# Patient Record
Sex: MALE | Race: WHITE | NOT HISPANIC OR LATINO | Employment: OTHER | URBAN - METROPOLITAN AREA
[De-identification: names, ages, dates, MRNs, and addresses within clinical notes are randomized per-mention and may not be internally consistent; named-entity substitution may affect disease eponyms.]

---

## 2017-01-04 ENCOUNTER — OFFICE VISIT (OUTPATIENT)
Dept: MEDICAL GROUP | Facility: MEDICAL CENTER | Age: 67
End: 2017-01-04
Payer: MEDICARE

## 2017-01-04 VITALS
HEIGHT: 71 IN | OXYGEN SATURATION: 94 % | TEMPERATURE: 97.2 F | DIASTOLIC BLOOD PRESSURE: 74 MMHG | SYSTOLIC BLOOD PRESSURE: 110 MMHG | RESPIRATION RATE: 16 BRPM | HEART RATE: 66 BPM | BODY MASS INDEX: 29.12 KG/M2 | WEIGHT: 208 LBS

## 2017-01-04 DIAGNOSIS — Z79.4 CONTROLLED TYPE 2 DIABETES MELLITUS WITH COMPLICATION, WITH LONG-TERM CURRENT USE OF INSULIN (HCC): ICD-10-CM

## 2017-01-04 DIAGNOSIS — I10 ESSENTIAL HYPERTENSION: ICD-10-CM

## 2017-01-04 DIAGNOSIS — E78.5 DYSLIPIDEMIA: ICD-10-CM

## 2017-01-04 DIAGNOSIS — F41.1 GAD (GENERALIZED ANXIETY DISORDER): ICD-10-CM

## 2017-01-04 DIAGNOSIS — Z12.5 SCREENING FOR MALIGNANT NEOPLASM OF PROSTATE: ICD-10-CM

## 2017-01-04 DIAGNOSIS — Z00.00 HEALTH CARE MAINTENANCE: ICD-10-CM

## 2017-01-04 DIAGNOSIS — E11.8 CONTROLLED TYPE 2 DIABETES MELLITUS WITH COMPLICATION, WITH LONG-TERM CURRENT USE OF INSULIN (HCC): ICD-10-CM

## 2017-01-04 DIAGNOSIS — I25.10 CORONARY ARTERY DISEASE INVOLVING NATIVE CORONARY ARTERY OF NATIVE HEART WITHOUT ANGINA PECTORIS: ICD-10-CM

## 2017-01-04 DIAGNOSIS — K21.9 GASTROESOPHAGEAL REFLUX DISEASE WITHOUT ESOPHAGITIS: ICD-10-CM

## 2017-01-04 LAB
HBA1C MFR BLD: 7.6 % (ref ?–5.8)
INT CON NEG: NEGATIVE
INT CON POS: POSITIVE

## 2017-01-04 PROCEDURE — 99215 OFFICE O/P EST HI 40 MIN: CPT | Performed by: INTERNAL MEDICINE

## 2017-01-04 PROCEDURE — 83036 HEMOGLOBIN GLYCOSYLATED A1C: CPT | Performed by: INTERNAL MEDICINE

## 2017-01-04 ASSESSMENT — PATIENT HEALTH QUESTIONNAIRE - PHQ9
6. FEELING BAD ABOUT YOURSELF - OR THAT YOU ARE A FAILURE OR HAVE LET YOURSELF OR YOUR FAMILY DOWN: 0
5. POOR APPETITE OR OVEREATING: 0
8. MOVING OR SPEAKING SO SLOWLY THAT OTHER PEOPLE COULD HAVE NOTICED. OR THE OPPOSITE, BEING SO FIGETY OR RESTLESS THAT YOU HAVE BEEN MOVING AROUND A LOT MORE THAN USUAL: 0
4. FEELING TIRED OR HAVING LITTLE ENERGY: 0
SUM OF ALL RESPONSES TO PHQ9 QUESTIONS 1 AND 2: 0
3. TROUBLE FALLING OR STAYING ASLEEP OR SLEEPING TOO MUCH: 2
7. TROUBLE CONCENTRATING ON THINGS, SUCH AS READING THE NEWSPAPER OR WATCHING TELEVISION: 0
2. FEELING DOWN, DEPRESSED, IRRITABLE, OR HOPELESS: 0
9. THOUGHTS THAT YOU WOULD BE BETTER OFF DEAD, OR OF HURTING YOURSELF: 0
SUM OF ALL RESPONSES TO PHQ QUESTIONS 1-9: 2
1. LITTLE INTEREST OR PLEASURE IN DOING THINGS: 0

## 2017-01-04 NOTE — Clinical Note
[facility]  Oxana Parkinson M.D.  [address]  [place] [address]  Fax: [phone] Authorization for Release/Disclosure of Protected Health Information   Name: NIRMALA RENNER : 1950 SSN: [id number]   Address: [address] [place] [address] [address] [place] [address] Phone:    [phone] (home)    I authorize the entity listed below to release/disclose the PHI below to [facility]/Oxana Parkinson M.D.   Provider or Entity Name:                                                 Randy Long M.D.   Address   [facility], [facility], [facility]   Phone:      Fax:  [phone]   Reason for request: continuity of care   Information to be released:    [  ] LAST COLONOSCOPY, including any PATH REPORT [  ] LAST DEXA  [  ] LAST MAMMOGRAM  [  ] LAST PAP [  ] RETINA EXAM REPORT  [  ] IMMUNIZATION RECORDS  [ XXXX ] Release all info      [  ] Check here and initial the line next to each item to release ALL health information INCLUDING  _____ Care and treatment for drug and / or alcohol abuse  _____ HIV testing, infection status, or AIDS  _____ Genetic Testing    DATES OF SERVICE OR TIME PERIOD TO BE DISCLOSED: _____________  I understand and acknowledge that:  * This Authorization may be revoked at any time by you in writing, except if your health information has already been used or disclosed.  * Your health information that will be used or disclosed as a result of you signing this authorization could be re-disclosed by the recipient. If this occurs, your re-disclosed health information may no longer be protected by State or Federal laws.  * You may refuse to sign this Authorization. Your refusal will not affect your ability to obtain treatment.  * This Authorization becomes effective upon signing and will  on (date) __________. If no date is indicated, this Authorization will  one (1) year from the signature date.    Name: Nirmala Renner    Signature:     Date: 2017

## 2017-01-04 NOTE — MR AVS SNAPSHOT
"        Luis Renner   2017 11:00 AM   Office Visit   MRN: [id number]    Department:  [place] [address]   Dept Phone:  [phone]    Description:  Male : 1950   Provider:  Oxana Parkinson M.D.           Reason for Visit     Follow-Up           Allergies as of 2017     Allergen Noted Reactions    Byetta 2013       urticaria    Other Environmental 2016       Grass and cats      You were diagnosed with     Controlled type 2 diabetes mellitus with complication, with long-term current use of insulin ([facility])   [2749467]       Essential hypertension   [3230265]       Coronary artery disease involving native coronary artery of native heart without angina pectoris   [2016279]       Dyslipidemia   [784974]       Screening for malignant neoplasm of prostate   [6590777]       Health care maintenance   [624893]       Gastroesophageal reflux disease without esophagitis   [238106]         Vital Signs     Blood Pressure Pulse Temperature Respirations Height Weight    110/74 mmHg 66 36.2 °C (97.2 °F) 16 1.803 m (5' 10.98\") 94.348 kg (208 lb)    Body Mass Index Oxygen Saturation Smoking Status             29.02 kg/m2 94% Never Smoker          Basic Information     Date Of Birth Sex Race Ethnicity Preferred Language    1950 Male White Non- English      Your appointments     2017  1:00 PM   ACU GROUP with [facility] (--)    [address] [place] [address]   [phone]              Problem List              ICD-10-CM Priority Class Noted - Resolved    Hyperlipidemia (Chronic) E78.5 Medium  3/7/2013 - Present    Coronary atherosclerosis of native coronary artery (Chronic) I25.10 High  3/8/2013 - Present    GOUT (Chronic)  Low  2013 - Present    Ischemic cardiomyopathy I25.5 High  2013 - Present    Bilateral renal cysts N28.1 Low  2015 - Present    Panic attacks F41.0   2016 - Present   " Gastroesophageal reflux disease without esophagitis K21.9   1/7/2016 - Present    Coronary artery disease involving native coronary artery of native heart without angina pectoris I25.10   9/23/2016 - Present    Diabetes mellitus type 2, controlled, with complications (HCC) E11.8   9/23/2016 - Present    Gastroesophageal reflux disease without esophagitis K21.9   1/4/2017 - Present      Health Maintenance        Date Due Completion Dates    IMM ZOSTER VACCINE 5/13/2011 ---    URINE ACR / MICROALBUMIN 5/5/2016 5/5/2015, 3/24/2014    IMM PNEUMOCOCCAL 65+ (ADULT) LOW/MEDIUM RISK SERIES (1 of 2 - PCV13) 5/13/2016 3/11/2013    IMM INFLUENZA (1) 9/1/2016 1/7/2016, 11/6/2013, 9/7/2012, 11/12/2010    A1C SCREENING 1/21/2017 7/21/2016, 4/4/2016, 1/7/2016, 10/2/2015, 5/7/2015, 1/13/2015, 7/29/2014, 3/24/2014, 12/24/2013, 9/16/2013, 5/15/2013, 4/11/2013, 3/13/2013, 3/8/2013    RETINAL SCREENING 3/15/2017 3/15/2016, 10/15/2014 (Done), 11/6/2012 (N/S)    Override on 10/15/2014: Done    Override on 11/6/2012: (N/S)    FASTING LIPID PROFILE 11/19/2017 11/19/2016, 8/17/2016, 8/17/2016, 10/2/2015, 7/29/2014, 12/24/2013, 9/16/2013, 4/11/2013, 3/9/2013    SERUM CREATININE 11/19/2017 11/19/2016, 11/19/2016, 8/17/2016, 8/17/2016, 7/27/2016, 7/26/2016, 7/25/2016, 7/24/2016, 7/23/2016, 7/22/2016, 7/20/2016, 5/5/2015, 1/13/2015, 7/29/2014, 3/24/2014, 12/24/2013, 9/16/2013, 5/15/2013, 4/11/2013, 3/13/2013, 3/12/2013, 3/11/2013, 3/10/2013, 3/9/2013, 3/8/2013, 3/7/2013    DIABETES MONOFILAMTENT / LE EXAM 1/4/2018 1/4/2017 (Done), 9/23/2016 (Done), 1/7/2016, 5/7/2015 (Done), 11/6/2013 (N/S)    Override on 1/4/2017: Done    Override on 9/23/2016: Done    Override on 5/7/2015: Done    Override on 11/6/2013: (N/S)    COLONOSCOPY 11/6/2023 11/6/2013 (N/S)    Override on 11/6/2013: (N/S) (due 2018)    IMM DTaP/Tdap/Td Vaccine (2 - Td) 5/7/2025 5/7/2015            Results     POCT  A1C      Component    Glycohemoglobin    7.6    Internal Control  Negative    Negative    Internal Control Positive    Positive                        Current Immunizations     Influenza TIV (IM) 11/6/2013, 9/7/2012, 11/12/2010    Influenza Vaccine Quad Inj (Pf) 1/7/2016    Pneumococcal polysaccharide vaccine (PPSV-23) 3/11/2013  9:15 PM    Tdap Vaccine 5/7/2015      Below and/or attached are the medications your provider expects you to take. Review all of your home medications and newly ordered medications with your provider and/or pharmacist. Follow medication instructions as directed by your provider and/or pharmacist. Please keep your medication list with you and share with your provider. Update the information when medications are discontinued, doses are changed, or new medications (including over-the-counter products) are added; and carry medication information at all times in the event of emergency situations     Allergies:  BYETTA - (reactions not documented)     OTHER ENVIRONMENTAL - (reactions not documented)               Medications  Valid as of: January 04, 2017 - 11:35 AM    Generic Name Brand Name Tablet Size Instructions for use    Allopurinol (Tab) ZYLOPRIM 300 MG Take 1 Tab by mouth every day. continues to take        Aspirin (Tab) aspirin 81 MG Take 81 mg by mouth every day.        Blood Glucose Monitoring Suppl (Misc) Blood Glucose Monitoring Suppl SUPPLIES Test strips order: Test strips for Freestyle Lite  meter. Sig: use four times a day and prn ssx high or low sugar #100 RF x 3        Carvedilol (Tab) COREG 6.25 MG Take 1 Tab by mouth 2 times a day, with meals.        Citalopram Hydrobromide (Tab) CELEXA 20 MG TAKE ONE TABLET BY MOUTH DAILY -GENERICFOR CELEXA        Clopidogrel Bisulfate (Tab) PLAVIX 75 MG Take 1 Tab by mouth every day.        Colesevelam HCl (Tab) WELCHOL 625 MG Take 1,250 mg by mouth every day at 6 PM.        Colesevelam HCl   Take  by mouth.        Docusate Sodium (Cap) COLACE 100 MG Take 100 mg by mouth 3 times a day as needed for  Constipation. Indications: NOT TAKIN NOT  TAKING        Empagliflozin (Tab) JARDIANCE 10 MG Take 10 mg by mouth every day.        Ezetimibe (Tab) ZETIA 10 MG Take 1 Tab by mouth every day. TAKE ONE TABLET BY MOUTH DAILY        Lancets (Misc) Lancets  1 Each by Does not apply route 3 times a day. Lancets order: Lancets for Freestyle Lite meter. Sig: use three times dailyand prn ssx high or low sugar. #100 RF x 3        Linagliptin-Metformin HCl (Tab) Linagliptin-Metformin HCl 2.5-850 MG Take 1 Tab by mouth 2 Times a Day.        Lisinopril (Tab) PRINIVIL 5 MG Take 1 Tab by mouth every day.        Niacin (Antihyperlipidemic) (Tab CR) NIASPAN 1000 MG Take 2 tablets after dinner meal.        Omeprazole (CAPSULE DELAYED RELEASE) PRILOSEC 20 MG TAKE ONE CAPSULE BY MOUTH DAILY -GENERICFOR PRILOSEC        Polyethylene Glycol 3350 (Powder) MIRALAX  Take 17 g by mouth 1 time daily as needed. Indications: not taking NOT taking        Rosuvastatin Calcium (Tab) CRESTOR 20 MG Take 1 Tab by mouth every evening.        Sennosides-Docusate Sodium (Tab) PERICOLACE or SENOKOT S 8.6-50 MG Take 2 Tabs by mouth 4 times a day as needed for Constipation.        Spironolactone (Tab) ALDACTONE 25 MG Take 1 Tab by mouth every day.        .                 Medicines prescribed today were sent to:     [facility] [address] [place], [place] - [address] [facility]    [address] [facility] [address]    Phone: [phone] Fax: [phone]    Open 24 Hours?: No      Medication refill instructions:       If your prescription bottle indicates you have medication refills left, it is not necessary to call your provider’s office. Please contact your pharmacy and they will refill your medication.    If your prescription bottle indicates you do not have any refills left, you may request refills at any time through one of the following ways: The online [employer] system (except Urgent Care), by calling your provider’s office, or by asking your pharmacy to contact your  provider’s office with a refill request. Medication refills are processed only during regular business hours and may not be available until the next business day. Your provider may request additional information or to have a follow-up visit with you prior to refilling your medication.   *Please Note: Medication refills are assigned a new Rx number when refilled electronically. Your pharmacy may indicate that no refills were authorized even though a new prescription for the same medication is available at the pharmacy. Please request the medicine by name with the pharmacy before contacting your provider for a refill.        Your To Do List     Future Labs/Procedures Complete By Expires    COMP METABOLIC PANEL  As directed 1/5/2018    HEMOGLOBIN A1C  As directed 1/5/2018    LIPID PROFILE  As directed 1/5/2018    MICROALBUMIN CREAT RATIO URINE  As directed 1/5/2018    MICROALBUMIN CREAT RATIO URINE  As directed 1/5/2018    PROSTATE SPECIFIC AG SCREENING  As directed 1/5/2018    VITAMIN D,25 HYDROXY  As directed 1/5/2018         [employer] Access Code: Activation code not generated  Current [employer] Status: Active

## 2017-01-09 DIAGNOSIS — M54.2 NECK PAIN: ICD-10-CM

## 2017-01-09 RX ORDER — METHOCARBAMOL 750 MG/1
750 TABLET, FILM COATED ORAL 3 TIMES DAILY PRN
Qty: 60 TAB | Refills: 2 | Status: SHIPPED | OUTPATIENT
Start: 2017-01-09 | End: 2017-04-04

## 2017-01-27 RX ORDER — OMEPRAZOLE 20 MG/1
20 CAPSULE, DELAYED RELEASE ORAL DAILY
Qty: 30 CAP | Refills: 2 | Status: SHIPPED | OUTPATIENT
Start: 2017-01-27 | End: 2017-06-20 | Stop reason: SDUPTHER

## 2017-02-06 RX ORDER — LINAGLIPTIN AND METFORMIN HYDROCHLORIDE 2.5; 85 MG/1; MG/1
TABLET, FILM COATED ORAL
Qty: 180 TAB | Refills: 0 | Status: SHIPPED | OUTPATIENT
Start: 2017-02-06 | End: 2017-05-12 | Stop reason: SDUPTHER

## 2017-02-06 NOTE — TELEPHONE ENCOUNTER
Was the patient seen in the last year in this department? Yes     Does patient have an active prescription for medications requested? No     Received Request Via: Pharmacy     Last Office Visit: 1/4/17    Last Labs: 11/19/16

## 2017-02-28 ENCOUNTER — APPOINTMENT (OUTPATIENT)
Dept: LAB | Facility: MEDICAL CENTER | Age: 67
End: 2017-02-28
Payer: MEDICARE

## 2017-02-28 ENCOUNTER — HOSPITAL ENCOUNTER (OUTPATIENT)
Dept: LAB | Facility: MEDICAL CENTER | Age: 67
End: 2017-02-28
Attending: INTERNAL MEDICINE
Payer: MEDICARE

## 2017-02-28 DIAGNOSIS — E78.5 HYPERLIPIDEMIA: Chronic | ICD-10-CM

## 2017-02-28 DIAGNOSIS — E78.2 MIXED HYPERLIPIDEMIA: Chronic | ICD-10-CM

## 2017-02-28 LAB
ALBUMIN SERPL BCP-MCNC: 4.4 G/DL (ref 3.2–4.9)
ALBUMIN SERPL BCP-MCNC: 4.5 G/DL (ref 3.2–4.9)
ALBUMIN/GLOB SERPL: 1.3 G/DL
ALBUMIN/GLOB SERPL: 1.3 G/DL
ALP SERPL-CCNC: 62 U/L (ref 30–99)
ALP SERPL-CCNC: 64 U/L (ref 30–99)
ALT SERPL-CCNC: 15 U/L (ref 2–50)
ALT SERPL-CCNC: 16 U/L (ref 2–50)
ANION GAP SERPL CALC-SCNC: 11 MMOL/L (ref 0–11.9)
ANION GAP SERPL CALC-SCNC: 8 MMOL/L (ref 0–11.9)
AST SERPL-CCNC: 17 U/L (ref 12–45)
AST SERPL-CCNC: 18 U/L (ref 12–45)
BILIRUB SERPL-MCNC: 0.8 MG/DL (ref 0.1–1.5)
BILIRUB SERPL-MCNC: 0.8 MG/DL (ref 0.1–1.5)
BUN SERPL-MCNC: 27 MG/DL (ref 8–22)
BUN SERPL-MCNC: 27 MG/DL (ref 8–22)
CALCIUM SERPL-MCNC: 9.8 MG/DL (ref 8.5–10.5)
CALCIUM SERPL-MCNC: 9.9 MG/DL (ref 8.5–10.5)
CHLORIDE SERPL-SCNC: 101 MMOL/L (ref 96–112)
CHLORIDE SERPL-SCNC: 101 MMOL/L (ref 96–112)
CHOLEST SERPL-MCNC: 102 MG/DL (ref 100–199)
CHOLEST SERPL-MCNC: 102 MG/DL (ref 100–199)
CO2 SERPL-SCNC: 25 MMOL/L (ref 20–33)
CO2 SERPL-SCNC: 25 MMOL/L (ref 20–33)
CORTIS SERPL-MCNC: 16 UG/DL (ref 0–23)
CREAT SERPL-MCNC: 1.53 MG/DL (ref 0.5–1.4)
CREAT SERPL-MCNC: 1.53 MG/DL (ref 0.5–1.4)
CREAT UR-MCNC: 124.9 MG/DL
EST. AVERAGE GLUCOSE BLD GHB EST-MCNC: 157 MG/DL
GLOBULIN SER CALC-MCNC: 3.5 G/DL (ref 1.9–3.5)
GLOBULIN SER CALC-MCNC: 3.5 G/DL (ref 1.9–3.5)
GLUCOSE SERPL-MCNC: 147 MG/DL (ref 65–99)
GLUCOSE SERPL-MCNC: 149 MG/DL (ref 65–99)
HBA1C MFR BLD: 7.1 % (ref 0–5.6)
HDLC SERPL-MCNC: 39 MG/DL
HDLC SERPL-MCNC: 39 MG/DL
LDLC SERPL CALC-MCNC: 34 MG/DL
LDLC SERPL CALC-MCNC: 34 MG/DL
POTASSIUM SERPL-SCNC: 4.7 MMOL/L (ref 3.6–5.5)
POTASSIUM SERPL-SCNC: 4.7 MMOL/L (ref 3.6–5.5)
PROT 24H UR-MRATE: 36.7 MG/DL (ref 0–15)
PROT SERPL-MCNC: 7.9 G/DL (ref 6–8.2)
PROT SERPL-MCNC: 8 G/DL (ref 6–8.2)
PROT/CREAT UR: 294 MG/G (ref 15–68)
SODIUM SERPL-SCNC: 134 MMOL/L (ref 135–145)
SODIUM SERPL-SCNC: 137 MMOL/L (ref 135–145)
TRIGL SERPL-MCNC: 144 MG/DL (ref 0–149)
TRIGL SERPL-MCNC: 146 MG/DL (ref 0–149)

## 2017-02-28 PROCEDURE — 80053 COMPREHEN METABOLIC PANEL: CPT | Mod: 91

## 2017-02-28 PROCEDURE — 84156 ASSAY OF PROTEIN URINE: CPT

## 2017-02-28 PROCEDURE — 82340 ASSAY OF CALCIUM IN URINE: CPT

## 2017-02-28 PROCEDURE — 80061 LIPID PANEL: CPT

## 2017-02-28 PROCEDURE — 82088 ASSAY OF ALDOSTERONE: CPT

## 2017-02-28 PROCEDURE — 84105 ASSAY OF URINE PHOSPHORUS: CPT

## 2017-02-28 PROCEDURE — 82533 TOTAL CORTISOL: CPT

## 2017-02-28 PROCEDURE — 83036 HEMOGLOBIN GLYCOSYLATED A1C: CPT | Mod: GA

## 2017-02-28 PROCEDURE — 36415 COLL VENOUS BLD VENIPUNCTURE: CPT

## 2017-02-28 PROCEDURE — 82570 ASSAY OF URINE CREATININE: CPT

## 2017-02-28 PROCEDURE — 80061 LIPID PANEL: CPT | Mod: GA,91

## 2017-02-28 PROCEDURE — 80053 COMPREHEN METABOLIC PANEL: CPT

## 2017-03-02 ENCOUNTER — TELEPHONE (OUTPATIENT)
Dept: CARDIOLOGY | Facility: MEDICAL CENTER | Age: 67
End: 2017-03-02

## 2017-03-02 DIAGNOSIS — I25.5 ISCHEMIC CARDIOMYOPATHY: ICD-10-CM

## 2017-03-02 LAB
ALDOST SERPL-MCNC: 21.2 NG/DL
PHOSPHATE 24H UR-MCNC: 55 MG/DL
PHOSPHATE 24H UR-MRATE: NORMAL MG/D (ref 400–1300)
PHOSPHATE/CREAT 24H UR: 478 MG/G
TOTAL VOLUME 1105: NORMAL ML

## 2017-03-02 NOTE — TELEPHONE ENCOUNTER
----- Message from Monica Morrison M.D. sent at 3/1/2017  3:59 PM PST -----    Worsening renal function  D/c aldactone recheck BMP in 2-3 weeks   F/u with SED[facility] Cuenca

## 2017-03-06 LAB
CALCIUM 24H UR-MCNC: 5.3 MG/DL
CALCIUM 24H UR-MRATE: NORMAL MG/D
CALCIUM/CREAT 24H UR: 46 MG/G (ref 20–240)
CREAT 24H UR-MCNC: 115 MG/DL
CREAT 24H UR-MRATE: NORMAL MG/D (ref 800–2100)
TOTAL VOLUME 1105: NORMAL ML

## 2017-03-20 ENCOUNTER — HOSPITAL ENCOUNTER (OUTPATIENT)
Dept: LAB | Facility: MEDICAL CENTER | Age: 67
End: 2017-03-20
Attending: INTERNAL MEDICINE
Payer: MEDICARE

## 2017-03-20 DIAGNOSIS — I25.5 ISCHEMIC CARDIOMYOPATHY: ICD-10-CM

## 2017-03-20 LAB
ANION GAP SERPL CALC-SCNC: 8 MMOL/L (ref 0–11.9)
BUN SERPL-MCNC: 20 MG/DL (ref 8–22)
CALCIUM SERPL-MCNC: 9.5 MG/DL (ref 8.4–10.2)
CHLORIDE SERPL-SCNC: 103 MMOL/L (ref 96–112)
CO2 SERPL-SCNC: 27 MMOL/L (ref 20–33)
CREAT SERPL-MCNC: 1.23 MG/DL (ref 0.5–1.4)
GFR SERPL CREATININE-BSD FRML MDRD: 59 ML/MIN/1.73 M 2
GLUCOSE SERPL-MCNC: 144 MG/DL (ref 65–99)
POTASSIUM SERPL-SCNC: 4 MMOL/L (ref 3.6–5.5)
SODIUM SERPL-SCNC: 138 MMOL/L (ref 135–145)

## 2017-03-20 PROCEDURE — 36415 COLL VENOUS BLD VENIPUNCTURE: CPT

## 2017-03-20 PROCEDURE — 80048 BASIC METABOLIC PNL TOTAL CA: CPT

## 2017-03-31 ENCOUNTER — HOSPITAL ENCOUNTER (OUTPATIENT)
Dept: RADIOLOGY | Facility: MEDICAL CENTER | Age: 67
End: 2017-03-31
Attending: PHYSICAL MEDICINE & REHABILITATION
Payer: MEDICARE

## 2017-03-31 DIAGNOSIS — M47.812 CERVICAL SPONDYLOSIS WITHOUT MYELOPATHY: ICD-10-CM

## 2017-03-31 DIAGNOSIS — S13.9XXA SPRAIN OF NECK, INITIAL ENCOUNTER: ICD-10-CM

## 2017-03-31 PROCEDURE — 72141 MRI NECK SPINE W/O DYE: CPT

## 2017-04-04 ENCOUNTER — OFFICE VISIT (OUTPATIENT)
Dept: MEDICAL GROUP | Facility: MEDICAL CENTER | Age: 67
End: 2017-04-04
Payer: MEDICARE

## 2017-04-04 VITALS
SYSTOLIC BLOOD PRESSURE: 126 MMHG | WEIGHT: 209.66 LBS | BODY MASS INDEX: 29.35 KG/M2 | DIASTOLIC BLOOD PRESSURE: 78 MMHG | HEART RATE: 73 BPM | TEMPERATURE: 97.3 F | RESPIRATION RATE: 14 BRPM | OXYGEN SATURATION: 94 % | HEIGHT: 71 IN

## 2017-04-04 DIAGNOSIS — Z00.00 HEALTH CARE MAINTENANCE: ICD-10-CM

## 2017-04-04 DIAGNOSIS — Z95.1 S/P CABG X 3: ICD-10-CM

## 2017-04-04 DIAGNOSIS — F41.1 GAD (GENERALIZED ANXIETY DISORDER): ICD-10-CM

## 2017-04-04 DIAGNOSIS — E78.5 HYPERLIPIDEMIA, UNSPECIFIED HYPERLIPIDEMIA TYPE: Chronic | ICD-10-CM

## 2017-04-04 DIAGNOSIS — D64.9 ANEMIA, UNSPECIFIED TYPE: ICD-10-CM

## 2017-04-04 DIAGNOSIS — Z12.5 SCREENING FOR MALIGNANT NEOPLASM OF PROSTATE: ICD-10-CM

## 2017-04-04 DIAGNOSIS — I25.10 ATHEROSCLEROSIS OF NATIVE CORONARY ARTERY OF NATIVE HEART WITHOUT ANGINA PECTORIS: Chronic | ICD-10-CM

## 2017-04-04 DIAGNOSIS — I25.5 ISCHEMIC CARDIOMYOPATHY: ICD-10-CM

## 2017-04-04 DIAGNOSIS — E11.8 CONTROLLED TYPE 2 DIABETES MELLITUS WITH COMPLICATION, WITHOUT LONG-TERM CURRENT USE OF INSULIN (HCC): ICD-10-CM

## 2017-04-04 DIAGNOSIS — I25.10 CORONARY ARTERY DISEASE INVOLVING NATIVE CORONARY ARTERY OF NATIVE HEART WITHOUT ANGINA PECTORIS: ICD-10-CM

## 2017-04-04 PROCEDURE — 99214 OFFICE O/P EST MOD 30 MIN: CPT | Performed by: INTERNAL MEDICINE

## 2017-04-04 ASSESSMENT — PATIENT HEALTH QUESTIONNAIRE - PHQ9
2. FEELING DOWN, DEPRESSED, IRRITABLE, OR HOPELESS: 0
SUM OF ALL RESPONSES TO PHQ QUESTIONS 1-9: 0
SUM OF ALL RESPONSES TO PHQ9 QUESTIONS 1 AND 2: 0
6. FEELING BAD ABOUT YOURSELF - OR THAT YOU ARE A FAILURE OR HAVE LET YOURSELF OR YOUR FAMILY DOWN: 0
1. LITTLE INTEREST OR PLEASURE IN DOING THINGS: 0
5. POOR APPETITE OR OVEREATING: 0
4. FEELING TIRED OR HAVING LITTLE ENERGY: 0
9. THOUGHTS THAT YOU WOULD BE BETTER OFF DEAD, OR OF HURTING YOURSELF: 0
7. TROUBLE CONCENTRATING ON THINGS, SUCH AS READING THE NEWSPAPER OR WATCHING TELEVISION: 0
8. MOVING OR SPEAKING SO SLOWLY THAT OTHER PEOPLE COULD HAVE NOTICED. OR THE OPPOSITE, BEING SO FIGETY OR RESTLESS THAT YOU HAVE BEEN MOVING AROUND A LOT MORE THAN USUAL: 0
3. TROUBLE FALLING OR STAYING ASLEEP OR SLEEPING TOO MUCH: 0

## 2017-04-04 NOTE — MR AVS SNAPSHOT
"        Luis Renner   2017 1:00 PM   Office Visit   MRN: [id number]    Department:  [place] [address]   Dept Phone:  [phone]    Description:  Male : 1950   Provider:  Oxana Parkinson M.D.           Reason for Visit     Follow-Up           Allergies as of 2017     Allergen Noted Reactions    Byetta 2013       urticaria    Other Environmental 2016       Grass and cats      You were diagnosed with     Controlled type 2 diabetes mellitus with complication, without long-term current use of insulin (CMS-[facility])   [1818931]       Coronary artery disease involving native coronary artery of native heart without angina pectoris   [9965214]       Atherosclerosis of native coronary artery of native heart without angina pectoris   [9820152]       Ischemic cardiomyopathy   [045197]       S/P CABG x 3   [651167]       Hyperlipidemia, unspecified hyperlipidemia type   [9180774]       Screening for malignant neoplasm of prostate   [5680140]       Health care maintenance   [035783]       Anemia, unspecified type   [1506353]       TEMO (generalized anxiety disorder)   [574904]         Vital Signs     Blood Pressure Pulse Temperature Respirations Height Weight    126/78 mmHg 73 36.3 °C (97.3 °F) 14 1.803 m (5' 11\") 95.1 kg (209 lb 10.5 oz)    Body Mass Index Oxygen Saturation Smoking Status             29.25 kg/m2 94% Never Smoker          Basic Information     Date Of Birth Sex Race Ethnicity Preferred Language    1950 Male White Non- English      Your appointments     2017  2:00 PM   ANNUAL EXAM PREVENTATIVE with Oxana Parkinson M.D.   [facility]    [address]  [address]  [place] [id number] [phone]              Problem List              ICD-10-CM Priority Class Noted - Resolved    Hyperlipidemia (Chronic) E78.5 Medium  3/7/2013 - Present    Coronary atherosclerosis of native coronary artery (Chronic) I25.10 " High  3/8/2013 - Present    GOUT (Chronic)  Low  5/20/2013 - Present    Ischemic cardiomyopathy I25.5 High  7/23/2013 - Present    Bilateral renal cysts N28.1 Low  1/14/2015 - Present    Panic attacks F41.0   1/7/2016 - Present    Gastroesophageal reflux disease without esophagitis K21.9   1/7/2016 - Present    Coronary artery disease involving native coronary artery of native heart without angina pectoris I25.10   9/23/2016 - Present    Diabetes mellitus type 2, controlled, with complications (CMS-HCC) E11.8   9/23/2016 - Present    Gastroesophageal reflux disease without esophagitis K21.9   1/4/2017 - Present    S/P CABG x 3 Z95.1   4/4/2017 - Present    TEMO (generalized anxiety disorder) F41.1   4/4/2017 - Present      Health Maintenance        Date Due Completion Dates    IMM ZOSTER VACCINE 5/13/2011 ---    IMM PNEUMOCOCCAL 65+ (ADULT) LOW/MEDIUM RISK SERIES (1 of 2 - PCV13) 5/13/2016 3/11/2013    RETINAL SCREENING 3/15/2017 3/15/2016, 10/15/2014 (Done), 11/6/2012 (N/S)    Override on 10/15/2014: Done    Override on 11/6/2012: (N/S)    A1C SCREENING 8/28/2017 2/28/2017, 1/4/2017, 7/21/2016, 4/4/2016, 1/7/2016, 10/2/2015, 5/7/2015, 1/13/2015, 7/29/2014, 3/24/2014, 12/24/2013, 9/16/2013, 5/15/2013, 4/11/2013, 3/13/2013, 3/8/2013    FASTING LIPID PROFILE 2/28/2018 2/28/2017, 2/28/2017, 11/19/2016, 8/17/2016, 8/17/2016, 10/2/2015, 7/29/2014, 12/24/2013, 9/16/2013, 4/11/2013, 3/9/2013    URINE ACR / MICROALBUMIN 2/28/2018 2/28/2017 (Done), 5/5/2015, 3/24/2014    Override on 2/28/2017: Done    SERUM CREATININE 3/20/2018 3/20/2017, 2/28/2017, 2/28/2017, 11/19/2016, 11/19/2016, 8/17/2016, 8/17/2016, 7/27/2016, 7/26/2016, 7/25/2016, 7/24/2016, 7/23/2016, 7/22/2016, 7/20/2016, 5/5/2015, 1/13/2015, 7/29/2014, 3/24/2014, 12/24/2013, 9/16/2013, 5/15/2013, 4/11/2013, 3/13/2013, 3/12/2013, 3/11/2013, 3/10/2013, 3/9/2013, 3/8/2013, 3/7/2013    DIABETES MONOFILAMENT / LE EXAM 4/4/2018 4/4/2017 (Done), 1/4/2017 (Done), 9/23/2016  (Done), 1/7/2016, 5/7/2015 (Done), 11/6/2013 (N/S)    Override on 4/4/2017: Done    Override on 1/4/2017: Done    Override on 9/23/2016: Done    Override on 5/7/2015: Done    Override on 11/6/2013: (N/S)    COLONOSCOPY 11/6/2023 11/6/2013 (N/S)    Override on 11/6/2013: (N/S) (due 2018)    IMM DTaP/Tdap/Td Vaccine (2 - Td) 5/7/2025 5/7/2015            Current Immunizations     Influenza TIV (IM) 11/6/2013, 9/7/2012, 11/12/2010    Influenza Vaccine Quad Inj (Pf) 1/7/2016    Pneumococcal polysaccharide vaccine (PPSV-23) 3/11/2013  9:15 PM    Tdap Vaccine 5/7/2015      Below and/or attached are the medications your provider expects you to take. Review all of your home medications and newly ordered medications with your provider and/or pharmacist. Follow medication instructions as directed by your provider and/or pharmacist. Please keep your medication list with you and share with your provider. Update the information when medications are discontinued, doses are changed, or new medications (including over-the-counter products) are added; and carry medication information at all times in the event of emergency situations     Allergies:  BYETTA - (reactions not documented)     OTHER ENVIRONMENTAL - (reactions not documented)               Medications  Valid as of: April 04, 2017 -  1:29 PM    Generic Name Brand Name Tablet Size Instructions for use    Allopurinol (Tab) ZYLOPRIM 300 MG Take 1 Tab by mouth every day. continues to take        Aspirin (Tab) aspirin 81 MG Take 81 mg by mouth every day.        Blood Glucose Monitoring Suppl (Misc) Blood Glucose Monitoring Suppl SUPPLIES Test strips order: Test strips for Freestyle Lite  meter. Sig: use four times a day and prn ssx high or low sugar #100 RF x 3        Carvedilol (Tab) COREG 6.25 MG Take 1 Tab by mouth 2 times a day, with meals.        Citalopram Hydrobromide (Tab) CELEXA 20 MG TAKE ONE TABLET BY MOUTH DAILY -GENERICFOR CELEXA        Clopidogrel Bisulfate (Tab)  PLAVIX 75 MG Take 1 Tab by mouth every day.        Colesevelam HCl   Take  by mouth.        Docusate Sodium (Cap) COLACE 100 MG Take 100 mg by mouth 3 times a day as needed for Constipation. Indications: NOT TAKIN NOT  TAKING        Empagliflozin (Tab) JARDIANCE 10 MG Take 10 mg by mouth every day.        Ezetimibe (Tab) ZETIA 10 MG Take 1 Tab by mouth every day. TAKE ONE TABLET BY MOUTH DAILY        Lancets (Misc) Lancets  1 Each by Does not apply route 3 times a day. Lancets order: Lancets for Freestyle Lite meter. Sig: use three times dailyand prn ssx high or low sugar. #100 RF x 3        Linagliptin-Metformin HCl (Tab) JENTADUETO 2.5-850 MG TAKE ONE TABLET BY MOUTH TWICE A DAY        Lisinopril (Tab) PRINIVIL 5 MG Take 1 Tab by mouth every day.        Omeprazole (CAPSULE DELAYED RELEASE) PRILOSEC 20 MG Take 1 Cap by mouth every day.        Polyethylene Glycol 3350 (Powder) MIRALAX  Take 17 g by mouth 1 time daily as needed. Indications: not taking NOT taking        Rosuvastatin Calcium (Tab) CRESTOR 20 MG Take 1 Tab by mouth every evening.        Sennosides-Docusate Sodium (Tab) PERICOLACE or SENOKOT S 8.6-50 MG Take 2 Tabs by mouth 4 times a day as needed for Constipation.        .                 Medicines prescribed today were sent to:     [facility] [address] [place], [place] - [address] [facility]    [address] [facility] [address]    Phone: [phone] Fax: [phone]    Open 24 Hours?: No      Medication refill instructions:       If your prescription bottle indicates you have medication refills left, it is not necessary to call your provider’s office. Please contact your pharmacy and they will refill your medication.    If your prescription bottle indicates you do not have any refills left, you may request refills at any time through one of the following ways: The online [employer] system (except Urgent Care), by calling your provider’s office, or by asking your pharmacy to contact your provider’s office with a  refill request. Medication refills are processed only during regular business hours and may not be available until the next business day. Your provider may request additional information or to have a follow-up visit with you prior to refilling your medication.   *Please Note: Medication refills are assigned a new Rx number when refilled electronically. Your pharmacy may indicate that no refills were authorized even though a new prescription for the same medication is available at the pharmacy. Please request the medicine by name with the pharmacy before contacting your provider for a refill.        Your To Do List     Future Labs/Procedures Complete By Expires    BASIC METABOLIC PANEL  As directed 4/5/2018    CBC WITH DIFFERENTIAL  As directed 4/5/2018    HEMOGLOBIN A1C  As directed 4/5/2018    PROSTATE SPECIFIC AG SCREENING  As directed 4/5/2018    VITAMIN D,25 HYDROXY  As directed 4/5/2018      Referral     A referral request has been sent to our patient care coordination department. Please allow 3-5 business days for us to process this request and contact you either by phone or mail. If you do not hear from us by the 5th business day, please call us at [phone].           [employer] Access Code: Activation code not generated  Current [employer] Status: Active

## 2017-04-04 NOTE — PROGRESS NOTES
CHIEF COMPLAINT  Chief Complaint   Patient presents with   • Follow-Up   DM    HPI  Patient is a 66 y.o. male patient who presents today for the following     DIABETES MELLITUS TYPE 2, with CKD stage III  Onset:  DM2 for 10 yrs.    Insulin/meds: Jardiance, 10 mg QD; jentadueto 2.5/850 mg BIDF  Used meds as prescribed.    Compliance to meds: yes  Checking feet daily/wear soft socks/shoes: yes    The last A1c: 7.1 today    Checking blood sugar: yes, once daily  Mean BS: postmeal in 180'  Hypoglycemia:  one remote episode   Symptoms of hypoglycemia: sweating, fatigue, pale skin, hunger.    ASA: yes  ACE: yes  Statin: yes, welcol    Diet: regular  Exercise: daily  Body mass index is 29    DIABETES COMPLICATIONS:   Peripheral neuropathy:  No numbness or tingling sensation in the feet.  Retinopathy: No evidence of retinopathy. Last eye exam: 3/2016  Nephropathy: Last microalbumin in 2015              - decreased GFR, stage III  CVS: Denies symptoms of CAD (CP/pressure, exertional dyspnea, edema).    GI: No symptoms of gastropathy (nausea/vomiting).    FH of DM:  none    HYPERTENSION / CAD (st post CABG in 7/16), cardiomyopathy  The patient is on Lisinopril, 5 mg QD, spironolactone, 25 mg QD, carvedilol, 6.25 mg BID; taking as prescribed.    He is not measuring BP at home.  Denies: - headaches, vision problems, tinnitus.  - chest pain/pressure, palpitations, irregular heart beats, exertional, dyspnea, peripheral edema.  Low salt diet: yes  Diet / exercise BMI: as above    FH: multiple    HYPERLIPIDEMIA / LIPID PROFILE  Meds: Crestor, 20 mg QD, Welchol, 625 mg QD. No muscle weakness, cramps, nausea,abdominal discomfort.    Diet / exercise BMI: as above    FH: 2 brothers, father  He has been f/u by cardiology.    Anemia  He developed anemia after CABG .  Denies fatigue, dizziness, lightheadedness, anorexia.     Anxiety  He she has history of anxiety / panic attacks, has been on Celexa for > 10 yrs, currently on 10 mg every  other.        TEMO-7 score:  Points   1. Feeling nervous, anxious, or on edge  0   2. Not being able to stop or control worrying 0   3. Worrying too much about different things 0   4. Trouble relaxing 0   5. Being so restless that it is hard to sit still 0   6. Becoming easily annoyed or irritable 0   7. Feeling afraid as if something awful might happen 0   Total score 0     Depression screen:   1. Little interest or pleasure in doing things:  0  2. Feeling down, depressed, or hopeless:    0     Reviewed PMH, PSH, FH, SH, ALL, HCM/IMM, no changes  Reviewed MEDS, no changes    Patient Active Problem List    Diagnosis Date Noted   • Ischemic cardiomyopathy 07/23/2013     Priority: High   • Coronary atherosclerosis of native coronary artery 03/08/2013     Priority: High   • Hyperlipidemia 03/07/2013     Priority: Medium   • Bilateral renal cysts 01/14/2015     Priority: Low   • GOUT 05/20/2013     Priority: Low   • Gastroesophageal reflux disease without esophagitis 01/04/2017   • Coronary artery disease involving native coronary artery of native heart without angina pectoris 09/23/2016   • Diabetes mellitus type 2, controlled, with complications (CMS-[facility]) 09/23/2016   • Panic attacks 01/07/2016   • Gastroesophageal reflux disease without esophagitis 01/07/2016     CURRENT MEDICATIONS  Current Outpatient Prescriptions   Medication Sig Dispense Refill   • JENTADUETO 2.5-850 MG Tab TAKE ONE TABLET BY MOUTH TWICE A  Tab 0   • omeprazole (PRILOSEC) 20 MG delayed-release capsule Take 1 Cap by mouth every day. 30 Cap 2   • methocarbamol (ROBAXIN) 750 MG Tab Take 1 Tab by mouth 3 times a day as needed. 60 Tab 2   • Colesevelam HCl (WELCHOL PO) Take  by mouth.     • clopidogrel (PLAVIX) 75 MG Tab Take 1 Tab by mouth every day. 30 Tab 11   • JARDIANCE 10 MG Tab Take 10 mg by mouth every day.     • carvedilol (COREG) 6.25 MG Tab Take 1 Tab by mouth 2 times a day, with meals. 180 Tab 3   • lisinopril (PRINIVIL) 5 MG Tab  Take 1 Tab by mouth every day. 90 Tab 3   • spironolactone (ALDACTONE) 25 MG Tab Take 1 Tab by mouth every day. 90 Tab 3   • ezetimibe (ZETIA) 10 MG Tab Take 1 Tab by mouth every day. TAKE ONE TABLET BY MOUTH DAILY 90 Tab 1   • allopurinol (ZYLOPRIM) 300 MG Tab Take 1 Tab by mouth every day. continues to take 90 Tab 0   • citalopram (CELEXA) 20 MG Tab TAKE ONE TABLET BY MOUTH DAILY -GENERICFOR CELEXA 30 Tab 2   • niacin (NIASPAN) 1000 MG CR tablet Take 2 tablets after dinner meal. (Patient not taking: Reported on 1/4/2017) 60 Tab 6   • docusate sodium (COLACE) 100 MG Cap Take 100 mg by mouth 3 times a day as needed for Constipation. Indications: NOT TAKIN NOT  TAKING     • senna-docusate (PERICOLACE OR SENOKOT S) 8.6-50 MG Tab Take 2 Tabs by mouth 4 times a day as needed for Constipation.     • polyethylene glycol 3350 (MIRALAX) Powder Take 17 g by mouth 1 time daily as needed. Indications: not taking NOT taking     • colesevelam (WELCHOL) 625 MG Tab Take 1,250 mg by mouth every day at 6 PM.     • rosuvastatin (CRESTOR) 20 MG Tab Take 1 Tab by mouth every evening. 30 Tab 11   • Blood Glucose Monitoring Suppl SUPPLIES Misc Test strips order: Test strips for Freestyle Lite  meter. Sig: use four times a day and prn ssx high or low sugar #100 RF x 3 300 Each 1   • Lancets Misc 1 Each by Does not apply route 3 times a day. Lancets order: Lancets for Freestyle Lite meter. Sig: use three times dailyand prn ssx high or low sugar. #100 RF x 3 100 Each 3   • aspirin 81 MG tablet Take 81 mg by mouth every day.       No current facility-administered medications for this visit.     ALLERGIES  Allergies: Byetta and Other environmental  PAST MEDICAL HISTORY  Past Medical History   Diagnosis Date   • Chronic anticoagulation    • Indigestion    • Left ventricular apical thrombus (CMS-HCC) March 2013     Inferoapical clot measuring 1.1cm x 0.8cm, started on anticoagulation.   • Hyperlipidemia     • CAD (coronary artery disease) March       ACS. PCI/ANA PAULA x 2 of the LAD (2.5 x 12mm - mid; 2.75 x 16mm - proximal). Distal RCA is occluded; distal circumflex is occluded with good collateralization.   • Bilateral renal cysts    • Bronchitis    • DIABETES MELLITUS       oral diet and insulin   • Myocardial infarct (CMS-[facility]) 3/7/2013   • Psychiatric problem      anxiety   • C. difficile diarrhea      pt with hx of c diff after hospitalization [id number]   • Hypertension      well controlled on meds   • Backpain      -7/10   • Heart burn      SURGICAL HISTORY  He  has past surgical history that includes other neurological surg (); other cardiac surgery (); cholecystectomy (); recovery (2016); multiple coronary artery bypass endo vein harvest (2016); and miller (2016).  SOCIAL HISTORY  Social History   Substance Use Topics   • Smoking status: Never Smoker    • Smokeless tobacco: Never Used   • Alcohol Use: No     Social History     Social History Narrative     FAMILY HISTORY  Family History   Problem Relation Age of Onset   • Cancer Mother    • Diabetes Neg Hx    • Hyperlipidemia Brother      x 2   • Hypertension Father    • Hypertension Brother      x2   • Heart Disease Brother      x2, afib; cad x 1     Family Status   Relation Status Death Age   • Mother     • Father       ROS   Constitutional: Negative for fever, chills.  HENT: Negative for congestion, sore throat.  Eyes: Negative for blurred vision.   Respiratory: Negative for cough, shortness of breath.  Cardiovascular: Negative for chest pain, palpitations. And per HPI.  Gastrointestinal: Negative for heartburn, nausea, abdominal pain.   Genitourinary: Negative for dysuria.  Musculoskeletal: Negative for significant myalgias, back pain and joint pain.   Skin: Negative for rash and itching.   Neuro: Negative for dizziness, weakness and headaches.   Endo/Heme/Allergies: Does not bruise/bleed easily. And per HPI.  Psychiatric/Behavioral: As above    PHYSICAL EXAM  "  Blood pressure 126/78, pulse 73, temperature 36.3 °C (97.4 °F), height 1.803 m (5' 11\"), weight 95.1 kg (209 lb 10.5 oz), SpO2 94 %. Body mass index is 29.25 kg/(m^2).  General:  NAD, well appearing  HEENT:   NC/AT, PERRLA, EOMI, TMs are clear. Oropharyngeal mucosa is pink,  without lesions;  no cervical / supraclavicular  lymphadenopathy, no thyromegaly.    Cardiovascular: RRR.   No m/r/g. No carotid bruits .      Lungs:   CTAB, no w/r/r, no respiratory distress.  Abdomen: Soft, NT/ND + BS; no suprapubic tenderness; no masses or hepatosplenomegaly.  Extremities:  2+ DP and radial pulses bilaterally.  No c/c/e.   Skin:  Warm, dry.  No erythema. No rash.   Neurologic: Alert & oriented x 3. No focal deficits.  Psychiatric:  Affect normal, mood normal, judgment normal.    LABS     Labs are reviewed and discussed with a patient    Lab Results   Component Value Date/Time    CHOLESTEROL, 02/28/2017 08:56 AM    CHOLESTEROL, 02/28/2017 08:56 AM    LDL 34 02/28/2017 08:56 AM    LDL 34 02/28/2017 08:56 AM    HDL 39* 02/28/2017 08:56 AM    HDL 39* 02/28/2017 08:56 AM    TRIGLYCERIDES 146 02/28/2017 08:56 AM    TRIGLYCERIDES 144 02/28/2017 08:56 AM       Lab Results   Component Value Date/Time    SODIUM 138 03/20/2017 07:59 AM    POTASSIUM 4.0 03/20/2017 07:59 AM    CHLORIDE 103 03/20/2017 07:59 AM    CO2 27 03/20/2017 07:59 AM    GLUCOSE 144* 03/20/2017 07:59 AM    BUN 20 03/20/2017 07:59 AM    CREATININE 1.23 03/20/2017 07:59 AM     Lab Results   Component Value Date/Time    ALKALINE PHOSPHATASE 64 02/28/2017 08:56 AM    ALKALINE PHOSPHATASE 62 02/28/2017 08:56 AM    AST(SGOT) 17 02/28/2017 08:56 AM    AST(SGOT) 18 02/28/2017 08:56 AM    ALT(SGPT) 15 02/28/2017 08:56 AM    ALT(SGPT) 16 02/28/2017 08:56 AM    TOTAL BILIRUBIN 0.8 02/28/2017 08:56 AM    TOTAL BILIRUBIN 0.8 02/28/2017 08:56 AM      Lab Results   Component Value Date/Time    GLYCOHEMOGLOBIN 7.1* 02/28/2017 08:56 AM    GLYCOHEMOGLOBIN 7.6 01/04/2017 " 11:23 AM    GLYCOHEMOGLOBIN 7.1* 07/21/2016 06:51 PM     No results found for: TSH  Lab Results   Component Value Date/Time    FREE T-4 0.82 03/08/2013 01:00 AM       Lab Results   Component Value Date/Time    WBC 10.2 07/27/2016 02:15 AM    RBC 3.56* 07/27/2016 02:15 AM    HEMOGLOBIN 10.1* 07/27/2016 02:15 AM    HEMATOCRIT 29.8* 07/27/2016 02:15 AM    MCV 83.7 07/27/2016 02:15 AM    MCH 28.4 07/27/2016 02:15 AM    MCHC 33.9 07/27/2016 02:15 AM    MPV 11.1 07/27/2016 02:15 AM    NEUTROPHILS-POLYS 65.1 09/16/2013 01:13 PM    LYMPHOCYTES 23.8 09/16/2013 01:13 PM    MONOCYTES 5.7 09/16/2013 01:13 PM    EOSINOPHILS 4.2 09/16/2013 01:13 PM    BASOPHILS 1.2 09/16/2013 01:13 PM    HYPOCHROMIA 1+ 09/16/2013 01:13 PM      IMAGING     None    ASSESMENT AND PLAN        1. Controlled type 2 diabetes mellitus with complication, without long-term current use of insulin (CMS-HCC)  Controlled, continue current treatments  - REFERRAL FOR RETINAL SCREENING EXAM  - BASIC METABOLIC PANEL; Future  - HEMOGLOBIN A1C; Future    2. Coronary artery disease involving native coronary artery of native heart without angina pectoris  3. Atherosclerosis of native coronary artery of native heart without angina pectoris  4. Ischemic cardiomyopathy  5. S/P CABG x 3  Controlled, asymptomatic, continue current treatment and cardiology follow-up    6. Hyperlipidemia, unspecified hyperlipidemia type  Controlled, continue current treatment    7. Anemia, unspecified type  Follow-up CBC  - CBC WITH DIFFERENTIAL; Future    8. TEMO (generalized anxiety disorder)  Controlled, continue current treatment regimen    9. Screening for malignant neoplasm of prostate  - PROSTATE SPECIFIC AG SCREENING; Future    10. Health care maintenance  - VITAMIN D,25 HYDROXY; Future    Counseling:   - Smoking:  Nonsmoker    Followup: Return in about 3 months (around 7/4/2017) for Short.    All questions are answered.    Please note that this dictation was created using voice  recognition software, and that there might be errors of mary and possibly content.

## 2017-04-12 DIAGNOSIS — Z76.0 MEDICATION REFILL: ICD-10-CM

## 2017-04-12 RX ORDER — CITALOPRAM 20 MG/1
TABLET ORAL
Qty: 30 TAB | Refills: 2 | Status: SHIPPED | OUTPATIENT
Start: 2017-04-12 | End: 2017-08-25 | Stop reason: SDUPTHER

## 2017-05-01 RX ORDER — ALLOPURINOL 300 MG/1
TABLET ORAL
Qty: 90 TAB | Refills: 1 | Status: SHIPPED | OUTPATIENT
Start: 2017-05-01 | End: 2017-10-23 | Stop reason: SDUPTHER

## 2017-05-08 ENCOUNTER — PATIENT OUTREACH (OUTPATIENT)
Dept: HEALTH INFORMATION MANAGEMENT | Facility: OTHER | Age: 67
End: 2017-05-08

## 2017-05-12 RX ORDER — LINAGLIPTIN AND METFORMIN HYDROCHLORIDE 2.5; 85 MG/1; MG/1
TABLET, FILM COATED ORAL
Qty: 180 TAB | Refills: 3 | Status: SHIPPED | OUTPATIENT
Start: 2017-05-12 | End: 2018-05-22

## 2017-05-18 NOTE — PROGRESS NOTES
Outcome: Left Message    WebIZ Checked & Epic Updated:  yes    [facility] Verified: no    Attempt # 2

## 2017-05-22 NOTE — PROGRESS NOTES
Attempt #:3    WebIZ Checked & Epic Updated: yes  [facility] Verified: NO  Verify PCP: yes    Communication Preference Obtained: yes     Review Care Team: yes    Annual Wellness Visit Scheduling  1. Scheduling Status:Scheduled     Care Gap Scheduling (Attempt to Schedule EACH Overdue Care Gap!)     Health Maintenance Due   Topic Date Due   • Annual Wellness Visit  SCHEDULED   • IMM ZOSTER VACCINE  SCHEDULED   • IMM PNEUMOCOCCAL 65+ (ADULT) LOW/MEDIUM RISK SERIES (1 of 2 - PCV13) SCHEDULED   • RETINAL SCREENING  PT HAS APPT SCHEDULED-PER PT         MyChart Activation: already active  MyChart Jonathan: no  Virtual Visits: no  Opt In to Text Messages: no

## 2017-06-06 ENCOUNTER — OFFICE VISIT (OUTPATIENT)
Dept: MEDICAL GROUP | Facility: MEDICAL CENTER | Age: 67
End: 2017-06-06
Payer: MEDICARE

## 2017-06-06 VITALS
SYSTOLIC BLOOD PRESSURE: 116 MMHG | OXYGEN SATURATION: 94 % | DIASTOLIC BLOOD PRESSURE: 78 MMHG | HEIGHT: 71 IN | HEART RATE: 85 BPM | WEIGHT: 214 LBS | TEMPERATURE: 98.1 F | BODY MASS INDEX: 29.96 KG/M2

## 2017-06-06 DIAGNOSIS — N28.1 BILATERAL RENAL CYSTS: ICD-10-CM

## 2017-06-06 DIAGNOSIS — K21.9 GASTROESOPHAGEAL REFLUX DISEASE WITHOUT ESOPHAGITIS: ICD-10-CM

## 2017-06-06 DIAGNOSIS — E11.8 CONTROLLED TYPE 2 DIABETES MELLITUS WITH COMPLICATION, WITHOUT LONG-TERM CURRENT USE OF INSULIN (HCC): ICD-10-CM

## 2017-06-06 DIAGNOSIS — E78.5 HYPERLIPIDEMIA, UNSPECIFIED HYPERLIPIDEMIA TYPE: Chronic | ICD-10-CM

## 2017-06-06 DIAGNOSIS — Z00.00 MEDICARE ANNUAL WELLNESS VISIT, INITIAL: Primary | ICD-10-CM

## 2017-06-06 DIAGNOSIS — F41.0 PANIC ATTACKS: ICD-10-CM

## 2017-06-06 DIAGNOSIS — Z95.1 S/P CABG X 3: ICD-10-CM

## 2017-06-06 DIAGNOSIS — E13.9 OTHER SPECIFIED DIABETES MELLITUS: ICD-10-CM

## 2017-06-06 DIAGNOSIS — F41.1 GAD (GENERALIZED ANXIETY DISORDER): ICD-10-CM

## 2017-06-06 DIAGNOSIS — I25.10 CORONARY ARTERY DISEASE INVOLVING NATIVE CORONARY ARTERY OF NATIVE HEART WITHOUT ANGINA PECTORIS: ICD-10-CM

## 2017-06-06 DIAGNOSIS — I25.5 ISCHEMIC CARDIOMYOPATHY: ICD-10-CM

## 2017-06-06 PROCEDURE — G0438 PPPS, INITIAL VISIT: HCPCS | Performed by: NURSE PRACTITIONER

## 2017-06-06 PROCEDURE — 3045F PR MOST RECENT HEMOGLOBIN A1C LEVEL 7.0-9.0%: CPT | Performed by: NURSE PRACTITIONER

## 2017-06-06 PROCEDURE — 1036F TOBACCO NON-USER: CPT | Performed by: NURSE PRACTITIONER

## 2017-06-06 PROCEDURE — G8432 DEP SCR NOT DOC, RNG: HCPCS | Performed by: NURSE PRACTITIONER

## 2017-06-06 ASSESSMENT — PATIENT HEALTH QUESTIONNAIRE - PHQ9: CLINICAL INTERPRETATION OF PHQ2 SCORE: 0

## 2017-06-06 NOTE — MR AVS SNAPSHOT
"        Luis Renner   2017 1:40 PM   Office Visit   MRN: [id number]    Department:  [facility]   Dept Phone:  [phone]    Description:  Male : 1950   Provider:  LORENA Spring           Reason for Visit     Annual Exam initial      Allergies as of 2017     Allergen Noted Reactions    Byetta 2013       urticaria    Other Environmental 2016       Grass and cats      You were diagnosed with     Medicare annual wellness visit, initial   [014919]  -  Primary     Bilateral renal cysts   [968118]       Coronary artery disease involving native coronary artery of native heart without angina pectoris   [6045042]       Other specified diabetes mellitus   [5722412]       TEMO (generalized anxiety disorder)   [819032]       Gastroesophageal reflux disease without esophagitis   [564755]       Hyperlipidemia, unspecified hyperlipidemia type   [5887018]       Ischemic cardiomyopathy   [931083]       Panic attacks   [132567]       S/P CABG x 3   [379391]       Controlled type 2 diabetes mellitus with complication, without long-term current use of insulin (CMS-[facility])   [2330615]         Vital Signs     Blood Pressure Pulse Temperature Height Weight Body Mass Index    116/78 mmHg 85 36.7 °C (98.1 °F) 1.803 m (5' 11\") 97.07 kg (214 lb) 29.86 kg/m2    Oxygen Saturation Smoking Status                94% Never Smoker           Basic Information     Date Of Birth Sex Race Ethnicity Preferred Language    1950 Male White Non- English      Your appointments     2017  2:00 PM   ANNUAL EXAM PREVENTATIVE with Oxana Parkinson M.D.   [facility]    [address]  [address]  [place] [id number] [phone]              Problem List              ICD-10-CM Priority Class Noted - Resolved    Hyperlipidemia (Chronic) E78.5 Medium  3/7/2013 - Present    Coronary atherosclerosis of native coronary artery (Chronic) I25.10 High "  3/8/2013 - Present    GOUT (Chronic)  Low  5/20/2013 - Present    Ischemic cardiomyopathy I25.5 High  7/23/2013 - Present    Bilateral renal cysts N28.1 Low  1/14/2015 - Present    Panic attacks F41.0   1/7/2016 - Present    Coronary artery disease involving native coronary artery of native heart without angina pectoris I25.10   9/23/2016 - Present    Diabetes mellitus type 2, controlled, with complications (CMS-HCC) E11.8   9/23/2016 - Present    Gastroesophageal reflux disease without esophagitis K21.9   1/4/2017 - Present    S/P CABG x 3 Z95.1   4/4/2017 - Present    TEMO (generalized anxiety disorder) F41.1   4/4/2017 - Present      Health Maintenance        Date Due Completion Dates    IMM ZOSTER VACCINE 5/13/2011 ---    IMM PNEUMOCOCCAL 65+ (ADULT) LOW/MEDIUM RISK SERIES (1 of 2 - PCV13) 5/13/2016 3/11/2013    RETINAL SCREENING 3/15/2017 3/15/2016, 10/15/2014 (Done), 11/6/2012 (N/S)    Override on 10/15/2014: Done    Override on 11/6/2012: (N/S)    A1C SCREENING 8/28/2017 2/28/2017, 1/4/2017, 7/21/2016, 4/4/2016, 1/7/2016, 10/2/2015, 5/7/2015, 1/13/2015, 7/29/2014, 3/24/2014, 12/24/2013, 9/16/2013, 5/15/2013, 4/11/2013, 3/13/2013, 3/8/2013    FASTING LIPID PROFILE 2/28/2018 2/28/2017, 2/28/2017, 11/19/2016, 8/17/2016, 8/17/2016, 10/2/2015, 7/29/2014, 12/24/2013, 9/16/2013, 4/11/2013, 3/9/2013    URINE ACR / MICROALBUMIN 2/28/2018 2/28/2017 (Done), 5/5/2015, 3/24/2014    Override on 2/28/2017: Done    SERUM CREATININE 3/20/2018 3/20/2017, 2/28/2017, 2/28/2017, 11/19/2016, 11/19/2016, 8/17/2016, 8/17/2016, 7/27/2016, 7/26/2016, 7/25/2016, 7/24/2016, 7/23/2016, 7/22/2016, 7/20/2016, 5/5/2015, 1/13/2015, 7/29/2014, 3/24/2014, 12/24/2013, 9/16/2013, 5/15/2013, 4/11/2013, 3/13/2013, 3/12/2013, 3/11/2013, 3/10/2013, 3/9/2013, 3/8/2013, 3/7/2013    DIABETES MONOFILAMENT / LE EXAM 4/4/2018 4/4/2017 (Done), 1/4/2017 (Done), 9/23/2016 (Done), 1/7/2016, 5/7/2015 (Done), 11/6/2013 (N/S)    Override on 4/4/2017: Done     Override on 1/4/2017: Done    Override on 9/23/2016: Done    Override on 5/7/2015: Done    Override on 11/6/2013: (N/S)    COLONOSCOPY 11/6/2023 11/6/2013 (N/S)    Override on 11/6/2013: (N/S) (due 2018)    IMM DTaP/Tdap/Td Vaccine (2 - Td) 5/7/2025 5/7/2015            Current Immunizations     Influenza TIV (IM) 11/6/2013, 9/7/2012, 11/12/2010    Influenza Vaccine Quad Inj (Pf) 1/7/2016    Pneumococcal polysaccharide vaccine (PPSV-23) 3/11/2013  9:15 PM    Tdap Vaccine 5/7/2015      Below and/or attached are the medications your provider expects you to take. Review all of your home medications and newly ordered medications with your provider and/or pharmacist. Follow medication instructions as directed by your provider and/or pharmacist. Please keep your medication list with you and share with your provider. Update the information when medications are discontinued, doses are changed, or new medications (including over-the-counter products) are added; and carry medication information at all times in the event of emergency situations     Allergies:  BYETTA - (reactions not documented)     OTHER ENVIRONMENTAL - (reactions not documented)               Medications  Valid as of: June 06, 2017 -  2:59 PM    Generic Name Brand Name Tablet Size Instructions for use    Allopurinol (Tab) ZYLOPRIM 300 MG TAKE ONE TABLET BY MOUTH EVERY DAY        Aspirin (Tab) aspirin 81 MG Take 81 mg by mouth every day.        Blood Glucose Monitoring Suppl (Misc) Blood Glucose Monitoring Suppl SUPPLIES Test strips order: Test strips for Freestyle Lite  meter. Sig: use four times a day and prn ssx high or low sugar #100 RF x 3        Carvedilol (Tab) COREG 6.25 MG Take 1 Tab by mouth 2 times a day, with meals.        Citalopram Hydrobromide (Tab) CELEXA 20 MG TAKE ONE TABLET BY MOUTH DAILY -GENERICFOR CELEXA        Clopidogrel Bisulfate (Tab) PLAVIX 75 MG Take 1 Tab by mouth every day.        Colesevelam HCl   Take  by mouth.        Docusate  Sodium (Cap) COLACE 100 MG Take 100 mg by mouth 3 times a day as needed for Constipation. Indications: NOT TAKIN NOT  TAKING        Empagliflozin (Tab) JARDIANCE 10 MG Take 10 mg by mouth every day.        Ezetimibe (Tab) ZETIA 10 MG Take 1 Tab by mouth every day. TAKE ONE TABLET BY MOUTH DAILY        Lancets (Misc) Lancets  1 Each by Does not apply route 3 times a day. Lancets order: Lancets for Freestyle Lite meter. Sig: use three times dailyand prn ssx high or low sugar. #100 RF x 3        Linagliptin-Metformin HCl (Tab) JENTADUETO 2.5-850 MG TAKE ONE TABLET BY MOUTH TWICE A DAY        Lisinopril (Tab) PRINIVIL 5 MG Take 1 Tab by mouth every day.        Omeprazole (CAPSULE DELAYED RELEASE) PRILOSEC 20 MG Take 1 Cap by mouth every day.        Polyethylene Glycol 3350 (Powder) MIRALAX  Take 17 g by mouth 1 time daily as needed. Indications: not taking NOT taking        Rosuvastatin Calcium (Tab) CRESTOR 20 MG Take 1 Tab by mouth every evening.        .                 Medicines prescribed today were sent to:     [id number] - [place], [place] - [address]    [address] [place] [address]    Phone: [phone] Fax: [phone]    Open 24 Hours?: No      Medication refill instructions:       If your prescription bottle indicates you have medication refills left, it is not necessary to call your provider’s office. Please contact your pharmacy and they will refill your medication.    If your prescription bottle indicates you do not have any refills left, you may request refills at any time through one of the following ways: The online [employer] system (except Urgent Care), by calling your provider’s office, or by asking your pharmacy to contact your provider’s office with a refill request. Medication refills are processed only during regular business hours and may not be available until the next business day. Your provider may request additional information or to have a follow-up visit with you prior to refilling  your medication.   *Please Note: Medication refills are assigned a new Rx number when refilled electronically. Your pharmacy may indicate that no refills were authorized even though a new prescription for the same medication is available at the pharmacy. Please request the medicine by name with the pharmacy before contacting your provider for a refill.        Instructions    Continue with care through Oxana Parkinson M.D..  Next Medicare Annual Wellness Visit is due in one year.    Continue care with specialists you are seeing for your chronic problems.    Attached is some preventative information for you to read.          Fall Prevention and Home Safety  Falls cause injuries and can affect all age groups. It is possible to prevent falls.   HOW TO PREVENT FALLS  · Wear shoes with rubber soles that do not have an opening for your toes.   · Keep the inside and outside of your house well lit.   · Use night lights throughout your home.   · Remove clutter from floors.   · Clean up floor spills.   · Remove throw rugs or fasten them to the floor with carpet tape.   · Do not place electrical cords across pathways.   · Put grab bars by your tub, shower, and toilet. Do not use towel bars as grab bars.   · Put handrails on both sides of the stairway. Fix loose handrails.   · Do not climb on stools or stepladders, if possible.   · Do not wax your floors.   · Repair uneven or unsafe sidewalks, walkways, or stairs.   · Keep items you use a lot within reach.   · Be aware of pets.   · Keep emergency numbers next to the telephone.   · Put smoke detectors in your home and near bedrooms.   Ask your doctor what other things you can do to prevent falls.  Document Released: 10/14/2010 Document Revised: 06/18/2013 Document Reviewed: 03/19/2013  ExitCare® Patient Information ©2013 [employer] [place].    Exercise to Stay Healthy      Exercise helps you become and stay healthy.  EXERCISE IDEAS AND TIPS  Choose exercises that:  · You enjoy.      · Fit into your day.   You do not need to exercise really hard to be healthy. You can do exercises at a slow or medium level and stay healthy. You can:  · Stretch before and after working out.   · Try yoga, Pilates, or sobia chi.   · Lift weights.   · Walk fast, swim, jog, run, climb stairs, bicycle, dance, or rollerskate.   · Take aerobic classes.   Exercises that burn about 150 calories:  · Running 1 ½ miles in 15 minutes.   · Playing volleyball for 45 to 60 minutes.   · Washing and waxing a car for 45 to 60 minutes.   · Playing touch football for 45 minutes.   · Walking 1 ¾ miles in 35 minutes.   · Pushing a stroller 1 ½ miles in 30 minutes.   · Playing basketball for 30 minutes.   · Raking leaves for 30 minutes.   · Bicycling 5 miles in 30 minutes.   · Walking 2 miles in 30 minutes.   · Dancing for 30 minutes.   · Shoveling snow for 15 minutes.   · Swimming laps for 20 minutes.   · Walking up stairs for 15 minutes.   · Bicycling 4 miles in 15 minutes.   · Gardening for 30 to 45 minutes.   · Jumping rope for 15 minutes.   · Washing windows or floors for 45 to 60 minutes.     One suggestion is to start walking for 10 minutes after each meal.  This will help with digestion and help you to metabolize your meal.       For Weight Loss -   Recommend portion sizes with more fruits/vegetables/high fiber foods.  Stay away from white bread/pastas/white rice/white potatoes.  Increase Fluid intake to 6-8 glasses of water daily.   Eliminate soda's (diet and regular) from your fluid intake.      Document Released: 01/20/2012 Document Revised: 03/11/2013 Document Reviewed: 01/20/2012  ExitCare® Patient Information ©2013 [employer].    Fat and Cholesterol Control Diet  Cholesterol is a wax-like substance. It comes from your liver and is found in certain foods. There is good (HDL) and bad (LDL) cholesterol. Too much cholesterol in your blood can affect your heart. Certain foods can lower or raise your cholesterol. Eat foods  that are low in cholesterol.  Saturated and trans fats are bad fats found in foods that will raise your cholesterol. Do not eat foods that are high in saturated and trans fats.  FOODS HIGHER IN SATURATED AND TRANS FATS  · Dairy products, such as whole milk, eggs, cheese, cream, and butter.   · Fatty meats, such as hot dogs, sausage, and salami.   · Fried foods.   · Trans fats which are found in margarine and pre-made cookies, crackers, and baked goods.   · Tropical oils, such as coconut and palm oils.   Read package labels at the store. Do not buy products that use saturated or trans fats or hydrogenated oils. Find foods labeled:  · Low-fat.   · Low-saturated fat.   · Trans-fat-free.   · Low-cholesterol.   FOODS LOWER IN CHOLESTEROL  ·  Fruit.   · Vegetables.   · Beans, peas, and lentils.   · Fish.   · Lean meat, such as chicken (without skin) or ground turkey.   · Grains, such as barley, rice, couscous, bulgur wheat, and pasta.   · Heart-healthy tub margarine.   PREPARING YOUR FOOD  · Broil, bake, steam, or roast foods. Do not franklin food.   · Use non-stick cooking sprays.   · Use lemon or herbs to flavor food instead of using butter or stick margarine.   · Use nonfat yogurt, salsa, or low-fat dressings for salads.   Document Released: 06/18/2013 Document Reviewed: 06/18/2013  ExitCare® Patient Information ©2013 [employer].    Recommend annual flu vaccine.  Next due in Fall, 2015.  If you decide not to have the flu vaccine, recommend good handwashing and staying out of crowds during flu season.        Basic Carbohydrate Counting for Diabetes Mellitus  Carbohydrate counting is a method for keeping track of the amount of carbohydrates you eat. Eating carbohydrates naturally increases the level of sugar (glucose) in your blood, so it is important for you to know the amount that is okay for you to have in every meal. Carbohydrate counting helps keep the level of glucose in your blood within normal limits. The amount of  "carbohydrates allowed is different for every person. A dietitian can help you calculate the amount that is right for you. Once you know the amount of carbohydrates you can have, you can count the carbohydrates in the foods you want to eat.  Carbohydrates are found in the following foods:  · Grains, such as breads and cereals.  · Dried beans and soy products.  · Starchy vegetables, such as potatoes, peas, and corn.  · Fruit and fruit juices.  · Milk and yogurt.  · Sweets and snack foods, such as cake, cookies, candy, chips, soft drinks, and fruit drinks.  CARBOHYDRATE COUNTING  There are two ways to count the carbohydrates in your food. You can use either of the methods or a combination of both.  Reading the \"Nutrition Facts\" on Packaged Food  The \"Nutrition Facts\" is an area that is included on the labels of almost all packaged food and beverages in the [place]. It includes the serving size of that food or beverage and information about the nutrients in each serving of the food, including the grams (g) of carbohydrate per serving.   Decide the number of servings of this food or beverage that you will be able to eat or drink. Multiply that number of servings by the number of grams of carbohydrate that is listed on the label for that serving. The total will be the amount of carbohydrates you will be having when you eat or drink this food or beverage.  Learning Standard Serving Sizes of Food  When you eat food that is not packaged or does not include \"Nutrition Facts\" on the label, you need to measure the servings in order to count the amount of carbohydrates. A serving of most carbohydrate-rich foods contains about 15 g of carbohydrates. The following list includes serving sizes of carbohydrate-rich foods that provide 15 g of carbohydrate per serving:    · 1 slice of bread (1 oz) or 1 six-inch tortilla.    · ½ of a hamburger bun or English muffin.  · 4-6 crackers.  · ¾ cup unsweetened dry cereal.    · ½ cup hot " cereal.  ·  cup rice or pasta.    · ½ cup mashed potatoes or ¼ of a large baked potato.  · 1 cup fresh fruit or one small piece of fruit.    · ½ cup canned or frozen fruit or fruit juice.  · 1 cup milk.  ·  cup plain fat-free yogurt or yogurt sweetened with artificial sweeteners.  · ½ cup cooked dried beans or starchy vegetable, such as peas, corn, or potatoes.    Decide the number of standard-size servings that you will eat. Multiply that number of servings by 15 (the grams of carbohydrates in that serving). For example, if you eat 2 cups of strawberries, you will have eaten 2 servings and 30 g of carbohydrates (2 servings x 15 g = 30 g). For foods such as soups and casseroles, in which more than one food is mixed in, you will need to count the carbohydrates in each food that is included.  EXAMPLE OF CARBOHYDRATE COUNTING  Sample Dinner   · 3 oz chicken breast.  ·  cup of brown rice.  · ½ cup of corn.  · 1 cup milk.    · 1 cup strawberries with sugar-free whipped topping.    Carbohydrate Calculation   Step 1: Identify the foods that contain carbohydrates:   · Rice.    · Corn.    · Milk.    · Strawberries.  Step 2: Calculate the number of servings eaten of each:   · 2 servings of rice.    · 1 serving of corn.    · 1 serving of milk.    · 1 serving of strawberries.  Step 3:  Multiply each of those number of servings by 15 g:   · 2 servings of rice x 15 g = 30 g.    · 1 serving of corn x 15 g = 15 g.    · 1 serving of milk x 15 g = 15 g.    · 1 serving of strawberries x 15 g = 15 g.  Step 4: Add together all of the amounts to find the total grams of carbohydrates eaten: 30 g + 15 g + 15 g + 15 g = 75 g.     This information is not intended to replace advice given to you by your health care provider. Make sure you discuss any questions you have with your health care provider.     Document Released: 12/18/2006 Document Revised: 01/08/2016 Document Reviewed: 11/14/2014  Elsevier Interactive Patient Education ©2016  Elsevier Inc.            [employer] Access Code: Activation code not generated  Current [employer] Status: Active

## 2017-06-06 NOTE — ASSESSMENT & PLAN NOTE
Chronic condition managed with current medical regimen  Stable per review   Continue with current meds  Followed by Oxana Parkinson M.D..

## 2017-06-06 NOTE — ASSESSMENT & PLAN NOTE
Chronic condition managed with current medical regimen  3/20/2017   Glucose 144 (H) 65 - 99 mg/dL Final   Home testing fasting average 120   Continue with diet management and current meds  Followed by Oxana Parkinson M.D..

## 2017-06-06 NOTE — ASSESSMENT & PLAN NOTE
"Most recent renal u/s done 4/4/2016, \" 1.  There are bilateral simple renal cortical cyst.\"  Followed by Oxana Parkinson M.D..   "

## 2017-06-06 NOTE — PATIENT INSTRUCTIONS
Continue with care through Oxana Parkinson M.D..  Next Medicare Annual Wellness Visit is due in one year.    Continue care with specialists you are seeing for your chronic problems.    Attached is some preventative information for you to read.          Fall Prevention and Home Safety  Falls cause injuries and can affect all age groups. It is possible to prevent falls.   HOW TO PREVENT FALLS  · Wear shoes with rubber soles that do not have an opening for your toes.   · Keep the inside and outside of your house well lit.   · Use night lights throughout your home.   · Remove clutter from floors.   · Clean up floor spills.   · Remove throw rugs or fasten them to the floor with carpet tape.   · Do not place electrical cords across pathways.   · Put grab bars by your tub, shower, and toilet. Do not use towel bars as grab bars.   · Put handrails on both sides of the stairway. Fix loose handrails.   · Do not climb on stools or stepladders, if possible.   · Do not wax your floors.   · Repair uneven or unsafe sidewalks, walkways, or stairs.   · Keep items you use a lot within reach.   · Be aware of pets.   · Keep emergency numbers next to the telephone.   · Put smoke detectors in your home and near bedrooms.   Ask your doctor what other things you can do to prevent falls.  Document Released: 10/14/2010 Document Revised: 06/18/2013 Document Reviewed: 03/19/2013  ExitCare® Patient Information ©2013 [employer].    Exercise to Stay Healthy      Exercise helps you become and stay healthy.  EXERCISE IDEAS AND TIPS  Choose exercises that:  · You enjoy.   · Fit into your day.   You do not need to exercise really hard to be healthy. You can do exercises at a slow or medium level and stay healthy. You can:  · Stretch before and after working out.   · Try yoga, Pilates, or sobia chi.   · Lift weights.   · Walk fast, swim, jog, run, climb stairs, bicycle, dance, or rollerskate.   · Take aerobic classes.   Exercises that burn about  150 calories:  · Running 1 ½ miles in 15 minutes.   · Playing volleyball for 45 to 60 minutes.   · Washing and waxing a car for 45 to 60 minutes.   · Playing touch football for 45 minutes.   · Walking 1 ¾ miles in 35 minutes.   · Pushing a stroller 1 ½ miles in 30 minutes.   · Playing basketball for 30 minutes.   · Raking leaves for 30 minutes.   · Bicycling 5 miles in 30 minutes.   · Walking 2 miles in 30 minutes.   · Dancing for 30 minutes.   · Shoveling snow for 15 minutes.   · Swimming laps for 20 minutes.   · Walking up stairs for 15 minutes.   · Bicycling 4 miles in 15 minutes.   · Gardening for 30 to 45 minutes.   · Jumping rope for 15 minutes.   · Washing windows or floors for 45 to 60 minutes.     One suggestion is to start walking for 10 minutes after each meal.  This will help with digestion and help you to metabolize your meal.       For Weight Loss -   Recommend portion sizes with more fruits/vegetables/high fiber foods.  Stay away from white bread/pastas/white rice/white potatoes.  Increase Fluid intake to 6-8 glasses of water daily.   Eliminate soda's (diet and regular) from your fluid intake.      Document Released: 01/20/2012 Document Revised: 03/11/2013 Document Reviewed: 01/20/2012  ExitCare® Patient Information ©2013 [employer].    Fat and Cholesterol Control Diet  Cholesterol is a wax-like substance. It comes from your liver and is found in certain foods. There is good (HDL) and bad (LDL) cholesterol. Too much cholesterol in your blood can affect your heart. Certain foods can lower or raise your cholesterol. Eat foods that are low in cholesterol.  Saturated and trans fats are bad fats found in foods that will raise your cholesterol. Do not eat foods that are high in saturated and trans fats.  FOODS HIGHER IN SATURATED AND TRANS FATS  · Dairy products, such as whole milk, eggs, cheese, cream, and butter.   · Fatty meats, such as hot dogs, sausage, and salami.   · Fried foods.   · Trans fats  which are found in margarine and pre-made cookies, crackers, and baked goods.   · Tropical oils, such as coconut and palm oils.   Read package labels at the store. Do not buy products that use saturated or trans fats or hydrogenated oils. Find foods labeled:  · Low-fat.   · Low-saturated fat.   · Trans-fat-free.   · Low-cholesterol.   FOODS LOWER IN CHOLESTEROL  ·  Fruit.   · Vegetables.   · Beans, peas, and lentils.   · Fish.   · Lean meat, such as chicken (without skin) or ground turkey.   · Grains, such as barley, rice, couscous, bulgur wheat, and pasta.   · Heart-healthy tub margarine.   PREPARING YOUR FOOD  · Broil, bake, steam, or roast foods. Do not franklin food.   · Use non-stick cooking sprays.   · Use lemon or herbs to flavor food instead of using butter or stick margarine.   · Use nonfat yogurt, salsa, or low-fat dressings for salads.   Document Released: 06/18/2013 Document Reviewed: 06/18/2013  ExitCare® Patient Information ©2013 [employer].    Recommend annual flu vaccine.  Next due in Fall, 2015.  If you decide not to have the flu vaccine, recommend good handwashing and staying out of crowds during flu season.        Basic Carbohydrate Counting for Diabetes Mellitus  Carbohydrate counting is a method for keeping track of the amount of carbohydrates you eat. Eating carbohydrates naturally increases the level of sugar (glucose) in your blood, so it is important for you to know the amount that is okay for you to have in every meal. Carbohydrate counting helps keep the level of glucose in your blood within normal limits. The amount of carbohydrates allowed is different for every person. A dietitian can help you calculate the amount that is right for you. Once you know the amount of carbohydrates you can have, you can count the carbohydrates in the foods you want to eat.  Carbohydrates are found in the following foods:  · Grains, such as breads and cereals.  · Dried beans and soy products.  · Starchy  "vegetables, such as potatoes, peas, and corn.  · Fruit and fruit juices.  · Milk and yogurt.  · Sweets and snack foods, such as cake, cookies, candy, chips, soft drinks, and fruit drinks.  CARBOHYDRATE COUNTING  There are two ways to count the carbohydrates in your food. You can use either of the methods or a combination of both.  Reading the \"Nutrition Facts\" on Packaged Food  The \"Nutrition Facts\" is an area that is included on the labels of almost all packaged food and beverages in the [place]. It includes the serving size of that food or beverage and information about the nutrients in each serving of the food, including the grams (g) of carbohydrate per serving.   Decide the number of servings of this food or beverage that you will be able to eat or drink. Multiply that number of servings by the number of grams of carbohydrate that is listed on the label for that serving. The total will be the amount of carbohydrates you will be having when you eat or drink this food or beverage.  Learning Standard Serving Sizes of Food  When you eat food that is not packaged or does not include \"Nutrition Facts\" on the label, you need to measure the servings in order to count the amount of carbohydrates. A serving of most carbohydrate-rich foods contains about 15 g of carbohydrates. The following list includes serving sizes of carbohydrate-rich foods that provide 15 g of carbohydrate per serving:    · 1 slice of bread (1 oz) or 1 six-inch tortilla.    · ½ of a hamburger bun or English muffin.  · 4-6 crackers.  · ¾ cup unsweetened dry cereal.    · ½ cup hot cereal.  ·  cup rice or pasta.    · ½ cup mashed potatoes or ¼ of a large baked potato.  · 1 cup fresh fruit or one small piece of fruit.    · ½ cup canned or frozen fruit or fruit juice.  · 1 cup milk.  ·  cup plain fat-free yogurt or yogurt sweetened with artificial sweeteners.  · ½ cup cooked dried beans or starchy vegetable, such as peas, corn, or potatoes. "    Decide the number of standard-size servings that you will eat. Multiply that number of servings by 15 (the grams of carbohydrates in that serving). For example, if you eat 2 cups of strawberries, you will have eaten 2 servings and 30 g of carbohydrates (2 servings x 15 g = 30 g). For foods such as soups and casseroles, in which more than one food is mixed in, you will need to count the carbohydrates in each food that is included.  EXAMPLE OF CARBOHYDRATE COUNTING  Sample Dinner   · 3 oz chicken breast.  ·  cup of brown rice.  · ½ cup of corn.  · 1 cup milk.    · 1 cup strawberries with sugar-free whipped topping.    Carbohydrate Calculation   Step 1: Identify the foods that contain carbohydrates:   · Rice.    · Corn.    · Milk.    · Strawberries.  Step 2: Calculate the number of servings eaten of each:   · 2 servings of rice.    · 1 serving of corn.    · 1 serving of milk.    · 1 serving of strawberries.  Step 3:  Multiply each of those number of servings by 15 g:   · 2 servings of rice x 15 g = 30 g.    · 1 serving of corn x 15 g = 15 g.    · 1 serving of milk x 15 g = 15 g.    · 1 serving of strawberries x 15 g = 15 g.  Step 4: Add together all of the amounts to find the total grams of carbohydrates eaten: 30 g + 15 g + 15 g + 15 g = 75 g.     This information is not intended to replace advice given to you by your health care provider. Make sure you discuss any questions you have with your health care provider.     Document Released: 12/18/2006 Document Revised: 01/08/2016 Document Reviewed: 11/14/2014  [employer] Interactive Patient Education ©2016 [employer]

## 2017-06-06 NOTE — ASSESSMENT & PLAN NOTE
"Chronic condition managed with current medical regimen  States has \" a very active adrenal gland\"   Continue with current meds effective  Followed by Oxana Parkinson M.D..      "

## 2017-06-06 NOTE — ASSESSMENT & PLAN NOTE
Chronic condition managed with current medical regimen  Labs reviewed    Continue with current meds  Followed by Oxana Parkinson M.D..

## 2017-06-06 NOTE — PROGRESS NOTES
CC:   Medicare Annual Wellness Visit    HPI:  Luis is a 67 y.o. here for Medicare Annual Wellness Visit    Patient Active Problem List    Diagnosis Date Noted   • Ischemic cardiomyopathy 07/23/2013     Priority: High   • Coronary atherosclerosis of native coronary artery 03/08/2013     Priority: High   • Hyperlipidemia 03/07/2013     Priority: Medium   • Bilateral renal cysts 01/14/2015     Priority: Low   • GOUT 05/20/2013     Priority: Low   • S/P CABG x 3 04/04/2017   • TEMO (generalized anxiety disorder) 04/04/2017   • Gastroesophageal reflux disease without esophagitis 01/04/2017   • Coronary artery disease involving native coronary artery of native heart without angina pectoris 09/23/2016   • Diabetes mellitus type 2, controlled, with complications (CMS-HCC) 09/23/2016   • Panic attacks 01/07/2016     Current Outpatient Prescriptions   Medication Sig Dispense Refill   • JENTADUETO 2.5-850 MG Tab TAKE ONE TABLET BY MOUTH TWICE A  Tab 3   • allopurinol (ZYLOPRIM) 300 MG Tab TAKE ONE TABLET BY MOUTH EVERY DAY 90 Tab 1   • citalopram (CELEXA) 20 MG Tab TAKE ONE TABLET BY MOUTH DAILY -GENERICFOR CELEXA 30 Tab 2   • omeprazole (PRILOSEC) 20 MG delayed-release capsule Take 1 Cap by mouth every day. 30 Cap 2   • Colesevelam HCl (WELCHOL PO) Take  by mouth.     • clopidogrel (PLAVIX) 75 MG Tab Take 1 Tab by mouth every day. 30 Tab 11   • JARDIANCE 10 MG Tab Take 10 mg by mouth every day.     • carvedilol (COREG) 6.25 MG Tab Take 1 Tab by mouth 2 times a day, with meals. 180 Tab 3   • lisinopril (PRINIVIL) 5 MG Tab Take 1 Tab by mouth every day. 90 Tab 3   • ezetimibe (ZETIA) 10 MG Tab Take 1 Tab by mouth every day. TAKE ONE TABLET BY MOUTH DAILY 90 Tab 1   • docusate sodium (COLACE) 100 MG Cap Take 100 mg by mouth 3 times a day as needed for Constipation. Indications: NOT TAKIN NOT  TAKING     • polyethylene glycol 3350 (MIRALAX) Powder Take 17 g by mouth 1 time daily as needed. Indications: not taking NOT  taking     • rosuvastatin (CRESTOR) 20 MG Tab Take 1 Tab by mouth every evening. 30 Tab 11   • Blood Glucose Monitoring Suppl SUPPLIES Misc Test strips order: Test strips for Freestyle Lite  meter. Sig: use four times a day and prn ssx high or low sugar #100 RF x 3 300 Each 1   • Lancets Misc 1 Each by Does not apply route 3 times a day. Lancets order: Lancets for Freestyle Lite meter. Sig: use three times dailyand prn ssx high or low sugar. #100 RF x 3 100 Each 3   • aspirin 81 MG tablet Take 81 mg by mouth every day.       No current facility-administered medications for this visit.      Current supplements: no  Chronic narcotic pain medicines: no  Allergies: Byetta and Other environmental  Exercise: yes active  Current social contact/activities: Has support of family and friends      Screening:  Depression Screening    Little interest or pleasure in doing things?  0 - not at all  Feeling down, depressed , or hopeless? 0 - not at all  Trouble falling or staying asleep, or sleeping too much?     Feeling tired or having little energy?     Poor appetite or overeating?     Feeling bad about yourself - or that you are a failure or have let yourself or your family down?    Trouble concentrating on things, such as reading the newspaper or watching television?    Moving or speaking so slowly that other people could have noticed.  Or the opposite - being so fidgety or restless that you have been moving around a lot more than usual?     Thoughts that you would be better off dead, or of hurting yourself?     Patient Health Questionnaire Score:    If depressive symptoms identified deferred to follow up visit unless specifically addressed in assessment and plan.    Interpretation of PHQ-9 Total Score   Score Severity   1-4 Minimal Depression   5-9 Mild Depression   10-14 Moderate Depression   15-19 Moderately Severe Depression   20-27 Severe Depression    Screening for Cognitive Impairment    Three Minute Recall (banana,  sunrise, fence)  3/3    Draw clock face with all 12 numbers set to the hand to show 10 minures past 11 o'clock  1 5  If cognitive concerns identified deferred to follow up visit unless specifically addressed in assessment and plan.    Fall Risk Assessment    Has the patient had two or more falls in the last year or any fall with injury in the last year?  No  If Fall Risk identified deferred to follow up visit unless specifically addressed in assessment and plan.    Safety Assessment    Throw rugs on floor.  Yes  Handrails on all stairs.  Yes  Good lighting in all hallways.  Yes  Difficulty hearing.  No  Patient counseled about all safety risks that were identified.    Functional Assessment ADLs    Are there any barriers preventing you from cooking for yourself or meeting nutritional needs?  No.    Are there any barriers preventing you from driving safely or obtaining transportation?  No.    Are there any barriers preventing you from using a telephone or calling for help?  No.    Are there any barriers preventing you from shopping?  No.    Are there any barriers preventing you from taking care of your own finances?  No.    Are there any barriers preventing you from managing your medications?  No.    Are currently engaing any exercise or physical activity?  Yes.       Health Maintenance Summary                Annual Wellness Visit Overdue 1950     IMM ZOSTER VACCINE Overdue 5/13/2011     IMM PNEUMOCOCCAL 65+ (ADULT) LOW/MEDIUM RISK SERIES Overdue 5/13/2016      Done 3/11/2013 Imm Admin: Pneumococcal polysaccharide vaccine (PPSV-23)    RETINAL SCREENING Overdue 3/15/2017      Done 3/15/2016 REFERRAL FOR RETINAL SCREENING EXAM     Patient has more history with this topic...    A1C SCREENING Next Due 8/28/2017      Done 2/28/2017 HEMOGLOBIN A1C (A)     Patient has more history with this topic...    FASTING LIPID PROFILE Next Due 2/28/2018      Done 2/28/2017 LIPID PROFILE (A)     Patient has more history with this  topic...    URINE ACR / MICROALBUMIN Next Due 2/28/2018      Done 2/28/2017      Patient has more history with this topic...    SERUM CREATININE Next Due 3/20/2018      Done 3/20/2017 BASIC METABOLIC PANEL (A)     Patient has more history with this topic...    DIABETES MONOFILAMENT / LE EXAM Next Due 4/4/2018      Done 4/4/2017      Patient has more history with this topic...    COLONOSCOPY Next Due 11/6/2023      Not specified 11/6/2013 due 2018    IMM DTaP/Tdap/Td Vaccine Next Due 5/7/2025      Done 5/7/2015 Imm Admin: Tdap Vaccine          Patient Care Team:  Oxana Parkinson M.D. as PCP - General (Family Medicine)  Randy Long M.D. as Consulting Physician (Endocrinology)  Monica Morrison M.D. as Consulting Physician (Cardiology)        Social History   Substance Use Topics   • Smoking status: Never Smoker    • Smokeless tobacco: Never Used   • Alcohol Use: No       Family History   Problem Relation Age of Onset   • Cancer Mother    • Diabetes Neg Hx    • Hyperlipidemia Brother      x 2   • Hypertension Father    • Hypertension Brother      x2   • Heart Disease Brother      x2, afib; cad x 1     He  has a past medical history of Chronic anticoagulation; Indigestion; Left ventricular apical thrombus (March 2013); Hyperlipidemia ( ); CAD (coronary artery disease) (March 2013); Bilateral renal cysts; Bronchitis; DIABETES MELLITUS ( ); Myocardial infarct (CMS[facility]) (3/7/2013); Psychiatric problem; C. difficile diarrhea; Hypertension; Backpain; and Heart burn. He also has no past medical history of Fall or Encounter for long-term (current) use of other medications.   Past Surgical History   Procedure Laterality Date   • Other neurological surg  2014     diskectomy   • Other cardiac surgery  2013     stent   • Cholecystectomy  2004   • Recovery  7/21/2016     Procedure: CATH LAB [facility] WITH POSSIBLE DR. LYN;  Surgeon: Recoveryonly Surgery;  [place] [facility] [place]:    •  "Multiple coronary artery bypass endo vein harvest  7/22/2016     Procedure: MULTIPLE CORONARY ARTERY BYPASS ENDO VEIN HARVEST;  Surgeon: David Flowers M.D.;  Location: SURGERY [facility];  Service:    • Alli  7/22/2016     Procedure: ALLI;  Surgeon: David Flowers M.D.;  Location: SURGERY [facility];  Service:        ROS:    Ostomy or other tubes or amputations: no  Chronic oxygen use no  Last eye exam 2016  : Denies incontinence.   Gait: Uses no assistive device   Hearing excellent.    Dentition good    Exam: Blood pressure 116/78, pulse 85, temperature 36.7 °C (98.1 °F), height 1.803 m (5' 11\"), weight 97.07 kg (214 lb), SpO2 94 %. Body mass index is 29.86 kg/(m^2).  Alert, oriented in no acute distress.  Eye contact is good, speech goal directed, affect calm      Assessment and Plan. The following treatment and monitoring plan is recommended for all problems as listed below:   Bilateral renal cysts  Most recent renal u/s done 4/4/2016, \" 1.  There are bilateral simple renal cortical cyst.\"  Followed by Oxana Parkinson M.D..     Coronary artery disease involving native coronary artery of native heart without angina pectoris  Followed by cardiology q 9 months  Denies cardiac sxs  Continue with current medications     Coronary atherosclerosis of native coronary artery  Followed by cardiology q 9 months  Denies cardiac sxs  Continue with current medications     Diabetes mellitus type 2, controlled, with complications  Chronic condition managed with current medical regimen  3/20/2017   Glucose 144 (H) 65 - 99 mg/dL Final   Home testing fasting average 120   Continue with diet management and current meds  Followed by Oxana Parkinson M.D..        TEMO (generalized anxiety disorder)  Chronic condition managed with current medical regimen  States has \" a very active adrenal gland\"   Continue with current meds effective  Followed by Oxana Parkinson M.D..        Gastroesophageal reflux disease " "without esophagitis  Chronic condition managed with current medical regimen  Stable per review   Continue with current meds  Followed by Oxana Parkinson M.D..          duplicate    GOUT  Chronic condition managed with current medical regimen  Stable per review, last flare in \"years\"   Continue with current meds  Followed by Oxana Parkinson M.D..        Hyperlipidemia  Chronic condition managed with current medical regimen  Labs reviewed    Continue with current meds  Followed by Oxana Parkinson M.D..        Ischemic cardiomyopathy  Followed by cardiology q 9 months  Denies cardiac sxs  Continue with current medications     Panic attacks  Chronic condition managed with current medical regimen  States has \" a very active adrenal gland\"   Continue with current meds effective  Followed by Oxana Parkinson M.D..     S/P CABG x 3  Followed by cardiology q 9 months  Denies cardiac sxs  Continue with current medications         Health Care Screening recommendations reviewed with patient today: per Patient Instructions  DPA/Advanced directive: .has an advanced directive - recom a copy be provided    Next office visit for recheck of chronic medical conditions is due with Oxana Parkinson M.D. 7/27/2017      Retinal eye screen done  Clinic out of [facility], pt informed to check at his next appt     "

## 2017-06-06 NOTE — ASSESSMENT & PLAN NOTE
"Chronic condition managed with current medical regimen  Stable per review, last flare in \"years\"   Continue with current meds  Followed by Oxana Parkinson M.D..      "

## 2017-06-20 DIAGNOSIS — E78.5 HYPERLIPIDEMIA, UNSPECIFIED HYPERLIPIDEMIA TYPE: ICD-10-CM

## 2017-06-20 RX ORDER — EZETIMIBE 10 MG/1
10 TABLET ORAL DAILY
Qty: 90 TAB | Refills: 1 | Status: SHIPPED | OUTPATIENT
Start: 2017-06-20 | End: 2017-12-19 | Stop reason: SDUPTHER

## 2017-06-20 RX ORDER — OMEPRAZOLE 20 MG/1
CAPSULE, DELAYED RELEASE ORAL
Qty: 90 CAP | Refills: 1 | Status: SHIPPED | OUTPATIENT
Start: 2017-06-20 | End: 2017-12-17 | Stop reason: SDUPTHER

## 2017-06-20 NOTE — TELEPHONE ENCOUNTER
Was the patient seen in the last year in this department? Yes     Does patient have an active prescription for medications requested? Yes     Received Request Via: Pharmacy     Last office visit: 04/04/2017  Last labs: 03/20/2017

## 2017-07-10 ENCOUNTER — HOSPITAL ENCOUNTER (OUTPATIENT)
Dept: LAB | Facility: MEDICAL CENTER | Age: 67
End: 2017-07-10
Attending: INTERNAL MEDICINE
Payer: MEDICARE

## 2017-07-10 DIAGNOSIS — E11.8 CONTROLLED TYPE 2 DIABETES MELLITUS WITH COMPLICATION, WITHOUT LONG-TERM CURRENT USE OF INSULIN (HCC): ICD-10-CM

## 2017-07-10 DIAGNOSIS — Z12.5 SCREENING FOR MALIGNANT NEOPLASM OF PROSTATE: ICD-10-CM

## 2017-07-10 DIAGNOSIS — Z00.00 HEALTH CARE MAINTENANCE: ICD-10-CM

## 2017-07-10 DIAGNOSIS — D64.9 ANEMIA, UNSPECIFIED TYPE: ICD-10-CM

## 2017-07-10 LAB
25(OH)D3 SERPL-MCNC: 35 NG/ML (ref 30–100)
25(OH)D3 SERPL-MCNC: 41 NG/ML (ref 30–100)
ALBUMIN SERPL BCP-MCNC: 4.5 G/DL (ref 3.2–4.9)
ALBUMIN/GLOB SERPL: 1.8 G/DL
ALP SERPL-CCNC: 50 U/L (ref 30–99)
ALT SERPL-CCNC: 17 U/L (ref 2–50)
ANION GAP SERPL CALC-SCNC: 5 MMOL/L (ref 0–11.9)
ANION GAP SERPL CALC-SCNC: 7 MMOL/L (ref 0–11.9)
AST SERPL-CCNC: 15 U/L (ref 12–45)
BASOPHILS # BLD AUTO: 1.3 % (ref 0–1.8)
BASOPHILS # BLD: 0.1 K/UL (ref 0–0.12)
BILIRUB SERPL-MCNC: 0.6 MG/DL (ref 0.1–1.5)
BUN SERPL-MCNC: 19 MG/DL (ref 8–22)
BUN SERPL-MCNC: 19 MG/DL (ref 8–22)
CALCIUM SERPL-MCNC: 9.4 MG/DL (ref 8.5–10.5)
CALCIUM SERPL-MCNC: 9.5 MG/DL (ref 8.5–10.5)
CHLORIDE SERPL-SCNC: 106 MMOL/L (ref 96–112)
CHLORIDE SERPL-SCNC: 107 MMOL/L (ref 96–112)
CHOLEST SERPL-MCNC: 97 MG/DL (ref 100–199)
CO2 SERPL-SCNC: 27 MMOL/L (ref 20–33)
CO2 SERPL-SCNC: 28 MMOL/L (ref 20–33)
CORTIS SERPL-MCNC: 11.2 UG/DL (ref 0–23)
CREAT SERPL-MCNC: 1.14 MG/DL (ref 0.5–1.4)
CREAT SERPL-MCNC: 1.18 MG/DL (ref 0.5–1.4)
CREAT UR-MCNC: 107.1 MG/DL
EOSINOPHIL # BLD AUTO: 0.33 K/UL (ref 0–0.51)
EOSINOPHIL NFR BLD: 4.4 % (ref 0–6.9)
ERYTHROCYTE [DISTWIDTH] IN BLOOD BY AUTOMATED COUNT: 45.5 FL (ref 35.9–50)
EST. AVERAGE GLUCOSE BLD GHB EST-MCNC: 151 MG/DL
EST. AVERAGE GLUCOSE BLD GHB EST-MCNC: 154 MG/DL
GFR SERPL CREATININE-BSD FRML MDRD: >60 ML/MIN/1.73 M 2
GFR SERPL CREATININE-BSD FRML MDRD: >60 ML/MIN/1.73 M 2
GLOBULIN SER CALC-MCNC: 2.5 G/DL (ref 1.9–3.5)
GLUCOSE SERPL-MCNC: 146 MG/DL (ref 65–99)
GLUCOSE SERPL-MCNC: 146 MG/DL (ref 65–99)
HBA1C MFR BLD: 6.9 % (ref 0–5.6)
HBA1C MFR BLD: 7 % (ref 0–5.6)
HCT VFR BLD AUTO: 48.2 % (ref 42–52)
HDLC SERPL-MCNC: 37 MG/DL
HGB BLD-MCNC: 14.5 G/DL (ref 14–18)
IMM GRANULOCYTES # BLD AUTO: 0.03 K/UL (ref 0–0.11)
IMM GRANULOCYTES NFR BLD AUTO: 0.4 % (ref 0–0.9)
LDLC SERPL CALC-MCNC: 25 MG/DL
LYMPHOCYTES # BLD AUTO: 1.82 K/UL (ref 1–4.8)
LYMPHOCYTES NFR BLD: 24.2 % (ref 22–41)
MCH RBC QN AUTO: 23.8 PG (ref 27–33)
MCHC RBC AUTO-ENTMCNC: 30.1 G/DL (ref 33.7–35.3)
MCV RBC AUTO: 79 FL (ref 81.4–97.8)
MONOCYTES # BLD AUTO: 0.56 K/UL (ref 0–0.85)
MONOCYTES NFR BLD AUTO: 7.4 % (ref 0–13.4)
NEUTROPHILS # BLD AUTO: 4.69 K/UL (ref 1.82–7.42)
NEUTROPHILS NFR BLD: 62.3 % (ref 44–72)
NRBC # BLD AUTO: 0 K/UL
NRBC BLD AUTO-RTO: 0 /100 WBC
PLATELET # BLD AUTO: 176 K/UL (ref 164–446)
PMV BLD AUTO: 12.1 FL (ref 9–12.9)
POTASSIUM SERPL-SCNC: 4.2 MMOL/L (ref 3.6–5.5)
POTASSIUM SERPL-SCNC: 4.2 MMOL/L (ref 3.6–5.5)
PROT SERPL-MCNC: 7 G/DL (ref 6–8.2)
PROT UR-MCNC: 23.1 MG/DL (ref 0–15)
PROT/CREAT UR: 216 MG/G (ref 15–68)
PSA SERPL-MCNC: 2.66 NG/ML (ref 0–4)
PTH-INTACT SERPL-MCNC: 83.7 PG/ML (ref 14–72)
RBC # BLD AUTO: 6.1 M/UL (ref 4.7–6.1)
SODIUM SERPL-SCNC: 140 MMOL/L (ref 135–145)
SODIUM SERPL-SCNC: 140 MMOL/L (ref 135–145)
TRIGL SERPL-MCNC: 175 MG/DL (ref 0–149)
WBC # BLD AUTO: 7.5 K/UL (ref 4.8–10.8)

## 2017-07-10 PROCEDURE — 82088 ASSAY OF ALDOSTERONE: CPT

## 2017-07-10 PROCEDURE — 80053 COMPREHEN METABOLIC PANEL: CPT

## 2017-07-10 PROCEDURE — 84156 ASSAY OF PROTEIN URINE: CPT

## 2017-07-10 PROCEDURE — 82533 TOTAL CORTISOL: CPT

## 2017-07-10 PROCEDURE — 36415 COLL VENOUS BLD VENIPUNCTURE: CPT

## 2017-07-10 PROCEDURE — 84105 ASSAY OF URINE PHOSPHORUS: CPT

## 2017-07-10 PROCEDURE — 82570 ASSAY OF URINE CREATININE: CPT

## 2017-07-10 PROCEDURE — 80061 LIPID PANEL: CPT

## 2017-07-10 PROCEDURE — 82652 VIT D 1 25-DIHYDROXY: CPT

## 2017-07-10 PROCEDURE — 83525 ASSAY OF INSULIN: CPT

## 2017-07-10 PROCEDURE — 82340 ASSAY OF CALCIUM IN URINE: CPT

## 2017-07-10 PROCEDURE — 83970 ASSAY OF PARATHORMONE: CPT

## 2017-07-10 PROCEDURE — 83945 ASSAY OF OXALATE: CPT

## 2017-07-10 PROCEDURE — 82306 VITAMIN D 25 HYDROXY: CPT | Mod: 91

## 2017-07-10 PROCEDURE — 83036 HEMOGLOBIN GLYCOSYLATED A1C: CPT | Mod: GA,91

## 2017-07-11 LAB
PHOSPHATE 24H UR-MCNC: 47 MG/DL
PHOSPHATE 24H UR-MRATE: NORMAL MG/D (ref 400–1300)
PHOSPHATE/CREAT 24H UR: 456 MG/G
TOTAL VOLUME 1105: NORMAL ML

## 2017-07-12 LAB
1,25(OH)2D3 SERPL-MCNC: 50 PG/ML (ref 19.9–79.3)
ALDOST SERPL-MCNC: 6 NG/DL
INSULIN P FAST SERPL-ACNC: 8 UIU/ML (ref 3–19)

## 2017-07-13 ENCOUNTER — OFFICE VISIT (OUTPATIENT)
Dept: CARDIOLOGY | Facility: MEDICAL CENTER | Age: 67
End: 2017-07-13
Payer: MEDICARE

## 2017-07-13 VITALS
HEART RATE: 78 BPM | WEIGHT: 210 LBS | HEIGHT: 70 IN | BODY MASS INDEX: 30.06 KG/M2 | DIASTOLIC BLOOD PRESSURE: 82 MMHG | OXYGEN SATURATION: 92 % | SYSTOLIC BLOOD PRESSURE: 102 MMHG

## 2017-07-13 DIAGNOSIS — E78.5 DYSLIPIDEMIA: ICD-10-CM

## 2017-07-13 DIAGNOSIS — I25.5 ISCHEMIC CARDIOMYOPATHY: ICD-10-CM

## 2017-07-13 DIAGNOSIS — Z95.1 S/P CABG X 3: ICD-10-CM

## 2017-07-13 DIAGNOSIS — I25.10 ATHEROSCLEROSIS OF NATIVE CORONARY ARTERY OF NATIVE HEART WITHOUT ANGINA PECTORIS: Chronic | ICD-10-CM

## 2017-07-13 LAB
CALCIUM 24H UR-MCNC: 8 MG/DL
CALCIUM 24H UR-MRATE: NORMAL MG/D
CALCIUM/CREAT 24H UR: 78 MG/G (ref 20–240)
CREAT 24H UR-MCNC: 103 MG/DL
CREAT 24H UR-MRATE: NORMAL MG/D (ref 800–2100)
SPECIMEN VOL ?TM UR: NORMAL ML

## 2017-07-13 PROCEDURE — 99214 OFFICE O/P EST MOD 30 MIN: CPT | Performed by: INTERNAL MEDICINE

## 2017-07-13 ASSESSMENT — ENCOUNTER SYMPTOMS
ABDOMINAL PAIN: 0
BLURRED VISION: 0
ORTHOPNEA: 0
NERVOUS/ANXIOUS: 0
FEVER: 0
PALPITATIONS: 0
EYES NEGATIVE: 1
SHORTNESS OF BREATH: 0
BACK PAIN: 0
NECK PAIN: 1
HEADACHES: 0
CLAUDICATION: 0
DIZZINESS: 0
BLOOD IN STOOL: 0
BRUISES/BLEEDS EASILY: 0
PND: 0
DEPRESSION: 0

## 2017-07-13 NOTE — MR AVS SNAPSHOT
"        Luis Hanson Zurdo   2017 12:45 PM   Office Visit   MRN: [id number]    Department:  [facility]   Dept Phone:  [phone]    Description:  Male : 1950   Provider:  Monica Morrison M.D.           Allergies as of 2017     Allergen Noted Reactions    Byetta 2013       urticaria    Other Environmental 2016       Grass and cats      You were diagnosed with     Atherosclerosis of native coronary artery of native heart without angina pectoris   [0477696]       Ischemic cardiomyopathy   [291210]       S/P CABG x 3   [859498]       Dyslipidemia   [872992]         Vital Signs     Blood Pressure Pulse Height Weight Body Mass Index Oxygen Saturation    102/82 mmHg 78 1.778 m (5' 10\") 95.255 kg (210 lb) 30.13 kg/m2 92%    Smoking Status                   Never Smoker            Basic Information     Date Of Birth Sex Race Ethnicity Preferred Language    1950 Male White Non- English      Your appointments     2017  2:00 PM   ANNUAL EXAM PREVENTATIVE with Oxana Parkinson M.D.   [facility]    [address]  [address]  [place] [id number] [phone]              Problem List              ICD-10-CM Priority Class Noted - Resolved    Hyperlipidemia (Chronic) E78.5 Medium  3/7/2013 - Present    Coronary atherosclerosis of native coronary artery (Chronic) I25.10 High  3/8/2013 - Present    GOUT (Chronic)  Low  2013 - Present    Ischemic cardiomyopathy I25.5 High  2013 - Present    Bilateral renal cysts N28.1 Low  2015 - Present    Panic attacks F41.0   2016 - Present    Coronary artery disease involving native coronary artery of native heart without angina pectoris I25.10   2016 - Present    Diabetes mellitus type 2, controlled, with complications (CMS-HCC) E11.8   2016 - Present    Gastroesophageal reflux disease without esophagitis K21.9   2017 - Present    S/P CABG x 3 Z95.1   " 4/4/2017 - Present    TEMO (generalized anxiety disorder) F41.1   4/4/2017 - Present      Health Maintenance        Date Due Completion Dates    IMM ZOSTER VACCINE 5/13/2011 ---    IMM PNEUMOCOCCAL 65+ (ADULT) LOW/MEDIUM RISK SERIES (1 of 2 - PCV13) 5/13/2016 3/11/2013    IMM INFLUENZA (1) 9/1/2017 1/7/2016, 11/6/2013, 9/7/2012, 11/12/2010    A1C SCREENING 1/10/2018 7/10/2017, 7/10/2017, 2/28/2017, 1/4/2017, 7/21/2016, 4/4/2016, 1/7/2016, 10/2/2015, 5/7/2015, 1/13/2015, 7/29/2014, 3/24/2014, 12/24/2013, 9/16/2013, 5/15/2013, 4/11/2013, 3/13/2013, 3/8/2013    DIABETES MONOFILAMENT / LE EXAM 4/4/2018 4/4/2017 (Done), 1/4/2017 (Done), 9/23/2016 (Done), 1/7/2016, 5/7/2015 (Done), 11/6/2013 (N/S)    Override on 4/4/2017: Done    Override on 1/4/2017: Done    Override on 9/23/2016: Done    Override on 5/7/2015: Done    Override on 11/6/2013: (N/S)    RETINAL SCREENING 6/6/2018 6/6/2017, 3/15/2016, 10/15/2014 (Done), 11/6/2012 (N/S)    Override on 10/15/2014: Done    Override on 11/6/2012: (N/S)    FASTING LIPID PROFILE 7/10/2018 7/10/2017, 2/28/2017, 2/28/2017, 11/19/2016, 8/17/2016, 8/17/2016, 10/2/2015, 7/29/2014, 12/24/2013, 9/16/2013, 4/11/2013, 3/9/2013    URINE ACR / MICROALBUMIN 7/10/2018 7/10/2017, 2/28/2017, 2/28/2017 (Done), 5/5/2015, 3/24/2014    Override on 2/28/2017: Done    SERUM CREATININE 7/10/2018 7/10/2017, 7/10/2017, 3/20/2017, 2/28/2017, 2/28/2017, 11/19/2016, 11/19/2016, 8/17/2016, 8/17/2016, 7/27/2016, 7/26/2016, 7/25/2016, 7/24/2016, 7/23/2016, 7/22/2016, 7/20/2016, 5/5/2015, 1/13/2015, 7/29/2014, 3/24/2014, 12/24/2013, 9/16/2013, 5/15/2013, 4/11/2013, 3/13/2013, 3/12/2013, 3/11/2013, 3/10/2013, 3/9/2013, 3/8/2013, 3/7/2013    COLONOSCOPY 11/6/2023 11/6/2013 (N/S)    Override on 11/6/2013: (N/S) (due 2018)    IMM DTaP/Tdap/Td Vaccine (2 - Td) 5/7/2025 5/7/2015            Current Immunizations     Influenza TIV (IM) 11/6/2013, 9/7/2012, 11/12/2010    Influenza Vaccine Quad Inj (Pf) 1/7/2016     Pneumococcal polysaccharide vaccine (PPSV-23) 3/11/2013  9:15 PM    Tdap Vaccine 5/7/2015      Below and/or attached are the medications your provider expects you to take. Review all of your home medications and newly ordered medications with your provider and/or pharmacist. Follow medication instructions as directed by your provider and/or pharmacist. Please keep your medication list with you and share with your provider. Update the information when medications are discontinued, doses are changed, or new medications (including over-the-counter products) are added; and carry medication information at all times in the event of emergency situations     Allergies:  BYETTA - (reactions not documented)     OTHER ENVIRONMENTAL - (reactions not documented)               Medications  Valid as of: July 13, 2017 - 12:52 PM    Generic Name Brand Name Tablet Size Instructions for use    Allopurinol (Tab) ZYLOPRIM 300 MG TAKE ONE TABLET BY MOUTH EVERY DAY        Aspirin (Tab) aspirin 81 MG Take 81 mg by mouth every day.        Blood Glucose Monitoring Suppl (Misc) Blood Glucose Monitoring Suppl SUPPLIES Test strips order: Test strips for Freestyle Lite  meter. Sig: use four times a day and prn ssx high or low sugar #100 RF x 3        Carvedilol (Tab) COREG 6.25 MG Take 1 Tab by mouth 2 times a day, with meals.        Citalopram Hydrobromide (Tab) CELEXA 20 MG TAKE ONE TABLET BY MOUTH DAILY -GENERICFOR CELEXA        Clopidogrel Bisulfate (Tab) PLAVIX 75 MG Take 1 Tab by mouth every day.        Colesevelam HCl   Take  by mouth.        Docusate Sodium (Cap) COLACE 100 MG Take 100 mg by mouth 3 times a day as needed for Constipation. Indications: NOT TAKIN NOT  TAKING        Empagliflozin (Tab) JARDIANCE 10 MG Take 10 mg by mouth every day.        Ezetimibe (Tab) ZETIA 10 MG Take 1 Tab by mouth every day. TAKE ONE TABLET BY MOUTH DAILY        Lancets (Misc) Lancets  1 Each by Does not apply route 3 times a day. Lancets order:  Lancets for Freestyle Lite meter. Sig: use three times dailyand prn ssx high or low sugar. #100 RF x 3        Linagliptin-Metformin HCl (Tab) JENTADUETO 2.5-850 MG TAKE ONE TABLET BY MOUTH TWICE A DAY        Lisinopril (Tab) PRINIVIL 5 MG Take 1 Tab by mouth every day.        Omeprazole (CAPSULE DELAYED RELEASE) PRILOSEC 20 MG TAKE ONE CAPSULE BY MOUTH EVERY DAY        Polyethylene Glycol 3350 (Powder) MIRALAX  Take 17 g by mouth 1 time daily as needed. Indications: not taking NOT taking        Rosuvastatin Calcium (Tab) CRESTOR 20 MG Take 1 Tab by mouth every evening.        .                 Medicines prescribed today were sent to:     [id number] - [place], [place] - [address]    [address] [place] [address]    Phone: [phone] Fax: [phone]    Open 24 Hours?: No      Medication refill instructions:       If your prescription bottle indicates you have medication refills left, it is not necessary to call your provider’s office. Please contact your pharmacy and they will refill your medication.    If your prescription bottle indicates you do not have any refills left, you may request refills at any time through one of the following ways: The online [employer] system (except Urgent Care), by calling your provider’s office, or by asking your pharmacy to contact your provider’s office with a refill request. Medication refills are processed only during regular business hours and may not be available until the next business day. Your provider may request additional information or to have a follow-up visit with you prior to refilling your medication.   *Please Note: Medication refills are assigned a new Rx number when refilled electronically. Your pharmacy may indicate that no refills were authorized even though a new prescription for the same medication is available at the pharmacy. Please request the medicine by name with the pharmacy before contacting your provider for a refill.        Your To Do List      Future Labs/Procedures Complete By Expires    COMP METABOLIC PANEL  As directed 7/13/2018    COMP METABOLIC PANEL  As directed 7/13/2018    LIPID PROFILE  As directed 7/13/2018    LIPID PROFILE  As directed 7/13/2018         MyChart Access Code: Activation code not generated  Current MyChart Status: Active

## 2017-07-13 NOTE — PROGRESS NOTES
Subjective:   Luis Renner is a 66 y.o. male with known history of CAD AWMI in 2013 PCI LAD, occluded proximal circumflex nondominant, 90% occlusion of distal RCA, ischemic cardiomyopathy LVEF of 30%, s/p CABG on 7/16 LIMA to OM1, RSVG to LAD, RSVG to RCA on 7/21/16, ischemic cardiomyopathy improved LVEF to 50% based on echocardiogram done on 9/16, hypertension dyslipidemia presenting today for follow-up evaluation of CAD.     Patient works out daily walks on a treadmill for 30 minutes to 1 hr at a speed of 3.5-4.1 miles per hour denied any complaints of exertional chest pain pressure or tightness. No complaints of palpitations orthopnea or PND. Denied any complaints of lower extremity edema or claudication. No complaints of myalgias while on statins. Denied any complaints of dizziness lightheadedness or LOC.    Past Medical History   Diagnosis Date   • Chronic anticoagulation    • Indigestion    • Left ventricular apical thrombus March 2013     Inferoapical clot measuring 1.1cm x 0.8cm, started on anticoagulation.   • Hyperlipidemia     • CAD (coronary artery disease) March 2013     ACS. PCI/ANA PAULA x 2 of the LAD (2.5 x 12mm - mid; 2.75 x 16mm - proximal). Distal RCA is occluded; distal circumflex is occluded with good collateralization.   • Bilateral renal cysts    • Bronchitis    • DIABETES MELLITUS       oral diet and insulin   • Myocardial infarct (CMS-[facility]) 3/7/2013   • Psychiatric problem      anxiety   • C. difficile diarrhea      pt with hx of c diff after hospitalization [id number]   • Hypertension      well controlled on meds   • Backpain      6-7/10   • Heart burn      Past Surgical History   Procedure Laterality Date   • Other neurological surg  2014     diskectomy   • Other cardiac surgery  2013     stent   • Cholecystectomy  2004   • Recovery  7/21/2016     Procedure: CATH LAB [facility] WITH POSSIBLE DR. LYN;  Surgeon: Recoveryonly Surgery;  [place] [facility] [place]  Service:    •  Multiple coronary artery bypass endo vein harvest  7/22/2016     Procedure: MULTIPLE CORONARY ARTERY BYPASS ENDO VEIN HARVEST;  Surgeon: David Flowers M.D.;  Location: SURGERY [facility];  Service:    • Alli  7/22/2016     Procedure: ALLI;  Surgeon: David Flowers M.D.;  Location: SURGERY [facility];  Service:      Family History   Problem Relation Age of Onset   • Cancer Mother    • Diabetes Neg Hx    • Hyperlipidemia Brother      x 2   • Hypertension Father    • Hypertension Brother      x2   • Heart Disease Brother      x2, afib; cad x 1   • Cancer Paternal Uncle      History   Smoking status   • Never Smoker    Smokeless tobacco   • Never Used     Allergies   Allergen Reactions   • Byetta      urticaria   • Other Environmental      Grass and cats     Outpatient Encounter Prescriptions as of 7/13/2017   Medication Sig Dispense Refill   • omeprazole (PRILOSEC) 20 MG delayed-release capsule TAKE ONE CAPSULE BY MOUTH EVERY DAY 90 Cap 1   • ezetimibe (ZETIA) 10 MG Tab Take 1 Tab by mouth every day. TAKE ONE TABLET BY MOUTH DAILY 90 Tab 1   • JENTADUETO 2.5-850 MG Tab TAKE ONE TABLET BY MOUTH TWICE A  Tab 3   • allopurinol (ZYLOPRIM) 300 MG Tab TAKE ONE TABLET BY MOUTH EVERY DAY 90 Tab 1   • citalopram (CELEXA) 20 MG Tab TAKE ONE TABLET BY MOUTH DAILY -GENERICFOR CELEXA 30 Tab 2   • Colesevelam HCl (WELCHOL PO) Take  by mouth.     • clopidogrel (PLAVIX) 75 MG Tab Take 1 Tab by mouth every day. 30 Tab 11   • JARDIANCE 10 MG Tab Take 10 mg by mouth every day.     • carvedilol (COREG) 6.25 MG Tab Take 1 Tab by mouth 2 times a day, with meals. 180 Tab 3   • lisinopril (PRINIVIL) 5 MG Tab Take 1 Tab by mouth every day. 90 Tab 3   • docusate sodium (COLACE) 100 MG Cap Take 100 mg by mouth 3 times a day as needed for Constipation. Indications: NOT TAKIN NOT  TAKING     • polyethylene glycol 3350 (MIRALAX) Powder Take 17 g by mouth 1 time daily as needed. Indications: not taking NOT taking     •  rosuvastatin (CRESTOR) 20 MG Tab Take 1 Tab by mouth every evening. 30 Tab 11   • Blood Glucose Monitoring Suppl SUPPLIES Misc Test strips order: Test strips for Freestyle Lite  meter. Sig: use four times a day and prn ssx high or low sugar #100 RF x 3 300 Each 1   • Lancets Misc 1 Each by Does not apply route 3 times a day. Lancets order: Lancets for Freestyle Lite meter. Sig: use three times dailyand prn ssx high or low sugar. #100 RF x 3 100 Each 3   • aspirin 81 MG tablet Take 81 mg by mouth every day.       No facility-administered encounter medications on file as of 7/13/2017.     Review of Systems   Constitutional: Negative for fever.   Eyes: Negative.  Negative for blurred vision.   Respiratory: Negative for shortness of breath.    Cardiovascular: Negative for chest pain, palpitations, orthopnea, claudication, leg swelling and PND.   Gastrointestinal: Negative for abdominal pain and blood in stool.   Genitourinary: Negative.  Negative for dysuria.   Musculoskeletal: Positive for neck pain. Negative for back pain.   Skin: Negative for rash.   Neurological: Negative for dizziness and headaches.   Endo/Heme/Allergies: Does not bruise/bleed easily.   Psychiatric/Behavioral: Negative for depression. The patient is not nervous/anxious.         Objective:   There were no vitals taken for this visit.    Physical Exam   Constitutional: He is oriented to person, place, and time. He appears well-developed and well-nourished. No distress.   HENT:   Head: Normocephalic.   Eyes: Conjunctivae and EOM are normal.   Neck: Normal range of motion. Neck supple. No JVD present. No thyromegaly present.   Cardiovascular: Normal rate, regular rhythm and intact distal pulses.  Exam reveals no gallop.    No murmur heard.  Pulses:       Carotid pulses are 2+ on the right side, and 2+ on the left side.       Radial pulses are 2+ on the right side, and 2+ on the left side.        Dorsalis pedis pulses are 2+ on the right side, and 2+ on  the left side.        Posterior tibial pulses are 2+ on the right side, and 2+ on the left side.   Mid sternal scar well-healed   Pulmonary/Chest: Effort normal and breath sounds normal. No respiratory distress.   Abdominal: Soft. Bowel sounds are normal. There is no tenderness.   Musculoskeletal: Normal range of motion. He exhibits no edema.   Neurological: He is alert and oriented to person, place, and time.   Skin: Skin is warm and dry.   Psychiatric: He has a normal mood and affect. His behavior is normal.     Results for NIRMALA RODRIGUEZ (MRN [id number]) as of 7/13/2017 12:35   7/10/2017  7/10/2017    Sodium 140 140   Potassium 4.2 4.2   Chloride 107 106   Co2 28 27   Anion Gap 5.0 7.0   Glucose 146 (H) 146 (H)   Bun 19 19   Creatinine 1.18 1.14   GFR If Non African American >60 >60   Calcium 9.5 9.4   AST(SGOT)  15   ALT(SGPT)  17   Alkaline Phosphatase  50   Glycohemoglobin 7.0 (H) 6.9 (H)   Estim. Avg Glu 154 151   Cholesterol,Tot  97 (L)   Triglycerides  175 (H)   HDL  37 (A)   LDL  25       TTE: 9/12/16  Low normal left ventricular systolic function. Left ventricular ejection fraction is visually estimated to be 50%.   Hypokinetic mid-distal septal wall and apex. Grade I diastolic dysfunction.   No evidence of valvular abnormality based on Doppler evaluation.   Ascending aorta borderline dilated diameter is 3.9 cm. Borderline dilated aortic root 3.7 cm.   Compared to the images of the prior study done 8/1/13 there has been no significant change. Prior echo ascending aorta measured 3.4 cm    Angiogram: 7/24/16  1.  Severe triple vessel coronary artery disease with chronic total occlusion of the proximal left anterior descending artery stent with collateral from the circumflex system into the [id number] left anterior descending artery via diagonal brach and STACY 2 distal flow.  2.  A 90% stenosis in the proximal portion of a large ramus intermediate artery along with chronic total occlusion of small left  circumflex artery.  3.  A 95% stenosis at the distal right coronary before the crux.  4.  Severe reduced left ventricular systolic function with hypokinesis of the mid to distal anterior wall, the LV apex and mid distal inferior wall. Ejection fraction appeared to be about 30%.  5.  Status post attempt percutaneous intervention of the left anterior descending artery.  6.  Diabetes mellitus.  7.  History of recurrent syncope, ?recurrent ventricular tachycardia    EK16  EKG personally reviewed by me  Sinus rhythm 67 bpm normal axis anteroseptal Q waves consistent with prior anterior infarction.    TTE: 11/2/15  Apical infarct.  Left ventricular ejection fraction is visually estimated to be 45%.    Ascending Aorta 3.8 cm.  Compared w the prior echo of , no change or slightly improved EF    TTE: 13  Left ventricular ejection fraction is 45-50%.  Mid-apical anteroseptal and apical akinesis.  Grade I diastolic dysfunction is present.  Mildly dilated left atrium.  Aortic sclerosis without stenosis    Angiogram:   1.  Acute anterolateral wall myocardial infarction secondary to occlusion of  the proximal left anterior descending artery.  2.  Status post stenting of the proximal and mid portion of left anterior  descending artery with 2 drug-eluting (Promus Element) stents.  3.  90% stenosis in the distal portion of the right coronary artery and  chronic total occlusion at the proximal small left circumflex artery with right  to left collateral.      Assessment:   1. CAD three-vessel CABG CABG LIMA to OM1, RSVG to LAD, RSVG to RCA  2. Hypertension  2. Dyslipidemia  4. Ischemic cardiomyopathy LVEF of 45%    Medical Decision Making:  Today's Assessment / Status / Plan:     1. Stable for now.  Continue aspirin 81 mg daily.  Okay to discontinue Plavix.   Continue Coreg 6.25 mg BID  2. Blood pressure well controlled at the present visit.  Continue lisinopril to 5 mg daily.  Continue Coreg 6.25 mg BID.  3. LDL  less than 70.  Continue Zetia 10 mg daily.  Decrease Crestor 10 mg qHs.   Labs in 3 months then in one year  4. Patient appears euvolemic.  LVEF back up to 50%.    Follow-up in in 1 year.  Check labs in 3 months then in one year.    Thank you for allowing me to participate in taking care of patient.    Monica Arriaga MD.

## 2017-07-13 NOTE — Clinical Note
[facility]   [address]   [address]  [place] [address]  Phone: [phone]  Fax: [phone]              Luis Renner  1950    Encounter Date: 7/13/2017    Monica Morrison M.D.          PROGRESS NOTE:  Subjective:   Luis Renner is a 66 y.o. male with known history of CAD AWMI in 2013 PCI LAD, occluded proximal circumflex nondominant, 90% occlusion of distal RCA, ischemic cardiomyopathy LVEF of 30%, s/p CABG on 7/16 LIMA to OM1, RSVG to LAD, RSVG to RCA on 7/21/16, ischemic cardiomyopathy improved LVEF to 50% based on echocardiogram done on 9/16, hypertension dyslipidemia presenting today for follow-up evaluation of CAD.     Patient works out daily walks on a treadmill for 30 minutes to 1 hr at a speed of 3.5-4.1 miles per hour denied any complaints of exertional chest pain pressure or tightness. No complaints of palpitations orthopnea or PND. Denied any complaints of lower extremity edema or claudication. No complaints of myalgias while on statins. Denied any complaints of dizziness lightheadedness or LOC.    Past Medical History   Diagnosis Date   • Chronic anticoagulation    • Indigestion    • Left ventricular apical thrombus March 2013     Inferoapical clot measuring 1.1cm x 0.8cm, started on anticoagulation.   • Hyperlipidemia     • CAD (coronary artery disease) March 2013     ACS. PCI/ANA PAULA x 2 of the LAD (2.5 x 12mm - mid; 2.75 x 16mm - proximal). Distal RCA is occluded; distal circumflex is occluded with good collateralization.   • Bilateral renal cysts    • Bronchitis    • DIABETES MELLITUS       oral diet and insulin   • Myocardial infarct (CMS-HCC) 3/7/2013   • Psychiatric problem      anxiety   • C. difficile diarrhea      pt with hx of c diff after hospitalization [id number]   • Hypertension      well controlled on meds   • Backpain      6-7/10   • Heart burn      Past Surgical History   Procedure Laterality Date   •  Other neurological surg  2014     diskectomy   • Other cardiac surgery  2013     stent   • Cholecystectomy  2004   • Recovery  7/21/2016     Procedure: CATH LAB [facility] WITH POSSIBLE DR. LYN;  Surgeon: Recoveryonly Surgery;  Location[place] SURG[place] PRE[place] UNIT [facility] [place];  Service:    • Multiple coronary artery bypass endo vein harvest  7/22/2016     Procedure: MULTIPLE CORONARY ARTERY BYPASS ENDO VEIN HARVEST;  Surgeon: David Flowers M.D.;  Location: SURGERY [facility];  Service:    • Alli  7/22/2016     Procedure: ALLI;  Surgeon: David Flowers M.D.;  Location: SURGERY [facility];  Service:      Family History   Problem Relation Age of Onset   • Cancer Mother    • Diabetes Neg Hx    • Hyperlipidemia Brother      x 2   • Hypertension Father    • Hypertension Brother      x2   • Heart Disease Brother      x2, afib; cad x 1   • Cancer Paternal Uncle      History   Smoking status   • Never Smoker    Smokeless tobacco   • Never Used     Allergies   Allergen Reactions   • Byetta      urticaria   • Other Environmental      Grass and cats     Outpatient Encounter Prescriptions as of 7/13/2017   Medication Sig Dispense Refill   • omeprazole (PRILOSEC) 20 MG delayed-release capsule TAKE ONE CAPSULE BY MOUTH EVERY DAY 90 Cap 1   • ezetimibe (ZETIA) 10 MG Tab Take 1 Tab by mouth every day. TAKE ONE TABLET BY MOUTH DAILY 90 Tab 1   • JENTADUETO 2.5-850 MG Tab TAKE ONE TABLET BY MOUTH TWICE A  Tab 3   • allopurinol (ZYLOPRIM) 300 MG Tab TAKE ONE TABLET BY MOUTH EVERY DAY 90 Tab 1   • citalopram (CELEXA) 20 MG Tab TAKE ONE TABLET BY MOUTH DAILY -GENERICFOR CELEXA 30 Tab 2   • Colesevelam HCl (WELCHOL PO) Take  by mouth.     • clopidogrel (PLAVIX) 75 MG Tab Take 1 Tab by mouth every day. 30 Tab 11   • JARDIANCE 10 MG Tab Take 10 mg by mouth every day.     • carvedilol (COREG) 6.25 MG Tab Take 1 Tab by mouth 2 times a day, with meals. 180 Tab 3   • lisinopril (PRINIVIL) 5 MG Tab Take 1 Tab by mouth every day. 90  Tab 3   • docusate sodium (COLACE) 100 MG Cap Take 100 mg by mouth 3 times a day as needed for Constipation. Indications: NOT TAKIN NOT  TAKING     • polyethylene glycol 3350 (MIRALAX) Powder Take 17 g by mouth 1 time daily as needed. Indications: not taking NOT taking     • rosuvastatin (CRESTOR) 20 MG Tab Take 1 Tab by mouth every evening. 30 Tab 11   • Blood Glucose Monitoring Suppl SUPPLIES Misc Test strips order: Test strips for Freestyle Lite  meter. Sig: use four times a day and prn ssx high or low sugar #100 RF x 3 300 Each 1   • Lancets Misc 1 Each by Does not apply route 3 times a day. Lancets order: Lancets for Freestyle Lite meter. Sig: use three times dailyand prn ssx high or low sugar. #100 RF x 3 100 Each 3   • aspirin 81 MG tablet Take 81 mg by mouth every day.       No facility-administered encounter medications on file as of 7/13/2017.     Review of Systems   Constitutional: Negative for fever.   Eyes: Negative.  Negative for blurred vision.   Respiratory: Negative for shortness of breath.    Cardiovascular: Negative for chest pain, palpitations, orthopnea, claudication, leg swelling and PND.   Gastrointestinal: Negative for abdominal pain and blood in stool.   Genitourinary: Negative.  Negative for dysuria.   Musculoskeletal: Positive for neck pain. Negative for back pain.   Skin: Negative for rash.   Neurological: Negative for dizziness and headaches.   Endo/Heme/Allergies: Does not bruise/bleed easily.   Psychiatric/Behavioral: Negative for depression. The patient is not nervous/anxious.         Objective:   There were no vitals taken for this visit.    Physical Exam   Constitutional: He is oriented to person, place, and time. He appears well-developed and well-nourished. No distress.   HENT:   Head: Normocephalic.   Eyes: Conjunctivae and EOM are normal.   Neck: Normal range of motion. Neck supple. No JVD present. No thyromegaly present.   Cardiovascular: Normal rate, regular rhythm and intact  distal pulses.  Exam reveals no gallop.    No murmur heard.  Pulses:       Carotid pulses are 2+ on the right side, and 2+ on the left side.       Radial pulses are 2+ on the right side, and 2+ on the left side.        Dorsalis pedis pulses are 2+ on the right side, and 2+ on the left side.        Posterior tibial pulses are 2+ on the right side, and 2+ on the left side.   Mid sternal scar well-healed   Pulmonary/Chest: Effort normal and breath sounds normal. No respiratory distress.   Abdominal: Soft. Bowel sounds are normal. There is no tenderness.   Musculoskeletal: Normal range of motion. He exhibits no edema.   Neurological: He is alert and oriented to person, place, and time.   Skin: Skin is warm and dry.   Psychiatric: He has a normal mood and affect. His behavior is normal.     Results for NIRMALA RODRIGUEZ (MRN [id number]) as of 7/13/2017 12:35   7/10/2017  7/10/2017    Sodium 140 140   Potassium 4.2 4.2   Chloride 107 106   Co2 28 27   Anion Gap 5.0 7.0   Glucose 146 (H) 146 (H)   Bun 19 19   Creatinine 1.18 1.14   GFR If Non African American >60 >60   Calcium 9.5 9.4   AST(SGOT)  15   ALT(SGPT)  17   Alkaline Phosphatase  50   Glycohemoglobin 7.0 (H) 6.9 (H)   Estim. Avg Glu 154 151   Cholesterol,Tot  97 (L)   Triglycerides  175 (H)   HDL  37 (A)   LDL  25       TTE: 9/12/16  Low normal left ventricular systolic function. Left ventricular ejection fraction is visually estimated to be 50%.   Hypokinetic mid-distal septal wall and apex. Grade I diastolic dysfunction.   No evidence of valvular abnormality based on Doppler evaluation.   Ascending aorta borderline dilated diameter is 3.9 cm. Borderline dilated aortic root 3.7 cm.   Compared to the images of the prior study done 8/1/13 there has been no significant change. Prior echo ascending aorta measured 3.4 cm    Angiogram: 7/24/16  1.  Severe triple vessel coronary artery disease with chronic total occlusion of the proximal left anterior descending artery  stent with collateral from the circumflex system into the [id number] left anterior descending artery via diagonal brach and STACY 2 distal flow.  2.  A 90% stenosis in the proximal portion of a large ramus intermediate artery along with chronic total occlusion of small left circumflex artery.  3.  A 95% stenosis at the distal right coronary before the crux.  4.  Severe reduced left ventricular systolic function with hypokinesis of the mid to distal anterior wall, the LV apex and mid distal inferior wall. Ejection fraction appeared to be about 30%.  5.  Status post attempt percutaneous intervention of the left anterior descending artery.  6.  Diabetes mellitus.  7.  History of recurrent syncope, ?recurrent ventricular tachycardia    [id number]  EKG personally reviewed by me  Sinus rhythm 67 bpm normal axis anteroseptal Q waves consistent with prior anterior infarction.    TTE: 11/2/15  Apical infarct.  Left ventricular ejection fraction is visually estimated to be 45%.    Ascending Aorta 3.8 cm.  Compared w the prior echo of , no change or slightly improved EF    TTE: 13  Left ventricular ejection fraction is 45-50%.  Mid-apical anteroseptal and apical akinesis.  Grade I diastolic dysfunction is present.  Mildly dilated left atrium.  Aortic sclerosis without stenosis    Angiogram:   1.  Acute anterolateral wall myocardial infarction secondary to occlusion of  the proximal left anterior descending artery.  2.  Status post stenting of the proximal and mid portion of left anterior  descending artery with 2 drug-eluting (Promus Element) stents.  3.  90% stenosis in the distal portion of the right coronary artery and  chronic total occlusion at the proximal small left circumflex artery with right  to left collateral.      Assessment:   1. CAD three-vessel CABG CABG LIMA to OM1, RSVG to LAD, RSVG to RCA  2. Hypertension  2. Dyslipidemia  4. Ischemic cardiomyopathy LVEF of 45%    Medical Decision Making:   Today's Assessment / Status / Plan:     1. Stable for now.  Continue aspirin 81 mg daily.  Okay to discontinue Plavix.   Continue Coreg 6.25 mg BID  2. Blood pressure well controlled at the present visit.  Continue lisinopril to 5 mg daily.  Continue Coreg 6.25 mg BID.  3. LDL less than 70.  Continue Zetia 10 mg daily.  Decrease Crestor 10 mg qHs.   Labs in 3 months then in one year  4. Patient appears euvolemic.  LVEF back up to 50%.    Follow-up in in 1 year.  Check labs in 3 months then in one year.    Thank you for allowing me to participate in taking care of patient.    Monica Morrison. MD.      Oxana Parkinson M.D.  [address]  [address]  [facility] [address]  VIA In Basket

## 2017-07-15 LAB
COLLECT DURATION TIME SPEC: NORMAL H
CREAT 24H UR-MCNC: 102 MG/DL
OXALATE 24H UR-MCNC: 19 MG/L
OXALATE 24H UR-MRATE: NORMAL MG/D (ref 16–49)
TOTAL VOLUME 1105: NORMAL ML

## 2017-07-20 DIAGNOSIS — E78.5 HYPERLIPIDEMIA, UNSPECIFIED HYPERLIPIDEMIA TYPE: ICD-10-CM

## 2017-07-20 RX ORDER — ROSUVASTATIN CALCIUM 10 MG/1
10 TABLET, COATED ORAL EVERY EVENING
Qty: 30 TAB | Refills: 11 | Status: SHIPPED | OUTPATIENT
Start: 2017-07-20 | End: 2018-07-23 | Stop reason: SDUPTHER

## 2017-07-27 ENCOUNTER — OFFICE VISIT (OUTPATIENT)
Dept: MEDICAL GROUP | Facility: MEDICAL CENTER | Age: 67
End: 2017-07-27
Payer: MEDICARE

## 2017-07-27 VITALS
BODY MASS INDEX: 29.89 KG/M2 | WEIGHT: 208.8 LBS | SYSTOLIC BLOOD PRESSURE: 118 MMHG | OXYGEN SATURATION: 93 % | HEART RATE: 77 BPM | TEMPERATURE: 97.6 F | DIASTOLIC BLOOD PRESSURE: 72 MMHG | HEIGHT: 70 IN

## 2017-07-27 DIAGNOSIS — F41.1 GAD (GENERALIZED ANXIETY DISORDER): ICD-10-CM

## 2017-07-27 DIAGNOSIS — I25.10 CORONARY ARTERY DISEASE INVOLVING NATIVE CORONARY ARTERY OF NATIVE HEART WITHOUT ANGINA PECTORIS: ICD-10-CM

## 2017-07-27 DIAGNOSIS — E11.8 CONTROLLED TYPE 2 DIABETES MELLITUS WITH COMPLICATION, WITHOUT LONG-TERM CURRENT USE OF INSULIN (HCC): ICD-10-CM

## 2017-07-27 DIAGNOSIS — Z95.1 S/P CABG X 3: ICD-10-CM

## 2017-07-27 DIAGNOSIS — M10.00 IDIOPATHIC GOUT, UNSPECIFIED CHRONICITY, UNSPECIFIED SITE: ICD-10-CM

## 2017-07-27 DIAGNOSIS — K21.9 GASTROESOPHAGEAL REFLUX DISEASE WITHOUT ESOPHAGITIS: ICD-10-CM

## 2017-07-27 DIAGNOSIS — N28.1 BILATERAL RENAL CYSTS: ICD-10-CM

## 2017-07-27 DIAGNOSIS — E78.5 HYPERLIPIDEMIA, UNSPECIFIED HYPERLIPIDEMIA TYPE: Chronic | ICD-10-CM

## 2017-07-27 DIAGNOSIS — Z00.00 HEALTH CARE MAINTENANCE: ICD-10-CM

## 2017-07-27 DIAGNOSIS — I25.10 ATHEROSCLEROSIS OF NATIVE CORONARY ARTERY OF NATIVE HEART WITHOUT ANGINA PECTORIS: Chronic | ICD-10-CM

## 2017-07-27 DIAGNOSIS — L57.0 ACTINIC KERATOSIS: ICD-10-CM

## 2017-07-27 DIAGNOSIS — I25.5 ISCHEMIC CARDIOMYOPATHY: ICD-10-CM

## 2017-07-27 DIAGNOSIS — I10 ESSENTIAL HYPERTENSION: ICD-10-CM

## 2017-07-27 PROCEDURE — 99215 OFFICE O/P EST HI 40 MIN: CPT | Performed by: INTERNAL MEDICINE

## 2017-07-27 ASSESSMENT — PATIENT HEALTH QUESTIONNAIRE - PHQ9: CLINICAL INTERPRETATION OF PHQ2 SCORE: 0

## 2017-07-27 NOTE — PROGRESS NOTES
CHIEF COMPLAINT  Chief Complaint   Patient presents with   • F/u DM     HPI  Patient is a 67 y.o. male patient who presents today for the following     DIABETES MELLITUS TYPE 2, with CKD stage III  Onset:  DM2 for 10 yrs.    Insulin/meds: Jardiance, 10 mg QD; jentadueto 2.5/850 mg BIDF  Used meds as prescribed.    Compliance to meds: yes  Checking feet daily/wear soft socks/shoes: yes    The last A1c: 6.9 today    Checking blood sugar: yes, once daily  Mean BS: < 140  Hypoglycemia:  one remote episode   Symptoms of hypoglycemia: sweating, fatigue, pale skin, hunger.    ASA: yes  ACE: yes  Statin: yes, welcol    Diet: regular  Exercise: daily  Body mass index is 29    DIABETES COMPLICATIONS:   Peripheral neuropathy:   No numbness or tingling sensation in the feet.  Retinopathy:    No evidence of retinopathy. Last eye exam: 2017  Nephropathy:    Last microalbumin in 2017  CVS:     Has CAD  GI:     No symptoms of gastropathy (nausea/vomiting).    FH of DM:  none    HYPERTENSION / CAD (st post CABG in 7/16), cardiomyopathy  Meds:  Lisinopril, 5 mg QD, spironolactone, 25 mg QD, carvedilol, 6.25 mg BID; taking as prescribed.    He is not measuring BP at home.  Denies: - headaches, vision problems, tinnitus.  - chest pain/pressure, palpitations, irregular heart beats, exertional, dyspnea, peripheral edema.  Low salt diet: yes  Diet / exercise BMI: as above    FH: multiple    HYPERLIPIDEMIA  Meds: Crestor, 20 mg QD, Welchol, 625 mg QD. No muscle weakness, cramps, nausea,abdominal discomfort.    Diet / exercise BMI: as above    FH: 2 brothers, father  He has been f/u by cardiology.    Gout  He has history of gout, on 300 mg of allopurinol daily.   No recent episode..    Bilateral renal cysts  The patient has history of renal cysts.   The last ultrasound was done on 4/4/16, showing benign bilateral cysts.     Anxiety  He she has history of anxiety / panic attacks, has been on Celexa for > 10 yrs, currently on 10 mg every  other day.      GERD  On omeprazole, 20 mg daily.  Takes medication as prescribed, that controls sx.   Denies:   - heartburn, epigastric pain.   -  nausea, vomiting, or diarrhea  - dysphagia  - abnormal cough  - blood in the stool or dark tarry stools.  - early satiety.  Nonsmoker.     Actinic keratosis   He has history of actinic keratoses in the past treated with liquid nitrogen.  Still have lesions on dorsum of both hand and on  lower extremities.  He has significant sun exposure in the past.   No past history of skin cancer.  Family history of skin cancer: Unknown    Reviewed PMH, PSH, FH, SH, ALL, HCM/IMM, no changes  Reviewed MEDS, no changes    Patient Active Problem List    Diagnosis Date Noted   • Ischemic cardiomyopathy 07/23/2013     Priority: High   • Coronary atherosclerosis of native coronary artery 03/08/2013     Priority: High   • Hyperlipidemia 03/07/2013     Priority: Medium   • Bilateral renal cysts 01/14/2015     Priority: Low   • Health care maintenance 07/27/2017   • Idiopathic gout 07/27/2017   • Actinic keratosis 07/27/2017   • S/P CABG x 3 04/04/2017   • TEMO (generalized anxiety disorder) 04/04/2017   • Gastroesophageal reflux disease without esophagitis 01/04/2017   • Coronary artery disease involving native coronary artery of native heart without angina pectoris 09/23/2016   • Diabetes mellitus type 2, controlled, with complications (CMS-[facility]) 09/23/2016     CURRENT MEDICATIONS  Current Outpatient Prescriptions   Medication Sig Dispense Refill   • rosuvastatin (CRESTOR) 10 MG Tab Take 1 Tab by mouth every evening. 30 Tab 11   • omeprazole (PRILOSEC) 20 MG delayed-release capsule TAKE ONE CAPSULE BY MOUTH EVERY DAY 90 Cap 1   • ezetimibe (ZETIA) 10 MG Tab Take 1 Tab by mouth every day. TAKE ONE TABLET BY MOUTH DAILY 90 Tab 1   • JENTADUETO 2.5-850 MG Tab TAKE ONE TABLET BY MOUTH TWICE A  Tab 3   • allopurinol (ZYLOPRIM) 300 MG Tab TAKE ONE TABLET BY MOUTH EVERY DAY 90 Tab 1   •  citalopram (CELEXA) 20 MG Tab TAKE ONE TABLET BY MOUTH DAILY -GENERICFOR CELEXA 30 Tab 2   • Colesevelam HCl (WELCHOL PO) Take  by mouth.     • JARDIANCE 10 MG Tab Take 10 mg by mouth every day.     • carvedilol (COREG) 6.25 MG Tab Take 1 Tab by mouth 2 times a day, with meals. 180 Tab 3   • lisinopril (PRINIVIL) 5 MG Tab Take 1 Tab by mouth every day. 90 Tab 3   • docusate sodium (COLACE) 100 MG Cap Take 100 mg by mouth 3 times a day as needed for Constipation. Indications: NOT TAKIN NOT  TAKING     • polyethylene glycol 3350 (MIRALAX) Powder Take 17 g by mouth 1 time daily as needed. Indications: not taking NOT taking     • Blood Glucose Monitoring Suppl SUPPLIES Misc Test strips order: Test strips for Freestyle Lite  meter. Sig: use four times a day and prn ssx high or low sugar #100 RF x 3 300 Each 1   • Lancets Misc 1 Each by Does not apply route 3 times a day. Lancets order: Lancets for Freestyle Lite meter. Sig: use three times dailyand prn ssx high or low sugar. #100 RF x 3 100 Each 3   • aspirin 81 MG tablet Take 81 mg by mouth every day.       No current facility-administered medications for this visit.     ALLERGIES  Allergies: Byetta and Other environmental    PAST MEDICAL HISTORY  Past Medical History   Diagnosis Date   • Chronic anticoagulation    • Indigestion    • Left ventricular apical thrombus March 2013     Inferoapical clot measuring 1.1cm x 0.8cm, started on anticoagulation.   • Hyperlipidemia     • CAD (coronary artery disease) March 2013     ACS. PCI/ANA PAULA x 2 of the LAD (2.5 x 12mm - mid; 2.75 x 16mm - proximal). Distal RCA is occluded; distal circumflex is occluded with good collateralization.   • Bilateral renal cysts    • Bronchitis    • DIABETES MELLITUS       oral diet and insulin   • Myocardial infarct (CMS-[facility]) 3/7/2013   • Psychiatric problem      anxiety   • C. difficile diarrhea      pt with hx of c diff after hospitalization [id number]   • Hypertension      well controlled on meds   •  "Backpain      -7/10   • Heart burn      SURGICAL HISTORY  He  has past surgical history that includes other neurological surg (); other cardiac surgery (); cholecystectomy (); recovery (2016); multiple coronary artery bypass endo vein harvest (2016); and miller (2016).    SOCIAL HISTORY  Social History   Substance Use Topics   • Smoking status: Never Smoker    • Smokeless tobacco: Never Used   • Alcohol Use: No     Social History     Social History Narrative     FAMILY HISTORY  Family History   Problem Relation Age of Onset   • Cancer Mother    • Diabetes Neg Hx    • Hyperlipidemia Brother      x 2   • Hypertension Father    • Hypertension Brother      x2   • Heart Disease Brother      x2, afib; cad x 1   • Cancer Paternal Uncle      Family Status   Relation Status Death Age   • Mother  79      colon ca   • Father  87       ROS   Constitutional: Negative for fever, chills.  HENT: Negative for congestion, sore throat.  Eyes: Negative for blurred vision.   Respiratory: Negative for cough, shortness of breath.  Cardiovascular: Negative for chest pain, palpitations.   Gastrointestinal: Negative for heartburn, nausea, abdominal pain.   Genitourinary: Negative for dysuria.  Musculoskeletal: Negative for significant myalgias, back pain and joint pain.   Skin: Negative for rash and itching.   Neuro: Negative for dizziness, weakness and headaches.   Endo/Heme/Allergies: Does not bruise/bleed easily.   Psychiatric/Behavioral: Negative for depression, anxiety    PHYSICAL EXAM   Blood pressure 118/72, pulse 77, temperature 36.4 °C (97.6 °F), height 1.778 m (5' 10\"), weight 94.711 kg (208 lb 12.8 oz), SpO2 93 %. Body mass index is 29.96 kg/(m^2).  General:  NAD, well appearing  HEENT:   NC/AT, PERRLA, EOMI, TMs are clear. Oropharyngeal mucosa is pink,  without lesions;  no cervical / supraclavicular  lymphadenopathy, no thyromegaly.    Cardiovascular: RRR.   No m/r/g. No carotid bruits .  "     Lungs:   CTAB, no w/r/r, no respiratory distress.  Abdomen: Soft, NT/ND + BS; no suprapubic tenderness; no masses or hepatosplenomegaly.  Extremities:  2+ DP and radial pulses bilaterally.  No c/c/e.   Skin:  Warm, dry.  No erythema. No rash.   Neurologic: Alert & oriented x 3. CN II-XII grossly intact. Brachioradialis / knee DTR are 2/4, symmetric. Strength and sensation grossly intact.  No focal deficits.  Psychiatric:  Affect normal, mood normal, judgment normal.    LABS     Labs are reviewed and discussed with a patient    Lab Results   Component Value Date/Time    CHOLESTEROL,TOT 97* 07/10/2017 07:45 AM    LDL 25 07/10/2017 07:45 AM    HDL 37* 07/10/2017 07:45 AM    TRIGLYCERIDES 175* 07/10/2017 07:45 AM       Lab Results   Component Value Date/Time    SODIUM 140 07/10/2017 07:45 AM    POTASSIUM 4.2 07/10/2017 07:45 AM    CHLORIDE 106 07/10/2017 07:45 AM    CO2 27 07/10/2017 07:45 AM    GLUCOSE 146* 07/10/2017 07:45 AM    BUN 19 07/10/2017 07:45 AM    CREATININE 1.14 07/10/2017 07:45 AM     Lab Results   Component Value Date/Time    ALKALINE PHOSPHATASE 50 07/10/2017 07:45 AM    AST(SGOT) 15 07/10/2017 07:45 AM    ALT(SGPT) 17 07/10/2017 07:45 AM    TOTAL BILIRUBIN 0.6 07/10/2017 07:45 AM      Lab Results   Component Value Date/Time    GLYCOHEMOGLOBIN 6.9* 07/10/2017 07:45 AM    GLYCOHEMOGLOBIN 7.0* 07/10/2017 07:39 AM    GLYCOHEMOGLOBIN 7.1* 02/28/2017 08:56 AM     No results found for: TSH  Lab Results   Component Value Date/Time    FREE T-4 0.82 03/08/2013 01:00 AM     Lab Results   Component Value Date/Time    WBC 7.5 07/10/2017 07:39 AM    RBC 6.10 07/10/2017 07:39 AM    HEMOGLOBIN 14.5 07/10/2017 07:39 AM    HEMATOCRIT 48.2 07/10/2017 07:39 AM    MCV 79.0* 07/10/2017 07:39 AM    MCH 23.8* 07/10/2017 07:39 AM    MCHC 30.1* 07/10/2017 07:39 AM    MPV 12.1 07/10/2017 07:39 AM    NEUTROPHILS-POLYS 62.30 07/10/2017 07:39 AM    LYMPHOCYTES 24.20 07/10/2017 07:39 AM    MONOCYTES 7.40 07/10/2017 07:39 AM     EOSINOPHILS 4.40 07/10/2017 07:39 AM    BASOPHILS 1.30 07/10/2017 07:39 AM    HYPOCHROMIA 1+ 09/16/2013 01:13 PM       IMAGING     Reviewed renal ultrasound from 4/4/16:  1.  There are bilateral simple renal cortical cyst.  2.  Enlarged prostate gland protruding up into the bladder base.    ASSESMENT AND PLAN        1. Controlled type 2 diabetes mellitus with complication, without long-term current use of insulin (CMS-[facility])  Controlled, continue current treatment    2. Essential hypertension  Controlled, continue current treatment; advised to monitor blood pressure at home    3. Coronary artery disease involving native coronary artery of native heart without angina pectoris  4. Atherosclerosis of native coronary artery of native heart without angina pectoris  5. Ischemic cardiomyopathy  6. S/P CABG x 3  Stable, controlled, continue current treatment and cardiology follow-up    7. Hyperlipidemia, unspecified hyperlipidemia type  Controlled, continue current treatment, follow-up lipid panel    8. Idiopathic gout, unspecified chronicity, unspecified site  Stable, controlled.  No recent flares.  Continue current treatment.    9. Bilateral renal cysts  Benign cyst, no need for follow-up    10. TEMO (generalized anxiety disorder)  Stable and controlled, continue current treatment    11. Gastroesophageal reflux disease without esophagitis  Stable, controlled, continue current treatment    12. Actinic keratosis  Advised to avoid sun exposure, use sunscreen.   Advised to come back for treatment with liquid nitrogen    13. Health care maintenance  Advise shingles    Counseling:   - Smoking:  Nonsmoker    Followup: Return in about 4 months (around 11/27/2017) for Short.    All questions are answered.    Time spent 40 minutes face to face, with > 50% spent counseling and coordinating care.    Please note that this dictation was created using voice recognition software, and that there might be errors of mary and possibly  content.

## 2017-08-25 DIAGNOSIS — Z76.0 MEDICATION REFILL: ICD-10-CM

## 2017-08-25 RX ORDER — CITALOPRAM 20 MG/1
TABLET ORAL
Qty: 90 TAB | Refills: 0 | Status: SHIPPED | OUTPATIENT
Start: 2017-08-25 | End: 2017-12-29 | Stop reason: SDUPTHER

## 2017-10-18 ENCOUNTER — OFFICE VISIT (OUTPATIENT)
Dept: MEDICAL GROUP | Facility: MEDICAL CENTER | Age: 67
End: 2017-10-18
Payer: MEDICARE

## 2017-10-18 VITALS
WEIGHT: 205 LBS | OXYGEN SATURATION: 95 % | HEART RATE: 72 BPM | TEMPERATURE: 97.1 F | SYSTOLIC BLOOD PRESSURE: 118 MMHG | DIASTOLIC BLOOD PRESSURE: 72 MMHG | BODY MASS INDEX: 28.7 KG/M2 | HEIGHT: 71 IN

## 2017-10-18 DIAGNOSIS — R35.1 NOCTURIA: ICD-10-CM

## 2017-10-18 DIAGNOSIS — R35.0 URINARY FREQUENCY: ICD-10-CM

## 2017-10-18 DIAGNOSIS — F41.1 GAD (GENERALIZED ANXIETY DISORDER): ICD-10-CM

## 2017-10-18 DIAGNOSIS — N40.0 BENIGN PROSTATIC HYPERPLASIA, UNSPECIFIED WHETHER LOWER URINARY TRACT SYMPTOMS PRESENT: ICD-10-CM

## 2017-10-18 DIAGNOSIS — Z00.00 HEALTH CARE MAINTENANCE: ICD-10-CM

## 2017-10-18 LAB
APPEARANCE UR: CLEAR
BILIRUB UR STRIP-MCNC: NORMAL MG/DL
COLOR UR AUTO: YELLOW
GLUCOSE UR STRIP.AUTO-MCNC: 2000 MG/DL
KETONES UR STRIP.AUTO-MCNC: NORMAL MG/DL
LEUKOCYTE ESTERASE UR QL STRIP.AUTO: NORMAL
NITRITE UR QL STRIP.AUTO: NORMAL
PH UR STRIP.AUTO: 5 [PH] (ref 5–8)
PROT UR QL STRIP: 30 MG/DL
RBC UR QL AUTO: NORMAL
SP GR UR STRIP.AUTO: 1.01
UROBILINOGEN UR STRIP-MCNC: NORMAL MG/DL

## 2017-10-18 PROCEDURE — 81002 URINALYSIS NONAUTO W/O SCOPE: CPT | Performed by: INTERNAL MEDICINE

## 2017-10-18 PROCEDURE — 99214 OFFICE O/P EST MOD 30 MIN: CPT | Performed by: INTERNAL MEDICINE

## 2017-10-18 RX ORDER — TAMSULOSIN HYDROCHLORIDE 0.4 MG/1
0.4 CAPSULE ORAL
Qty: 90 CAP | Refills: 0 | Status: SHIPPED | OUTPATIENT
Start: 2017-10-18 | End: 2018-01-12 | Stop reason: SDUPTHER

## 2017-10-18 NOTE — PROGRESS NOTES
CHIEF COMPLAINT  Chief Complaint   Patient presents with   • Immunizations     HPI  Patient is a 67 y.o. male patient who presents today for the following     BPH  Onset: > 1 y ago  C/o:   · Nocturia x 2-3 times  · Frequency  · Bladder fullness / incomplete empyingUrgency    Denies:  · Weak stream/dribbling  · Hesitancy  · Dysuria  · Change in urine color/odor, hematuria  · Suprapubic/flank/abdominal pain    Renal panel was normal.  PSA has been low at 2.66.     Anxiety  He she has history of anxiety / panic attacks, has been on Celexa for > 10 yrs, currently on 10 mg every other.       TEMO-7 score:  Points   1. Feeling nervous, anxious, or on edge                      0   2. Not being able to stop or control worrying 0   3. Worrying too much about different things 0   4. Trouble relaxing 0   5. Being so restless that it is hard to sit still 0   6. Becoming easily annoyed or irritable 0   7. Feeling afraid as if something awful might happen 0   Total score 0      Depression screen:   1. Little interest or pleasure in doing things:             0  2. Feeling down, depressed, or hopeless:    0     Reviewed PMH, PSH, FH, SH, ALL, HCM/IMM, no changes  Reviewed MEDS, no changes    Patient Active Problem List    Diagnosis Date Noted   • Ischemic cardiomyopathy 07/23/2013     Priority: High   • Coronary atherosclerosis of native coronary artery 03/08/2013     Priority: High   • Hyperlipidemia 03/07/2013     Priority: Medium   • Bilateral renal cysts 01/14/2015     Priority: Low   • Health care maintenance 07/27/2017   • Idiopathic gout 07/27/2017   • Actinic keratosis 07/27/2017   • S/P CABG x 3 04/04/2017   • TEMO (generalized anxiety disorder) 04/04/2017   • Gastroesophageal reflux disease without esophagitis 01/04/2017   • Coronary artery disease involving native coronary artery of native heart without angina pectoris 09/23/2016   • Diabetes mellitus type 2, controlled, with complications (CMS-[facility]) 09/23/2016     CURRENT  MEDICATIONS  Current Outpatient Prescriptions   Medication Sig Dispense Refill   • citalopram (CELEXA) 20 MG Tab TAKE ONE TABLET BY MOUTH DAILY -GENERICFOR CELEXA 90 Tab 0   • rosuvastatin (CRESTOR) 10 MG Tab Take 1 Tab by mouth every evening. 30 Tab 11   • omeprazole (PRILOSEC) 20 MG delayed-release capsule TAKE ONE CAPSULE BY MOUTH EVERY DAY 90 Cap 1   • ezetimibe (ZETIA) 10 MG Tab Take 1 Tab by mouth every day. TAKE ONE TABLET BY MOUTH DAILY 90 Tab 1   • JENTADUETO 2.5-850 MG Tab TAKE ONE TABLET BY MOUTH TWICE A  Tab 3   • allopurinol (ZYLOPRIM) 300 MG Tab TAKE ONE TABLET BY MOUTH EVERY DAY 90 Tab 1   • Colesevelam HCl (WELCHOL PO) Take  by mouth.     • JARDIANCE 10 MG Tab Take 10 mg by mouth every day.     • carvedilol (COREG) 6.25 MG Tab Take 1 Tab by mouth 2 times a day, with meals. 180 Tab 3   • lisinopril (PRINIVIL) 5 MG Tab Take 1 Tab by mouth every day. 90 Tab 3   • docusate sodium (COLACE) 100 MG Cap Take 100 mg by mouth 3 times a day as needed for Constipation. Indications: NOT TAKIN NOT  TAKING     • polyethylene glycol 3350 (MIRALAX) Powder Take 17 g by mouth 1 time daily as needed. Indications: not taking NOT taking     • Blood Glucose Monitoring Suppl SUPPLIES Misc Test strips order: Test strips for Freestyle Lite  meter. Sig: use four times a day and prn ssx high or low sugar #100 RF x 3 300 Each 1   • Lancets Misc 1 Each by Does not apply route 3 times a day. Lancets order: Lancets for Freestyle Lite meter. Sig: use three times dailyand prn ssx high or low sugar. #100 RF x 3 100 Each 3   • aspirin 81 MG tablet Take 81 mg by mouth every day.       No current facility-administered medications for this visit.      ALLERGIES  Allergies: Byetta and Other environmental    PAST MEDICAL HISTORY  Past Medical History:   Diagnosis Date   • Myocardial infarct 3/7/2013   • Left ventricular apical thrombus March 2013    Inferoapical clot measuring 1.1cm x 0.8cm, started on anticoagulation.   • CAD  (coronary artery disease) 2013    ACS. PCI/ANA PAULA x 2 of the LAD (2.5 x 12mm - mid; 2.75 x 16mm - proximal). Distal RCA is occluded; distal circumflex is occluded with good collateralization.   • Backpain     -7/10   • Bilateral renal cysts    • Bronchitis    • C. difficile diarrhea     pt with hx of c diff after hospitalization [id number]   • Chronic anticoagulation    • DIABETES MELLITUS      oral diet and insulin   • Heart burn    • Hyperlipidemia     • Hypertension     well controlled on meds   • Indigestion    • Psychiatric problem     anxiety     SURGICAL HISTORY  He  has a past surgical history that includes other neurological surg (); other cardiac surgery (); cholecystectomy (); recovery (2016); multiple coronary artery bypass endo vein harvest (2016); and miller (2016).    SOCIAL HISTORY  Social History   Substance Use Topics   • Smoking status: Never Smoker   • Smokeless tobacco: Never Used   • Alcohol use No     Social History     Social History Narrative   • No narrative on file     FAMILY HISTORY  Family History   Problem Relation Age of Onset   • Cancer Mother    • Hypertension Father    • Hyperlipidemia Brother      x 2   • Hypertension Brother      x2   • Heart Disease Brother      x2, afib; cad x 1   • Cancer Paternal Uncle    • Diabetes Neg Hx      Family Status   Relation Status   • Mother  at age 79     colon ca   • Father  at age 87   • Brother    • Brother    • Brother    • Paternal Uncle    • Neg Hx        ROS   Constitutional: Negative for fever, chills.  HENT: Negative for congestion, sore throat.  Eyes: Negative for blurred vision.   Respiratory: Negative for cough, shortness of breath.  Cardiovascular: Negative for chest pain, palpitations.   Gastrointestinal: Negative for heartburn, nausea, abdominal pain.   Genitourinary: Negative for dysuria. And per HPI.  Musculoskeletal: Negative for significant myalgias, back pain and joint pain.   Skin:  "Negative for rash and itching.   Neuro: Negative for dizziness, weakness and headaches.   Endo/Heme/Allergies: Does not bruise/bleed easily. He has controlled diabetes  Psychiatric/Behavioral: controlled anxiety    PHYSICAL EXAM   Blood pressure 118/72, pulse 72, temperature 36.2 °C (97.1 °F), height 1.803 m (5' 11\"), weight 93 kg (205 lb), SpO2 95 %. Body mass index is 28.59 kg/m².  General:  NAD, well appearing  HEENT:   NC/AT, PERRLA, EOMI, TMs are clear. Oropharyngeal mucosa is pink,  without lesions;  no cervical / supraclavicular  lymphadenopathy, no thyromegaly.    Cardiovascular: RRR.   No m/r/g. No carotid bruits .      Lungs:   CTAB, no w/r/r, no respiratory distress.  Abdomen: Soft, NT/ND + BS; no suprapubic tenderness; no masses or hepatosplenomegaly.  Extremities:  2+ DP and radial pulses bilaterally.  No c/c/e.   Skin:  Warm, dry.  No erythema. No rash.   Neurologic: Alert & oriented x 3.  No focal deficits.  Psychiatric:  Affect normal, mood normal, judgment normal.    LABS     Labs are reviewed and discussed with a patient     POCT UA: positive for glucosuria    Lab Results   Component Value Date/Time    CHOLSTRLTOT 97 (L) 07/10/2017 07:45 AM    LDL 25 07/10/2017 07:45 AM    HDL 37 (A) 07/10/2017 07:45 AM    TRIGLYCERIDE 175 (H) 07/10/2017 07:45 AM       Lab Results   Component Value Date/Time    SODIUM 140 07/10/2017 07:45 AM    POTASSIUM 4.2 07/10/2017 07:45 AM    CHLORIDE 106 07/10/2017 07:45 AM    CO2 27 07/10/2017 07:45 AM    GLUCOSE 146 (H) 07/10/2017 07:45 AM    BUN 19 07/10/2017 07:45 AM    CREATININE 1.14 07/10/2017 07:45 AM     Lab Results   Component Value Date/Time    ALKPHOSPHAT 50 07/10/2017 07:45 AM    ASTSGOT 15 07/10/2017 07:45 AM    ALTSGPT 17 07/10/2017 07:45 AM    TBILIRUBIN 0.6 07/10/2017 07:45 AM      Lab Results   Component Value Date/Time    HBA1C 6.9 (H) 07/10/2017 07:45 AM    HBA1C 7.0 (H) 07/10/2017 07:39 AM    HBA1C 7.1 (H) 02/28/2017 08:56 AM     No results found for: " TSH  Lab Results   Component Value Date/Time    FREET4 0.82 03/08/2013 01:00 AM     Lab Results   Component Value Date/Time    WBC 7.5 07/10/2017 07:39 AM    RBC 6.10 07/10/2017 07:39 AM    HEMOGLOBIN 14.5 07/10/2017 07:39 AM    HEMATOCRIT 48.2 07/10/2017 07:39 AM    MCV 79.0 (L) 07/10/2017 07:39 AM    MCH 23.8 (L) 07/10/2017 07:39 AM    MCHC 30.1 (L) 07/10/2017 07:39 AM    MPV 12.1 07/10/2017 07:39 AM    NEUTSPOLYS 62.30 07/10/2017 07:39 AM    LYMPHOCYTES 24.20 07/10/2017 07:39 AM    MONOCYTES 7.40 07/10/2017 07:39 AM    EOSINOPHILS 4.40 07/10/2017 07:39 AM    BASOPHILS 1.30 07/10/2017 07:39 AM    HYPOCHROMIA 1+ 09/16/2013 01:13 PM       Ref. Range 7/10/2017 07:39   Prostatic Specific Antigen Tot  0.00 - 4.00 ng/mL 2.66     IMAGING     None    ASSESMENT AND PLAN        1. Urinary frequency  - POCT Urinalysis  2. Nocturia  - POCT Urinalysis  3. Benign prostatic hyperplasia, unspecified whether lower urinary tract symptoms present  Start:  Flomax, 0.4 mg daily, given handout about medication and discussed/reviewed side effects    4. TEMO (generalized anxiety disorder)  Controlled, continue current treatment    5. Health care maintenance  He will get a shingles shot in the pharmacy    Counseling:   - Smoking:  Nonsmoker    Followup: Return if symptoms worsen or fail to improve.    All questions are answered.    Please note that this dictation was created using voice recognition software, and that there might be errors of mary and possibly content.

## 2017-10-23 RX ORDER — ALLOPURINOL 300 MG/1
TABLET ORAL
Qty: 90 TAB | Refills: 1 | Status: SHIPPED | OUTPATIENT
Start: 2017-10-23 | End: 2018-04-23 | Stop reason: SDUPTHER

## 2017-11-17 DIAGNOSIS — I10 ESSENTIAL HYPERTENSION: ICD-10-CM

## 2017-11-19 RX ORDER — LISINOPRIL 5 MG/1
5 TABLET ORAL DAILY
Qty: 90 TAB | Refills: 2 | Status: SHIPPED | OUTPATIENT
Start: 2017-11-19 | End: 2018-08-14 | Stop reason: SDUPTHER

## 2017-11-22 DIAGNOSIS — Z95.1 S/P CABG X 3: ICD-10-CM

## 2017-11-22 RX ORDER — CARVEDILOL 6.25 MG/1
6.25 TABLET ORAL 2 TIMES DAILY WITH MEALS
Qty: 180 TAB | Refills: 2 | Status: SHIPPED | OUTPATIENT
Start: 2017-11-22 | End: 2018-08-22 | Stop reason: SDUPTHER

## 2017-12-18 RX ORDER — OMEPRAZOLE 20 MG/1
CAPSULE, DELAYED RELEASE ORAL
Qty: 90 CAP | Refills: 3 | Status: SHIPPED | OUTPATIENT
Start: 2017-12-18 | End: 2018-12-13 | Stop reason: SDUPTHER

## 2017-12-19 DIAGNOSIS — E78.5 HYPERLIPIDEMIA, UNSPECIFIED HYPERLIPIDEMIA TYPE: ICD-10-CM

## 2017-12-19 RX ORDER — EZETIMIBE 10 MG/1
10 TABLET ORAL DAILY
Qty: 90 TAB | Refills: 3 | Status: SHIPPED | OUTPATIENT
Start: 2017-12-19 | End: 2018-12-13 | Stop reason: SDUPTHER

## 2017-12-29 DIAGNOSIS — Z76.0 MEDICATION REFILL: ICD-10-CM

## 2017-12-29 RX ORDER — CITALOPRAM 20 MG/1
TABLET ORAL
Qty: 90 TAB | Refills: 0 | Status: SHIPPED | OUTPATIENT
Start: 2017-12-29 | End: 2018-04-18 | Stop reason: SDUPTHER

## 2018-01-12 RX ORDER — TAMSULOSIN HYDROCHLORIDE 0.4 MG/1
CAPSULE ORAL
Qty: 90 CAP | Refills: 0 | Status: SHIPPED | OUTPATIENT
Start: 2018-01-12 | End: 2018-04-14 | Stop reason: SDUPTHER

## 2018-01-29 ENCOUNTER — APPOINTMENT (OUTPATIENT)
Dept: RADIOLOGY | Facility: MEDICAL CENTER | Age: 68
End: 2018-01-29
Attending: PHYSICAL MEDICINE & REHABILITATION
Payer: MEDICARE

## 2018-01-29 DIAGNOSIS — M54.6 PAIN IN THORACIC SPINE: ICD-10-CM

## 2018-01-29 PROCEDURE — 72146 MRI CHEST SPINE W/O DYE: CPT

## 2018-02-15 ENCOUNTER — HOSPITAL ENCOUNTER (OUTPATIENT)
Dept: LAB | Facility: MEDICAL CENTER | Age: 68
End: 2018-02-15
Attending: INTERNAL MEDICINE
Payer: MEDICARE

## 2018-02-15 LAB
CREAT UR-MCNC: 103.5 MG/DL
CREAT UR-MCNC: 104.7 MG/DL
MICROALBUMIN UR-MCNC: 18.8 MG/DL
MICROALBUMIN/CREAT UR: 182 MG/G (ref 0–30)

## 2018-02-15 PROCEDURE — 82570 ASSAY OF URINE CREATININE: CPT

## 2018-02-15 PROCEDURE — 82043 UR ALBUMIN QUANTITATIVE: CPT

## 2018-02-26 ENCOUNTER — HOSPITAL ENCOUNTER (OUTPATIENT)
Dept: LAB | Facility: MEDICAL CENTER | Age: 68
End: 2018-02-26
Attending: INTERNAL MEDICINE
Payer: MEDICARE

## 2018-02-26 DIAGNOSIS — E11.8 CONTROLLED TYPE 2 DIABETES MELLITUS WITH COMPLICATION, WITHOUT LONG-TERM CURRENT USE OF INSULIN (HCC): ICD-10-CM

## 2018-02-26 DIAGNOSIS — E78.5 DYSLIPIDEMIA: ICD-10-CM

## 2018-02-27 ENCOUNTER — HOSPITAL ENCOUNTER (OUTPATIENT)
Dept: LAB | Facility: MEDICAL CENTER | Age: 68
End: 2018-02-27
Attending: INTERNAL MEDICINE
Payer: MEDICARE

## 2018-02-27 DIAGNOSIS — E11.9 DIABETES MELLITUS WITHOUT COMPLICATION (HCC): ICD-10-CM

## 2018-02-27 DIAGNOSIS — E11.8 CONTROLLED TYPE 2 DIABETES MELLITUS WITH COMPLICATION, WITHOUT LONG-TERM CURRENT USE OF INSULIN (HCC): ICD-10-CM

## 2018-02-27 DIAGNOSIS — E78.5 DYSLIPIDEMIA: ICD-10-CM

## 2018-02-27 LAB
ALBUMIN SERPL BCP-MCNC: 4.3 G/DL (ref 3.2–4.9)
ALBUMIN/GLOB SERPL: 1.8 G/DL
ALP SERPL-CCNC: 43 U/L (ref 30–99)
ALT SERPL-CCNC: 14 U/L (ref 2–50)
ANION GAP SERPL CALC-SCNC: 7 MMOL/L (ref 0–11.9)
AST SERPL-CCNC: 16 U/L (ref 12–45)
BILIRUB SERPL-MCNC: 0.7 MG/DL (ref 0.1–1.5)
BUN SERPL-MCNC: 20 MG/DL (ref 8–22)
CALCIUM SERPL-MCNC: 9.5 MG/DL (ref 8.5–10.5)
CHLORIDE SERPL-SCNC: 103 MMOL/L (ref 96–112)
CHOLEST SERPL-MCNC: 100 MG/DL (ref 100–199)
CO2 SERPL-SCNC: 28 MMOL/L (ref 20–33)
CREAT SERPL-MCNC: 1.11 MG/DL (ref 0.5–1.4)
EST. AVERAGE GLUCOSE BLD GHB EST-MCNC: 140 MG/DL
GLOBULIN SER CALC-MCNC: 2.4 G/DL (ref 1.9–3.5)
GLUCOSE SERPL-MCNC: 133 MG/DL (ref 65–99)
HBA1C MFR BLD: 6.5 % (ref 0–5.6)
HDLC SERPL-MCNC: 36 MG/DL
LDLC SERPL CALC-MCNC: 32 MG/DL
POTASSIUM SERPL-SCNC: 4.6 MMOL/L (ref 3.6–5.5)
PROT SERPL-MCNC: 6.7 G/DL (ref 6–8.2)
SODIUM SERPL-SCNC: 138 MMOL/L (ref 135–145)
TRIGL SERPL-MCNC: 158 MG/DL (ref 0–149)

## 2018-02-27 PROCEDURE — 36415 COLL VENOUS BLD VENIPUNCTURE: CPT

## 2018-02-27 PROCEDURE — 83036 HEMOGLOBIN GLYCOSYLATED A1C: CPT | Mod: GA

## 2018-02-27 PROCEDURE — 80061 LIPID PANEL: CPT

## 2018-02-27 PROCEDURE — 80053 COMPREHEN METABOLIC PANEL: CPT

## 2018-02-27 RX ORDER — LANCETS 30 GAUGE
1 EACH MISCELLANEOUS 3 TIMES DAILY
Qty: 100 EACH | Refills: 3 | Status: SHIPPED | OUTPATIENT
Start: 2018-02-27 | End: 2019-03-18 | Stop reason: SDUPTHER

## 2018-04-16 RX ORDER — TAMSULOSIN HYDROCHLORIDE 0.4 MG/1
CAPSULE ORAL
Qty: 30 CAP | Refills: 0 | Status: SHIPPED | OUTPATIENT
Start: 2018-04-16 | End: 2018-05-18 | Stop reason: SDUPTHER

## 2018-04-18 DIAGNOSIS — Z76.0 MEDICATION REFILL: ICD-10-CM

## 2018-04-18 RX ORDER — CITALOPRAM 20 MG/1
TABLET ORAL
Qty: 30 TAB | Refills: 0 | Status: SHIPPED | OUTPATIENT
Start: 2018-04-18 | End: 2018-05-25 | Stop reason: SDUPTHER

## 2018-04-23 RX ORDER — ALLOPURINOL 300 MG/1
300 TABLET ORAL
Qty: 30 TAB | Refills: 0 | Status: SHIPPED | OUTPATIENT
Start: 2018-04-23 | End: 2018-05-20 | Stop reason: SDUPTHER

## 2018-05-02 ENCOUNTER — TELEPHONE (OUTPATIENT)
Dept: MEDICAL GROUP | Facility: MEDICAL CENTER | Age: 68
End: 2018-05-02

## 2018-05-02 ENCOUNTER — PATIENT MESSAGE (OUTPATIENT)
Dept: HEALTH INFORMATION MANAGEMENT | Facility: OTHER | Age: 68
End: 2018-05-02

## 2018-05-02 DIAGNOSIS — E11.9 CONTROLLED TYPE 2 DIABETES MELLITUS WITHOUT COMPLICATION, WITHOUT LONG-TERM CURRENT USE OF INSULIN (HCC): ICD-10-CM

## 2018-05-02 NOTE — TELEPHONE ENCOUNTER
Good morning Dr. Jha    Pt is requesting orders for an A1c Screening. Please advise.    Thank you.

## 2018-05-18 RX ORDER — TAMSULOSIN HYDROCHLORIDE 0.4 MG/1
0.4 CAPSULE ORAL DAILY
Qty: 90 CAP | Refills: 0 | Status: SHIPPED | OUTPATIENT
Start: 2018-05-18 | End: 2018-05-22 | Stop reason: SDUPTHER

## 2018-05-18 NOTE — TELEPHONE ENCOUNTER
Was the patient seen in the last year in this department? Yes     Does patient have an active prescription for medications requested? Yes     Received Request Via: Pharmacy     Patient has an appointment on 5/22- ran out of prescription and needs a supply to get through until Tues.

## 2018-05-21 ENCOUNTER — HOSPITAL ENCOUNTER (OUTPATIENT)
Dept: LAB | Facility: MEDICAL CENTER | Age: 68
End: 2018-05-21
Attending: INTERNAL MEDICINE
Payer: MEDICARE

## 2018-05-21 ENCOUNTER — HOSPITAL ENCOUNTER (OUTPATIENT)
Dept: LAB | Facility: MEDICAL CENTER | Age: 68
End: 2018-05-21
Attending: PHYSICIAN ASSISTANT
Payer: MEDICARE

## 2018-05-21 LAB
EST. AVERAGE GLUCOSE BLD GHB EST-MCNC: 160 MG/DL
HBA1C MFR BLD: 7.2 % (ref 0–5.6)

## 2018-05-21 PROCEDURE — 86812 HLA TYPING A B OR C: CPT

## 2018-05-21 PROCEDURE — 36415 COLL VENOUS BLD VENIPUNCTURE: CPT

## 2018-05-21 PROCEDURE — 83036 HEMOGLOBIN GLYCOSYLATED A1C: CPT | Mod: GA

## 2018-05-21 RX ORDER — ALLOPURINOL 300 MG/1
TABLET ORAL
Qty: 30 TAB | Refills: 0 | Status: SHIPPED | OUTPATIENT
Start: 2018-05-21 | End: 2018-05-22 | Stop reason: SDUPTHER

## 2018-05-21 RX ORDER — COLESEVELAM HYDROCHLORIDE 625 MG/1
TABLET, FILM COATED ORAL
COMMUNITY
Start: 2018-03-26 | End: 2018-12-13

## 2018-05-21 RX ORDER — EMPAGLIFLOZIN 25 MG/1
TABLET, FILM COATED ORAL DAILY
COMMUNITY
Start: 2018-03-21 | End: 2020-04-30

## 2018-05-21 RX ORDER — GLIPIZIDE 5 MG/1
2.5 TABLET ORAL 2 TIMES DAILY
COMMUNITY
Start: 2018-05-04 | End: 2020-04-30

## 2018-05-21 NOTE — PROGRESS NOTES
RN-CDE Note    Subjective:     Health changes since last visit/interval Hx: Patient reports cortisone injection in his neck increasing his blood sugars     Medications (including changes made today)  Current Outpatient Prescriptions   Medication Sig Dispense Refill   • allopurinol (ZYLOPRIM) 300 MG Tab TAKE 1 TABLET BY MOUTH ONCE DAILY 30 Tab 0   • WELCHOL 625 MG Tab      • JARDIANCE 25 MG Tab      • glipiZIDE (GLUCOTROL) 5 MG Tab      • metFORMIN (GLUCOPHAGE) 850 MG Tab      • tamsulosin (FLOMAX) 0.4 MG capsule Take 1 Cap by mouth every day. 12 hour after breakfast. 90 Cap 0   • citalopram (CELEXA) 20 MG Tab TAKE ONE TABLET BY MOUTH DAILY -GENERICFOR CELEXA 30 Tab 0   • Lancets Misc 1 Each by Does not apply route 3 times a day. Lancets for Freestyle Lite meter. Sig: use TID and prn ssx high or low sugar. 100 Each 3   • Blood Glucose Monitoring Suppl Supplies Misc Test strips order: Test strips for Freestyle Lite  meter. Sig: use four times a day and prn ssx high or low sugar #100 RF x 3 300 Each 1   • ezetimibe (ZETIA) 10 MG Tab Take 1 Tab by mouth every day. TAKE ONE TABLET BY MOUTH DAILY 90 Tab 3   • omeprazole (PRILOSEC) 20 MG delayed-release capsule TAKE 1 CAPSULE BY MOUTH ONCE DAILY 90 Cap 3   • carvedilol (COREG) 6.25 MG Tab Take 1 Tab by mouth 2 times a day, with meals. 180 Tab 2   • lisinopril (PRINIVIL) 5 MG Tab Take 1 Tab by mouth every day. 90 Tab 2   • rosuvastatin (CRESTOR) 10 MG Tab Take 1 Tab by mouth every evening. 30 Tab 11   • aspirin 81 MG tablet Take 81 mg by mouth every day.     • docusate sodium (COLACE) 100 MG Cap Take 100 mg by mouth 3 times a day as needed for Constipation. Indications: NOT TAKIN NOT  TAKING     • polyethylene glycol 3350 (MIRALAX) Powder Take 17 g by mouth 1 time daily as needed. Indications: not taking NOT taking       No current facility-administered medications for this visit.        Taking daily ASA: Yes  Taking above medications as prescribed: Yes  Patient Denies side  effects of medication.    Exercise: walking only/ neck and back pain  Diet: meals per day on average: 3 + snacks    Health Maintenance:   Health Maintenance Topics with due status: Overdue       Topic Date Due    DIABETES MONOFILAMENT / LE EXAM 04/04/2018         DM:   Last A1c:   Lab Results   Component Value Date/Time    HBA1C 7.2 (H) 05/21/2018 02:07 PM      A1c goal: < 7    Glucose monitoring frequency: daily  fasting range:   patient forgot to bring HBG readings  Hypoglycemic episodes: yes - afternoons     Last Retinal Exam: 4/25/18  Daily Foot Exam: no  Routine Dental Exams: yes    Lab Results   Component Value Date/Time    MALBCRT 182 (H) 02/15/2018 02:11 PM    MICROALBUR 18.8 02/15/2018 02:11 PM        ACR Albumin/Creatinine Ratio goal <30 no    Diabetic complications: cardiovascular disease    HTN:   Blood pressure goal <140/<80 yes.   Currently Rx ACE/ARB: Yes    Dyslipidemia:    Lab Results   Component Value Date/Time    CHOLSTRLTOT 100 02/27/2018 09:55 AM    LDL 32 02/27/2018 09:55 AM    HDL 36 (A) 02/27/2018 09:55 AM    TRIGLYCERIDE 158 (H) 02/27/2018 09:55 AM       Lab Results   Component Value Date/Time    SODIUM 138 02/27/2018 09:55 AM    POTASSIUM 4.6 02/27/2018 09:55 AM    CHLORIDE 103 02/27/2018 09:55 AM    CO2 28 02/27/2018 09:55 AM    GLUCOSE 133 (H) 02/27/2018 09:55 AM    BUN 20 02/27/2018 09:55 AM    CREATININE 1.11 02/27/2018 09:55 AM     Lab Results   Component Value Date/Time    ALKPHOSPHAT 43 02/27/2018 09:55 AM    ASTSGOT 16 02/27/2018 09:55 AM    ALTSGPT 14 02/27/2018 09:55 AM    TBILIRUBIN 0.7 02/27/2018 09:55 AM      Currently Rx Statin: Yes    BPH  Nocturia:x 1-2    Denies:   · Frequency  · Urgency  · Weak stream/dribbling  · Bladder fullness / incomplete emptying  · Hesitancy  No flomax 0.4 mg HS.    He  reports that he has never smoked. He has never used smokeless tobacco.     ROS   Constitutional: Negative for fever, chills and weight loss.   HEENT: Negative for blurred  "vision, sore throat, swollen glands. No hearing loss or vertigo.  Respiratory: Negative for cough, wheezing, shortness of breath.   CVS: Negative for chest pain, palpitations, irregular heart beats, exertional dyspnea.   GI: Negative for heartburn, abdominal pain, nausea, vomiting, change in BMs.   : Negative for dysuria, polyuria.   MS: Negative for myalgias, joint pain.   Neuro: no headaches, numbness, weakness, seizures.   Skin: no skin lesions, rash.  Endocrine: per HPI.     Objective:   /74   Pulse 73   Temp 36.4 °C (97.6 °F)   Resp 16   Ht 1.803 m (5' 11\")   Wt 98.2 kg (216 lb 7.9 oz)   SpO2 93%   BMI 30.19 kg/m²    Alert, oriented in no acute distress.  Eye contact is good, speech goal directed, affect bright.  HEENT: EOMI, PERRL, conjunctiva non-injected, sclera non-icteric.  Nares patent with no significant congestion or drainage.  Normal pinnae, external auditory canals, TM pearly gray with normal light reflex bilaterally.  Oral mucous membranes pink and moist with no lesions.  Neck supple with no cervical lymphadenopathy, JVD, palpable thyroid nodules or carotid bruits.  Lungs: clear to auscultation bilaterally with good excursion.  CV: regular rate and rhythm, without murmur, rubs, thrills, or gallops.  Abdomen: soft, non-distended, non-tender with normal bowel sounds. No CVAT, No masses, or organomegaly.  Lower extremities: color normal, vascularity normal, no edema, temperature normal  Musculoskeletal: Normal gait. No obvious muscle weakness or wasting.  Psych: Normal mood and affect. Alert and oriented x3. Judgment and insight is normal.  Neuro:speech normal, mental status intact, cranial nerves 2-12 intact, gait, including heel, toe, and tandem walking normal, muscle tone normal, muscle strength normal, sensation to light touch and pinprick normal, reflexes normal and symmetric  Monofilament testing with a 10 gram force: sensation intact: intact bilaterally  Visual Inspection: Feet " without maceration, ulcers, fissures.  Pedal pulses: intact bilaterally    Plan:     1. Controlled type 2 diabetes mellitus with complication, without long-term current use of insulin ([facility])  Discussed All medications, side effects and compliance (discussed carefully)  Diabetic diet discussed in detail, written exchange diet given  Foot care discussed and Podiatry visits  Glycohemoglobin and other lab monitoring  Home glucose monitoring emphasized  Labs immediately prior to next visit  Long term diabetic complications. Labs -- see laboratory orders  Advised to increase fluid intake and avoid NSAIDS  Patient educated on Interpretation of lab results, Blood Sugar Goals, Complications of Diabetes Mellitus, Exercise, Nutrition    Recommended medication changes: start Victoza 0.6 mg x 3-7 days then increase to 1.2 mg daily.  Stop glipizide when at 1.2 mg dose.  Send FSBG in 1-week    - Diabetic Monofilament LE Exam  - HEMOGLOBIN A1C; Future  - COMP METABOLIC PANEL; Future  - MICROALBUMIN CREAT RATIO URINE; Future    2. Essential hypertension  Controlled, continue current treatment    3. Coronary artery disease involving native coronary artery of native heart without angina pectoris  4. S/P CABG x 3  5. Ischemic cardiomyopathy  Controlled, asymptomatic; continue current treatment and cardiology follow-up    6. Dyslipidemia  Controlled, continue current treatment  - LIPID PROFILE; Future    7. TEMO (generalized anxiety disorder)  She does not feel anxious or depressed.   He takes Celexa as needed not daily    8. Idiopathic gout, unspecified chronicity, unspecified site  Stable and controlled, no current flare, refill:  - allopurinol (ZYLOPRIM) 300 MG Tab; TAKE 1 TABLET BY MOUTH ONCE DAILY  Dispense: 90 Tab; Refill: 1    9. Health care maintenance  UTD    10. BMI 30-34.9  Appropriate counseling given.    F/u in 4 months

## 2018-05-22 ENCOUNTER — OFFICE VISIT (OUTPATIENT)
Dept: MEDICAL GROUP | Facility: MEDICAL CENTER | Age: 68
End: 2018-05-22
Payer: MEDICARE

## 2018-05-22 ENCOUNTER — NON-PROVIDER VISIT (OUTPATIENT)
Dept: MEDICAL GROUP | Facility: MEDICAL CENTER | Age: 68
End: 2018-05-22
Payer: MEDICARE

## 2018-05-22 VITALS
TEMPERATURE: 97.6 F | RESPIRATION RATE: 16 BRPM | DIASTOLIC BLOOD PRESSURE: 74 MMHG | HEART RATE: 73 BPM | BODY MASS INDEX: 30.31 KG/M2 | HEIGHT: 71 IN | SYSTOLIC BLOOD PRESSURE: 112 MMHG | OXYGEN SATURATION: 93 % | WEIGHT: 216.49 LBS

## 2018-05-22 DIAGNOSIS — Z23 NEED FOR PNEUMOCOCCAL VACCINE: ICD-10-CM

## 2018-05-22 DIAGNOSIS — E66.9 OBESITY (BMI 30.0-34.9): ICD-10-CM

## 2018-05-22 DIAGNOSIS — E11.8 CONTROLLED TYPE 2 DIABETES MELLITUS WITH COMPLICATION, WITHOUT LONG-TERM CURRENT USE OF INSULIN (HCC): ICD-10-CM

## 2018-05-22 DIAGNOSIS — I25.10 CORONARY ARTERY DISEASE INVOLVING NATIVE CORONARY ARTERY OF NATIVE HEART WITHOUT ANGINA PECTORIS: ICD-10-CM

## 2018-05-22 DIAGNOSIS — Z00.00 HEALTH CARE MAINTENANCE: ICD-10-CM

## 2018-05-22 DIAGNOSIS — F41.1 GAD (GENERALIZED ANXIETY DISORDER): ICD-10-CM

## 2018-05-22 DIAGNOSIS — E78.5 DYSLIPIDEMIA: ICD-10-CM

## 2018-05-22 DIAGNOSIS — I10 ESSENTIAL HYPERTENSION: ICD-10-CM

## 2018-05-22 DIAGNOSIS — Z95.1 S/P CABG X 3: ICD-10-CM

## 2018-05-22 DIAGNOSIS — M10.00 IDIOPATHIC GOUT, UNSPECIFIED CHRONICITY, UNSPECIFIED SITE: ICD-10-CM

## 2018-05-22 DIAGNOSIS — I25.5 ISCHEMIC CARDIOMYOPATHY: ICD-10-CM

## 2018-05-22 PROCEDURE — 99214 OFFICE O/P EST MOD 30 MIN: CPT | Performed by: INTERNAL MEDICINE

## 2018-05-22 RX ORDER — TAMSULOSIN HYDROCHLORIDE 0.4 MG/1
0.4 CAPSULE ORAL DAILY
Qty: 90 CAP | Refills: 1 | Status: SHIPPED | OUTPATIENT
Start: 2018-05-22 | End: 2018-08-21 | Stop reason: SDUPTHER

## 2018-05-22 RX ORDER — ALLOPURINOL 300 MG/1
TABLET ORAL
Qty: 90 TAB | Refills: 1 | Status: SHIPPED | OUTPATIENT
Start: 2018-05-22 | End: 2018-12-13 | Stop reason: SDUPTHER

## 2018-05-22 NOTE — NON-PROVIDER
"Luis Renner is a 68 y.o. male here for a non-provider visit for:   PREVNAR (PCV13) 2 of 2    Reason for immunization: lack of immunity demonstrated on prior labs or testing  Immunization records indicate need for vaccine: Yes, confirmed with Epic  Minimum interval has been met for this vaccine: Yes  ABN completed: Not Indicated    Order and dose verified by: Alex FLOYD Dated  05/22/18 was given to patient: Yes  All IAC Questionnaire questions were answered \"No.\"    Patient tolerated injection and no adverse effects were observed or reported: Yes    Pt scheduled for next dose in series: Not Indicated    "

## 2018-05-23 LAB — HLA-B27 QL FC: NEGATIVE

## 2018-05-25 DIAGNOSIS — Z76.0 MEDICATION REFILL: ICD-10-CM

## 2018-05-25 DIAGNOSIS — Z23 NEED FOR VACCINATION FOR STREP PNEUMONIAE: ICD-10-CM

## 2018-05-25 PROCEDURE — 90670 PCV13 VACCINE IM: CPT | Performed by: INTERNAL MEDICINE

## 2018-05-25 PROCEDURE — G0009 ADMIN PNEUMOCOCCAL VACCINE: HCPCS | Performed by: INTERNAL MEDICINE

## 2018-05-25 RX ORDER — CITALOPRAM 20 MG/1
TABLET ORAL
Qty: 90 TAB | Refills: 1 | Status: SHIPPED | OUTPATIENT
Start: 2018-05-25 | End: 2019-01-09 | Stop reason: SDUPTHER

## 2018-06-05 ENCOUNTER — TELEPHONE (OUTPATIENT)
Dept: MEDICAL GROUP | Facility: LAB | Age: 68
End: 2018-06-05

## 2018-06-05 NOTE — TELEPHONE ENCOUNTER
Patient sends FSBG results:    Fastin-145  Lunch: 105-156  Dinner:   Bed:  (193 was after a jareth)  Hypoglycemia: none- Luis reports he feels good    Current diabetes medications: Victoza 1.2 mg, Jardiance 25 mg, metformin 850 mg twice daily    Recommendations: Victoza will continue to increase in effectiveness over the next month.  No changes

## 2018-07-23 DIAGNOSIS — E78.5 HYPERLIPIDEMIA, UNSPECIFIED HYPERLIPIDEMIA TYPE: ICD-10-CM

## 2018-07-23 RX ORDER — ROSUVASTATIN CALCIUM 10 MG/1
10 TABLET, COATED ORAL EVERY EVENING
Qty: 30 TAB | Refills: 0 | Status: SHIPPED | OUTPATIENT
Start: 2018-07-23 | End: 2018-08-22 | Stop reason: SDUPTHER

## 2018-07-25 ENCOUNTER — OFFICE VISIT (OUTPATIENT)
Dept: CARDIOLOGY | Facility: MEDICAL CENTER | Age: 68
End: 2018-07-25
Payer: MEDICARE

## 2018-07-25 VITALS
DIASTOLIC BLOOD PRESSURE: 62 MMHG | BODY MASS INDEX: 28.56 KG/M2 | SYSTOLIC BLOOD PRESSURE: 100 MMHG | WEIGHT: 204 LBS | HEIGHT: 71 IN | HEART RATE: 78 BPM | OXYGEN SATURATION: 92 %

## 2018-07-25 DIAGNOSIS — I25.83 CORONARY ARTERY DISEASE DUE TO LIPID RICH PLAQUE: ICD-10-CM

## 2018-07-25 DIAGNOSIS — I25.10 CORONARY ARTERY DISEASE DUE TO LIPID RICH PLAQUE: ICD-10-CM

## 2018-07-25 DIAGNOSIS — I25.5 ISCHEMIC CARDIOMYOPATHY: ICD-10-CM

## 2018-07-25 DIAGNOSIS — Z95.1 S/P CABG X 3: ICD-10-CM

## 2018-07-25 PROCEDURE — 99214 OFFICE O/P EST MOD 30 MIN: CPT | Performed by: INTERNAL MEDICINE

## 2018-07-25 ASSESSMENT — ENCOUNTER SYMPTOMS
NAUSEA: 0
CLAUDICATION: 0
DIZZINESS: 0
ABDOMINAL PAIN: 0
FOCAL WEAKNESS: 0
CHILLS: 0
SORE THROAT: 0
PND: 0
BLURRED VISION: 0
FEVER: 0
FALLS: 0
PALPITATIONS: 0
WEAKNESS: 0
SHORTNESS OF BREATH: 0
COUGH: 0
BRUISES/BLEEDS EASILY: 0

## 2018-07-25 NOTE — PATIENT INSTRUCTIONS
Please look into the following diets and incorporate them into your diet    FOR TREATMENT OR PREVENTION OF CORONARY ARTERY DISEASE    [facility] - [facility]    Dr Noel - [place] over [place] (book and documentary)    Dr Meka Johnson's Cardiologist    FOR TREATMENT OF BLOOD PRESSURE  DASH DIET - American Heart Association for treatment of HYPERTENSION    KEEP YOUR SODIUM EQUAL TO CALORIES AND NO MORE THAN DOUBLE THE CALORIES FOR A LOW SALT DIET    FOR TREATMENT OF BAD CHOLESTEROL/FATS  REDUCE PROCESSED SUGAR AS MUCH AS POSSIBLE  INCREASE WHOLE GRAINS/VEGETABLES    Lowering total cholesterol and LDL (bad) cholesterol:  - Eat leaner cuts of meat, or eliminate altogether if possible red meat, and frequently substitute fish or chicken.  - Limit saturated fat to no more than 7-10% of total calories no more than 10 g per day is recommended. Some sources of saturated fat include butter, animal fats, hydrogenated vegetable fats and oils, many desserts, whole milk dairy products.  - Replaced saturated fats with polyunsaturated fats and monounsaturated fats. Foods high in monounsaturated fat include nuts, although well, canola oil, avocados, and olives  Limited transfer at (count and processed foods) and replaced with fresh fruits and vegetables  - Recommend nonfat dairy products  - Increase substantially the amount of soluble fiber intake (, legumes such as beans, fruit whole grains.  - Consider nutritional supplements: plant sterile spreads such as Benecol, fish oil,  flaxseed oil, omega-3 acids capsules 1000 mg twice a day,  or viscous fiber such as Metamucil  - Attain ideal weight and regular exercise (at least 30 minutes per day of walking)    Lowering triglycerides:  - Reduce intake of simple sugar: Desserts, candy, pastries, honey, sodas, sugared cereals, yogurt, Gatorade, sports bars, canned fruit, smoothies, fruit juice, coffee drinks  - Reduced intake of refined starches:) Refined  Posta  - Reduce or abstain from alcohol  - Increase omega-3 fatty acids: [place], Trout, Mackerel, Herring, [place] tuna and supplements  - Attain ideal weight and regular exercise (at least 30 minutes per day of walking)      Elevating HDL (good) cholesterol:  - Increase physical activity  - Seasoned foods with garlic and onions  - Increase omega-3 fatty acids and supplements as listed above  - Incorporating appropriate amounts of monounsaturated fats such as nuts, olive oil, canola oil, avocados, olives  - Stop smoking  - Attain ideal weight and regular exercise (at least 30 minutes per day of walking)

## 2018-07-26 NOTE — PROGRESS NOTES
Chief Complaint   Patient presents with   • Cardiomyopathy (Ischemic)       Subjective:   Luis Renner is a 68 y.o. male who presents today for follow-up of his history of ischemic cardiomyopathy with prior CABG and previous stenting to that in the setting of diabetes    He has been doing well he leads a heart healthy lifestyle follows up closely on his medicines no new changes in breathing no chest pains    Past Medical History:   Diagnosis Date   • Backpain     6-7/10   • Bilateral renal cysts    • Bronchitis    • C. difficile diarrhea     pt with hx of c diff after hospitalization [id number]   • CAD (coronary artery disease) March 2013    ACS. PCI/ANA PAULA x 2 of the LAD (2.5 x 12mm - mid; 2.75 x 16mm - proximal). Distal RCA is occluded; distal circumflex is occluded with good collateralization.   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   • Myocardial infarct (HCC) 3/7/2013   • Psychiatric problem     anxiety     Past Surgical History:   Procedure Laterality Date   • MULTIPLE CORONARY ARTERY BYPASS ENDO VEIN HARVEST  7/22/2016    Procedure: MULTIPLE CORONARY ARTERY BYPASS ENDO VEIN HARVEST;  Surgeon: David Flowers M.D.;  Location: SURGERY [facility];  Service:    • LILLY  7/22/2016    Procedure: LILLY;  Surgeon: David Flowers M.D.;  Location: SURGERY [facility];  Service:    • RECOVERY  7/21/2016    Procedure: CATH LAB [facility] WITH POSSIBLE DR. LYN;  Surgeon: [facility];  [place] [facility] [place]  Service:    • OTHER NEUROLOGICAL SURG  2014    diskectomy   • OTHER CARDIAC SURGERY  2013    stent   • CHOLECYSTECTOMY  2004     Family History   Problem Relation Age of Onset   • Cancer Mother    • Hypertension Father    •  Hyperlipidemia Brother         x 2   • Hypertension Brother         x2   • Heart Disease Brother         x2, afib; cad x 1   • Cancer Paternal Uncle    • Diabetes Neg Hx      Social History     Social History   • Marital status:      Spouse name: N/A   • Number of children: N/A   • Years of education: N/A     Occupational History   • Not on file.     Social History Main Topics   • Smoking status: Never Smoker   • Smokeless tobacco: Never Used   • Alcohol use No   • Drug use: No   • Sexual activity: Yes     Partners: Female     Other Topics Concern   • Not on file     Social History Narrative   • No narrative on file     Allergies   Allergen Reactions   • [place]etta      urticaria   • Other Environmental      Grass and cats     Outpatient Encounter Prescriptions as of 7/25/2018   Medication Sig Dispense Refill   • rosuvastatin (CRESTOR) 10 MG Tab Take 1 Tab by mouth every evening. For further refills please contact new cardiologist. Thank you 30 Tab 0   • citalopram (CELEXA) 20 MG Tab TAKE ONE TABLET BY MOUTH DAILY -GENERICFOR CELEXA 90 Tab 1   • liraglutide (VICTOZA) 18 MG/3ML Solution Pen-injector injection Inject 0.2 mL as instructed every day. 2 PEN 11   • allopurinol (ZYLOPRIM) 300 MG Tab TAKE 1 TABLET BY MOUTH ONCE DAILY 90 Tab 1   • tamsulosin (FLOMAX) 0.4 MG capsule Take 1 Cap by mouth every day. 12 hour after breakfast. 90 Cap 1   • WELCHOL 625 MG Tab      • JARDIANCE 25 MG Tab      • metFORMIN (GLUCOPHAGE) 850 MG Tab      • Lancets Misc 1 Each by Does not apply route 3 times a day. Lancets for Freestyle Lite meter. Sig: use TID and prn ssx high or low sugar. 100 Each 3   • Blood Glucose Monitoring Suppl Supplies Misc Test strips order: Test strips for Freestyle Lite  meter. Sig: use four times a day and prn ssx high or low sugar #100 RF x 3 300 Each 1   • ezetimibe (ZETIA) 10 MG Tab Take 1 Tab by mouth every day. TAKE ONE TABLET BY MOUTH DAILY 90 Tab 3   • omeprazole (PRILOSEC) 20 MG delayed-release  "capsule TAKE 1 CAPSULE BY MOUTH ONCE DAILY 90 Cap 3   • carvedilol (COREG) 6.25 MG Tab Take 1 Tab by mouth 2 times a day, with meals. 180 Tab 2   • lisinopril (PRINIVIL) 5 MG Tab Take 1 Tab by mouth every day. 90 Tab 2   • docusate sodium (COLACE) 100 MG Cap Take 100 mg by mouth 3 times a day as needed for Constipation. Indications: NOT TAKIN NOT  TAKING     • polyethylene glycol 3350 (MIRALAX) Powder Take 17 g by mouth 1 time daily as needed. Indications: not taking NOT taking     • aspirin 81 MG tablet Take 81 mg by mouth every day.     • Insulin Pen Needle 32G X 4 MM Misc Use daily with victoza 100 Each 3   • glipiZIDE (GLUCOTROL) 5 MG Tab        No facility-administered encounter medications on file as of 7/25/2018.      Review of Systems   Constitutional: Negative for chills and fever.   HENT: Negative for sore throat.    Eyes: Negative for blurred vision.   Respiratory: Negative for cough and shortness of breath.    Cardiovascular: Negative for chest pain, palpitations, claudication, leg swelling and PND.   Gastrointestinal: Negative for abdominal pain and nausea.   Musculoskeletal: Negative for falls and joint pain.   Skin: Negative for rash.   Neurological: Negative for dizziness, focal weakness and weakness.   Endo/Heme/Allergies: Does not bruise/bleed easily.        Objective:   /62   Pulse 78   Ht 1.803 m (5' 11\")   Wt 92.5 kg (204 lb)   SpO2 92%   BMI 28.45 kg/m²     Physical Exam   Constitutional: No distress.   HENT:   Mouth/Throat: Oropharynx is clear and moist. No oropharyngeal exudate.   Eyes: No scleral icterus.   Neck: No JVD present.   Cardiovascular: Normal rate and normal heart sounds.  Exam reveals no gallop and no friction rub.    No murmur heard.  Pulmonary/Chest: No respiratory distress. He has no wheezes. He has no rales.   Abdominal: Soft. Bowel sounds are normal.   Musculoskeletal: He exhibits no edema.   Neurological: He is alert.   Skin: No rash noted. He is not diaphoretic. "   Psychiatric: He has a normal mood and affect.     We reviewed his labs over the years    Assessment:     1. Coronary artery disease due to lipid rich plaque     2. Ischemic cardiomyopathy     3. S/P CABG x 3         Medical Decision Making:  Today's Assessment / Status / Plan:     It was my pleasure to meet with Mr. Renner.    He is on proper medical therapy for his cardiac history  We discussed diet      I will see Mr. Renner back in 1 year time and encouraged him to follow up with us over the phone or e-mail using my MyChart as issues arise.    It is my pleasure to participate in the care of Mr. Renner.  Please do not hesitate to contact me with questions or concerns.    Shun Benoit MD PhD FACC  Cardiologist [facility]

## 2018-08-14 DIAGNOSIS — I10 ESSENTIAL HYPERTENSION: ICD-10-CM

## 2018-08-14 RX ORDER — LISINOPRIL 5 MG/1
5 TABLET ORAL DAILY
Qty: 90 TAB | Refills: 3 | Status: SHIPPED | OUTPATIENT
Start: 2018-08-14 | End: 2019-07-08 | Stop reason: SDUPTHER

## 2018-08-21 RX ORDER — TAMSULOSIN HYDROCHLORIDE 0.4 MG/1
0.4 CAPSULE ORAL DAILY
Qty: 90 CAP | Refills: 1 | Status: SHIPPED | OUTPATIENT
Start: 2018-08-21 | End: 2019-02-12 | Stop reason: SDUPTHER

## 2018-08-22 DIAGNOSIS — E78.5 HYPERLIPIDEMIA, UNSPECIFIED HYPERLIPIDEMIA TYPE: ICD-10-CM

## 2018-08-22 DIAGNOSIS — Z95.1 S/P CABG X 3: ICD-10-CM

## 2018-08-22 RX ORDER — CARVEDILOL 6.25 MG/1
6.25 TABLET ORAL 2 TIMES DAILY WITH MEALS
Qty: 180 TAB | Refills: 2 | Status: SHIPPED | OUTPATIENT
Start: 2018-08-22 | End: 2019-04-05 | Stop reason: SDUPTHER

## 2018-08-22 RX ORDER — ROSUVASTATIN CALCIUM 10 MG/1
TABLET, COATED ORAL
Qty: 30 TAB | Refills: 11 | Status: SHIPPED | OUTPATIENT
Start: 2018-08-22 | End: 2018-08-30 | Stop reason: SDUPTHER

## 2018-08-30 DIAGNOSIS — E78.5 HYPERLIPIDEMIA, UNSPECIFIED HYPERLIPIDEMIA TYPE: ICD-10-CM

## 2018-08-31 RX ORDER — ROSUVASTATIN CALCIUM 10 MG/1
10 TABLET, COATED ORAL EVERY EVENING
Qty: 90 TAB | Refills: 3 | Status: SHIPPED | OUTPATIENT
Start: 2018-08-31 | End: 2019-05-01 | Stop reason: SDUPTHER

## 2018-11-06 ENCOUNTER — APPOINTMENT (OUTPATIENT)
Dept: MEDICAL GROUP | Facility: MEDICAL CENTER | Age: 68
End: 2018-11-06
Payer: MEDICARE

## 2018-11-13 ENCOUNTER — HOSPITAL ENCOUNTER (OUTPATIENT)
Dept: LAB | Facility: MEDICAL CENTER | Age: 68
End: 2018-11-13
Attending: INTERNAL MEDICINE
Payer: MEDICARE

## 2018-11-13 DIAGNOSIS — E78.5 DYSLIPIDEMIA: ICD-10-CM

## 2018-11-13 DIAGNOSIS — E11.8 CONTROLLED TYPE 2 DIABETES MELLITUS WITH COMPLICATION, WITHOUT LONG-TERM CURRENT USE OF INSULIN (HCC): ICD-10-CM

## 2018-11-13 LAB
ALBUMIN SERPL BCP-MCNC: 4.4 G/DL (ref 3.2–4.9)
ALBUMIN/GLOB SERPL: 1.8 G/DL
ALP SERPL-CCNC: 49 U/L (ref 30–99)
ALT SERPL-CCNC: 37 U/L (ref 2–50)
ANION GAP SERPL CALC-SCNC: 7 MMOL/L (ref 0–11.9)
AST SERPL-CCNC: 22 U/L (ref 12–45)
BILIRUB SERPL-MCNC: 0.6 MG/DL (ref 0.1–1.5)
BUN SERPL-MCNC: 27 MG/DL (ref 8–22)
CALCIUM SERPL-MCNC: 10 MG/DL (ref 8.5–10.5)
CHLORIDE SERPL-SCNC: 103 MMOL/L (ref 96–112)
CHOLEST SERPL-MCNC: 115 MG/DL (ref 100–199)
CO2 SERPL-SCNC: 30 MMOL/L (ref 20–33)
CREAT SERPL-MCNC: 1.15 MG/DL (ref 0.5–1.4)
CREAT UR-MCNC: 83.8 MG/DL
EST. AVERAGE GLUCOSE BLD GHB EST-MCNC: 180 MG/DL
GLOBULIN SER CALC-MCNC: 2.4 G/DL (ref 1.9–3.5)
GLUCOSE SERPL-MCNC: 145 MG/DL (ref 65–99)
HBA1C MFR BLD: 7.9 % (ref 0–5.6)
HDLC SERPL-MCNC: 45 MG/DL
LDLC SERPL CALC-MCNC: 44 MG/DL
MICROALBUMIN UR-MCNC: 20.5 MG/DL
MICROALBUMIN/CREAT UR: 245 MG/G (ref 0–30)
POTASSIUM SERPL-SCNC: 4.5 MMOL/L (ref 3.6–5.5)
PROT SERPL-MCNC: 6.8 G/DL (ref 6–8.2)
SODIUM SERPL-SCNC: 140 MMOL/L (ref 135–145)
TRIGL SERPL-MCNC: 132 MG/DL (ref 0–149)

## 2018-11-13 PROCEDURE — 80053 COMPREHEN METABOLIC PANEL: CPT

## 2018-11-13 PROCEDURE — 83036 HEMOGLOBIN GLYCOSYLATED A1C: CPT | Mod: GA

## 2018-11-13 PROCEDURE — 36415 COLL VENOUS BLD VENIPUNCTURE: CPT | Mod: GA

## 2018-11-13 PROCEDURE — 82570 ASSAY OF URINE CREATININE: CPT

## 2018-11-13 PROCEDURE — 82043 UR ALBUMIN QUANTITATIVE: CPT

## 2018-11-13 PROCEDURE — 80061 LIPID PANEL: CPT

## 2018-11-15 ENCOUNTER — APPOINTMENT (OUTPATIENT)
Dept: MEDICAL GROUP | Facility: MEDICAL CENTER | Age: 68
End: 2018-11-15
Payer: MEDICARE

## 2018-11-15 DIAGNOSIS — Z23 NEED FOR HEPATITIS B VACCINATION: ICD-10-CM

## 2018-11-19 ENCOUNTER — TELEPHONE (OUTPATIENT)
Dept: MEDICAL GROUP | Facility: MEDICAL CENTER | Age: 68
End: 2018-11-19

## 2018-12-04 NOTE — PROGRESS NOTES
CC:  Physical (Patient presents in clinic for annual physical. )    HPI: Patient is a 58 year old female here for a physical with chief concerns of yeast infection.  Has been itching for past couple of days in labia.  No unusual discharge.  She also complains of her left 5th toenail coming off her nail bed.  She doesn't think it is fungus as it seemed to happen quickly.  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       She is fasting today for labs.    MEDS:    Current Outpatient Medications   Medication   • HYDROcodone-acetaminophen (NORCO) 5-325 MG per tablet   • ondansetron (ZOFRAN ODT) 4 MG disintegrating tablet   • cholecalciferol (VITAMIN D3) 1000 UNITS tablet     No current facility-administered medications for this visit.        ALLERGIES:   Allergen Reactions   • Amoxicillin RASH   • Beets [Beet] HIVES   • Dander      Cats and dogs   • Grass      Blayne Grass   • Ragweed    • Sting [Bee Sting] HIVES       Past Medical History:   Diagnosis Date   • Colon polyp    • Kidney stone    • Nephrolithiasis ,        Past Surgical History:   Procedure Laterality Date   •  section, classic  1991   • Cholecystectomy  2003   • Colonoscopy w/ polypectomy  2017    DR QUEVEDO, repeat 10 years   • Cystoscopy,manipulatn     • Endometrial ablation     • Orif tibia & fibula fractures Right 2009   • [place] tooth extraction         Family History   Problem Relation Age of Onset   • Thyroid Mother    • Other Mother         traumatic brain injury   • High blood pressure Father    • Heart disease Father 76        CABG, AVR   • Thyroid Father        SOCIAL HISTORY:   Pt. reports that  has never smoked. she has never used smokeless tobacco. She reports that she does not drink alcohol or use drugs.    REVIEW OF SYSTEMS:   GENERAL:  Patient denies fever, chills, tiredness, malaise, weight loss, weight gain.  EYES:  Patient denies blurred  CHIEF COMPLAINT  Chief Complaint   Patient presents with   • Follow-Up   DM    HPI  Patient is a 66 y.o. male patient who presents today for the following     DIABETES MELLITUS TYPE 2, with CKD stage III  Onset:  DM2 for 10 yrs.    Insulin/meds: Jardiance, 10 mg QD; jentadueto 2.5/850 mg BIDF  Used meds as prescribed.    Compliance to meds: yes  Checking feet daily/wear soft socks/shoes: yes    The last A1c: 7.6 today    Checking blood sugar: yes  Mean BS:in 130 postmeal  Hypoglycemia:  one remote episode   Symptoms of hypoglycemia: sweating, fatigue, pale skin, hunger.    ASA: yes  ACE: yes  Statin: yes, crestor    Diet: regular  Exercise: daily  Body mass index is 29    DIABETES COMPLICATIONS:   Peripheral neuropathy:  No numbness or tingling sensation in the feet.  Retinopathy: No evidence of retinopathy. Last eye exam: 2016  Nephropathy: Last microalbumin in 2015   - decreased GFR, stage III  CVS: Denies symptoms of CAD (CP/pressure, exertional dyspnea, edema).    GI: No symptoms of gastropathy (nausea/vomiting).    FH of DM:  none    HYPERTENSION / CAD (st post CABG)  The patient is on Lisinopril, 5 mg QD, spironolactone, 25 mg QD, carvedilol, 6.25 mg BID; taking as prescribed.    He is not measuring BP at home.  Denies: - headaches, vision problems, tinnitus.  - chest pain/pressure, palpitations, irregular heart beats, exertional, dyspnea, peripheral edema.  Low salt diet: yes  Diet / exercise BMI: as above   FH: multiple    HYPERLIPIDEMIA / LIPID PROFILE  Meds: Crestor, 20 mg QD, Welchol, 625 mg QD. No muscle weakness, cramps, nausea,abdominal discomfort.    Diet / exercise BMI: as above   FH: 2 brothers, father  He has been f/u by cardiology.    GERD  On Prilosec, 20 mg QD  Takes medication as prescribed, that controls sx.   Denies:   - heartburn, epigastric pain.   -  nausea, vomiting, or diarrhea  - dysphagia  - abnormal cough  - blood in the stool or dark tarry stools.  - early satiety.  Nonsmoker.      ANXIETY, controlled  Onset: since his 30'  Since then symptoms were up/down  Mood currently does not affect: work, relationships, social activities.  Previous medications: celexa 10 mg QD x 10 yrs  Counseling: yes  Current medications: as above    Risk factors:   · H/o phobia: no  · H/o panic attacks: no  · H/o hypomanic or manic episode: no  · Substance abuse  (alcohol,  prescription drugs caffeine, tobacco): no  · Family support: yes  · Living alone:  no  · Family history of psych disorders:  neg  · Stress: yes  · PMH of abuse (sexual, physical, emotional abuse; neglect): no    TEMO-7 score:  1/17   1. Feeling nervous, anxious, or on edge  0   2. Not being able to stop or control worrying 0   3. Worrying too much about different things 0   4. Trouble relaxing 0   5. Being so restless that it is hard to sit still 0   6. Becoming easily annoyed or irritable 0   7. Feeling afraid as if something awful might happen 0   Total score 0     Patient Health Questionaire    Little interest or pleasure in doing things?: 0  Feeling down, depressed, or hopeless?: 0  PHQ 2 Score: 0    Reviewed PMH, PSH, FH, SH, ALL, HCM/IMM, no changes  Reviewed MEDS, no changes    Patient Active Problem List    Diagnosis Date Noted   • Ischemic cardiomyopathy 07/23/2013     Priority: High   • Coronary atherosclerosis of native coronary artery 03/08/2013     Priority: High   • Hyperlipidemia 03/07/2013     Priority: Medium   • Bilateral renal cysts 01/14/2015     Priority: Low   • GOUT 05/20/2013     Priority: Low   • Coronary artery disease involving native coronary artery of native heart without angina pectoris 09/23/2016   • Diabetes mellitus type 2, controlled, with complications (HCC) 09/23/2016   • Panic attacks 01/07/2016   • Gastroesophageal reflux disease without esophagitis 01/07/2016     CURRENT MEDICATIONS  Current Outpatient Prescriptions   Medication Sig Dispense Refill   • Colesevelam HCl (WELCHOL PO) Take  by mouth.     •  vision, double vision, pain, burning and itching.   NEUROLOGIC:  Patient denies tremors, dizzy spells, numbness, tingling, vision change, loss of balance.  ENDOCRINE:  Patient denies excessive thirst, heat intolerance, lymph node enlargement, tired/sluggishness.  GASTROINTESTINAL:  Patient denies abdominal pain, nausea/vomiting, indigestion/heartburn, diarrhea, constipation.  CARDIOVASCULAR:  Patient denies chest pain, shortness of breath, palpitations.  SKIN:  Patient denies skin rashes, boils, persistent itching, acne.  MUSCULOSKELETAL:  Patient denies joint pain, neck pain, back pain, leg swelling.  ENT/MOUTH:  Patient denies ear infections, sore throats, sinus problems, hearing loss.  :  Patient denies urine retention, painful urination, urinary frequency, blood in urine, nocturia.  RESPIRATORY:  Patient denies wheezing, frequent cough, shortness of breath.    LMP:  10/09/2012.    PHYSICAL EXAM:  VITAL SIGNS:    Visit Vitals  BP (!) 88/62 (BP Location: [facility], Patient Position: Sitting, Cuff Size: Regular)   Pulse 70   Ht 5' (1.524 m)   Wt 65.3 kg   LMP 10/09/2012   BMI 28.12 kg/m²     GENERAL:  A 58 year old female in no acute distress.  CONSTITUTIONAL:  Not ill appearing.  She looks tired with bags under her eyes.  SKIN:  Warm and moist with good color and turgor.  No malignant-appearing rashes or lesions are noted.  Her left 5th toenail is loose.  It is opaque, but not painful, or crumbly.  HEENT: Head is symmetrical in shape.  No sores noted.  Eyes: Pupils equal, round and reactive to light.  Extraocular motions are full.  Pinkish conjunctivae. Nonicteric sclerae. Ears: TMs clear, normal. No erythema, no bulging, no retractions. No drainage or cerumen in the canals. No tenderness noted to the outer ears.   Nasal passages are clear. No rhinorrhea. No swelling of the turbinates. Septum midline. Oropharynx: Oral mucosa is moist and pink without pharyngeal erythema or exudate.  Teeth are in good repair. Lips are  clopidogrel (PLAVIX) 75 MG Tab Take 1 Tab by mouth every day. 30 Tab 11   • JARDIANCE 10 MG Tab Take 10 mg by mouth every day.     • carvedilol (COREG) 6.25 MG Tab Take 1 Tab by mouth 2 times a day, with meals. 180 Tab 3   • lisinopril (PRINIVIL) 5 MG Tab Take 1 Tab by mouth every day. 90 Tab 3   • spironolactone (ALDACTONE) 25 MG Tab Take 1 Tab by mouth every day. 90 Tab 3   • ezetimibe (ZETIA) 10 MG Tab Take 1 Tab by mouth every day. TAKE ONE TABLET BY MOUTH DAILY 90 Tab 1   • Linagliptin-Metformin HCl (JENTADUETO) 2.5-850 MG Tab Take 1 Tab by mouth 2 Times a Day. 60 Tab 1   • allopurinol (ZYLOPRIM) 300 MG Tab Take 1 Tab by mouth every day. continues to take 90 Tab 0   • citalopram (CELEXA) 20 MG Tab TAKE ONE TABLET BY MOUTH DAILY -GENERICFOR CELEXA 30 Tab 2   • colesevelam (WELCHOL) 625 MG Tab Take 1,250 mg by mouth every day at 6 PM.     • rosuvastatin (CRESTOR) 20 MG Tab Take 1 Tab by mouth every evening. 30 Tab 11   • Blood Glucose Monitoring Suppl SUPPLIES Misc Test strips order: Test strips for Freestyle Lite  meter. Sig: use four times a day and prn ssx high or low sugar #100 RF x 3 300 Each 1   • Lancets Misc 1 Each by Does not apply route 3 times a day. Lancets order: Lancets for Freestyle Lite meter. Sig: use three times dailyand prn ssx high or low sugar. #100 RF x 3 100 Each 3   • aspirin 81 MG tablet Take 81 mg by mouth every day.     • omeprazole (PRILOSEC) 20 MG delayed-release capsule TAKE ONE CAPSULE BY MOUTH DAILY -GENERICFOR PRILOSEC 30 Cap 2   • niacin (NIASPAN) 1000 MG CR tablet Take 2 tablets after dinner meal. (Patient not taking: Reported on 1/4/2017) 60 Tab 6   • docusate sodium (COLACE) 100 MG Cap Take 100 mg by mouth 3 times a day as needed for Constipation. Indications: NOT TAKIN NOT  TAKING     • senna-docusate (PERICOLACE OR SENOKOT S) 8.6-50 MG Tab Take 2 Tabs by mouth 4 times a day as needed for Constipation.     • polyethylene glycol 3350 (MIRALAX) Powder Take 17 g by mouth 1 time  not dry or cracked.   NECK: Soft and supple. No lymphadenopathy in the anterior or posterior cervical chain or submandibular area. No thyroid enlargement or nodule. No carotid bruits.   BACK:  No axial deformity, spinous or costovertebral angle tenderness.  LUNGS: Clear to auscultation and resonant to percussion.   CARDIAC: Regular rate and rhythm without murmurs, gallops or rub.   BREASTS: Visually symmetrical. No nipple discharge or skin dimpling.  No masses, or axillary, supraclavicular or infraclavicular adenopathy. No skin changes.   ABDOMEN: Bowel sounds 2+, soft and nontender.  Liver and spleen are not enlarged.  No masses, guarding, rebound or rigidity.    GENITALIA: External genitalia is atrophic. I do not see any white discharge.  Cervix is visualized and has a normal appearance.  There is no unusual discharge in the vaginal vault.  Cervix is nontender with motion.  Bimanual exam shows no uterine enlargement.  No adnexal masses or tenderness were appreciated.  Pap smear is obtained.  RECTAL:  Deferred.  MUSCULOSKELETAL: Full range of motion of all of the major joints, upper and lower extremities.   NEUROLOGIC: Alert and oriented x3. Muscle tone, bulk and strength are good. Deep tendon reflexes are symmetrical in upper and lower extremities.      Anai was seen today for physical.    Diagnoses and all orders for this visit:    Encounter for routine adult health examination with abnormal findings- see toenail issue    Cervical cancer screening  -     PAPHPV WITH GENOTYPE PATIENTS OVER 30 YEARS OLD    Vaginal dryness- this may be related to menopause.  I see no evidence of candidiasis.    Toenail deformity- I suspect this is traumatic.  I advised her to wait and watch.    Lipid screening  -     LIPID PANEL WITH REFLEX; Future    Laboratory examination ordered as part of a routine general medical examination  -     COMPREHENSIVE METABOLIC PANEL; Future  -     URINALYSIS WITH MICRO & CULTURE IF INDICATED;  Future    Follow up in 1 year for CPE   daily as needed. Indications: not taking NOT taking     • LEVEMIR FLEXTOUCH 100 UNIT/ML Solution Pen-injector injection Inject 23 Units as instructed every evening.     • Insulin Pen Needle 32G X 6 MM Misc Use to inject insulin 100 Each 5     No current facility-administered medications for this visit.     ALLERGIES  Allergies: [place] and Other environmental  PAST MEDICAL HISTORY  Past Medical History   Diagnosis Date   • Chronic anticoagulation    • Indigestion    • Left ventricular apical thrombus March 2013     Inferoapical clot measuring 1.1cm x 0.8cm, started on anticoagulation.   • Hyperlipidemia     • CAD (coronary artery disease) March 2013     ACS. PCI/ANA PAULA x 2 of the LAD (2.5 x 12mm - mid; 2.75 x 16mm - proximal). Distal RCA is occluded; distal circumflex is occluded with good collateralization.   • Bilateral renal cysts    • Bronchitis    • DIABETES MELLITUS       oral diet and insulin   • Myocardial infarct 3/7/2013   • Psychiatric problem      anxiety   • C. difficile diarrhea      pt with hx of c diff after hospitalization [id number]   • Hypertension      well controlled on meds   • Backpain      6-7/10   • Heart burn      SURGICAL HISTORY  He  has past surgical history that includes other neurological surg (2014); other cardiac surgery (2013); cholecystectomy (2004); recovery (7/21/2016); multiple coronary artery bypass endo vein harvest (7/22/2016); and miller (7/22/2016).  SOCIAL HISTORY  Social History   Substance Use Topics   • Smoking status: Never Smoker    • Smokeless tobacco: Never Used   • Alcohol Use: 0.0 oz/week     0 Standard drinks or equivalent per week      Comment: 1 drink per week     Social History     Social History Narrative     FAMILY HISTORY  Family History   Problem Relation Age of Onset   • Cancer Mother    • Diabetes Neg Hx    • Hyperlipidemia Brother      x 2   • Hypertension Father    • Hypertension Brother      x2   • Heart Disease Brother      x2, afib; cad x 1     Family Status  "  Relation Status Death Age   • Mother     • Father       Health Maintenance:      Addressed    ROS   Constitutional: Negative for fever, chills.  HENT: Negative for congestion, sore throat.  Eyes: Negative for blurred vision.   Respiratory: Negative for cough, shortness of breath.  Cardiovascular: Negative for chest pain, palpitations.   Gastrointestinal: Negative for heartburn, nausea, abdominal pain.   Genitourinary: Negative for dysuria.  Musculoskeletal: Negative for significant myalgias, back pain and joint pain.   Skin: Negative for rash and itching.   Neuro: Negative for dizziness, weakness and headaches.   Endo/Heme/Allergies: Does not bruise/bleed easily.   Psychiatric/Behavioral: Negative for depression, anxiety    PHYSICAL EXAM   Blood pressure 110/74, pulse 66, temperature 36.2 °C (97.2 °F), resp. rate 16, height 1.803 m (5' 10.98\"), weight 94.348 kg (208 lb), SpO2 94 %. Body mass index is 29.02 kg/(m^2).  General:  NAD, well appearing  HEENT:   NC/AT, PERRLA, EOMI, TMs are clear. Oropharyngeal mucosa is pink,  without lesions;  no cervical / supraclavicular  lymphadenopathy, no thyromegaly.    Cardiovascular: RRR.   No m/r/g. No carotid bruits .      Lungs:   CTAB, no w/r/r, no respiratory distress.  Abdomen: Soft, NT/ND + BS; no suprapubic tenderness; no masses or hepatosplenomegaly.  Extremities:  2+ DP and radial pulses bilaterally.  No c/c/e.   Skin:  Warm, dry.  No erythema. No rash.   Neurologic: Alert & oriented x 3. No focal deficits.  Psychiatric:  Affect normal, mood normal, judgment normal.  Diabetic Foot Exam: No ulcers or skin lesions present, patient tested with a 10 g force and is decreased in toes, B/L. Dorsalis pedis and posterior tibial pulses are present and intact bilaterally    LABS     Labs are reviewed and discussed with a patient    Lab Results   Component Value Date/Time    CHOLESTEROL,TOT 94* 2016 10:59 AM    LDL 41 2016 10:59 AM    HDL 40 " 11/19/2016 10:59 AM    TRIGLYCERIDES 65 11/19/2016 10:59 AM       Lab Results   Component Value Date/Time    SODIUM 137 11/19/2016 11:03 AM    POTASSIUM 4.9 11/19/2016 11:03 AM    CHLORIDE 103 11/19/2016 11:03 AM    CO2 27 11/19/2016 11:03 AM    GLUCOSE 178* 11/19/2016 11:03 AM    BUN 22 11/19/2016 11:03 AM    CREATININE 1.29 11/19/2016 11:03 AM     Lab Results   Component Value Date/Time    ALKALINE PHOSPHATASE 60 11/19/2016 11:03 AM    AST(SGOT) 22 11/19/2016 11:03 AM    ALT(SGPT) 20 11/19/2016 11:03 AM    TOTAL BILIRUBIN 0.6 11/19/2016 11:03 AM      Lab Results   Component Value Date/Time    GLYCOHEMOGLOBIN 7.6 01/04/2017 11:23 AM    GLYCOHEMOGLOBIN 7.1* 07/21/2016 06:51 PM    GLYCOHEMOGLOBIN 7.4 01/07/2016 01:52 PM     No results found for: TSH  Lab Results   Component Value Date/Time    FREE T-4 0.82 03/08/2013 01:00 AM       Lab Results   Component Value Date/Time    WBC 10.2 07/27/2016 02:15 AM    RBC 3.56* 07/27/2016 02:15 AM    HEMOGLOBIN 10.1* 07/27/2016 02:15 AM    HEMATOCRIT 29.8* 07/27/2016 02:15 AM    MCV 83.7 07/27/2016 02:15 AM    MCH 28.4 07/27/2016 02:15 AM    MCHC 33.9 07/27/2016 02:15 AM    MPV 11.1 07/27/2016 02:15 AM    NEUTROPHILS-POLYS 65.1 09/16/2013 01:13 PM    LYMPHOCYTES 23.8 09/16/2013 01:13 PM    MONOCYTES 5.7 09/16/2013 01:13 PM    EOSINOPHILS 4.2 09/16/2013 01:13 PM    BASOPHILS 1.2 09/16/2013 01:13 PM    HYPOCHROMIA 1+ 09/16/2013 01:13 PM      IMAGING     None    ASSESMENT AND PLAN        1. Controlled type 2 diabetes mellitus with complication, with long-term current use of insulin (HCC)  A1c slightly increased.   Advised for DM diet, daily exercise.   He has been f/u by endocrinology_ continue f/u  - COMP METABOLIC PANEL; Future  - HEMOGLOBIN A1C; Future  - MICROALBUMIN CREAT RATIO URINE; Future  - POCT  A1C  - MICROALBUMIN CREAT RATIO URINE; Future    2. Essential hypertension  3. Coronary artery disease involving native coronary artery of native heart without angina  pectoris  Controlled, continue current treatment, and cardiology follow-up    4. Dyslipidemia  Controlled, continue current treatment  - LIPID PROFILE; Future    5. Gastroesophageal reflux disease without esophagitis  Controlled.   Continue current treatment.   Advised:    - avoid triggers   - head of the bed   - smaller, more frequent meals   - no meal 2-3 hrs prior bedtime.     6. TEMO (generalized anxiety disorder)  Controlled, continue current treatment    7. Screening for malignant neoplasm of prostate  - PROSTATE SPECIFIC AG SCREENING; Future    8. Health care maintenance  - VITAMIN D,25 HYDROXY; Future    Counseling:   - Smoking:  Nonsmoker    Followup: Return in about 3 months (around 4/4/2017).      Time spent 40 minutes face to face, with > 50% spent counseling and coordinating care.    All questions are answered.    Please note that this dictation was created using voice recognition software, and that there might be errors of mary and possibly content.

## 2018-12-12 DIAGNOSIS — E78.5 HYPERLIPIDEMIA, UNSPECIFIED HYPERLIPIDEMIA TYPE: ICD-10-CM

## 2018-12-13 ENCOUNTER — OFFICE VISIT (OUTPATIENT)
Dept: MEDICAL GROUP | Facility: MEDICAL CENTER | Age: 68
End: 2018-12-13
Payer: MEDICARE

## 2018-12-13 VITALS
OXYGEN SATURATION: 96 % | DIASTOLIC BLOOD PRESSURE: 84 MMHG | HEART RATE: 76 BPM | SYSTOLIC BLOOD PRESSURE: 132 MMHG | HEIGHT: 71 IN | BODY MASS INDEX: 29.04 KG/M2 | TEMPERATURE: 96.2 F | WEIGHT: 207.45 LBS | RESPIRATION RATE: 14 BRPM

## 2018-12-13 DIAGNOSIS — I25.83 CORONARY ARTERY DISEASE DUE TO LIPID RICH PLAQUE: ICD-10-CM

## 2018-12-13 DIAGNOSIS — Z95.1 S/P CABG X 3: ICD-10-CM

## 2018-12-13 DIAGNOSIS — K21.9 GASTROESOPHAGEAL REFLUX DISEASE WITHOUT ESOPHAGITIS: ICD-10-CM

## 2018-12-13 DIAGNOSIS — I10 ESSENTIAL HYPERTENSION: ICD-10-CM

## 2018-12-13 DIAGNOSIS — F41.1 GAD (GENERALIZED ANXIETY DISORDER): ICD-10-CM

## 2018-12-13 DIAGNOSIS — I25.5 ISCHEMIC CARDIOMYOPATHY: ICD-10-CM

## 2018-12-13 DIAGNOSIS — E78.5 DYSLIPIDEMIA: ICD-10-CM

## 2018-12-13 DIAGNOSIS — I25.10 CORONARY ARTERY DISEASE DUE TO LIPID RICH PLAQUE: ICD-10-CM

## 2018-12-13 DIAGNOSIS — E11.29 MICROALBUMINURIA DUE TO TYPE 2 DIABETES MELLITUS (HCC): ICD-10-CM

## 2018-12-13 DIAGNOSIS — M10.00 IDIOPATHIC GOUT, UNSPECIFIED CHRONICITY, UNSPECIFIED SITE: ICD-10-CM

## 2018-12-13 DIAGNOSIS — Z00.00 HEALTH CARE MAINTENANCE: ICD-10-CM

## 2018-12-13 DIAGNOSIS — Z12.5 SCREENING FOR MALIGNANT NEOPLASM OF PROSTATE: ICD-10-CM

## 2018-12-13 DIAGNOSIS — R80.9 MICROALBUMINURIA DUE TO TYPE 2 DIABETES MELLITUS (HCC): ICD-10-CM

## 2018-12-13 DIAGNOSIS — N40.0 BENIGN PROSTATIC HYPERPLASIA WITHOUT LOWER URINARY TRACT SYMPTOMS: ICD-10-CM

## 2018-12-13 DIAGNOSIS — E11.8 CONTROLLED TYPE 2 DIABETES MELLITUS WITH COMPLICATION, WITHOUT LONG-TERM CURRENT USE OF INSULIN (HCC): ICD-10-CM

## 2018-12-13 PROCEDURE — 99215 OFFICE O/P EST HI 40 MIN: CPT | Performed by: INTERNAL MEDICINE

## 2018-12-13 RX ORDER — OMEPRAZOLE 20 MG/1
20 CAPSULE, DELAYED RELEASE ORAL DAILY
Qty: 90 CAP | Refills: 3 | Status: CANCELLED | OUTPATIENT
Start: 2018-12-13

## 2018-12-13 RX ORDER — OMEPRAZOLE 20 MG/1
CAPSULE, DELAYED RELEASE ORAL
Qty: 90 CAP | Refills: 3 | Status: SHIPPED | OUTPATIENT
Start: 2018-12-13 | End: 2019-04-05 | Stop reason: SDUPTHER

## 2018-12-13 RX ORDER — EZETIMIBE 10 MG/1
10 TABLET ORAL DAILY
Qty: 90 TAB | Refills: 3 | OUTPATIENT
Start: 2018-12-13

## 2018-12-13 RX ORDER — ALLOPURINOL 300 MG/1
TABLET ORAL
Qty: 90 TAB | Refills: 3 | Status: SHIPPED | OUTPATIENT
Start: 2018-12-13 | End: 2019-12-08 | Stop reason: SDUPTHER

## 2018-12-13 RX ORDER — EZETIMIBE 10 MG/1
10 TABLET ORAL DAILY
Qty: 90 TAB | Refills: 3 | Status: SHIPPED | OUTPATIENT
Start: 2018-12-13 | End: 2019-04-05 | Stop reason: SDUPTHER

## 2018-12-13 ASSESSMENT — PATIENT HEALTH QUESTIONNAIRE - PHQ9
8. MOVING OR SPEAKING SO SLOWLY THAT OTHER PEOPLE COULD HAVE NOTICED. OR THE OPPOSITE, BEING SO FIGETY OR RESTLESS THAT YOU HAVE BEEN MOVING AROUND A LOT MORE THAN USUAL: 0
1. LITTLE INTEREST OR PLEASURE IN DOING THINGS: 0
6. FEELING BAD ABOUT YOURSELF - OR THAT YOU ARE A FAILURE OR HAVE LET YOURSELF OR YOUR FAMILY DOWN: 0
2. FEELING DOWN, DEPRESSED, IRRITABLE, OR HOPELESS: 0
SUM OF ALL RESPONSES TO PHQ9 QUESTIONS 1 AND 2: 0
CLINICAL INTERPRETATION OF PHQ2 SCORE: 0
SUM OF ALL RESPONSES TO PHQ QUESTIONS 1-9: 2
4. FEELING TIRED OR HAVING LITTLE ENERGY: 0
7. TROUBLE CONCENTRATING ON THINGS, SUCH AS READING THE NEWSPAPER OR WATCHING TELEVISION: 0
9. THOUGHTS THAT YOU WOULD BE BETTER OFF DEAD, OR OF HURTING YOURSELF: 0
5. POOR APPETITE OR OVEREATING: 1
3. TROUBLE FALLING OR STAYING ASLEEP OR SLEEPING TOO MUCH: 1

## 2018-12-13 NOTE — PROGRESS NOTES
CHIEF COMPLIANT:   Chief Complaint   Patient presents with   • Follow-Up     Labs   DM    Luis Renner is a 68 y.o. male here for DM follow up    DM2 with CKD stage III, microalbuminuria  Onset:  DM2 for 10 yrs.      Meds:   - Victoza 1.2 mg daily  - Jardiance, 25 mg QD  - glipizide 5 mg BID  - metformin 850 mg QD  Used meds as prescribed.    Compliance to meds: yes  Checking feet daily/wear soft socks/shoes: yes     The last A1c:  7.9     Checking blood sugar: yes, once daily  Mean BS: in 150'  Hypoglycemia:  one remote episode   Symptoms of hypoglycemia: sweating, fatigue, pale skin, hunger.     ASA: yes  ACE: yes  Statin: yes     Diet: regular  Exercise: at least 4 d/w  BMI:  28     DIABETES COMPLICATIONS:   Peripheral neuropathy:           No numbness or tingling sensation in the feet.  Retinopathy:                            No evidence of retinopathy. Last eye exam: 4/2018  Nephropathy:                          Last microalbumin in 11/2018, pos  CVS:                                        Has CAD  GI:                                           No symptoms of gastropathy (nausea/vomiting).     FH of DM:  none     Hypertension/ CAD (st post CABG in 7/16), cardiomyopathy  Meds: Lisinopril, 5 mg QD, spironolactone, 25 mg QD, carvedilol, 6.25 mg BID; taking as prescribed.    He is not measuring BP at home.  Denies: - headaches, vision problems, tinnitus.  - chest pain/pressure, palpitations, irregular heart beats, exertional, dyspnea, peripheral edema.  Low salt diet: yes  Diet / exercise BMI: as above    FH: multiple  He has been followed up by cardiology.     Dyslipidemia  Meds: Crestor, 10 mg QD, Zetia 10 mg QD. No muscle weakness, cramps, nausea,abdominal discomfort.    Diet / exercise BMI: as above    FH: 2 brothers, father  He has been f/u by cardiology.     Gout  He has history of gout, on 300 mg of allopurinol daily.   No recent episode..    Anxiety  Onset:  > 10 yrs  Course: stable  Mood/anxiety  currently does not affect: daily activities/sleep.  Current treatment: Celexa 20 mg daily     Risk factors:   · Depression, anxiety  · H/o phobia: no  · H/o panic attacks: no  · H/o hypomanic or manic episode: no  · Substance abuse  (alcohol,  prescription drugs caffeine, tobacco): no  · Family support: yes  · Living alone:  no  · Family history of psych disorders: Unknown  · Stress: no  · PMH of abuse (sexual, physical, emotional abuse; neglect): no      TEMO-7 score:  12/18   1. Feeling nervous, anxious, or on edge            0   2. Not being able to stop or control worrying 0   3. Worrying too much about different things 0   4. Trouble relaxing 0   5. Being so restless that it is hard to sit still 0   6. Becoming easily annoyed or irritable 0   7. Feeling afraid as if something awful might happen 0   Total score 0     Depression Screen (PHQ-2/PHQ-9) 7/27/2017 12/13/2018 12/13/2018   PHQ-2 Total Score - - 0   PHQ-2 Total Score 0 0 -   PHQ-9 Total Score - - 2     GERD  On omeprazole, 20 mg daily. Takes medication as prescribed, that controls sx.   Denies:   - heartburn, epigastric pain.   - nausea, vomiting, or diarrhea  - dysphagia  - abnormal cough  - blood in the stool or dark tarry stools.  - early satiety.  Nonsmoker.    BPH  Onset: > 1 y ago  C/o:   • Nocturia x 2-3 times  • Frequency  • Bladder fullness / incomplete empyingUrgency  Denies:  • Weak stream/dribbling  • Hesitancy  • Dysuria  • Change in urine color/odor, hematuria  • Suprapubic/flank/abdominal pain  Renal panel was normal.  PSA has been low.  On flomax 0.4 mg QD.    Reviewed PMH, PSH, FH, SH, ALL, IMM, MEDS.     Current medicines (including changes today)  Current Outpatient Prescriptions   Medication Sig Dispense Refill   • rosuvastatin (CRESTOR) 10 MG Tab Take 1 Tab by mouth every evening. 90 Tab 3   • carvedilol (COREG) 6.25 MG Tab Take 1 Tab by mouth 2 times a day, with meals. 180 Tab 2   • tamsulosin (FLOMAX) 0.4 MG capsule Take 1 Cap by  mouth every day. 12 hour after breakfast. 90 Cap 1   • lisinopril (PRINIVIL) 5 MG Tab Take 1 Tab by mouth every day. 90 Tab 3   • citalopram (CELEXA) 20 MG Tab TAKE ONE TABLET BY MOUTH DAILY -GENERICFOR CELEXA 90 Tab 1   • liraglutide (VICTOZA) 18 MG/3ML Solution Pen-injector injection Inject 0.2 mL as instructed every day. 2 PEN 11   • Insulin Pen Needle 32G X 4 MM Misc Use daily with victoza 100 Each 3   • allopurinol (ZYLOPRIM) 300 MG Tab TAKE 1 TABLET BY MOUTH ONCE DAILY 90 Tab 1   • WELCHOL 625 MG Tab      • JARDIANCE 25 MG Tab      • glipiZIDE (GLUCOTROL) 5 MG Tab      • metFORMIN (GLUCOPHAGE) 850 MG Tab      • Lancets Misc 1 Each by Does not apply route 3 times a day. Lancets for Freestyle Lite meter. Sig: use TID and prn ssx high or low sugar. 100 Each 3   • Blood Glucose Monitoring Suppl Supplies Misc Test strips order: Test strips for Freestyle Lite  meter. Sig: use four times a day and prn ssx high or low sugar #100 RF x 3 300 Each 1   • ezetimibe (ZETIA) 10 MG Tab Take 1 Tab by mouth every day. TAKE ONE TABLET BY MOUTH DAILY 90 Tab 3   • omeprazole (PRILOSEC) 20 MG delayed-release capsule TAKE 1 CAPSULE BY MOUTH ONCE DAILY 90 Cap 3   • docusate sodium (COLACE) 100 MG Cap Take 100 mg by mouth 3 times a day as needed for Constipation. Indications: NOT TAKIN NOT  TAKING     • polyethylene glycol 3350 (MIRALAX) Powder Take 17 g by mouth 1 time daily as needed. Indications: not taking NOT taking     • aspirin 81 MG tablet Take 81 mg by mouth every day.       No current facility-administered medications for this visit.      Allergies: Byetta and Other environmental  He  has a past medical history of Backpain; Bilateral renal cysts; Bronchitis; C. difficile diarrhea; CAD (coronary artery disease) (March 2013); Chronic anticoagulation; DIABETES MELLITUS ( ); Heart burn; Hyperlipidemia ( ); Hypertension; Indigestion; Left ventricular apical thrombus (March 2013); Myocardial infarct (HCC) (3/7/2013); and  "Psychiatric problem. He also has no past medical history of Encounter for long-term (current) use of other medications or Fall.  He  has a past surgical history that includes other neurological surg (); other cardiac surgery (); cholecystectomy (); recovery (2016); multiple coronary artery bypass endo vein harvest (2016); and miller (2016).  Social History   Substance Use Topics   • Smoking status: Never Smoker   • Smokeless tobacco: Never Used   • Alcohol use No     Social History     Social History Narrative   • No narrative on file     Family History   Problem Relation Age of Onset   • Cancer Mother    • Hypertension Father    • Hyperlipidemia Brother         x 2   • Hypertension Brother         x2   • Heart Disease Brother         x2, afib; cad x 1   • Cancer Paternal Uncle    • Diabetes Neg Hx      Family Status   Relation Status   • Mo  at age 79         colon ca   • Fa  at age 87   • Bro (Not Specified)   • Bro (Not Specified)   • Bro (Not Specified)   • PUnc (Not Specified)   • Neg Hx (Not Specified)     ROS   Constitutional: Negative for fever, chills and weight loss.   HEENT: Negative for blurred vision, sore throat, swollen glands. No hearing loss or vertigo.  Respiratory: Negative for cough, wheezing, shortness of breath.   CVS: Negative for chest pain, palpitations, irregular heart beats, exertional dyspnea.   GI: Negative for heartburn, abdominal pain, nausea, vomiting, change in BMs.   : Negative for dysuria, polyuria.   MS: Negative for myalgias, joint pain.   Neuro: no headaches, numbness, weakness, seizures.   Skin: no skin lesions, rash.  Endocrine: per HPI.     PHYSICAL EXAM   Blood pressure 132/84, pulse 76, temperature (!) 35.7 °C (96.2 °F), temperature source Temporal, resp. rate 14, height 1.803 m (5' 10.98\"), weight 94.1 kg (207 lb 7.3 oz), SpO2 96 %. Body mass index is 28.95 kg/m².  Alert, oriented in no acute distress.  Eye contact is good, speech " goal directed, affect bright.  HEENT: EOMI, PERRL, conjunctiva non-injected, sclera non-icteric.  Nares patent with no significant congestion or drainage.  Normal pinnae, external auditory canals, TM pearly gray with normal light reflex bilaterally.  Oral mucous membranes pink and moist with no lesions.  Neck supple with no cervical lymphadenopathy, JVD, palpable thyroid nodules or carotid bruits.  Lungs: clear to auscultation bilaterally with good excursion.  CV: regular rate and rhythm, without murmur, rubs, thrills, or gallops.  Abdomen: soft, non-distended, non-tender with normal bowel sounds. No CVAT, No masses, or organomegaly.  Lower extremities: color normal, vascularity normal, no edema, temperature normal  Musculoskeletal: Normal gait. No obvious muscle weakness or wasting.  Psych: Normal mood and affect. Alert and oriented x3. Judgment and insight is normal.  Neuro:speech normal, mental status intact, cranial nerves 2-12 intact, gait, including heel, toe, and tandem walking normal, muscle tone normal, muscle strength normal, sensation to light touch and pinprick normal, reflexes normal and symmetric  DM foot exam: intact to pin prick, bilaterally.  Dorsalis pedis pulse is +4/4 bilaterally. No redness, ulcers, calluses.     LABS     Results reviewed and discussed with the patient, questions answered.    Last labs:  Lab Results   Component Value Date/Time    CHOLSTRLTOT 115 11/13/2018 08:45 AM    LDL 44 11/13/2018 08:45 AM    HDL 45 11/13/2018 08:45 AM    TRIGLYCERIDE 132 11/13/2018 08:45 AM       Lab Results   Component Value Date/Time    SODIUM 140 11/13/2018 08:45 AM    POTASSIUM 4.5 11/13/2018 08:45 AM    CHLORIDE 103 11/13/2018 08:45 AM    CO2 30 11/13/2018 08:45 AM    GLUCOSE 145 (H) 11/13/2018 08:45 AM    BUN 27 (H) 11/13/2018 08:45 AM    CREATININE 1.15 11/13/2018 08:45 AM     Lab Results   Component Value Date/Time    ALKPHOSPHAT 49 11/13/2018 08:45 AM    ASTSGOT 22 11/13/2018 08:45 AM    ALTSGPT  37 11/13/2018 08:45 AM    TBILIRUBIN 0.6 11/13/2018 08:45 AM      Lab Results   Component Value Date/Time    HBA1C 7.9 (H) 11/13/2018 08:45 AM    HBA1C 7.2 (H) 05/21/2018 02:07 PM    HBA1C 6.5 (H) 02/27/2018 09:55 AM     No results found for: TSH  Lab Results   Component Value Date/Time    FREET4 0.82 03/08/2013 01:00 AM       Lab Results   Component Value Date/Time    WBC 7.5 07/10/2017 07:39 AM    RBC 6.10 07/10/2017 07:39 AM    HEMOGLOBIN 14.5 07/10/2017 07:39 AM    HEMATOCRIT 48.2 07/10/2017 07:39 AM    MCV 79.0 (L) 07/10/2017 07:39 AM    MCH 23.8 (L) 07/10/2017 07:39 AM    MCHC 30.1 (L) 07/10/2017 07:39 AM    MPV 12.1 07/10/2017 07:39 AM    NEUTSPOLYS 62.30 07/10/2017 07:39 AM    LYMPHOCYTES 24.20 07/10/2017 07:39 AM    MONOCYTES 7.40 07/10/2017 07:39 AM    EOSINOPHILS 4.40 07/10/2017 07:39 AM    BASOPHILS 1.30 07/10/2017 07:39 AM    HYPOCHROMIA 1+ 09/16/2013 01:13 PM        ASSESMENT AND PLAN     1. Controlled type 2 diabetes mellitus with complication, without long-term current use of insulin (HCC)  Uncontrolled, increase victoza from 1.2 mg to 1.8 mg daily    - liraglutide (VICTOZA) 18 MG/3ML Solution Pen-injector injection; Inject 0.3 mL as instructed every day.  Dispense: 6 PEN; Refill: 1  - COMP METABOLIC PANEL; Future  - HEMOGLOBIN A1C; Future  - MICROALBUMIN CREAT RATIO URINE; Future  Discussed about:    - importance of appropriate diet and exercise.   - hypoglycemic symptoms and precautions.    - long-term complications of uncontrolled DM, (increased risk of CAD, strokes, retinopathy, nephropathy, /can lead to kidney failure, dialysis/, peripheral neuropathy, (can lead to amputation)   - Good DM control can prevent / delay many of these complications.    - Recommended appropriate foot care     2. Microalbuminuria due to type 2 diabetes mellitus (HCC)  As above    3. Essential hypertension  Controlled, continue current treatment    4. Coronary artery disease due to lipid rich plaque  5. S/P CABG x 3  6.  Ischemic cardiomyopathy  Asymptomatic, continue current treatment and cardiology follow-up    7. Dyslipidemia  Controlled, continue current treatment  - COMP METABOLIC PANEL; Future  - Lipid Profile; Future  - ezetimibe (ZETIA) 10 MG Tab; Take 1 Tab by mouth every day. TAKE ONE TABLET BY MOUTH DAILY  Dispense: 90 Tab; Refill: 3    8. Idiopathic gout, unspecified chronicity, unspecified site  Controlled, continue current treatment  - allopurinol (ZYLOPRIM) 300 MG Tab; TAKE 1 TABLET BY MOUTH ONCE DAILY  Dispense: 90 Tab; Refill: 3    9. TEMO (generalized anxiety disorder)  Controlled, continue current treatment    10. Gastroesophageal reflux disease without esophagitis  Controlled, continue current  - omeprazole (PRILOSEC) 20 MG delayed-release capsule; TAKE 1 CAPSULE BY MOUTH ONCE DAILY  Dispense: 90 Cap; Refill: 3    11. Benign prostatic hyperplasia without lower urinary tract symptoms  Controlled, continue current treatment    12. Screening for malignant neoplasm of prostate  - PSA screening, [facility], future    13.  Healthcare maintenance  Advised shingles shot    All questions are answered.    Smoking Counseling: Nonsmoker    Followup: in 4 months    Time spent 40 minutes face to face, with > 50% spent counseling and coordinating care.  High complexity

## 2018-12-19 DIAGNOSIS — M54.50 CHRONIC LUMBOSACRAL PAIN: ICD-10-CM

## 2018-12-19 DIAGNOSIS — G89.29 CHRONIC LUMBOSACRAL PAIN: ICD-10-CM

## 2018-12-24 DIAGNOSIS — J30.2 SEASONAL ALLERGIES: ICD-10-CM

## 2019-01-09 DIAGNOSIS — Z76.0 MEDICATION REFILL: ICD-10-CM

## 2019-01-09 RX ORDER — CITALOPRAM 20 MG/1
TABLET ORAL
Qty: 90 TAB | Refills: 1 | Status: SHIPPED | OUTPATIENT
Start: 2019-01-09 | End: 2019-04-05 | Stop reason: SDUPTHER

## 2019-01-21 ENCOUNTER — TELEPHONE (OUTPATIENT)
Dept: HEALTH INFORMATION MANAGEMENT | Facility: MEDICAL CENTER | Age: 69
End: 2019-01-21

## 2019-01-21 NOTE — TELEPHONE ENCOUNTER
"Orlando reports he is taking Victoza 1.8 mg daily and is no longer having \"high readings\" of his blood glucose.  He is still taking glipizide 2.5 mg twice daily and metformin.  He currently has no follow up appointments scheduled with me.  He will try stopping the glipizide for 1 week and check [facility] to see if he is still controlled without the glipizide.  He will send [facility] in 1 week  "

## 2019-01-30 DIAGNOSIS — E11.8 CONTROLLED TYPE 2 DIABETES MELLITUS WITH COMPLICATION, WITHOUT LONG-TERM CURRENT USE OF INSULIN (HCC): ICD-10-CM

## 2019-02-07 ENCOUNTER — TELEPHONE (OUTPATIENT)
Dept: HEALTH INFORMATION MANAGEMENT | Facility: MEDICAL CENTER | Age: 69
End: 2019-02-07

## 2019-02-08 NOTE — TELEPHONE ENCOUNTER
Orlando sends email:  Per your request I did 22 glucose readings over 10 days after stopping glipizide and did the a1c workup available on the internet and got a score of 6.0.  Thanks for following up on this!

## 2019-02-13 RX ORDER — TAMSULOSIN HYDROCHLORIDE 0.4 MG/1
0.4 CAPSULE ORAL DAILY
Qty: 90 CAP | Refills: 1 | Status: SHIPPED | OUTPATIENT
Start: 2019-02-13 | End: 2019-04-05 | Stop reason: SDUPTHER

## 2019-03-07 ENCOUNTER — TELEPHONE (OUTPATIENT)
Dept: MEDICAL GROUP | Facility: MEDICAL CENTER | Age: 69
End: 2019-03-07

## 2019-03-07 DIAGNOSIS — J31.0 RHINITIS, UNSPECIFIED TYPE: ICD-10-CM

## 2019-03-07 NOTE — TELEPHONE ENCOUNTER
1. Caller Name: Luis Renner      Call Back Number: [phone] (home)         Patient approves a detailed voicemail message: yes    2. SPECIFIC Action To Be Taken: Referral pending, please sign.    3. Diagnosis/Clinical Reason for Request:Rinitis    4. Specialty & Provider Name/Lab/Imaging Location: ENT    5. Is appointment scheduled for requested order/referral: no    Patient was informed they will receive a return phone call from the office ONLY if there are any questions before processing their request. Advised to call back if they haven't received a call from the referral department in 5 days.

## 2019-03-11 DIAGNOSIS — E11.9 DIABETES MELLITUS WITHOUT COMPLICATION (HCC): ICD-10-CM

## 2019-03-13 NOTE — TELEPHONE ENCOUNTER
Dr. Jha,     I have not received a response on this referral.    Please advise.     Thank you,     Lul Chandler  Medical Assistant

## 2019-03-18 DIAGNOSIS — E11.9 DIABETES MELLITUS WITHOUT COMPLICATION (HCC): ICD-10-CM

## 2019-03-18 RX ORDER — LANCETS 30 GAUGE
1 EACH MISCELLANEOUS 3 TIMES DAILY
Qty: 100 EACH | Refills: 11 | Status: SHIPPED | OUTPATIENT
Start: 2019-03-18

## 2019-03-21 ENCOUNTER — APPOINTMENT (OUTPATIENT)
Dept: RADIOLOGY | Facility: MEDICAL CENTER | Age: 69
End: 2019-03-21
Attending: EMERGENCY MEDICINE
Payer: MEDICARE

## 2019-03-21 ENCOUNTER — HOSPITAL ENCOUNTER (OUTPATIENT)
Facility: MEDICAL CENTER | Age: 69
End: 2019-03-22
Attending: EMERGENCY MEDICINE | Admitting: HOSPITALIST
Payer: MEDICARE

## 2019-03-21 DIAGNOSIS — R53.83 FATIGUE, UNSPECIFIED TYPE: ICD-10-CM

## 2019-03-21 DIAGNOSIS — R00.0 SINUS TACHYCARDIA: ICD-10-CM

## 2019-03-21 DIAGNOSIS — R50.9 FEVER, UNSPECIFIED FEVER CAUSE: ICD-10-CM

## 2019-03-21 LAB
ALBUMIN SERPL BCP-MCNC: 4.5 G/DL (ref 3.2–4.9)
ALBUMIN/GLOB SERPL: 1.6 G/DL
ALP SERPL-CCNC: 55 U/L (ref 30–99)
ALT SERPL-CCNC: 35 U/L (ref 2–50)
ANION GAP SERPL CALC-SCNC: 7 MMOL/L (ref 0–11.9)
APPEARANCE UR: CLEAR
AST SERPL-CCNC: 25 U/L (ref 12–45)
BASOPHILS # BLD AUTO: 0.5 % (ref 0–1.8)
BASOPHILS # BLD: 0.04 K/UL (ref 0–0.12)
BILIRUB SERPL-MCNC: 0.8 MG/DL (ref 0.1–1.5)
BILIRUB UR QL STRIP.AUTO: NEGATIVE
BUN SERPL-MCNC: 24 MG/DL (ref 8–22)
CALCIUM SERPL-MCNC: 9.5 MG/DL (ref 8.4–10.2)
CHLORIDE SERPL-SCNC: 104 MMOL/L (ref 96–112)
CO2 SERPL-SCNC: 24 MMOL/L (ref 20–33)
COLOR UR: YELLOW
CREAT SERPL-MCNC: 1.16 MG/DL (ref 0.5–1.4)
EKG IMPRESSION: NORMAL
EOSINOPHIL # BLD AUTO: 0.03 K/UL (ref 0–0.51)
EOSINOPHIL NFR BLD: 0.4 % (ref 0–6.9)
ERYTHROCYTE [DISTWIDTH] IN BLOOD BY AUTOMATED COUNT: 45.2 FL (ref 35.9–50)
FLUAV RNA SPEC QL NAA+PROBE: NEGATIVE
FLUBV RNA SPEC QL NAA+PROBE: NEGATIVE
GLOBULIN SER CALC-MCNC: 2.8 G/DL (ref 1.9–3.5)
GLUCOSE SERPL-MCNC: 162 MG/DL (ref 65–99)
GLUCOSE UR STRIP.AUTO-MCNC: >=1000 MG/DL
HCT VFR BLD AUTO: 48.3 % (ref 42–52)
HGB BLD-MCNC: 15.2 G/DL (ref 14–18)
IMM GRANULOCYTES # BLD AUTO: 0.04 K/UL (ref 0–0.11)
IMM GRANULOCYTES NFR BLD AUTO: 0.5 % (ref 0–0.9)
KETONES UR STRIP.AUTO-MCNC: NEGATIVE MG/DL
LACTATE BLD-SCNC: 1.4 MMOL/L (ref 0.5–2)
LACTATE BLD-SCNC: 1.6 MMOL/L (ref 0.5–2)
LEUKOCYTE ESTERASE UR QL STRIP.AUTO: NEGATIVE
LIPASE SERPL-CCNC: 64 U/L (ref 7–58)
LYMPHOCYTES # BLD AUTO: 0.53 K/UL (ref 1–4.8)
LYMPHOCYTES NFR BLD: 6.8 % (ref 22–41)
MCH RBC QN AUTO: 24.8 PG (ref 27–33)
MCHC RBC AUTO-ENTMCNC: 31.5 G/DL (ref 33.7–35.3)
MCV RBC AUTO: 78.7 FL (ref 81.4–97.8)
MICRO URNS: ABNORMAL
MONOCYTES # BLD AUTO: 0.41 K/UL (ref 0–0.85)
MONOCYTES NFR BLD AUTO: 5.3 % (ref 0–13.4)
NEUTROPHILS # BLD AUTO: 6.74 K/UL (ref 1.82–7.42)
NEUTROPHILS NFR BLD: 86.5 % (ref 44–72)
NITRITE UR QL STRIP.AUTO: NEGATIVE
NRBC # BLD AUTO: 0 K/UL
NRBC BLD-RTO: 0 /100 WBC
PH UR STRIP.AUTO: 5.5 [PH]
PLATELET # BLD AUTO: 164 K/UL (ref 164–446)
PMV BLD AUTO: 10.3 FL (ref 9–12.9)
POTASSIUM SERPL-SCNC: 4.1 MMOL/L (ref 3.6–5.5)
PROT SERPL-MCNC: 7.3 G/DL (ref 6–8.2)
PROT UR QL STRIP: NEGATIVE MG/DL
RBC # BLD AUTO: 6.14 M/UL (ref 4.7–6.1)
RBC UR QL AUTO: NEGATIVE
SODIUM SERPL-SCNC: 135 MMOL/L (ref 135–145)
SP GR UR STRIP.AUTO: 1.01
TROPONIN I SERPL-MCNC: <0.02 NG/ML (ref 0–0.04)
TROPONIN I SERPL-MCNC: <0.02 NG/ML (ref 0–0.04)
TSH SERPL DL<=0.005 MIU/L-ACNC: 0.86 UIU/ML (ref 0.38–5.33)
WBC # BLD AUTO: 7.8 K/UL (ref 4.8–10.8)

## 2019-03-21 PROCEDURE — 99285 EMERGENCY DEPT VISIT HI MDM: CPT

## 2019-03-21 PROCEDURE — 84484 ASSAY OF TROPONIN QUANT: CPT | Mod: 91

## 2019-03-21 PROCEDURE — 87502 INFLUENZA DNA AMP PROBE: CPT

## 2019-03-21 PROCEDURE — 36415 COLL VENOUS BLD VENIPUNCTURE: CPT

## 2019-03-21 PROCEDURE — 94760 N-INVAS EAR/PLS OXIMETRY 1: CPT

## 2019-03-21 PROCEDURE — 83690 ASSAY OF LIPASE: CPT

## 2019-03-21 PROCEDURE — 700102 HCHG RX REV CODE 250 W/ 637 OVERRIDE(OP): Performed by: EMERGENCY MEDICINE

## 2019-03-21 PROCEDURE — 93005 ELECTROCARDIOGRAM TRACING: CPT | Performed by: EMERGENCY MEDICINE

## 2019-03-21 PROCEDURE — 700105 HCHG RX REV CODE 258: Performed by: EMERGENCY MEDICINE

## 2019-03-21 PROCEDURE — 71045 X-RAY EXAM CHEST 1 VIEW: CPT

## 2019-03-21 PROCEDURE — 80053 COMPREHEN METABOLIC PANEL: CPT

## 2019-03-21 PROCEDURE — A9270 NON-COVERED ITEM OR SERVICE: HCPCS | Performed by: EMERGENCY MEDICINE

## 2019-03-21 PROCEDURE — 81003 URINALYSIS AUTO W/O SCOPE: CPT

## 2019-03-21 PROCEDURE — 85025 COMPLETE CBC W/AUTO DIFF WBC: CPT

## 2019-03-21 PROCEDURE — 87040 BLOOD CULTURE FOR BACTERIA: CPT | Mod: 91

## 2019-03-21 PROCEDURE — 87086 URINE CULTURE/COLONY COUNT: CPT

## 2019-03-21 PROCEDURE — 85379 FIBRIN DEGRADATION QUANT: CPT

## 2019-03-21 PROCEDURE — 83605 ASSAY OF LACTIC ACID: CPT | Mod: 91

## 2019-03-21 PROCEDURE — 93005 ELECTROCARDIOGRAM TRACING: CPT

## 2019-03-21 PROCEDURE — 84443 ASSAY THYROID STIM HORMONE: CPT

## 2019-03-21 RX ORDER — SODIUM CHLORIDE, SODIUM LACTATE, POTASSIUM CHLORIDE, CALCIUM CHLORIDE 600; 310; 30; 20 MG/100ML; MG/100ML; MG/100ML; MG/100ML
1000 INJECTION, SOLUTION INTRAVENOUS ONCE
Status: COMPLETED | OUTPATIENT
Start: 2019-03-21 | End: 2019-03-21

## 2019-03-21 RX ORDER — ASPIRIN 81 MG/1
TABLET, CHEWABLE ORAL
Status: DISPENSED
Start: 2019-03-21 | End: 2019-03-22

## 2019-03-21 RX ORDER — ASPIRIN 81 MG/1
324 TABLET, CHEWABLE ORAL ONCE
Status: COMPLETED | OUTPATIENT
Start: 2019-03-21 | End: 2019-03-21

## 2019-03-21 RX ADMIN — SODIUM CHLORIDE, POTASSIUM CHLORIDE, SODIUM LACTATE AND CALCIUM CHLORIDE 1000 ML: 600; 310; 30; 20 INJECTION, SOLUTION INTRAVENOUS at 20:44

## 2019-03-21 RX ADMIN — ASPIRIN 81 MG CHEWABLE TABLET 324 MG: 81 TABLET CHEWABLE at 18:42

## 2019-03-22 ENCOUNTER — PATIENT OUTREACH (OUTPATIENT)
Dept: HEALTH INFORMATION MANAGEMENT | Facility: OTHER | Age: 69
End: 2019-03-22

## 2019-03-22 ENCOUNTER — APPOINTMENT (OUTPATIENT)
Dept: RADIOLOGY | Facility: MEDICAL CENTER | Age: 69
End: 2019-03-22
Attending: HOSPITALIST
Payer: MEDICARE

## 2019-03-22 ENCOUNTER — TELEPHONE (OUTPATIENT)
Dept: CARDIOLOGY | Facility: MEDICAL CENTER | Age: 69
End: 2019-03-22

## 2019-03-22 ENCOUNTER — APPOINTMENT (OUTPATIENT)
Dept: CARDIOLOGY | Facility: MEDICAL CENTER | Age: 69
End: 2019-03-22
Attending: HOSPITALIST
Payer: MEDICARE

## 2019-03-22 VITALS
BODY MASS INDEX: 29.01 KG/M2 | HEIGHT: 71 IN | RESPIRATION RATE: 18 BRPM | HEART RATE: 87 BPM | SYSTOLIC BLOOD PRESSURE: 104 MMHG | DIASTOLIC BLOOD PRESSURE: 63 MMHG | TEMPERATURE: 97.6 F | WEIGHT: 207.23 LBS | OXYGEN SATURATION: 92 %

## 2019-03-22 DIAGNOSIS — I25.5 ISCHEMIC CARDIOMYOPATHY: ICD-10-CM

## 2019-03-22 DIAGNOSIS — Z71.89 COMPLEX CARE COORDINATION: ICD-10-CM

## 2019-03-22 DIAGNOSIS — Z95.1 S/P CABG X 3: ICD-10-CM

## 2019-03-22 PROBLEM — R61 DIAPHORESIS: Status: ACTIVE | Noted: 2019-03-22

## 2019-03-22 LAB
ANION GAP SERPL CALC-SCNC: 8 MMOL/L (ref 0–11.9)
BASOPHILS # BLD AUTO: 0.3 % (ref 0–1.8)
BASOPHILS # BLD: 0.02 K/UL (ref 0–0.12)
BUN SERPL-MCNC: 17 MG/DL (ref 8–22)
CALCIUM SERPL-MCNC: 9 MG/DL (ref 8.4–10.2)
CHLORIDE SERPL-SCNC: 103 MMOL/L (ref 96–112)
CO2 SERPL-SCNC: 24 MMOL/L (ref 20–33)
CREAT SERPL-MCNC: 1.07 MG/DL (ref 0.5–1.4)
D DIMER PPP IA.FEU-MCNC: 0.46 UG/ML (FEU) (ref 0–0.5)
EKG IMPRESSION: NORMAL
EKG IMPRESSION: NORMAL
EOSINOPHIL # BLD AUTO: 0.01 K/UL (ref 0–0.51)
EOSINOPHIL NFR BLD: 0.2 % (ref 0–6.9)
ERYTHROCYTE [DISTWIDTH] IN BLOOD BY AUTOMATED COUNT: 45.2 FL (ref 35.9–50)
GLUCOSE BLD-MCNC: 200 MG/DL (ref 65–99)
GLUCOSE SERPL-MCNC: 145 MG/DL (ref 65–99)
HCT VFR BLD AUTO: 44.2 % (ref 42–52)
HGB BLD-MCNC: 14.1 G/DL (ref 14–18)
IMM GRANULOCYTES # BLD AUTO: 0.02 K/UL (ref 0–0.11)
IMM GRANULOCYTES NFR BLD AUTO: 0.3 % (ref 0–0.9)
LV EJECT FRACT  99904: 35
LV EJECT FRACT MOD 2C 99903: 45.21
LV EJECT FRACT MOD 4C 99902: 42.86
LV EJECT FRACT MOD BP 99901: 44.06
LYMPHOCYTES # BLD AUTO: 0.63 K/UL (ref 1–4.8)
LYMPHOCYTES NFR BLD: 9.8 % (ref 22–41)
MCH RBC QN AUTO: 25 PG (ref 27–33)
MCHC RBC AUTO-ENTMCNC: 31.9 G/DL (ref 33.7–35.3)
MCV RBC AUTO: 78.4 FL (ref 81.4–97.8)
MONOCYTES # BLD AUTO: 0.38 K/UL (ref 0–0.85)
MONOCYTES NFR BLD AUTO: 5.9 % (ref 0–13.4)
NEUTROPHILS # BLD AUTO: 5.39 K/UL (ref 1.82–7.42)
NEUTROPHILS NFR BLD: 83.5 % (ref 44–72)
NRBC # BLD AUTO: 0 K/UL
NRBC BLD-RTO: 0 /100 WBC
PLATELET # BLD AUTO: 120 K/UL (ref 164–446)
PMV BLD AUTO: 10.7 FL (ref 9–12.9)
POTASSIUM SERPL-SCNC: 3.8 MMOL/L (ref 3.6–5.5)
RBC # BLD AUTO: 5.64 M/UL (ref 4.7–6.1)
SODIUM SERPL-SCNC: 135 MMOL/L (ref 135–145)
TROPONIN I SERPL-MCNC: <0.02 NG/ML (ref 0–0.04)
TROPONIN I SERPL-MCNC: <0.02 NG/ML (ref 0–0.04)
WBC # BLD AUTO: 6.5 K/UL (ref 4.8–10.8)

## 2019-03-22 PROCEDURE — 85025 COMPLETE CBC W/AUTO DIFF WBC: CPT

## 2019-03-22 PROCEDURE — 93018 CV STRESS TEST I&R ONLY: CPT | Performed by: INTERNAL MEDICINE

## 2019-03-22 PROCEDURE — G0378 HOSPITAL OBSERVATION PER HR: HCPCS

## 2019-03-22 PROCEDURE — 700111 HCHG RX REV CODE 636 W/ 250 OVERRIDE (IP)

## 2019-03-22 PROCEDURE — 93010 ELECTROCARDIOGRAM REPORT: CPT | Performed by: INTERNAL MEDICINE

## 2019-03-22 PROCEDURE — A9502 TC99M TETROFOSMIN: HCPCS

## 2019-03-22 PROCEDURE — 93306 TTE W/DOPPLER COMPLETE: CPT

## 2019-03-22 PROCEDURE — 78452 HT MUSCLE IMAGE SPECT MULT: CPT | Mod: 26 | Performed by: INTERNAL MEDICINE

## 2019-03-22 PROCEDURE — 700105 HCHG RX REV CODE 258: Performed by: EMERGENCY MEDICINE

## 2019-03-22 PROCEDURE — 99219 PR INITIAL OBSERVATION CARE,LEVL II: CPT | Performed by: HOSPITALIST

## 2019-03-22 PROCEDURE — 84484 ASSAY OF TROPONIN QUANT: CPT

## 2019-03-22 PROCEDURE — 93306 TTE W/DOPPLER COMPLETE: CPT | Mod: 26 | Performed by: INTERNAL MEDICINE

## 2019-03-22 PROCEDURE — 82962 GLUCOSE BLOOD TEST: CPT

## 2019-03-22 PROCEDURE — 700102 HCHG RX REV CODE 250 W/ 637 OVERRIDE(OP): Performed by: HOSPITALIST

## 2019-03-22 PROCEDURE — 93005 ELECTROCARDIOGRAM TRACING: CPT | Performed by: HOSPITALIST

## 2019-03-22 PROCEDURE — A9270 NON-COVERED ITEM OR SERVICE: HCPCS | Performed by: HOSPITALIST

## 2019-03-22 PROCEDURE — 80048 BASIC METABOLIC PNL TOTAL CA: CPT

## 2019-03-22 RX ORDER — OMEPRAZOLE 20 MG/1
20 CAPSULE, DELAYED RELEASE ORAL DAILY
Status: DISCONTINUED | OUTPATIENT
Start: 2019-03-22 | End: 2019-03-22 | Stop reason: HOSPADM

## 2019-03-22 RX ORDER — POLYETHYLENE GLYCOL 3350 17 G/17G
1 POWDER, FOR SOLUTION ORAL
Status: DISCONTINUED | OUTPATIENT
Start: 2019-03-22 | End: 2019-03-22 | Stop reason: HOSPADM

## 2019-03-22 RX ORDER — BISACODYL 10 MG
10 SUPPOSITORY, RECTAL RECTAL
Status: DISCONTINUED | OUTPATIENT
Start: 2019-03-22 | End: 2019-03-22 | Stop reason: HOSPADM

## 2019-03-22 RX ORDER — EZETIMIBE 10 MG/1
10 TABLET ORAL DAILY
Status: DISCONTINUED | OUTPATIENT
Start: 2019-03-22 | End: 2019-03-22 | Stop reason: HOSPADM

## 2019-03-22 RX ORDER — REGADENOSON 0.08 MG/ML
INJECTION, SOLUTION INTRAVENOUS
Status: COMPLETED
Start: 2019-03-22 | End: 2019-03-22

## 2019-03-22 RX ORDER — ROSUVASTATIN CALCIUM 10 MG/1
10 TABLET, COATED ORAL EVERY EVENING
Status: DISCONTINUED | OUTPATIENT
Start: 2019-03-22 | End: 2019-03-22 | Stop reason: HOSPADM

## 2019-03-22 RX ORDER — CITALOPRAM 20 MG/1
20 TABLET ORAL DAILY
Status: DISCONTINUED | OUTPATIENT
Start: 2019-03-22 | End: 2019-03-22 | Stop reason: HOSPADM

## 2019-03-22 RX ORDER — CARVEDILOL 6.25 MG/1
6.25 TABLET ORAL 2 TIMES DAILY WITH MEALS
Status: DISCONTINUED | OUTPATIENT
Start: 2019-03-22 | End: 2019-03-22 | Stop reason: HOSPADM

## 2019-03-22 RX ORDER — ASPIRIN 81 MG/1
324 TABLET, CHEWABLE ORAL DAILY
Status: DISCONTINUED | OUTPATIENT
Start: 2019-03-22 | End: 2019-03-22 | Stop reason: HOSPADM

## 2019-03-22 RX ORDER — TAMSULOSIN HYDROCHLORIDE 0.4 MG/1
0.4 CAPSULE ORAL DAILY
Status: DISCONTINUED | OUTPATIENT
Start: 2019-03-22 | End: 2019-03-22 | Stop reason: HOSPADM

## 2019-03-22 RX ORDER — ASPIRIN 325 MG
325 TABLET ORAL DAILY
Status: DISCONTINUED | OUTPATIENT
Start: 2019-03-22 | End: 2019-03-22 | Stop reason: HOSPADM

## 2019-03-22 RX ORDER — AMOXICILLIN 250 MG
2 CAPSULE ORAL 2 TIMES DAILY
Status: DISCONTINUED | OUTPATIENT
Start: 2019-03-22 | End: 2019-03-22 | Stop reason: HOSPADM

## 2019-03-22 RX ORDER — LISINOPRIL 5 MG/1
5 TABLET ORAL DAILY
Status: DISCONTINUED | OUTPATIENT
Start: 2019-03-22 | End: 2019-03-22 | Stop reason: HOSPADM

## 2019-03-22 RX ORDER — SODIUM CHLORIDE, SODIUM LACTATE, POTASSIUM CHLORIDE, CALCIUM CHLORIDE 600; 310; 30; 20 MG/100ML; MG/100ML; MG/100ML; MG/100ML
1000 INJECTION, SOLUTION INTRAVENOUS ONCE
Status: COMPLETED | OUTPATIENT
Start: 2019-03-22 | End: 2019-03-22

## 2019-03-22 RX ORDER — ALLOPURINOL 300 MG/1
300 TABLET ORAL DAILY
Status: DISCONTINUED | OUTPATIENT
Start: 2019-03-22 | End: 2019-03-22 | Stop reason: HOSPADM

## 2019-03-22 RX ORDER — ASPIRIN 600 MG/1
300 SUPPOSITORY RECTAL DAILY
Status: DISCONTINUED | OUTPATIENT
Start: 2019-03-22 | End: 2019-03-22 | Stop reason: HOSPADM

## 2019-03-22 RX ADMIN — OMEPRAZOLE 20 MG: 20 CAPSULE, DELAYED RELEASE ORAL at 06:30

## 2019-03-22 RX ADMIN — LISINOPRIL 5 MG: 5 TABLET ORAL at 06:30

## 2019-03-22 RX ADMIN — SODIUM CHLORIDE, POTASSIUM CHLORIDE, SODIUM LACTATE AND CALCIUM CHLORIDE 1000 ML: 600; 310; 30; 20 INJECTION, SOLUTION INTRAVENOUS at 00:35

## 2019-03-22 RX ADMIN — REGADENOSON 0.4 MG: 0.08 INJECTION, SOLUTION INTRAVENOUS at 09:41

## 2019-03-22 RX ADMIN — ASPIRIN 325 MG ORAL TABLET 325 MG: 325 PILL ORAL at 06:30

## 2019-03-22 RX ADMIN — ALLOPURINOL 300 MG: 300 TABLET ORAL at 06:30

## 2019-03-22 ASSESSMENT — ENCOUNTER SYMPTOMS
SORE THROAT: 0
DIZZINESS: 0
DEPRESSION: 0
SHORTNESS OF BREATH: 0
HEADACHES: 0
MYALGIAS: 0
INSOMNIA: 0
NAUSEA: 0
DIAPHORESIS: 1
PALPITATIONS: 0
NECK PAIN: 0
BLURRED VISION: 0
FEVER: 0
WEAKNESS: 1
VOMITING: 0
BRUISES/BLEEDS EASILY: 0
DOUBLE VISION: 0
COUGH: 0

## 2019-03-22 ASSESSMENT — COGNITIVE AND FUNCTIONAL STATUS - GENERAL
MOBILITY SCORE: 24
SUGGESTED CMS G CODE MODIFIER DAILY ACTIVITY: CH
DAILY ACTIVITIY SCORE: 24
SUGGESTED CMS G CODE MODIFIER MOBILITY: CH

## 2019-03-22 ASSESSMENT — COPD QUESTIONNAIRES
HAVE YOU SMOKED AT LEAST 100 CIGARETTES IN YOUR ENTIRE LIFE: NO/DON'T KNOW
DURING THE PAST 4 WEEKS HOW MUCH DID YOU FEEL SHORT OF BREATH: NONE/LITTLE OF THE TIME
DO YOU EVER COUGH UP ANY MUCUS OR PHLEGM?: NO/ONLY WITH OCCASIONAL COLDS OR INFECTIONS
IN THE PAST 12 MONTHS DO YOU DO LESS THAN YOU USED TO BECAUSE OF YOUR BREATHING PROBLEMS: DISAGREE/UNSURE
COPD SCREENING SCORE: 2

## 2019-03-22 ASSESSMENT — PATIENT HEALTH QUESTIONNAIRE - PHQ9
1. LITTLE INTEREST OR PLEASURE IN DOING THINGS: NOT AT ALL
2. FEELING DOWN, DEPRESSED, IRRITABLE, OR HOPELESS: NOT AT ALL
SUM OF ALL RESPONSES TO PHQ9 QUESTIONS 1 AND 2: 0

## 2019-03-22 ASSESSMENT — LIFESTYLE VARIABLES
EVER_SMOKED: NEVER
ALCOHOL_USE: NO

## 2019-03-22 NOTE — PROGRESS NOTES
Nursing care plan includes knowledge deficit, potential for discomfort, potential for compromised cardiac output.  POC includes teaching, comfort measures and reassurance, and access to code cart, cardiology stand by and availability of rapid response team.  Pt verbalizes good understanding of benefits and risks of pharmacological cardiac stress test.  Informed consent obtained.  Lexiscan given, pt developed the following signs/symptoms:tachycardia.    VS stable, symptoms resolved.  To waiting room, fluids and/or snack given, awaiting second scan.  Nursing goals met.

## 2019-03-22 NOTE — ED NOTES
"Pt made aware that he is to be admitted  Okay for sandwich per md  Given  VSS  Pt in NAD   Does however now c/o incontinent of urine  Has not happened before  \"having little accidents\" per pt  MD aware     "

## 2019-03-22 NOTE — DISCHARGE INSTRUCTIONS
Discharge Instructions    Discharged to home by car with relative. Discharged via ambulating, hospital escort: Refused.  Special equipment needed: Not Applicable    Be sure to schedule a follow-up appointment with your primary care doctor or any specialists as instructed.     Discharge Plan:   Influenza Vaccine Indication: Not indicated: Previously immunized this influenza season and > 8 years of age    I understand that a diet low in cholesterol, fat, and sodium is recommended for good health. Unless I have been given specific instructions below for another diet, I accept this instruction as my diet prescription.   Other diet: cardiac    Special Instructions: None    · Is patient discharged on Warfarin / Coumadin?   No     Depression / Suicide Risk    As you are discharged from this [facility] facility, it is important to learn how to keep safe from harming yourself.    Recognize the warning signs:  · Abrupt changes in personality, positive or negative- including increase in energy   · Giving away possessions  · Change in eating patterns- significant weight changes-  positive or negative  · Change in sleeping patterns- unable to sleep or sleeping all the time   · Unwillingness or inability to communicate  · Depression  · Unusual sadness, discouragement and loneliness  · Talk of wanting to die  · Neglect of personal appearance   · Rebelliousness- reckless behavior  · Withdrawal from people/activities they love  · Confusion- inability to concentrate     If you or a loved one observes any of these behaviors or has concerns about self-harm, here's what you can do:  · Talk about it- your feelings and reasons for harming yourself  · Remove any means that you might use to hurt yourself (examples: pills, rope, extension cords, firearm)  · Get professional help from the community (Mental Health, Substance Abuse, psychological counseling)  · Do not be alone:Call your Safe Contact- someone whom you trust who will be there  for you.  · Call your local CRISIS HOTLINE 495-2948 or 942-189-0698  · Call your local Children's Mobile Crisis Response Team [place] (099) 718-6178 or www.Memeo  · Call the toll free National Suicide Prevention Hotlines   · National Suicide Prevention Lifeline 207-158-PLAF (2051)  · [place] Line Network 800-SUICIDE (359-1552)    Pharmacologic Stress Echocardiogram  A pharmacologic stress echocardiogram is a heart (cardiac) test used to check the function of your heart. This test may also be called a pharmacologic stress echocardiography. Pharmacologic means that a medicine is used to increase your heart rate and blood pressure.   This stress test will check how well your heart muscle and valves are working and determine if your heart muscle is getting enough blood. Some people exercise on a treadmill, which naturally increases or stresses the functioning of their heart. For those people unable to exercise on a treadmill, a medicine is used. This medicine stimulates your heart and will cause your heart to beat harder and more quickly, as if you were exercising.   An echocardiogram uses sound waves (ultrasound) to produce an image of your heart. If your heart does not work normally, it may indicate coronary artery disease with poor coronary blood supply. The coronary arteries are the arteries that bring blood and oxygen to your heart.  LET YOUR HEALTH CARE PROVIDER KNOW ABOUT:  · Any allergies you have.  · All medicines you are taking, including vitamins, herbs, eye drops, creams, and over-the-counter medicines.  · Previous problems you or members of your family have had with the use of anesthetics.  · Any blood disorders you have.  · Previous surgeries you have had.  · Medical conditions you have.  · Possibility of pregnancy, if this applies.  RISKS AND COMPLICATIONS  Generally, this is a safe procedure. However, as with any procedure, complications can occur. Possible complications can  include:  · You develop pain or pressure in the following areas:  ¨ Chest.  ¨ Jaw or neck.  ¨ Between your shoulder blades.  ¨ Radiating down your left arm.  · Headache.  · Dizziness or light-headedness.  · Shortness of breath.  · Increased or irregular heartbeat.  · Low blood pressure.  · Nausea or vomiting.  · Flushing.  · Redness going up the arm and slight pain during injection of medicine.  · Heart attack (rare).  BEFORE THE PROCEDURE  · Avoid all forms of caffeine for 24 hours before your test or as directed by your health care provider. This includes coffee, tea (even decaffeinated tea), caffeinated sodas, chocolate, cocoa, and certain pain medicines.  · Follow your health care provider's instructions regarding eating and drinking before the test.  · Take your medicines as directed at regular times with water unless instructed otherwise. Exceptions may include:  ¨ If you have diabetes, ask how you are to take your insulin or pills. It is common to adjust insulin dosing the morning of the test.  ¨ If you are taking beta-blocker medicines, it is important to talk to your health care provider about these medicines well before the date of your test. Taking beta-blocker medicines may interfere with the test. In some cases, these medicines need to be changed or stopped 24 hours or more before the test.  ¨ If you wear a nitroglycerin patch, it may need to be removed prior to the test. Ask your health care provider if the patch should be removed before the test.  · If you use an inhaler for any breathing condition, bring it with you to the test.  · If you are an outpatient, bring a snack so you can eat right after the stress phase of the test.  · Do not smoke for 4 hours prior to the test or as directed by your health care provider.  · Wear comfortable shoes and clothing.  Let your health care provider know if you were unable to complete or follow the preparations for your test.  PROCEDURE   · Multiple electrodes  will be put on your chest. If needed, small areas of your chest may be shaved to get better contact with the electrodes. Once the electrodes are attached to your body, multiple wires will be attached to the electrodes, and your heart rate will be monitored.  · An IV access will be started, and medicine will be given.  · You will have an echocardiogram done at rest and done again at peak heart rate.  · To produce an image of the heart, gel is applied to your chest, and a wand-like tool (transducer) is moved over the chest. The transducer sends the sound waves through the chest to create the moving images of your heart.  AFTER THE PROCEDURE  · Your heart rate and blood pressure will be monitored after the test.  · You may return to your normal schedule, including diet, activities, and medicines, unless your health care provider tells you otherwise.     This information is not intended to replace advice given to you by your health care provider. Make sure you discuss any questions you have with your health care provider.     Document Released: 03/09/2005 Document Revised: 12/23/2014 Document Reviewed: 08/25/2014  [employer] Patient Education ©2016 [employer]    Chest Pain, Nonspecific  It is often hard to give a specific diagnosis for the cause of chest pain. There is always a chance that your pain could be related to something serious, like a heart attack or a blood clot in the lungs. You need to follow up with your caregiver for further evaluation. More lab tests or other studies such as X-rays, electrocardiography, stress testing, or cardiac imaging may be needed to find the cause of your pain.  Most of the time, nonspecific chest pain improves within 2 to 3 days with rest and mild pain medicine. For the next few days, avoid physical exertion or activities that bring on pain. Do not smoke. Avoid drinking alcohol. Call your caregiver for routine follow-up as advised.   SEEK IMMEDIATE MEDICAL CARE  IF:  · You develop increased chest pain or pain that radiates to the arm, neck, jaw, back, or abdomen.   · You develop shortness of breath, increased coughing, or you start coughing up blood.   · You have severe back or abdominal pain, nausea, or vomiting.   · You develop severe weakness, fainting, fever, or chills.   Document Released: 12/18/2006 Document Revised: 03/11/2013 Document Reviewed: 06/06/2008  ip.access® Patient Information ©2013 [employer].

## 2019-03-22 NOTE — CARE PLAN
Problem: Safety  Goal: Will remain free from injury  Outcome: PROGRESSING AS EXPECTED  Bed in low locked position, call bell within reach,  socks on

## 2019-03-22 NOTE — PROGRESS NOTES
· 2 RN skin check complete with OLIMPIA Pinto.  · Devices in place: None.  · Skin assessed, CDI,. No s/s of breakdown.  · Confirmed pressure ulcers found on: None  · New potential pressure ulcers noted on: None. Wound consult: N/A  · The following interventions in place: Turns independently in bed. Encouraging frequent repositioning and ambulation at least TID. Maintain adequate PO intake.

## 2019-03-22 NOTE — ED NOTES
Dr Peña notified pt hitting sepsis protocol. Per Dr Peña, ok to go ahead and place sepsis protocol.

## 2019-03-22 NOTE — DISCHARGE SUMMARY
Discharge Summary    CHIEF COMPLAINT ON ADMISSION  Chief Complaint   Patient presents with   • Chest Pain     chest pain  weakness  MI with CABG in past       Reason for Admission  chest pain     Admission Date  3/21/2019    CODE STATUS  Full Code    HPI & HOSPITAL COURSE  This is a 68 y.o. male here with sudden onset of diaphoresis. He has an extensive cardiac history with a history of a CABG in 2013 and diabetes. He was admitted and ruled out for a myocardial infarction and had a stress test as detailed below showing old infarctions and a markedly reduced ejection fraction. This was followed by an echocardiogram given the results which is also noted below. His has a new reduction in his ejection fraction compared to his last echo and new wall motion abnormalities suggesting previous silent myocardial infarction. He has no evidence on stress test of reversible ischemia.  His symptoms completely resolved and he had no other events while here. I discussed his situation with cardiology and I have set him up for close follow up. He is maximize don his cardiac medications already. He has good blood pressure and blood sugar control at home. I have asked that he monitor blood pressure and blood sugar closely at home and give his numbers to his physicians for evaluation on his next follow up. He has no evidence of acute congestive heart failure at the moment.           Therefore, he is discharged in good and stable condition to home with close outpatient follow-up.        Discharge Date  3/22/2019    FOLLOW UP ITEMS POST DISCHARGE  See cardiology per appointment.     DISCHARGE DIAGNOSES  Active Problems:    Essential hypertension (Chronic) POA: Yes    Diabetes mellitus type 2, controlled, with complications (HCC) POA: Yes    S/P CABG x 3 POA: Yes      Overview: CABG x 3, 7/2016    Gastroesophageal reflux disease without esophagitis POA: Yes    Idiopathic gout POA: Yes    Diaphoresis POA: Unknown  Resolved Problems:    * No  resolved hospital problems. *      FOLLOW UP  Future Appointments  Date Time Provider Department Center   3/25/2019 3:40 PM MICHELINE Singh   4/5/2019 11:15 AM LORENA Rothman [facility] None   4/15/2019 10:40 AM MICHELINE Singh M.D.  [address]  [address]  [facility] [address]  [phone]            MEDICATIONS ON DISCHARGE     Medication List      CHANGE how you take these medications      Instructions   citalopram 20 MG Tabs  What changed:  · when to take this  · additional instructions  Commonly known as:  CELEXA   TAKE ONE TABLET BY MOUTH DAILY -GENERICFOR CELEXA     ezetimibe 10 MG Tabs  What changed:  · when to take this  · additional instructions  Commonly known as:  ZETIA   Take 1 Tab by mouth every day. TAKE ONE TABLET BY MOUTH DAILY  Dose:  10 mg     liraglutide 18 MG/3ML Sopn injection  What changed:  when to take this  Commonly known as:  VICTOZA   Inject 0.3 mL as instructed every day.  Dose:  1.8 mg     omeprazole 20 MG delayed-release capsule  What changed:  · when to take this  · reasons to take this  · additional instructions  Commonly known as:  PRILOSEC   TAKE 1 CAPSULE BY MOUTH ONCE DAILY     tamsulosin 0.4 MG capsule  What changed:  · when to take this  · additional instructions  Commonly known as:  FLOMAX   Doctor's comments:  Need OV every 6 months for refills  Take 1 Cap by mouth every day. 12 hour after breakfast.  Dose:  0.4 mg        CONTINUE taking these medications      Instructions   allopurinol 300 MG Tabs  Commonly known as:  ZYLOPRIM   TAKE 1 TABLET BY MOUTH ONCE DAILY     aspirin 81 MG tablet   Take 81 mg by mouth every bedtime.  Dose:  81 mg     Blood Glucose Test Strips   Test strips order: Test strips for Freestyle Lite  meter. Sig: use four times a day and prn ssx high or low sugar #100 RF x 3     carvedilol 6.25 MG Tabs  Commonly known as:  COREG   Take 1 Tab by mouth 2 times a day,  with meals.  Dose:  6.25 mg     glipiZIDE 5 MG Tabs  Commonly known as:  GLUCOTROL   Take 2.5 mg by mouth 2 times a day.  Dose:  2.5 mg     Insulin Pen Needle 32 G x 4 mm   Use daily with victoza     JARDIANCE 25 MG Tabs  Generic drug:  Empagliflozin   Take  by mouth every day.     Lancets   1 Each by Does not apply route 3 times a day. Lancets for Freestyle Lite meter. Sig: use TID and prn ssx high or low sugar.  Dose:  1 Each     lisinopril 5 MG Tabs  Commonly known as:  PRINIVIL   Take 1 Tab by mouth every day.  Dose:  5 mg     metFORMIN 850 MG Tabs  Commonly known as:  GLUCOPHAGE   2 times a day.     rosuvastatin 10 MG Tabs  Commonly known as:  CRESTOR   Take 1 Tab by mouth every evening.  Dose:  10 mg            Allergies  Allergies   Allergen Reactions   • Byetta      urticaria   • Other Environmental      Grass and cats       DIET  Orders Placed This Encounter   Procedures   • Diet Order Regular     Standing Status:   Standing     Number of Occurrences:   1     Order Specific Question:   Diet:     Answer:   Regular [1]     Order Specific Question:   Miscellaneous modifications:     Answer:   No Decaf, No Caffeine(for test) [11]     Comments:   Patient to have no caffeine for 12 hours prior to exam (decaf, coffee, cola, tea, chocolate)       ACTIVITY  As tolerated.  Weight bearing as tolerated    CONSULTATIONS  I discussed his case with cardiology today and they will follow him closely.     PROCEDURES  None         Myocardial Perfusion   Report   NUCLEAR IMAGING INTERPRETATION   Dense anterior infarct from mid ventricle to apex.   Dense inferior infarction from mid ventricle to apex.   No evidence of ischemia.   SRS35, SDS0   Global hypokinesis with regional variability.   Severely reduced left ventricular systolic function, LVEF 10%     ECG INTERPRETATION   Negative stress ECG for ischemia.      Echocardiography Laboratory    CONCLUSIONS  Akinetic mid to distal anterospetal wall and apex.  Hypokinetic basal  inferoposterior wall.  Left ventricle is moderately dilated.  Moderately reduced left ventricular systolic function.  Left ventricular ejection fraction is visually estimated to be 35%.  Structurally normal mitral valve without significant stenosis or   regurgitation.  Aortic sclerosis without stenosis.  Moderately dilated left atrium.  Structurally normal tricuspid valve without significant stenosis or   regurgitation.  Unable to estimate pulmonary artery pressure due to an inadequate   tricuspid regurgitant jet.  Normal right ventricular size and systolic function.  Normal inferior vena cava size and inspiratory collapse.  No pericardial effusion seen.  Compared to prior echo done 09/12/16,  there has been no significant   change.       LABORATORY  Lab Results   Component Value Date    SODIUM 135 03/22/2019    POTASSIUM 3.8 03/22/2019    CHLORIDE 103 03/22/2019    CO2 24 03/22/2019    GLUCOSE 145 (H) 03/22/2019    BUN 17 03/22/2019    CREATININE 1.07 03/22/2019        Lab Results   Component Value Date    WBC 6.5 03/22/2019    HEMOGLOBIN 14.1 03/22/2019    HEMATOCRIT 44.2 03/22/2019    PLATELETCT 120 (L) 03/22/2019        Total time of the discharge process exceeds 36 minutes.

## 2019-03-22 NOTE — PROGRESS NOTES
Telemetry Strip     Strip printed: 0738  Measurements from am strip were as follows:  Rhythm:sr  HR: 87  Measurements: .20/.10/.34       Telemetry Shift Summary:    Rhythm: sr-st  HR: 80-100s  Ectopy:rpvc           Normal Values  Rhythm SR  HR Range    Measurements 0.12-0.20 / 0.06-0.10  / 0.30-0.52

## 2019-03-22 NOTE — ASSESSMENT & PLAN NOTE
-Blood pressure now 145/90, he has been hypotensive in the emergency department.  -Hold off on medications at this time and resume lisinopril and carvedilol in the morning if blood pressure is elevated.

## 2019-03-22 NOTE — ED NOTES
Patient states it feels like when he had his last MI  Charge nurse notified  No open beds at the moment  Patient taken to triage room for EKG

## 2019-03-22 NOTE — PROGRESS NOTES
Patient returned from stress test. Resumed care. Patient given food. Awaiting results for safe discharge.

## 2019-03-22 NOTE — H&P
"Hospital Medicine History & Physical Note    Date of Service  3/22/2019    Primary Care Physician  Oxana Parkinson M.D.    Code Status  Full Code    Chief Complaint  Diaphoresis    History of Presenting Illness  68 y.o. Male, history of CAD status post CABG x3 (6 years ago), who at that time had atypical presentation, simply \"not feeling well \", also with history of hypertension, hyperlipidemia and type 2 diabetes who presented to the ER on 3/21/2019 for evaluation of acute onset diaphoresis and also the sensation of severe fatigue, chills and not feeling well.  Patient states symptoms remind him a lot of what happened to him 6 years ago when having heart attack.  He denies having chest pain or shortness of breath.    Patient was tachycardic with a heart rate of 130 upon arrival to the emergency department.  He also had a temperature of 100.5.  ER workup was negative for acute findings.  No acute ST elevation and troponin is negative.  D-dimer was done and was negative as well.  Symptoms are much improved right now.    Review of Systems  Review of Systems   Constitutional: Positive for diaphoresis and malaise/fatigue. Negative for fever.   HENT: Negative for congestion and sore throat.    Eyes: Negative for blurred vision and double vision.   Respiratory: Negative for cough and shortness of breath.    Cardiovascular: Negative for chest pain and palpitations.   Gastrointestinal: Negative for nausea and vomiting.   Genitourinary: Negative for dysuria and urgency.   Musculoskeletal: Negative for myalgias and neck pain.   Skin: Negative for itching and rash.   Neurological: Positive for weakness. Negative for dizziness and headaches.   Endo/Heme/Allergies: Does not bruise/bleed easily.   Psychiatric/Behavioral: Negative for depression. The patient does not have insomnia.        Past Medical History   has a past medical history of Backpain; Bilateral renal cysts; Bronchitis; C. difficile diarrhea; CAD (coronary " artery disease) (2013); Chronic anticoagulation; DIABETES MELLITUS ( ); Heart burn; Hyperlipidemia ( ); Hypertension; Indigestion; Left ventricular apical thrombus (2013); Myocardial infarct (HCC) (3/7/2013); and Psychiatric problem. He also has no past medical history of Encounter for long-term (current) use of other medications or Fall.    Surgical History   has a past surgical history that includes other neurological surg (); other cardiac surgery (); cholecystectomy (); recovery (2016); multiple coronary artery bypass endo vein harvest (2016); and miller (2016).     Family History  family history includes Cancer in his mother and paternal uncle; Heart Disease in his brother; Hyperlipidemia in his brother; Hypertension in his brother and father.     Social History   reports that he has never smoked. He has never used smokeless tobacco. He reports that he does not drink alcohol or use drugs.    Allergies  Allergies   Allergen Reactions   • Byetta      urticaria   • Other Environmental      Grass and cats       Medications  Prior to Admission Medications   Prescriptions Last Dose Informant Patient Reported? Taking?   Blood Glucose Test Strips   No No   Sig: Test strips order: Test strips for Freestyle Lite  meter. Sig: use four times a day and prn ssx high or low sugar #100 RF x 3   Insulin Pen Needle 32G X 4 MM Misc   No No   Sig: Use daily with victoza   JARDIANCE 25 MG Tab   Yes No   Lancets   No No   Si Each by Does not apply route 3 times a day. Lancets for Freestyle Lite meter. Sig: use TID and prn ssx high or low sugar.   allopurinol (ZYLOPRIM) 300 MG Tab   No No   Sig: TAKE 1 TABLET BY MOUTH ONCE DAILY   aspirin 81 MG tablet  Patient Yes No   Sig: Take 81 mg by mouth every day.   carvedilol (COREG) 6.25 MG Tab   No No   Sig: Take 1 Tab by mouth 2 times a day, with meals.   citalopram (CELEXA) 20 MG Tab   No No   Sig: TAKE ONE TABLET BY MOUTH DAILY -GENERICFOR CELEXA    docusate sodium (COLACE) 100 MG Cap   Yes No   Sig: Take 100 mg by mouth 3 times a day as needed for Constipation. Indications: NOT TAKIN NOT  TAKING   ezetimibe (ZETIA) 10 MG Tab   No No   Sig: Take 1 Tab by mouth every day. TAKE ONE TABLET BY MOUTH DAILY   glipiZIDE (GLUCOTROL) 5 MG Tab   Yes No   liraglutide (VICTOZA) 18 MG/3ML Solution Pen-injector injection   No No   Sig: Inject 0.3 mL as instructed every day.   lisinopril (PRINIVIL) 5 MG Tab   No No   Sig: Take 1 Tab by mouth every day.   metFORMIN (GLUCOPHAGE) 850 MG Tab   Yes No   omeprazole (PRILOSEC) 20 MG delayed-release capsule   No No   Sig: TAKE 1 CAPSULE BY MOUTH ONCE DAILY   polyethylene glycol 3350 (MIRALAX) Powder   Yes No   Sig: Take 17 g by mouth 1 time daily as needed. Indications: not taking NOT taking   rosuvastatin (CRESTOR) 10 MG Tab   No No   Sig: Take 1 Tab by mouth every evening.   tamsulosin (FLOMAX) 0.4 MG capsule   No No   Sig: Take 1 Cap by mouth every day. 12 hour after breakfast.      Facility-Administered Medications: None       Physical Exam  Temp:  [37.4 °C (99.3 °F)-38.1 °C (100.5 °F)] 38.1 °C (100.5 °F)  Pulse:  [104-130] 116  Resp:  [18-23] 23  BP: (104-138)/(74-86) 138/86  SpO2:  [90 %-96 %] 96 %    Physical Exam   Constitutional: He is oriented to person, place, and time. He appears well-developed and well-nourished.   HENT:   Head: Normocephalic and atraumatic.   Eyes: Pupils are equal, round, and reactive to light. EOM are normal.   Neck: Normal range of motion. Neck supple.   Cardiovascular: Normal rate and regular rhythm.    Pulmonary/Chest: Effort normal and breath sounds normal.   Abdominal: Soft. Bowel sounds are normal.   Musculoskeletal: Normal range of motion. He exhibits no edema.   Neurological: He is alert and oriented to person, place, and time.   Skin: Skin is warm and dry.   Psychiatric: He has a normal mood and affect. His behavior is normal.       Laboratory:  Recent Labs      03/21/19   1840   WBC  7.8    RBC  6.14*   HEMOGLOBIN  15.2   HEMATOCRIT  48.3   MCV  78.7*   MCH  24.8*   MCHC  31.5*   RDW  45.2   PLATELETCT  164   MPV  10.3     Recent Labs      03/21/19   1840   SODIUM  135   POTASSIUM  4.1   CHLORIDE  104   CO2  24   GLUCOSE  162*   BUN  24*   CREATININE  1.16   CALCIUM  9.5     Recent Labs      03/21/19   1840   ALTSGPT  35   ASTSGOT  25   ALKPHOSPHAT  55   TBILIRUBIN  0.8   LIPASE  64*   GLUCOSE  162*                 Recent Labs      03/21/19   1840  03/21/19   2219   TROPONINI  <0.02  <0.02       Urinalysis:    Recent Labs      03/21/19   2310   SPECGRAVITY  1.010   GLUCOSEUR  >=1000*   KETONES  Negative   NITRITE  Negative   LEUKESTERAS  Negative        Imaging:  DX-CHEST-PORTABLE (1 VIEW)   Final Result      No acute cardiac or pulmonary abnormalities are identified.      NM-CARDIAC STRESS TEST    (Results Pending)         Assessment/Plan:  I anticipate this patient is appropriate for observation status at this time.    S/P CABG x 3- (present on admission)   Assessment & Plan    -As above.     Diabetes mellitus type 2, controlled, with complications (HCC)- (present on admission)   Assessment & Plan    -Hold metoprolol, Victoza, glipizide and her Diovan since start him on regular insulin sliding scale.     Essential hypertension- (present on admission)   Assessment & Plan    -Blood pressure now 145/90, he has been hypotensive in the emergency department.  -Hold off on medications at this time and resume lisinopril and carvedilol in the morning if blood pressure is elevated.     Diaphoresis   Assessment & Plan    -With underlying history of atypical MI.  Patient states similar symptoms in the past 6 years ago when had MI  -Observation for chest pain equivalent workup.  -Initial troponin is negative.  Will do serial cardiac enzymes every 4 hours.  -Patient states last stress test was maybe 2 years ago but the only one I can find in the system is the one from 2016.  -Add stress test in the  morning.  -Patient was very tachycardic upon arrival to the emergency room with a slight fever.  D-dimer was negative.  Etiology could be related to early URI/infectious.       Idiopathic gout- (present on admission)   Assessment & Plan    -On allopurinol.     Gastroesophageal reflux disease without esophagitis- (present on admission)   Assessment & Plan    -Stable with omeprazole.  Continue.         VTE prophylaxis: SCD's

## 2019-03-22 NOTE — ED NOTES
Called the lab to ask them to complete the troponin from the green top sent, a lactic was completed.

## 2019-03-22 NOTE — ED PROVIDER NOTES
"ED Provider Note    CHIEF COMPLAINT  Chief Complaint   Patient presents with   • Chest Pain     chest pain  weakness  MI with CABG in past        HPI    Primary care provider: Oxana Parkinson M.D.   History obtained from: Patient  History limited by: None     Luis Renner is a 68 y.o. male who presents to the ED with wife complaining of concern for possible MI.  Patient reports that he had a MI 6 years ago and a CABG in 2016 and never had chest pain.  He reports feeling tired and sleepy and noticed that he was cold and shaky around noon today with symptoms similar to when he had MI in the past.  He denies shortness of breath or difficulty breathing/nausea/vomiting/fever or recent illness.  He did feel slightly dizzy at times today without syncope.  He denies any pain except for low back pain with radiation into his hips and reports that he has history of \"bad back\" but states that his pain seems to be worse today.  He denies any recent injury or trauma.  Patient states that he is due to receive a spinal stimulator for his chronic back pain.  He denies diarrhea/constipation/dysuria or any other symptoms.    REVIEW OF SYSTEMS  Please see HPI for pertinent positives/negatives.  All other systems reviewed and are negative.     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   • CAD (coronary artery disease) March 2013    ACS. PCI/ANA PAULA x 2 of the LAD (2.5 x 12mm - mid; 2.75 x 16mm - proximal). Distal RCA is occluded; distal circumflex is occluded with good collateralization.   • Backpain     6-7/10   • Bilateral renal cysts    • Bronchitis    • C. difficile diarrhea     pt with hx of c diff after hospitalization [id number]   • Chronic anticoagulation    • DIABETES MELLITUS      oral diet and insulin   • Heart burn    • Hyperlipidemia     • " "Hypertension     well controlled on meds   • Indigestion    • Psychiatric problem     anxiety        SURGICAL HISTORY  Past Surgical History:   Procedure Laterality Date   • MULTIPLE CORONARY ARTERY BYPASS ENDO VEIN HARVEST  7/22/2016    Procedure: MULTIPLE CORONARY ARTERY BYPASS ENDO VEIN HARVEST;  Surgeon: David Flowers M.D.;  Location: SURGERY [facility];  Service:    • LILLY  7/22/2016    Procedure: LILLY;  Surgeon: David Flowers M.D.;  Location: SURGERY [facility];  Service:    • RECOVERY  7/21/2016    Procedure: CATH LAB [facility] WITH POSSIBLE DR. LYN;  Surgeon: Recoveryonly Surgery;  Location: SURGERY PRE-POST PROC UNIT [facility];  Service:    • OTHER NEUROLOGICAL SURG  2014    diskectomy   • OTHER CARDIAC SURGERY  2013    stent   • CHOLECYSTECTOMY  2004        SOCIAL HISTORY  Social History     Social History Main Topics   • Smoking status: Never Smoker   • Smokeless tobacco: Never Used   • Alcohol use No   • Drug use: No   • Sexual activity: Yes     Partners: Female        FAMILY HISTORY  Family History   Problem Relation Age of Onset   • Cancer Mother    • Hypertension Father    • Hyperlipidemia Brother         x 2   • Hypertension Brother         x2   • Heart Disease Brother         x2, afib; cad x 1   • Cancer Paternal Uncle    • Diabetes Neg Hx         CURRENT MEDICATIONS  Home Medications    **Home medications have not yet been reviewed for this encounter**          ALLERGIES  Allergies   Allergen Reactions   • Byetta      urticaria   • Other Environmental      Grass and cats        PHYSICAL EXAM  VITAL SIGNS: /86   Pulse (!) 116   Temp (!) 38.1 °C (100.5 °F) (Oral)   Resp (!) 23   Ht 1.803 m (5' 11\")   Wt 94 kg (207 lb 3.7 oz)   SpO2 96%   BMI 28.90 kg/m²  @JORDAN[[id number]::@     Pulse ox interpretation: 93% I interpret this pulse ox as normal     Cardiac monitor interpretation: Sinus rhythm with heart rate in the 110-120s as interpreted by me.  The patient presented with weakness " and tachycardia and cardiac monitor was ordered to monitor for dysrhythmia.    Constitutional: Well developed, well nourished, alert in no apparent distress, nontoxic appearance    HENT: No external signs of trauma, normocephalic, oropharynx moist and clear, nose normal    Eyes: PERRL, conjunctiva without erythema, no discharge, no icterus    Neck: Soft and supple, trachea midline, no stridor, no tenderness, no LAD, no JVD, good ROM    Cardiovascular: Tachycardia, no murmurs/rubs/gallops, strong distal pulses and good perfusion    Thorax & Lungs: No respiratory distress, CTAB   Abdomen: Soft, nontender, nondistended, no guarding, no rebound, normal BS   Rectal: Normal external exam without hemorrhoids. Good rectal tone.  No perianal sensory loss.  Back: No CVAT    Extremities: No cyanosis, no edema, no gross deformity, good ROM, no tenderness, intact distal pulses with brisk cap refill    Skin: Warm, dry, no pallor/cyanosis, no rash noted    Lymphatic: No lymphadenopathy noted    Neuro: A/O times 3, no focal deficits noted    Psychiatric: Cooperative, normal mood and affect, normal judgement, appropriate for clinical situation        DIAGNOSTIC STUDIES / PROCEDURES    EKG  12 Lead EKG obtained at 1819 and interpreted by me:   Rate: 116   Rhythm: Sinus tachycardia  Ectopy: None  Intervals: Normal   Axis: LAD  QRS: Late precordial R wave transition  ST segments: Minimal elevation in septal leads  T Waves: Normal    Clinical Impression: Sinus tachycardia with nonspecific ST changes      EKG  12 Lead EKG obtained at 2221 and interpreted by me:   Rate: 102   Rhythm: Sinus tachycardia  Ectopy: None  Intervals: Normal   Axis: LAD  QRS: Late precordial R wave transition  ST segments: Minimal elevation in septal leads  T Waves: Normal    Clinical Impression: Sinus tachycardia with nonspecific ST changes  Compared to earlier EKG without significant change           LABS  All labs reviewed by me.     Results for orders placed  or performed during the hospital encounter of 03/21/19   CBC WITH DIFFERENTIAL   Result Value Ref Range    WBC 7.8 4.8 - 10.8 K/uL    RBC 6.14 (H) 4.70 - 6.10 M/uL    Hemoglobin 15.2 14.0 - 18.0 g/dL    Hematocrit 48.3 42.0 - 52.0 %    MCV 78.7 (L) 81.4 - 97.8 fL    MCH 24.8 (L) 27.0 - 33.0 pg    MCHC 31.5 (L) 33.7 - 35.3 g/dL    RDW 45.2 35.9 - 50.0 fL    Platelet Count 164 164 - 446 K/uL    MPV 10.3 9.0 - 12.9 fL    Neutrophils-Polys 86.50 (H) 44.00 - 72.00 %    Lymphocytes 6.80 (L) 22.00 - 41.00 %    Monocytes 5.30 0.00 - 13.40 %    Eosinophils 0.40 0.00 - 6.90 %    Basophils 0.50 0.00 - 1.80 %    Immature Granulocytes 0.50 0.00 - 0.90 %    Nucleated RBC 0.00 /100 WBC    Neutrophils (Absolute) 6.74 1.82 - 7.42 K/uL    Lymphs (Absolute) 0.53 (L) 1.00 - 4.80 K/uL    Monos (Absolute) 0.41 0.00 - 0.85 K/uL    Eos (Absolute) 0.03 0.00 - 0.51 K/uL    Baso (Absolute) 0.04 0.00 - 0.12 K/uL    Immature Granulocytes (abs) 0.04 0.00 - 0.11 K/uL    NRBC (Absolute) 0.00 K/uL   COMP METABOLIC PANEL   Result Value Ref Range    Sodium 135 135 - 145 mmol/L    Potassium 4.1 3.6 - 5.5 mmol/L    Chloride 104 96 - 112 mmol/L    Co2 24 20 - 33 mmol/L    Anion Gap 7.0 0.0 - 11.9    Glucose 162 (H) 65 - 99 mg/dL    Bun 24 (H) 8 - 22 mg/dL    Creatinine 1.16 0.50 - 1.40 mg/dL    Calcium 9.5 8.4 - 10.2 mg/dL    AST(SGOT) 25 12 - 45 U/L    ALT(SGPT) 35 2 - 50 U/L    Alkaline Phosphatase 55 30 - 99 U/L    Total Bilirubin 0.8 0.1 - 1.5 mg/dL    Albumin 4.5 3.2 - 4.9 g/dL    Total Protein 7.3 6.0 - 8.2 g/dL    Globulin 2.8 1.9 - 3.5 g/dL    A-G Ratio 1.6 g/dL   LIPASE   Result Value Ref Range    Lipase 64 (H) 7 - 58 U/L   TROPONIN   Result Value Ref Range    Troponin I <0.02 0.00 - 0.04 ng/mL   LACTIC ACID   Result Value Ref Range    Lactic Acid 1.4 0.5 - 2.0 mmol/L   TSH   Result Value Ref Range    TSH 0.860 0.380 - 5.330 uIU/mL   Lactic acid (lactate)   Result Value Ref Range    Lactic Acid 1.6 0.5 - 2.0 mmol/L   URINALYSIS   Result Value  Ref Range    Color Yellow     Character Clear     Specific Gravity 1.010 <1.035    Ph 5.5 5.0 - 8.0    Glucose >=1000 (A) Negative mg/dL    Ketones Negative Negative mg/dL    Protein Negative Negative mg/dL    Bilirubin Negative Negative    Nitrite Negative Negative    Leukocyte Esterase Negative Negative    Occult Blood Negative Negative    Micro Urine Req see below    Influenza A/B By PCR (Adult - Flu Only)   Result Value Ref Range    Influenza virus A RNA Negative Negative    Influenza virus B, PCR Negative Negative   ESTIMATED GFR   Result Value Ref Range    GFR If African American >60 >60 mL/min/1.73 m 2    GFR If Non African American >60 >60 mL/min/1.73 m 2   TROPONIN   Result Value Ref Range    Troponin I <0.02 0.00 - 0.04 ng/mL   D-DIMER   Result Value Ref Range    D-Dimer Screen 0.46 0.00 - 0.50 ug/mL (FEU)   EKG   Result Value Ref Range    Report       [facility] Emergency Dept.    Test Date:  2019  Pt Name:    NIRMALA RODRIGUEZ                 Department: EDS  MRN:        [id number]                      Room:  Gender:     Male                         Technician: TOO  :        1950                   Requested By:ER TRIAGE PROTOCOL  Order #:    [id number]                    Reading MD:    Measurements  Intervals                                Axis  Rate:       116                          P:          44  MD:         180                          QRS:        -47  QRSD:       90                           T:          66  QT:         308  QTc:        428    Interpretive Statements  SINUS TACHYCARDIA  PROBABLE INFERIOR INFARCT, OLD  ANTERIOR INFARCT, AGE INDETERMINATE  Compared to ECG 2016 11:31:34  Sinus rhythm no longer present  Myocardial infarct finding still present     EKG (NOW)   Result Value Ref Range    Report       [facility] Emergency Dept.    Test Date:  2019  Pt Name:    NIRMALA RODRIGUEZ                 Department: EDSM  MRN:         [id number]                      Room:       -ROOM 10  Gender:     Male                         Technician: MARIS  :        1950                   Requested By:AN ARMENTA  Order #:    [id number]                    Reading MD:    Measurements  Intervals                                Axis  Rate:       102                          P:          45  KS:         193                          QRS:        -35  QRSD:       94                           T:          64  QT:         380  QTc:        496    Interpretive Statements  Sinus tachycardia  Left axis deviation  Anterior infarct, old  Baseline wander in lead(s) V1,V4  Compared to ECG 2019 18:19:59  Left-axis deviation now present  Myocardial infarct finding still present          RADIOLOGY  The radiologist's interpretation of all radiological studies have been reviewed by me.     DX-CHEST-PORTABLE (1 VIEW)   Final Result      No acute cardiac or pulmonary abnormalities are identified.      NM-CARDIAC STRESS TEST    (Results Pending)          COURSE & MEDICAL DECISION MAKING  Nursing notes, VS, PMSFHx reviewed in chart.     Review of past medical records shows the patient was last admitted at [facility] 2016 for elective CABG and discharged on 2016.      Differential diagnoses considered include but are not limited to: AMI, pneumonia, sepsis, viral syndrome       0035: D/W Dr. Watt, hospitalist, who will admit patient      History and physical exam as above.  Patient presented with nonspecific symptoms similar to past MI but was also noted to have tachycardia and triggered our sepsis protocol.  EKG showed sinus tachycardia with nonspecific ST changes.  Single view chest x-ray without evidence of acute abnormality.  Patient with negative troponin x2.  Lactic acid x2 with normal levels.  Labs were otherwise unrevealing.  Patient's tachycardia improved mildly with IV fluid.  I discussed the findings with the patient.  He is noted to be in no  acute distress and nontoxic in appearance.  Patient reports feeling slightly better.  Given his history, patient is agreeable to admission for monitoring.  Discussed with Dr. Luis who graciously agreed to admit patient for further care.      HYDRATION: Based on the patient's presentation of Tachycardia the patient was given IV fluids. IV Hydration was used because oral hydration was not as rapid as required. Upon recheck following hydration, the patient was mildly improved.        FINAL IMPRESSION  1. Sinus tachycardia Active   2. Fatigue, unspecified type Active   3. Fever, unspecified fever cause Active          DISPOSITION  Patient will be admitted by hospitalist for further care      Electronically signed by: Jacob Peña, 3/21/2019 6:34 PM      Portions of this record were made with voice recognition software.  Despite my review, spelling/grammar/context errors may still remain.  Interpretation of this chart should be taken in this context.

## 2019-03-22 NOTE — PROGRESS NOTES
Patients stress test came back abnormal. Cardiology recommends an echo then follow up. Patient irritable but understands. Patient is ambulating the hallways with wife.

## 2019-03-22 NOTE — ASSESSMENT & PLAN NOTE
-With underlying history of atypical MI.  Patient states similar symptoms in the past 6 years ago when had MI  -Observation for chest pain equivalent workup.  -Initial troponin is negative.  Will do serial cardiac enzymes every 4 hours.  -Patient states last stress test was maybe 2 years ago but the only one I can find in the system is the one from 2016.  -Add stress test in the morning.  -Patient was very tachycardic upon arrival to the emergency room with a slight fever.  D-dimer was negative.  Etiology could be related to early URI/infectious.

## 2019-03-22 NOTE — ED NOTES
"Pt roomed immediately. Pt c/o back pain in between his sternum as well as general weakness and \"lots of fatigue\". IV started and blood sent to lab.   "

## 2019-03-22 NOTE — ASSESSMENT & PLAN NOTE
-Hold metoprolol, Victoza, glipizide and her Diovan since start him on regular insulin sliding scale.

## 2019-03-22 NOTE — PROGRESS NOTES
Report received over phone from [place] ED RN. Patient to transfer to [facility], room 313-1.

## 2019-03-22 NOTE — PROGRESS NOTES
Patient given discharge information, verbalized understanding. Aware of follow up appointments. IV and tele discontinued. Patient ambulated to front lobby with wife.

## 2019-03-23 PROBLEM — I50.22 CHRONIC SYSTOLIC (CONGESTIVE) HEART FAILURE (HCC): Status: ACTIVE | Noted: 2019-03-23

## 2019-03-24 LAB
BACTERIA UR CULT: NORMAL
SIGNIFICANT IND 70042: NORMAL
SITE SITE: NORMAL
SOURCE SOURCE: NORMAL

## 2019-03-25 ENCOUNTER — OFFICE VISIT (OUTPATIENT)
Dept: MEDICAL GROUP | Facility: MEDICAL CENTER | Age: 69
End: 2019-03-25
Payer: MEDICARE

## 2019-03-25 VITALS
HEART RATE: 84 BPM | HEIGHT: 71 IN | OXYGEN SATURATION: 95 % | DIASTOLIC BLOOD PRESSURE: 78 MMHG | BODY MASS INDEX: 28.4 KG/M2 | TEMPERATURE: 97.6 F | WEIGHT: 202.82 LBS | SYSTOLIC BLOOD PRESSURE: 118 MMHG

## 2019-03-25 DIAGNOSIS — I25.83 CORONARY ARTERY DISEASE DUE TO LIPID RICH PLAQUE: ICD-10-CM

## 2019-03-25 DIAGNOSIS — Z95.1 S/P CABG X 3: ICD-10-CM

## 2019-03-25 DIAGNOSIS — I25.10 CORONARY ARTERY DISEASE DUE TO LIPID RICH PLAQUE: ICD-10-CM

## 2019-03-25 DIAGNOSIS — I25.5 ISCHEMIC CARDIOMYOPATHY: ICD-10-CM

## 2019-03-25 DIAGNOSIS — R61 DIAPHORESIS: ICD-10-CM

## 2019-03-25 DIAGNOSIS — Z09 HOSPITAL DISCHARGE FOLLOW-UP: ICD-10-CM

## 2019-03-25 DIAGNOSIS — I50.22 CHRONIC SYSTOLIC (CONGESTIVE) HEART FAILURE (HCC): ICD-10-CM

## 2019-03-25 DIAGNOSIS — I25.10 CORONARY ARTERY DISEASE INVOLVING NATIVE CORONARY ARTERY OF NATIVE HEART WITHOUT ANGINA PECTORIS: ICD-10-CM

## 2019-03-25 DIAGNOSIS — I10 ESSENTIAL HYPERTENSION: Chronic | ICD-10-CM

## 2019-03-25 DIAGNOSIS — D69.6 THROMBOCYTOPENIA (HCC): ICD-10-CM

## 2019-03-25 PROCEDURE — 99214 OFFICE O/P EST MOD 30 MIN: CPT | Performed by: INTERNAL MEDICINE

## 2019-03-25 RX ORDER — HYDROCODONE BITARTRATE AND ACETAMINOPHEN 5; 325 MG/1; MG/1
TABLET ORAL
COMMUNITY
Start: 2019-03-19 | End: 2019-07-15

## 2019-03-25 NOTE — PROGRESS NOTES
CHIEF COMPLAINT  Chief Complaint   Patient presents with   • Hospital Follow-up     HPI  Patient is a 68 y.o. male patient who presents today for the following     Hospital discharge follow-up, hypertension, DM 2, status post CABG, GERD, gout, diaphoresis  The patient was hospitalized from 3/21/2019 to 322/19, discharge summary was reviewed with the following diagnoses:  DISCHARGE DIAGNOSES  Active Problems:  Essential hypertension (Chronic) POA: Yes  Diabetes mellitus type 2, controlled, with complications (HCC) POA: Yes  S/P CABG x 3 POA: Yes  Overview: CABG x 3, 7/2016  Gastroesophageal reflux disease without esophagitis POA: Yes  Idiopathic gout POA: Yes  Diaphoresis POA: Unknown    Hospitalization course  The patient is is a 68 y.o. male hospitalized due to sudden onset of diaphoresis.   He has an extensive cardiac history with a history of a CABG in 2013 and diabetes  -He is first MI was silent, manifested only as fatigue  He was admitted and ruled out for a myocardial infarction and had a stress test as detailed below showing old infarctions and a markedly reduced ejection fraction.   This was followed by an echocardiogram given the results which is also noted below  - has a new reduction in his ejection fraction compared to his last echo FROM 50% TO 35% and new wall motion abnormalities suggesting previous silent myocardial infarction.   He has no evidence on stress test of reversible ischemia. His symptoms completely resolved and he had no other events while here. I discussed his situation with cardiology and I have set him up for close follow up.   He is maximized his cardiac medications already.   Hypertension and diabetes are controlled.    He has no evidence of acute congestive heart failure at the moment.      Reviewed labs from hospitalization, as below  Reviewed imaging from hospitalization as below.    Posthospitalization course  The patient has been asymptomatic, including fatigue or sweating.  He  was compliant with medications.    Reviewed PMH, PSH, FH, SH, ALL, HCM/IMM, no changes  Reviewed MEDS, no changes    Patient Active Problem List    Diagnosis Date Noted   • Ischemic cardiomyopathy 07/23/2013     Priority: High   • Coronary artery disease due to lipid rich plaque 03/08/2013     Priority: High   • S/P CABG x 3 04/04/2017     Priority: Medium   • Diabetes mellitus type 2, controlled, with complications ([facility]) 09/23/2016     Priority: Medium   • Essential hypertension 03/07/2013     Priority: Medium   • Idiopathic gout 07/27/2017     Priority: Low   • Gastroesophageal reflux disease without esophagitis 01/04/2017     Priority: Low   • Bilateral renal cysts 01/14/2015     Priority: Low   • Chronic systolic (congestive) heart failure ([facility]) 03/23/2019   • Diaphoresis 03/22/2019   • Microalbuminuria due to type 2 diabetes mellitus ([facility]) 12/13/2018   • Benign prostatic hyperplasia 10/18/2017   • Health care maintenance 07/27/2017   • Actinic keratosis 07/27/2017   • TEMO (generalized anxiety disorder) 04/04/2017     CURRENT MEDICATIONS  Current Outpatient Prescriptions   Medication Sig Dispense Refill   • HYDROcodone-acetaminophen (NORCO) 5-325 MG Tab per tablet      • Lancets 1 Each by Does not apply route 3 times a day. Lancets for Freestyle Lite meter. Sig: use TID and prn ssx high or low sugar. 100 Each 11   • Blood Glucose Test Strips Test strips order: Test strips for Freestyle Lite  meter. Sig: use four times a day and prn ssx high or low sugar #100 RF x 3 300 Each 11   • tamsulosin (FLOMAX) 0.4 MG capsule Take 1 Cap by mouth every day. 12 hour after breakfast. (Patient taking differently: Take 0.4 mg by mouth every evening. 12 hour after breakfast.) 90 Cap 1   • liraglutide (VICTOZA) 18 MG/3ML Solution Pen-injector injection Inject 0.3 mL as instructed every day. (Patient taking differently: Inject 1.8 mg as instructed every bedtime.) 6 PEN 2   • citalopram (CELEXA) 20 MG Tab TAKE ONE TABLET BY MOUTH  DAILY -GENERICFOR CELEXA (Patient taking differently: every bedtime. TAKE ONE TABLET BY MOUTH DAILY -GENERICFOR CELEXA) 90 Tab 1   • allopurinol (ZYLOPRIM) 300 MG Tab TAKE 1 TABLET BY MOUTH ONCE DAILY 90 Tab 3   • ezetimibe (ZETIA) 10 MG Tab Take 1 Tab by mouth every day. TAKE ONE TABLET BY MOUTH DAILY (Patient taking differently: Take 10 mg by mouth every bedtime. TAKE ONE TABLET BY MOUTH AT BEDTIME) 90 Tab 3   • omeprazole (PRILOSEC) 20 MG delayed-release capsule TAKE 1 CAPSULE BY MOUTH ONCE DAILY (Patient taking differently: 1 time daily as needed. TAKE 1 CAPSULE BY MOUTH ONCE DAILY) 90 Cap 3   • rosuvastatin (CRESTOR) 10 MG Tab Take 1 Tab by mouth every evening. 90 Tab 3   • carvedilol (COREG) 6.25 MG Tab Take 1 Tab by mouth 2 times a day, with meals. 180 Tab 2   • lisinopril (PRINIVIL) 5 MG Tab Take 1 Tab by mouth every day. 90 Tab 3   • Insulin Pen Needle 32G X 4 MM Misc Use daily with victoza 100 Each 3   • JARDIANCE 25 MG Tab Take  by mouth every day.     • glipiZIDE (GLUCOTROL) 5 MG Tab Take 2.5 mg by mouth 2 times a day.     • metFORMIN (GLUCOPHAGE) 850 MG Tab 2 times a day.     • aspirin 81 MG tablet Take 81 mg by mouth every bedtime.       No current facility-administered medications for this visit.      ALLERGIES  Allergies: Byetta and Other environmental  PAST MEDICAL HISTORY  Past Medical History:   Diagnosis Date   • Myocardial infarct (HCC) 3/7/2013   • Left ventricular apical thrombus March 2013    Inferoapical clot measuring 1.1cm x 0.8cm, started on anticoagulation.   • CAD (coronary artery disease) March 2013    ACS. PCI/ANA PAULA x 2 of the LAD (2.5 x 12mm - mid; 2.75 x 16mm - proximal). Distal RCA is occluded; distal circumflex is occluded with good collateralization.   • Backpain     6-7/10   • Bilateral renal cysts    • Bronchitis    • C. difficile diarrhea     pt with hx of c diff after hospitalization [id number]   • Chronic anticoagulation    • DIABETES MELLITUS      oral diet and insulin   • Heart  "burn    • Hyperlipidemia     • Hypertension     well controlled on meds   • Indigestion    • Psychiatric problem     anxiety     SURGICAL HISTORY  He  has a past surgical history that includes other neurological surg (); other cardiac surgery (); cholecystectomy (); recovery (2016); multiple coronary artery bypass endo vein harvest (2016); and miller (2016).  SOCIAL HISTORY  Social History   Substance Use Topics   • Smoking status: Never Smoker   • Smokeless tobacco: Never Used   • Alcohol use No     Social History     Social History Narrative   • No narrative on file     FAMILY HISTORY  Family History   Problem Relation Age of Onset   • Cancer Mother    • Hypertension Father    • Hyperlipidemia Brother         x 2   • Hypertension Brother         x2   • Heart Disease Brother         x2, afib; cad x 1   • Cancer Paternal Uncle    • Diabetes Neg Hx      Family Status   Relation Status   • Mo  at age 79         colon ca   • Fa  at age 87   • Bro (Not Specified)   • Bro (Not Specified)   • Bro (Not Specified)   • PUnc (Not Specified)   • Neg Hx (Not Specified)     ROS   Constitutional: Negative for fever, chills. And per HPI.  HENT: Negative for congestion, sore throat.  Eyes: Negative for blurred vision.   Respiratory: Negative for cough, shortness of breath.  Cardiovascular: Negative for chest pain, palpitations. And per HPI.  Gastrointestinal: Negative for heartburn, nausea, abdominal pain.   Musculoskeletal: Negative for back pain pain.   Skin: Negative for rash.   Neuro: Negative for dizziness, lightheadedness headaches.   Endo/Heme/Allergies: Does not bruise/bleed easily.   Psychiatric/Behavioral: Negative for depression.    PHYSICAL EXAM   Blood pressure 118/78, pulse 84, temperature 36.4 °C (97.6 °F), temperature source Temporal, height 1.803 m (5' 11\"), weight 92 kg (202 lb 13.2 oz), SpO2 95 %. Body mass index is 28.29 kg/m².  General:  NAD, well appearing  HEENT:   " NC/AT, PERRLA, EOMI, TMs are clear. Oropharyngeal mucosa is pink,  without lesions;  no cervical / supraclavicular  lymphadenopathy, no thyromegaly.    Cardiovascular: RRR.   No m/r/g. No carotid bruits .      Lungs:   CTAB, no w/r/r, no respiratory distress.  Abdomen: Soft, NT/ND + BS; no hepatosplenomegaly.  Extremities:  2+ DP and radial pulses bilaterally.  No c/c/e.   Skin:  Warm, dry.  No erythema. No rash.   Neurologic: Alert & oriented x 3. CN II-XII grossly intact.   No focal deficits.  Psychiatric:  Affect normal, mood normal, judgment normal.    LABS     Labs are reviewed and discussed with a patient     Ref. Range 3/21/2019 3/21/2019   Troponin I 0.00 - 0.04 ng/mL <0.02 <0.02     Lab Results   Component Value Date/Time    CHOLSTRLTOT 115 11/13/2018 08:45 AM    LDL 44 11/13/2018 08:45 AM    HDL 45 11/13/2018 08:45 AM    TRIGLYCERIDE 132 11/13/2018 08:45 AM       Lab Results   Component Value Date/Time    SODIUM 135 03/22/2019 04:04 AM    POTASSIUM 3.8 03/22/2019 04:04 AM    CHLORIDE 103 03/22/2019 04:04 AM    CO2 24 03/22/2019 04:04 AM    GLUCOSE 145 (H) 03/22/2019 04:04 AM    BUN 17 03/22/2019 04:04 AM    CREATININE 1.07 03/22/2019 04:04 AM     Lab Results   Component Value Date/Time    ALKPHOSPHAT 55 03/21/2019 06:40 PM    ASTSGOT 25 03/21/2019 06:40 PM    ALTSGPT 35 03/21/2019 06:40 PM    TBILIRUBIN 0.8 03/21/2019 06:40 PM      Lab Results   Component Value Date/Time    HBA1C 7.9 (H) 11/13/2018 08:45 AM    HBA1C 7.2 (H) 05/21/2018 02:07 PM    HBA1C 6.5 (H) 02/27/2018 09:55 AM     No results found for: TSH  Lab Results   Component Value Date/Time    FREET4 0.82 03/08/2013 01:00 AM     Lab Results   Component Value Date/Time    WBC 6.5 03/22/2019 04:04 AM    RBC 5.64 03/22/2019 04:04 AM    HEMOGLOBIN 14.1 03/22/2019 04:04 AM    HEMATOCRIT 44.2 03/22/2019 04:04 AM    MCV 78.4 (L) 03/22/2019 04:04 AM    MCH 25.0 (L) 03/22/2019 04:04 AM    MCHC 31.9 (L) 03/22/2019 04:04 AM    MPV 10.7 03/22/2019 04:04 AM     NEUTSPOLYS 83.50 (H) 03/22/2019 04:04 AM    LYMPHOCYTES 9.80 (L) 03/22/2019 04:04 AM    MONOCYTES 5.90 03/22/2019 04:04 AM    EOSINOPHILS 0.20 03/22/2019 04:04 AM    BASOPHILS 0.30 03/22/2019 04:04 AM    HYPOCHROMIA 1+ 09/16/2013 01:13 PM      IMAGING     Reviewed and discussed following imaging from hospitalization:    Chest x-ray:  No acute cardiac or pulmonary abnormalities are identified.    Echocardiography:  Akinetic mid to distal anterospetal wall and apex.  Hypokinetic basal inferoposterior wall.  Left ventricle is moderately dilated.  Moderately reduced left ventricular systolic function.  Left ventricular ejection fraction is visually estimated to be 35%.  Structurally normal mitral valve without significant stenosis or   regurgitation.  Aortic sclerosis without stenosis.  Moderately dilated left atrium.  Structurally normal tricuspid valve without significant stenosis or   regurgitation.  Unable to estimate pulmonary artery pressure due to an inadequate   tricuspid regurgitant jet.  Normal right ventricular size and systolic function.  Normal inferior vena cava size and inspiratory collapse.  No pericardial effusion seen.  Compared to prior echo done 09/12/16,  there has been no significant   change.     NM stress test, 3/22/19  NUCLEAR IMAGING INTERPRETATION   Dense anterior infarct from mid ventricle to apex.   Dense inferior infarction from mid ventricle to apex.   No evidence of ischemia.   SRS35, SDS0   Global hypokinesis with regional variability.   Severely reduced left ventricular systolic function, LVEF 10%    ASSESMENT AND PLAN        1. Hospital discharge follow-up  2. Chronic systolic (congestive) heart failure (HCC)  3. Coronary artery disease involving native coronary artery of native heart without angina pectoris  4. Diaphoresis  5. Essential hypertension  6. S/P CABG x 3  7. Ischemic cardiomyopathy  8. Coronary artery disease due to lipid rich plaque  This hospitalization showed that  patient had another silent MI.  He will continue current treatment and cardiology follow-up    9. Thrombocytopenia (HCC)  Stable, follow-up labs  - CBC WITH DIFFERENTIAL; Future    Counseling:   - Smoking:  Nonsmoker    Followup: Return if symptoms worsen or fail to improve.    All questions are answered.    Please note that this dictation was created using voice recognition software, and that there might be errors of mary and possibly content.

## 2019-03-26 LAB
BACTERIA BLD CULT: NORMAL
BACTERIA BLD CULT: NORMAL
SIGNIFICANT IND 70042: NORMAL
SIGNIFICANT IND 70042: NORMAL
SITE SITE: NORMAL
SITE SITE: NORMAL
SOURCE SOURCE: NORMAL
SOURCE SOURCE: NORMAL

## 2019-04-01 ENCOUNTER — PATIENT OUTREACH (OUTPATIENT)
Dept: HEALTH INFORMATION MANAGEMENT | Facility: OTHER | Age: 69
End: 2019-04-01

## 2019-04-02 ENCOUNTER — PATIENT OUTREACH (OUTPATIENT)
Dept: HEALTH INFORMATION MANAGEMENT | Facility: OTHER | Age: 69
End: 2019-04-02

## 2019-04-02 DIAGNOSIS — I50.9 CHRONIC CONGESTIVE HEART FAILURE, UNSPECIFIED HEART FAILURE TYPE (HCC): ICD-10-CM

## 2019-04-02 NOTE — PROGRESS NOTES
Situation    RN outreach call to discuss enrollment in the Community Care Management program.   Background       Orlando has a history of HTN, CABG in 2017, DM, and CHF.  He was recently hospitalized with CP.  Last HgA1C was 7.9 in November 2018.   Assessment    Introduced self and CCM program.  Orlando would like to enroll in the program next week after seeing his cardiologist this Friday.   Recommendation Referral placed for RN Care Coordination, asking for pharmacy referral also after he sees cardiology

## 2019-04-05 ENCOUNTER — PATIENT OUTREACH (OUTPATIENT)
Dept: HEALTH INFORMATION MANAGEMENT | Facility: OTHER | Age: 69
End: 2019-04-05

## 2019-04-05 ENCOUNTER — OFFICE VISIT (OUTPATIENT)
Dept: CARDIOLOGY | Facility: MEDICAL CENTER | Age: 69
End: 2019-04-05
Payer: MEDICARE

## 2019-04-05 VITALS
DIASTOLIC BLOOD PRESSURE: 68 MMHG | SYSTOLIC BLOOD PRESSURE: 128 MMHG | OXYGEN SATURATION: 95 % | HEART RATE: 80 BPM | BODY MASS INDEX: 28.42 KG/M2 | WEIGHT: 203 LBS | HEIGHT: 71 IN

## 2019-04-05 DIAGNOSIS — Z95.1 S/P CABG X 3: ICD-10-CM

## 2019-04-05 DIAGNOSIS — I25.5 ISCHEMIC CARDIOMYOPATHY: ICD-10-CM

## 2019-04-05 DIAGNOSIS — Z76.0 MEDICATION REFILL: ICD-10-CM

## 2019-04-05 DIAGNOSIS — I10 ESSENTIAL HYPERTENSION: Chronic | ICD-10-CM

## 2019-04-05 DIAGNOSIS — E78.5 DYSLIPIDEMIA: ICD-10-CM

## 2019-04-05 DIAGNOSIS — E11.8 CONTROLLED TYPE 2 DIABETES MELLITUS WITH COMPLICATION, WITHOUT LONG-TERM CURRENT USE OF INSULIN (HCC): ICD-10-CM

## 2019-04-05 DIAGNOSIS — I25.83 CORONARY ARTERY DISEASE DUE TO LIPID RICH PLAQUE: ICD-10-CM

## 2019-04-05 DIAGNOSIS — K21.9 GASTROESOPHAGEAL REFLUX DISEASE WITHOUT ESOPHAGITIS: ICD-10-CM

## 2019-04-05 DIAGNOSIS — I25.10 CORONARY ARTERY DISEASE DUE TO LIPID RICH PLAQUE: ICD-10-CM

## 2019-04-05 PROBLEM — R61 DIAPHORESIS: Status: RESOLVED | Noted: 2019-03-22 | Resolved: 2019-04-05

## 2019-04-05 PROBLEM — I50.22 CHRONIC SYSTOLIC (CONGESTIVE) HEART FAILURE (HCC): Status: RESOLVED | Noted: 2019-03-23 | Resolved: 2019-04-05

## 2019-04-05 PROBLEM — Z00.00 HEALTH CARE MAINTENANCE: Status: RESOLVED | Noted: 2017-07-27 | Resolved: 2019-04-05

## 2019-04-05 PROBLEM — E11.29 MICROALBUMINURIA DUE TO TYPE 2 DIABETES MELLITUS (HCC): Status: RESOLVED | Noted: 2018-12-13 | Resolved: 2019-04-05

## 2019-04-05 PROBLEM — R80.9 MICROALBUMINURIA DUE TO TYPE 2 DIABETES MELLITUS (HCC): Status: RESOLVED | Noted: 2018-12-13 | Resolved: 2019-04-05

## 2019-04-05 PROCEDURE — 99214 OFFICE O/P EST MOD 30 MIN: CPT | Performed by: NURSE PRACTITIONER

## 2019-04-05 RX ORDER — CARVEDILOL 12.5 MG/1
12.5 TABLET ORAL 2 TIMES DAILY WITH MEALS
Qty: 180 TAB | Refills: 3 | Status: SHIPPED | OUTPATIENT
Start: 2019-04-05 | End: 2019-05-01 | Stop reason: SDUPTHER

## 2019-04-05 RX ORDER — TAMSULOSIN HYDROCHLORIDE 0.4 MG/1
0.4 CAPSULE ORAL EVERY EVENING
COMMUNITY
Start: 2019-04-05 | End: 2019-08-21 | Stop reason: SDUPTHER

## 2019-04-05 RX ORDER — CITALOPRAM 20 MG/1
20 TABLET ORAL
Qty: 30 TAB | Refills: 11 | COMMUNITY
Start: 2019-04-05 | End: 2019-10-10

## 2019-04-05 RX ORDER — OMEPRAZOLE 20 MG/1
20 CAPSULE, DELAYED RELEASE ORAL
COMMUNITY
Start: 2019-04-05 | End: 2020-04-30

## 2019-04-05 RX ORDER — EZETIMIBE 10 MG/1
10 TABLET ORAL
Qty: 90 TAB | Refills: 3 | COMMUNITY
Start: 2019-04-05 | End: 2019-12-17

## 2019-04-05 ASSESSMENT — ENCOUNTER SYMPTOMS
MYALGIAS: 0
DIZZINESS: 0
FEVER: 0
ABDOMINAL PAIN: 0
COUGH: 0
SHORTNESS OF BREATH: 0
PALPITATIONS: 0
CLAUDICATION: 0
PND: 0
ORTHOPNEA: 0

## 2019-04-05 NOTE — PROGRESS NOTES
"Referral received. \"Medicare ACO, referred by Araceli Zuñiga CC RN. Needs CC services for history of HTN, CABG in 2017, DM, and CHF. He was recently hospitalized with CP. Last HgA1C was 7.9 in November 2018. Would like to be enrolled in CCM program next week. Seeing cardiology on 4/5, please place pharmacy consult after cardiology appointment also.\"  Chart review completed. Outreach scheduled.  "

## 2019-04-05 NOTE — LETTER
[facility]   [address]  [place] [address]  Phone: [phone]  Fax: [phone]              Luis Renner  1950    Encounter Date: 4/5/2019    OLRENA Rothman          PROGRESS NOTE:  Chief Complaint   Patient presents with   • Coronary Artery Disease     Subjective:   Luis Renner is a 68 y.o. male who presents today for hospital follow up for atypical symptoms of aches and fatigue.    He is a patient of Dr. Benoit in our office.    Hx of CAD and CABG X3 in '16, HTN, HLD, DM, and now with new onset of rEF of 35% on echocardiogram.    He is not experiencing anymore symptoms. He did a cardiac work up in the hospital with his atypical symptoms and some mild chest pain that has resolved.    He had a normal nuclear imaging showing old infarct but reduced EF therefore had an echocardiogram done which concluded with new systolic function decrease of 35%. He is asymptomatic and euvolemic on exam.    He is very active. Bikes, hikes, and goes to the gym with no symptoms.    Noted this exam, his brother is diagnosed with Cardiac Amyloidosis and is on hospice for heart failure now.    Past Medical History:   Diagnosis Date   • Backpain     6-7/10   • Bilateral renal cysts    • Bronchitis    • C. difficile diarrhea     pt with hx of c diff after hospitalization [id number]   • CAD (coronary artery disease) March 2013    ACS. PCI/ANA PAULA x 2 of the LAD (2.5 x 12mm - mid; 2.75 x 16mm - proximal). Distal RCA is occluded; distal circumflex is occluded with good collateralization.   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   • Myocardial infarct (HCC) 3/7/2013   • Psychiatric problem     anxiety          Past Surgical History:   Procedure Laterality Date   • MULTIPLE CORONARY ARTERY BYPASS ENDO VEIN HARVEST  7/22/2016    Procedure: MULTIPLE CORONARY ARTERY BYPASS ENDO VEIN HARVEST;  Surgeon: David Flowers M.D.;  Location: SURGERY [facility];  Service:    • LILLY  7/22/2016    Procedure: LILLY;  Surgeon: David Flowers M.D.;  Location: SURGERY [facility];  Service:    • RECOVERY  7/21/2016    Procedure: CATH LAB [facility] WITH POSSIBLE DR. LYN;  Surgeon: Recoveryonly Surgery;  [place] [facility] [place]  Service:    • OTHER NEUROLOGICAL SURG  2014    diskectomy   • OTHER CARDIAC SURGERY  2013    stent   • CHOLECYSTECTOMY  2004     Family History   Problem Relation Age of Onset   • Cancer Mother    • Hypertension Father    • Hyperlipidemia Brother         x 2   • Hypertension Brother         x2   • Heart Disease Brother         x2, afib; cad x 1   • Cancer Paternal Uncle    • Diabetes Neg Hx      Social History     Social History   • Marital status:      Spouse name: N/A   • Number of children: N/A   • Years of education: N/A     Occupational History   • Not on file.     Social History Main Topics   • Smoking status: Never Smoker   • Smokeless tobacco: Never Used   • Alcohol use No   • Drug use: No   • Sexual activity: Yes     Partners: Female     Other Topics Concern   • Not on file     Social History Narrative   • No narrative on file     Allergies   Allergen Reactions   • Byetta      urticaria   • Other Environmental      Grass and cats     Outpatient Encounter Prescriptions as of 4/5/2019   Medication Sig Dispense Refill   • omeprazole (PRILOSEC) 20 MG delayed-release capsule Take 1 Cap by mouth 1 time daily as needed. TAKE 1 CAPSULE BY MOUTH ONCE DAILY     • carvedilol (COREG) 12.5 MG Tab Take 1 Tab by mouth 2 times a day, with meals. 180 Tab 3   • tamsulosin (FLOMAX) 0.4 MG capsule Take 1 Cap by mouth every evening.     • citalopram (CELEXA) 20 MG Tab Take 1 Tab by mouth  every 48 hours. 30 Tab 11   • ezetimibe (ZETIA) 10 MG Tab Take 1 Tab by mouth every bedtime. 90 Tab 3   • liraglutide (VICTOZA) 18 MG/3ML Solution Pen-injector injection Inject 0.3 mL as instructed every bedtime.     • HYDROcodone-acetaminophen (NORCO) 5-325 MG Tab per tablet      • Lancets 1 Each by Does not apply route 3 times a day. Lancets for Freestyle Lite meter. Sig: use TID and prn ssx high or low sugar. 100 Each 11   • Blood Glucose Test Strips Test strips order: Test strips for Freestyle Lite  meter. Sig: use four times a day and prn ssx high or low sugar #100 RF x 3 300 Each 11   • allopurinol (ZYLOPRIM) 300 MG Tab TAKE 1 TABLET BY MOUTH ONCE DAILY 90 Tab 3   • rosuvastatin (CRESTOR) 10 MG Tab Take 1 Tab by mouth every evening. 90 Tab 3   • lisinopril (PRINIVIL) 5 MG Tab Take 1 Tab by mouth every day. 90 Tab 3   • Insulin Pen Needle 32G X 4 MM Misc Use daily with victoza 100 Each 3   • JARDIANCE 25 MG Tab Take  by mouth every day.     • glipiZIDE (GLUCOTROL) 5 MG Tab Take 2.5 mg by mouth 2 times a day.     • metFORMIN (GLUCOPHAGE) 850 MG Tab 2 times a day.     • aspirin 81 MG tablet Take 81 mg by mouth every bedtime.     • [DISCONTINUED] tamsulosin (FLOMAX) 0.4 MG capsule Take 1 Cap by mouth every day. 12 hour after breakfast. (Patient taking differently: Take 0.4 mg by mouth every evening. 12 hour after breakfast.) 90 Cap 1   • [DISCONTINUED] liraglutide (VICTOZA) 18 MG/3ML Solution Pen-injector injection Inject 0.3 mL as instructed every day. (Patient taking differently: Inject 1.8 mg as instructed every bedtime.) 6 PEN 2   • [DISCONTINUED] citalopram (CELEXA) 20 MG Tab TAKE ONE TABLET BY MOUTH DAILY -GENERICFOR CELEXA (Patient taking differently: every bedtime. TAKE ONE TABLET BY MOUTH DAILY -GENERICFOR CELEXA) 90 Tab 1   • [DISCONTINUED] ezetimibe (ZETIA) 10 MG Tab Take 1 Tab by mouth every day. TAKE ONE TABLET BY MOUTH DAILY (Patient taking differently: Take 10 mg by mouth every bedtime. TAKE ONE  "TABLET BY MOUTH AT BEDTIME) 90 Tab 3   • [DISCONTINUED] omeprazole (PRILOSEC) 20 MG delayed-release capsule TAKE 1 CAPSULE BY MOUTH ONCE DAILY (Patient taking differently: 1 time daily as needed. TAKE 1 CAPSULE BY MOUTH ONCE DAILY) 90 Cap 3   • [DISCONTINUED] carvedilol (COREG) 6.25 MG Tab Take 1 Tab by mouth 2 times a day, with meals. 180 Tab 2     No facility-administered encounter medications on file as of 4/5/2019.      Review of Systems   Constitutional: Negative for fever and malaise/fatigue.   Respiratory: Negative for cough and shortness of breath.    Cardiovascular: Negative for chest pain, palpitations, orthopnea, claudication, leg swelling and PND.   Gastrointestinal: Negative for abdominal pain.   Musculoskeletal: Negative for myalgias.   Neurological: Negative for dizziness.        Objective:   /68 (BP Location: Left arm, Patient Position: Sitting, BP Cuff Size: Adult)   Pulse 80   Ht 1.803 m (5' 11\")   Wt 92.1 kg (203 lb)   SpO2 95%   BMI 28.31 kg/m²      Physical Exam   Constitutional: He appears well-developed and well-nourished.   HENT:   Head: Normocephalic and atraumatic.   Eyes: EOM are normal.   Neck: No JVD present.   Cardiovascular: Normal rate, regular rhythm, normal heart sounds and intact distal pulses.    Pulmonary/Chest: Effort normal and breath sounds normal.   Musculoskeletal: Normal range of motion. He exhibits no edema.   Skin: Skin is warm and dry.   Psychiatric: He has a normal mood and affect.   Nursing note and vitals reviewed.      Assessment:     1. Coronary artery disease due to lipid rich plaque     2. Controlled type 2 diabetes mellitus with complication, without long-term current use of insulin (HCC)  liraglutide (VICTOZA) 18 MG/3ML Solution Pen-injector injection   3. Essential hypertension     4. Ischemic cardiomyopathy     5. S/P CABG x 3  carvedilol (COREG) 12.5 MG Tab   6. Gastroesophageal reflux disease without esophagitis  omeprazole (PRILOSEC) 20 MG " delayed-release capsule   7. Medication refill  citalopram (CELEXA) 20 MG Tab   8. Dyslipidemia  ezetimibe (ZETIA) 10 MG Tab    LIPID PANEL    Comp Metabolic Panel     Medical Decision Making:  Today's Assessment / Status / Plan:     1. CAD with prior CABG  -no angina or RANGEL  -normal stress imaging in hospital, consider [facility] with new onset of rEF of 35% in the setting of coronary disease  -cont asa, coreg, and crestor  -will discuss with Dr. Benoit upon return from vacation as patient is asymptomatic at this time    2. New rEF of 35%, presumable ischemic  -euvolemic on exam, no heart failure during admission  subsequent finding on echocardiogram  -recommend repeat [facility] for eval, will discuss with Dr. Benoit next week  -call for worsening symptoms  -up titrate bb therapy to 12.5 mg BID, cont lisinopril 5 mg    3. HTN  -good control on coreg and lisinopril  -up titrate coreg to 12.5 mg BID  -follow BP/HR at home    4. HLD  -statin  -LDL goal <70 with CAD  -yearly cmp and lipid     5. DM  -managed by endocrinology    FU in clinic in 3 months with CW; call next week with Dr. Benoit's review to determine plan of care    Patient verbalizes understanding and agrees with the plan of care.     Collaborating MD: Lester IBARRA        No Recipients

## 2019-04-06 NOTE — PROGRESS NOTES
Chief Complaint   Patient presents with   • Coronary Artery Disease     Subjective:   Luis Renner is a 68 y.o. male who presents today for hospital follow up for atypical symptoms of aches and fatigue.    He is a patient of Dr. Benoit in our office.    Hx of CAD and CABG X3 in '16, HTN, HLD, DM, and now with new onset of rEF of 35% on echocardiogram.    He is not experiencing anymore symptoms. He did a cardiac work up in the hospital with his atypical symptoms and some mild chest pain that has resolved.    He had a normal nuclear imaging showing old infarct but reduced EF therefore had an echocardiogram done which concluded with new systolic function decrease of 35%. He is asymptomatic and euvolemic on exam.    He is very active. Bikes, hikes, and goes to the gym with no symptoms.    Noted this exam, his brother is diagnosed with Cardiac Amyloidosis and is on hospice for heart failure now.    Past Medical History:   Diagnosis Date   • Backpain     6-7/10   • Bilateral renal cysts    • Bronchitis    • C. difficile diarrhea     pt with hx of c diff after hospitalization [id number]   • CAD (coronary artery disease) March 2013    ACS. PCI/ANA PAULA x 2 of the LAD (2.5 x 12mm - mid; 2.75 x 16mm - proximal). Distal RCA is occluded; distal circumflex is occluded with good collateralization.   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   • Myocardial infarct (HCC) 3/7/2013   • Psychiatric problem     anxiety     Past Surgical History:   Procedure Laterality Date   • MULTIPLE CORONARY ARTERY BYPASS ENDO VEIN HARVEST  7/22/2016    Procedure: MULTIPLE CORONARY ARTERY BYPASS ENDO VEIN HARVEST;  Surgeon: David Flowers M.D.;  Location: SURGERY [facility];  Service:    • LILLY   7/22/2016    Procedure: LILLY;  Surgeon: David Flowers M.D.;  Location: SURGERY [facility];  Service:    • RECOVERY  7/21/2016    Procedure: CATH LAB [facility] WITH POSSIBLE DR. LYN;  Surgeon: Recoveryonly Surgery;  Location: SURGERY PRE-POST PROC UNIT [facility];  Service:    • OTHER NEUROLOGICAL SURG  2014    diskectomy   • OTHER CARDIAC SURGERY  2013    stent   • CHOLECYSTECTOMY  2004     Family History   Problem Relation Age of Onset   • Cancer Mother    • Hypertension Father    • Hyperlipidemia Brother         x 2   • Hypertension Brother         x2   • Heart Disease Brother         x2, afib; cad x 1   • Cancer Paternal Uncle    • Diabetes Neg Hx      Social History     Social History   • Marital status:      Spouse name: N/A   • Number of children: N/A   • Years of education: N/A     Occupational History   • Not on file.     Social History Main Topics   • Smoking status: Never Smoker   • Smokeless tobacco: Never Used   • Alcohol use No   • Drug use: No   • Sexual activity: Yes     Partners: Female     Other Topics Concern   • Not on file     Social History Narrative   • No narrative on file     Allergies   Allergen Reactions   • [place]etta      urticaria   • Other Environmental      Grass and cats     Outpatient Encounter Prescriptions as of 4/5/2019   Medication Sig Dispense Refill   • omeprazole (PRILOSEC) 20 MG delayed-release capsule Take 1 Cap by mouth 1 time daily as needed. TAKE 1 CAPSULE BY MOUTH ONCE DAILY     • carvedilol (COREG) 12.5 MG Tab Take 1 Tab by mouth 2 times a day, with meals. 180 Tab 3   • tamsulosin (FLOMAX) 0.4 MG capsule Take 1 Cap by mouth every evening.     • citalopram (CELEXA) 20 MG Tab Take 1 Tab by mouth every 48 hours. 30 Tab 11   • ezetimibe (ZETIA) 10 MG Tab Take 1 Tab by mouth every bedtime. 90 Tab 3   • liraglutide (VICTOZA) 18 MG/3ML Solution Pen-injector injection Inject 0.3 mL as instructed every bedtime.     • HYDROcodone-acetaminophen (NORCO) 5-325 MG Tab per  tablet      • Lancets 1 Each by Does not apply route 3 times a day. Lancets for Freestyle Lite meter. Sig: use TID and prn ssx high or low sugar. 100 Each 11   • Blood Glucose Test Strips Test strips order: Test strips for Freestyle Lite  meter. Sig: use four times a day and prn ssx high or low sugar #100 RF x 3 300 Each 11   • allopurinol (ZYLOPRIM) 300 MG Tab TAKE 1 TABLET BY MOUTH ONCE DAILY 90 Tab 3   • rosuvastatin (CRESTOR) 10 MG Tab Take 1 Tab by mouth every evening. 90 Tab 3   • lisinopril (PRINIVIL) 5 MG Tab Take 1 Tab by mouth every day. 90 Tab 3   • Insulin Pen Needle 32G X 4 MM Misc Use daily with victoza 100 Each 3   • JARDIANCE 25 MG Tab Take  by mouth every day.     • glipiZIDE (GLUCOTROL) 5 MG Tab Take 2.5 mg by mouth 2 times a day.     • metFORMIN (GLUCOPHAGE) 850 MG Tab 2 times a day.     • aspirin 81 MG tablet Take 81 mg by mouth every bedtime.     • [DISCONTINUED] tamsulosin (FLOMAX) 0.4 MG capsule Take 1 Cap by mouth every day. 12 hour after breakfast. (Patient taking differently: Take 0.4 mg by mouth every evening. 12 hour after breakfast.) 90 Cap 1   • [DISCONTINUED] liraglutide (VICTOZA) 18 MG/3ML Solution Pen-injector injection Inject 0.3 mL as instructed every day. (Patient taking differently: Inject 1.8 mg as instructed every bedtime.) 6 PEN 2   • [DISCONTINUED] citalopram (CELEXA) 20 MG Tab TAKE ONE TABLET BY MOUTH DAILY -GENERICFOR CELEXA (Patient taking differently: every bedtime. TAKE ONE TABLET BY MOUTH DAILY -GENERICFOR CELEXA) 90 Tab 1   • [DISCONTINUED] ezetimibe (ZETIA) 10 MG Tab Take 1 Tab by mouth every day. TAKE ONE TABLET BY MOUTH DAILY (Patient taking differently: Take 10 mg by mouth every bedtime. TAKE ONE TABLET BY MOUTH AT BEDTIME) 90 Tab 3   • [DISCONTINUED] omeprazole (PRILOSEC) 20 MG delayed-release capsule TAKE 1 CAPSULE BY MOUTH ONCE DAILY (Patient taking differently: 1 time daily as needed. TAKE 1 CAPSULE BY MOUTH ONCE DAILY) 90 Cap 3   • [DISCONTINUED] carvedilol  "(COREG) 6.25 MG Tab Take 1 Tab by mouth 2 times a day, with meals. 180 Tab 2     No facility-administered encounter medications on file as of 4/5/2019.      Review of Systems   Constitutional: Negative for fever and malaise/fatigue.   Respiratory: Negative for cough and shortness of breath.    Cardiovascular: Negative for chest pain, palpitations, orthopnea, claudication, leg swelling and PND.   Gastrointestinal: Negative for abdominal pain.   Musculoskeletal: Negative for myalgias.   Neurological: Negative for dizziness.        Objective:   /68 (BP Location: Left arm, Patient Position: Sitting, BP Cuff Size: Adult)   Pulse 80   Ht 1.803 m (5' 11\")   Wt 92.1 kg (203 lb)   SpO2 95%   BMI 28.31 kg/m²     Physical Exam   Constitutional: He appears well-developed and well-nourished.   HENT:   Head: Normocephalic and atraumatic.   Eyes: EOM are normal.   Neck: No JVD present.   Cardiovascular: Normal rate, regular rhythm, normal heart sounds and intact distal pulses.    Pulmonary/Chest: Effort normal and breath sounds normal.   Musculoskeletal: Normal range of motion. He exhibits no edema.   Skin: Skin is warm and dry.   Psychiatric: He has a normal mood and affect.   Nursing note and vitals reviewed.      Assessment:     1. Coronary artery disease due to lipid rich plaque     2. Controlled type 2 diabetes mellitus with complication, without long-term current use of insulin ([facility])  liraglutide (VICTOZA) 18 MG/3ML Solution Pen-injector injection   3. Essential hypertension     4. Ischemic cardiomyopathy     5. S/P CABG x 3  carvedilol (COREG) 12.5 MG Tab   6. Gastroesophageal reflux disease without esophagitis  omeprazole (PRILOSEC) 20 MG delayed-release capsule   7. Medication refill  citalopram (CELEXA) 20 MG Tab   8. Dyslipidemia  ezetimibe (ZETIA) 10 MG Tab    LIPID PANEL    Comp Metabolic Panel     Medical Decision Making:  Today's Assessment / Status / Plan:     1. CAD with prior CABG  -no angina or " RANGEL  -normal stress imaging in hospital, consider LHC with new onset of rEF of 35% in the setting of coronary disease  -cont asa, coreg, and crestor  -will discuss with Dr. Benoit upon return from vacation as patient is asymptomatic at this time    2. New rEF of 35%, presumable ischemic  -euvolemic on exam, no heart failure during admission  subsequent finding on echocardiogram  -recommend repeat [facility] for eval, will discuss with Dr. Bneoit next week  -call for worsening symptoms  -up titrate bb therapy to 12.5 mg BID, cont lisinopril 5 mg    3. HTN  -good control on coreg and lisinopril  -up titrate coreg to 12.5 mg BID  -follow BP/HR at home    4. HLD  -statin  -LDL goal <70 with CAD  -yearly cmp and lipid     5. DM  -managed by endocrinology    FU in clinic in 3 months with CW; call next week with Dr. Benoit's review to determine plan of care    Patient verbalizes understanding and agrees with the plan of care.     Collaborating MD: Lester IBARRA

## 2019-04-08 ENCOUNTER — TELEPHONE (OUTPATIENT)
Dept: CARDIOLOGY | Facility: MEDICAL CENTER | Age: 69
End: 2019-04-08

## 2019-04-08 ENCOUNTER — TELEPHONE (OUTPATIENT)
Dept: MEDICAL GROUP | Facility: MEDICAL CENTER | Age: 69
End: 2019-04-08

## 2019-04-08 NOTE — TELEPHONE ENCOUNTER
Future Appointments       Provider Department Center    4/15/2019 10:40 AM Ox[facility] Parkinson M.D.; [facility]    10/2/2019 2:45 PM Shun Benoit M.D. [facility]           ANNUAL WELLNESS VISIT PRE-VISIT PLANNING WITHOUT OUTREACH    1.  Reviewed note from last office visit with PCP: YES    2.  If any orders were placed at last visit, do we have Results/Consult Notes?        •  Labs - Labs ordered, but not to be completed until FUTURE.  Note: If patient appointment is for lab review and patient did not complete labs, check with provider if OK to reschedule patient until labs completed.       •  Imaging - Imaging was not ordered at last office visit.       •  Referrals - No referrals were ordered at last office visit.    3.  Immunizations were updated in Epic using WebIZ?: Epic matches WebIZ       •  WebIZ Recommendations: TD, SHINGRIX (Shingles) and CPOX        •  Is patient due for Tdap? NO       •  Is patient due for Shingrix? YES. Patient was notified of copay/out of pocket cost.     4.  Patient is due for the following Health Maintenance Topics:   Health Maintenance Due   Topic Date Due   • IMM HEP B VACCINE (1 of 3 - Risk 3-dose series) 05/13/1970   • IMM ZOSTER VACCINES (1 of 2) 05/13/2001   • Annual Wellness Visit  07/28/2018       - Patient already has appointment scheduled for Annual Wellness Visit (AWV).    5.  Reviewed/Updated the following with patient:       •   Preferred Pharmacy? YES       •   Preferred Lab? YES       •   Preferred Communication? YES       •   Allergies? YES       •   Medications? YES. Was Abstract Encounter opened and chart updated? YES       •   Social History? YES. Was Abstract Encounter opened and chart updated? YES       •   Family History (document living status of immediate family members and if + hx of  cancer, diabetes, hypertension, hyperlipidemia, heart  attack, stroke) YES. Was Abstract Encounter opened and chart updated? YES    6.  Care Team Updated:       •   DME Company (gait device, O2, CPAP, etc.): NO       •   Other Specialists (eye doctor, derm, GYN, cardiology, endo, etc): YES    7. Orders for overdue Health Maintenance topics pended in Pre-Charting? NO    8.  Patient has the following Care Path diagnoses on Problem List:  DIABETES    Has patient ever had diabetes education? Yes, and is NOT interested in more at this time.      9.  Patient was advised: “This is a free wellness visit. The provider will screen for medical conditions to help you stay healthy. If you have other concerns to address you may be asked to discuss these at a separate visit or there may be an additional fee.”     10.  Patient was informed to arrive 15 min prior to their scheduled appointment and bring in their medication bottles.

## 2019-04-08 NOTE — TELEPHONE ENCOUNTER
FW: New rEF of 35%   Received: Today   Message Contents   LORENA Rothman R.N.   Cc: Jenni Cao R.N.             Hebraydon girls,     I don't know which one of you wants to call this gentleman and let him know Dr. Benoit agrees with repeat LHC as planned. The patient is aware this would most likely be the outcome. We just need to call him to let him know and then have Cipriano schedule please.     Thanks,     Angela :)    Previous Messages      ----- Message -----   From: Shun Benoit M.D.   Sent: 4/7/2019   6:18 PM   To: Jenni Cao R.N., Shun Benoit M.D., *   Subject: RE: New rEF of 35%                               Agreed     Please set it up     Thank you     ----- Message -----   From: LORENA Rothman   Sent: 4/5/2019   5:15 PM   To: Jenni Cao R.N., Hien Michael R.N., *   Subject: New rEF of 35%                                   I had the pleasure of seeing Mr Renner as hospital follow up for new onset rEF of 35% found on echocardiogram.     Asymptomatic. Had some atypical chest pains on admission to the hospital, nuclear was negative for ischemia, showed old infarcts.     I thought it might be nichols to check C to determine if grafts are still patent with new onset of rEF of 35%, prior 55%.     He is doing well. He is very concerned about his heart function. His brother is now on hospice with heart failure from amyloidosis...     Angela          Called pt and no answer. Voicemail left that a message would be sent through his [employer] since unable to reach him today.     To Cipriano to schedule

## 2019-04-10 DIAGNOSIS — I25.10 CORONARY ARTERY DISEASE INVOLVING NATIVE HEART WITHOUT ANGINA PECTORIS, UNSPECIFIED VESSEL OR LESION TYPE: ICD-10-CM

## 2019-04-10 NOTE — TELEPHONE ENCOUNTER
Patient is scheduled on 4-17-19 for a [facility] w/poss with Dr. VALADEZ. Patient was told to hold victoza,jardiance, glipizide and metformin am day of procedure and to hold metformin for 2 days after procedure. Patient to check in at 9:30 for an 11:30 procedure. H&P was done on 4-5-19 by Maribel MURPHY Pre admit is scheduled on 4-16-19 at 12:15.

## 2019-04-11 ASSESSMENT — ENCOUNTER SYMPTOMS
OCCASIONAL FEELINGS OF UNSTEADINESS: 0
LOSS OF SENSATION IN FEET: 0
DIABETIC ASSOCIATED SYMPTOMS: 1
DEPRESSION: 0
CARDIOVASCULAR NEGATIVE: 1

## 2019-04-11 ASSESSMENT — PATIENT HEALTH QUESTIONNAIRE - PHQ9: CLINICAL INTERPRETATION OF PHQ2 SCORE: 0

## 2019-04-11 NOTE — PROGRESS NOTES
"SBAR Documentation: Situation, Background, Assessment, Recommendation     Situation    Outreach call to the patient for Community Care Management program services.   Background       Orlando has a significant medical history including HTN, CABG in 2017, DM, and CHF.  He was recently hospitalized for Chest pain.  Last HgA1C was 7.9 in November 2018.   Assessment    Telephone contact w/Luis; provided an introduction of myself; my role and the reason for my call.   Provided Luis education on the CCM program.     The patient reports that he is doing very well since being back from the hospital. (please see smart forms for detailed assessment) Luis reports that he is getting an angiogram next week. Luis states that he is \"trying to eat healthy\". Initial assessment completed at this time. Luis is requesting that CCRN call him in couple of weeks so he has time to decide if he needs any education.   Recommendation CCRN will reach out to the patient in about 2 weeks to see if the patient is interested in Community Care Management services. The patient has CCRN contact information and will reach out with any questions or concerns as needed.   Escalation Protocol: No Escalation Needed     "

## 2019-04-15 ENCOUNTER — APPOINTMENT (OUTPATIENT)
Dept: MEDICAL GROUP | Facility: MEDICAL CENTER | Age: 69
End: 2019-04-15
Payer: MEDICARE

## 2019-04-15 NOTE — TELEPHONE ENCOUNTER
Per patients request he has been rescheduled from 4-17-19 to 4-23-19 for a C w/poss with Dr. Shepherd. Patient was told to hold Victoza, Jardiance,glipizide and metformin am day of procedure and to hold metformin for 48hrs after procedure. Patient to check in at 6:00 am for a 7:30 procedure. H&P was done on 4-5-19 by Maribel MURPHY Pre admit is scheduled on 4-22-19 at 2:45.

## 2019-04-16 ENCOUNTER — APPOINTMENT (OUTPATIENT)
Dept: ADMISSIONS | Facility: MEDICAL CENTER | Age: 69
End: 2019-04-16
Payer: MEDICARE

## 2019-04-18 NOTE — TELEPHONE ENCOUNTER
Per patients request he has been  rescheduled from 4-23-19 to 4-30-19 for a C w/poss with . Patient was told to hold victoza, jardiance, glipizide and metformin am day of procedure and to hold metformin for 48hrs after. Patient to check in at 6:00am for a 7:30 procedure. H&P was done on 4-5-19 by Maribel MURPHYPre admit is scheduled on 4-29-19 at 12:45.

## 2019-04-26 ENCOUNTER — PATIENT OUTREACH (OUTPATIENT)
Dept: HEALTH INFORMATION MANAGEMENT | Facility: OTHER | Age: 69
End: 2019-04-26

## 2019-04-26 NOTE — PROGRESS NOTES
Outreach call to the patient for ccm services. Spoke with the patient and he is requesting that CCRN reach out to him next week after his procedure.  Plan: will reach out at a later time/date.

## 2019-04-29 DIAGNOSIS — Z01.812 PRE-PROCEDURAL LABORATORY EXAMINATION: ICD-10-CM

## 2019-04-29 DIAGNOSIS — Z01.810 PRE-OPERATIVE CARDIOVASCULAR EXAMINATION: ICD-10-CM

## 2019-04-29 LAB
ALBUMIN SERPL BCP-MCNC: 4.5 G/DL (ref 3.2–4.9)
ALBUMIN/GLOB SERPL: 2.3 G/DL
ALP SERPL-CCNC: 49 U/L (ref 30–99)
ALT SERPL-CCNC: 24 U/L (ref 2–50)
ANION GAP SERPL CALC-SCNC: 8 MMOL/L (ref 0–11.9)
APTT PPP: 29.8 SEC (ref 24.7–36)
AST SERPL-CCNC: 20 U/L (ref 12–45)
BILIRUB SERPL-MCNC: 0.6 MG/DL (ref 0.1–1.5)
BUN SERPL-MCNC: 18 MG/DL (ref 8–22)
CALCIUM SERPL-MCNC: 9.5 MG/DL (ref 8.5–10.5)
CHLORIDE SERPL-SCNC: 106 MMOL/L (ref 96–112)
CO2 SERPL-SCNC: 27 MMOL/L (ref 20–33)
CREAT SERPL-MCNC: 0.99 MG/DL (ref 0.5–1.4)
EKG IMPRESSION: NORMAL
ERYTHROCYTE [DISTWIDTH] IN BLOOD BY AUTOMATED COUNT: 47.3 FL (ref 35.9–50)
GLOBULIN SER CALC-MCNC: 2 G/DL (ref 1.9–3.5)
GLUCOSE SERPL-MCNC: 153 MG/DL (ref 65–99)
HCT VFR BLD AUTO: 46.3 % (ref 42–52)
HGB BLD-MCNC: 14.3 G/DL (ref 14–18)
INR PPP: 1.04 (ref 0.87–1.13)
MCH RBC QN AUTO: 25.1 PG (ref 27–33)
MCHC RBC AUTO-ENTMCNC: 30.9 G/DL (ref 33.7–35.3)
MCV RBC AUTO: 81.2 FL (ref 81.4–97.8)
PLATELET # BLD AUTO: 199 K/UL (ref 164–446)
PMV BLD AUTO: 10.9 FL (ref 9–12.9)
POTASSIUM SERPL-SCNC: 4.3 MMOL/L (ref 3.6–5.5)
PROT SERPL-MCNC: 6.5 G/DL (ref 6–8.2)
PROTHROMBIN TIME: 13.7 SEC (ref 12–14.6)
RBC # BLD AUTO: 5.7 M/UL (ref 4.7–6.1)
SODIUM SERPL-SCNC: 141 MMOL/L (ref 135–145)
WBC # BLD AUTO: 7.9 K/UL (ref 4.8–10.8)

## 2019-04-29 PROCEDURE — 93005 ELECTROCARDIOGRAM TRACING: CPT

## 2019-04-29 PROCEDURE — 93010 ELECTROCARDIOGRAM REPORT: CPT | Performed by: INTERNAL MEDICINE

## 2019-04-29 PROCEDURE — 85610 PROTHROMBIN TIME: CPT

## 2019-04-29 PROCEDURE — 36415 COLL VENOUS BLD VENIPUNCTURE: CPT

## 2019-04-29 PROCEDURE — 85730 THROMBOPLASTIN TIME PARTIAL: CPT

## 2019-04-29 PROCEDURE — 85027 COMPLETE CBC AUTOMATED: CPT

## 2019-04-29 PROCEDURE — 80053 COMPREHEN METABOLIC PANEL: CPT

## 2019-04-29 RX ORDER — METHOCARBAMOL 500 MG/1
1000 TABLET, FILM COATED ORAL 3 TIMES DAILY PRN
COMMUNITY
End: 2019-07-15

## 2019-04-30 ENCOUNTER — HOSPITAL ENCOUNTER (OUTPATIENT)
Facility: MEDICAL CENTER | Age: 69
End: 2019-04-30
Attending: INTERNAL MEDICINE | Admitting: INTERNAL MEDICINE
Payer: MEDICARE

## 2019-04-30 ENCOUNTER — APPOINTMENT (OUTPATIENT)
Dept: CARDIOLOGY | Facility: MEDICAL CENTER | Age: 69
End: 2019-04-30
Attending: NURSE PRACTITIONER
Payer: MEDICARE

## 2019-04-30 VITALS
DIASTOLIC BLOOD PRESSURE: 85 MMHG | SYSTOLIC BLOOD PRESSURE: 138 MMHG | HEIGHT: 71 IN | BODY MASS INDEX: 28.98 KG/M2 | RESPIRATION RATE: 12 BRPM | TEMPERATURE: 97.3 F | HEART RATE: 75 BPM | WEIGHT: 207.01 LBS | OXYGEN SATURATION: 93 %

## 2019-04-30 DIAGNOSIS — I25.10 CORONARY ARTERY DISEASE INVOLVING NATIVE HEART WITHOUT ANGINA PECTORIS, UNSPECIFIED VESSEL OR LESION TYPE: ICD-10-CM

## 2019-04-30 LAB — ACT BLD: 279 SEC (ref 74–137)

## 2019-04-30 PROCEDURE — 99152 MOD SED SAME PHYS/QHP 5/>YRS: CPT | Performed by: INTERNAL MEDICINE

## 2019-04-30 PROCEDURE — 93459 L HRT ART/GRFT ANGIO: CPT | Mod: 26 | Performed by: INTERNAL MEDICINE

## 2019-04-30 PROCEDURE — 700117 HCHG RX CONTRAST REV CODE 255: Performed by: INTERNAL MEDICINE

## 2019-04-30 PROCEDURE — G0269 OCCLUSIVE DEVICE IN VEIN ART: HCPCS

## 2019-04-30 PROCEDURE — 700111 HCHG RX REV CODE 636 W/ 250 OVERRIDE (IP)

## 2019-04-30 PROCEDURE — 85347 COAGULATION TIME ACTIVATED: CPT

## 2019-04-30 PROCEDURE — 160002 HCHG RECOVERY MINUTES (STAT)

## 2019-04-30 PROCEDURE — 700101 HCHG RX REV CODE 250

## 2019-04-30 PROCEDURE — 700105 HCHG RX REV CODE 258: Performed by: INTERNAL MEDICINE

## 2019-04-30 RX ORDER — SODIUM CHLORIDE 9 MG/ML
INJECTION, SOLUTION INTRAVENOUS CONTINUOUS
Status: ACTIVE | OUTPATIENT
Start: 2019-04-30 | End: 2019-04-30

## 2019-04-30 RX ORDER — HEPARIN SODIUM,PORCINE 1000/ML
VIAL (ML) INJECTION
Status: COMPLETED
Start: 2019-04-30 | End: 2019-04-30

## 2019-04-30 RX ORDER — MIDAZOLAM HYDROCHLORIDE 1 MG/ML
INJECTION INTRAMUSCULAR; INTRAVENOUS
Status: COMPLETED
Start: 2019-04-30 | End: 2019-04-30

## 2019-04-30 RX ORDER — LIDOCAINE HYDROCHLORIDE 20 MG/ML
INJECTION, SOLUTION INFILTRATION; PERINEURAL
Status: COMPLETED
Start: 2019-04-30 | End: 2019-04-30

## 2019-04-30 RX ORDER — SODIUM CHLORIDE 9 MG/ML
INJECTION, SOLUTION INTRAVENOUS CONTINUOUS
Status: DISCONTINUED | OUTPATIENT
Start: 2019-04-30 | End: 2019-04-30 | Stop reason: HOSPADM

## 2019-04-30 RX ADMIN — LIDOCAINE HYDROCHLORIDE: 20 INJECTION, SOLUTION INFILTRATION; PERINEURAL at 08:21

## 2019-04-30 RX ADMIN — IOHEXOL 100 ML: 350 INJECTION, SOLUTION INTRAVENOUS at 09:18

## 2019-04-30 RX ADMIN — HEPARIN SODIUM: 1000 INJECTION, SOLUTION INTRAVENOUS; SUBCUTANEOUS at 08:21

## 2019-04-30 RX ADMIN — SODIUM CHLORIDE: 9 INJECTION, SOLUTION INTRAVENOUS at 06:48

## 2019-04-30 RX ADMIN — MIDAZOLAM HYDROCHLORIDE 2 MG: 1 INJECTION, SOLUTION INTRAMUSCULAR; INTRAVENOUS at 08:31

## 2019-04-30 RX ADMIN — HEPARIN SODIUM 2000 UNITS: 1000 INJECTION, SOLUTION INTRAVENOUS; SUBCUTANEOUS at 08:21

## 2019-04-30 RX ADMIN — NITROGLYCERIN 10 ML: 20 INJECTION INTRAVENOUS at 08:21

## 2019-04-30 RX ADMIN — FENTANYL CITRATE 100 MCG: 50 INJECTION, SOLUTION INTRAMUSCULAR; INTRAVENOUS at 08:31

## 2019-04-30 RX ADMIN — MIDAZOLAM HYDROCHLORIDE 0.5 MG: 1 INJECTION, SOLUTION INTRAMUSCULAR; INTRAVENOUS at 09:15

## 2019-04-30 RX ADMIN — FENTANYL CITRATE 75 MCG: 50 INJECTION, SOLUTION INTRAMUSCULAR; INTRAVENOUS at 09:15

## 2019-04-30 NOTE — OR NURSING
0934 patient arrived from  s/p [facility], patient awake. Vital signs stable. Right groin access site dressing clean dry intact. Patient not in any distress or discomfort. Denies N/V.  1000 patient given water tolerating well.   1320 patient ambulated to bathroom independently tolerated well. Surgical site clean. Patient not in any distress or discomfort.   1325 Criteria met to discharge patient home.  1329 discharge instructions given to patient and family. Both patient and family verbalize understanding. Discussed activity, follow up, worsening symptoms, medications.   1335 patient escorted via w/c with all his personal belongings.

## 2019-04-30 NOTE — DISCHARGE INSTRUCTIONS
ACTIVITY: Rest and take it easy for the first 24 hours.  A responsible adult is recommended to remain with you during that time.  It is normal to feel sleepy.  We encourage you to not do anything that requires balance, judgment or coordination.    MILD FLU-LIKE SYMPTOMS ARE NORMAL. YOU MAY EXPERIENCE GENERALIZED MUSCLE ACHES, THROAT IRRITATION, HEADACHE AND/OR SOME NAUSEA.    FOR 24 HOURS DO NOT:  Drive, operate machinery or run household appliances.  Drink beer or alcoholic beverages.   Make important decisions or sign legal documents.    SPECIAL INSTRUCTIONS: follow up with your cardiologist call find out when to follow up  Follow up with primary care physician call find out when to follow up  Resume your home medication  If you experience chest pain, shortness of breath call 911 return to ER  Keep dressing clean dry intact for 24 hours, remove dressing after 24 hours. Shower after 24 hours.  Do not take baths, swim, or use a hot tub for 7 days.    DIET: To avoid nausea, slowly advance diet as tolerated, avoiding spicy or greasy foods for the first day.  Add more substantial food to your diet according to your physician's instructions.  Babies can be fed formula or breast milk as soon as they are hungry.  INCREASE FLUIDS AND FIBER TO AVOID CONSTIPATION.    SURGICAL DRESSING/BATHING: keep dressing clean dry intact for 24 hours, remove dressing after 24 hours.    FOLLOW-UP APPOINTMENT:  A follow-up appointment should be arranged with your doctor in [phone]; call to schedule.    You should CALL YOUR PHYSICIAN if you develop:  Fever greater than 101 degrees F.  Pain not relieved by medication, or persistent nausea or vomiting.  Excessive bleeding (blood soaking through dressing) or unexpected drainage from the wound.  Extreme redness or swelling around the incision site, drainage of pus or foul smelling drainage.  Inability to urinate or empty your bladder within 8 hours.  Problems with breathing or chest  pain.    You should call 911 if you develop problems with breathing or chest pain.  If you are unable to contact your doctor or surgical center, you should go to the nearest emergency room or urgent care center.  Physician's telephone #: [phone]    If any questions arise, call your doctor.  If your doctor is not available, please feel free to call the Surgical Center at [phone].  The Center is open Monday through Friday from 7AM to 7PM.  You can also call the HEALTH HOTLINE open 24 hours/day, 7 days/week and speak to a nurse at [phone], or toll free at [phone].    A registered nurse may call you a few days after your surgery to see how you are doing after your procedure.    MEDICATIONS: Resume taking daily medication.  Take prescribed pain medication with food.  If no medication is prescribed, you may take non-aspirin pain medication if needed.  PAIN MEDICATION CAN BE VERY CONSTIPATING.  Take a stool softener or laxative such as senokot, pericolace, or milk of magnesia if needed.    If your physician has prescribed pain medication that includes Acetaminophen (Tylenol), do not take additional Acetaminophen (Tylenol) while taking the prescribed medication.    Depression / Suicide Risk    As you are discharged from this [facility] facility, it is important to learn how to keep safe from harming yourself.    Recognize the warning signs:  · Abrupt changes in personality, positive or negative- including increase in energy   · Giving away possessions  · Change in eating patterns- significant weight changes-  positive or negative  · Change in sleeping patterns- unable to sleep or sleeping all the time   · Unwillingness or inability to communicate  · Depression  · Unusual sadness, discouragement and loneliness  · Talk of wanting to die  · Neglect of personal appearance   · Rebelliousness- reckless behavior  · Withdrawal from people/activities they love  · Confusion- inability to concentrate     If you  "or a loved one observes any of these behaviors or has concerns about self-harm, here's what you can do:  · Talk about it- your feelings and reasons for harming yourself  · Remove any means that you might use to hurt yourself (examples: pills, rope, extension cords, firearm)  · Get professional help from the community (Mental Health, Substance Abuse, psychological counseling)  · Do not be alone:Call your Safe Contact- someone whom you trust who will be there for you.  · Call your local CRISIS HOTLINE 273-6295 or 921-718-8338  · Call your local Children's Mobile Crisis Response Team [place] (183) 535-9018 or www.[employer]  · Call the toll free National Suicide Prevention Hotlines   · National Suicide Prevention Lifeline 321-408-JHRV (6110)  National [employer] Line Network 800-SUICIDE (675-5485)  Post Angiogram Groin Care Instructions     INSTRUCTIONS  2. Examine (look and feel) the site of your incision site TODAY so you can recognize changes that should be called to your doctor (see below).  3. Avoid straining either by lifting or pulling objects for 4-5 days. Avoid lifting over 5 pounds.   4. For at least 72 hours, if you should sneeze or cough, please hold pressure over your groin area.  5. If you should begin to have oozing from the catheterization site, please hold firm pressure and call your doctor's office immediately.  6. If profuse bleeding occurs from the catheterization site, hold firm pressure and call \"[phone]\" immediately for assistance.  7. Remove bandage after 24 hours.     ACTIVITY  2. Limit activity as instructed by your doctor.  3. No driving or very limited driving with frequent stops for one week.   4. If you must take a long car ride, stop every hour and walk around the car.   5. Warm showers or baths are permitted after the bandage is removed. Avoid hot showers, baths, hot tubs, and swimming for one week.    PLEASE CALL YOUR DOCTOR IF:  1. Temperature elevation occurs.  2. Catheterization " site becomes reddened or begins to drain.   3. Bruising appears to be new or not resolving. The bruise may move down your leg. This is normal.  4. The small round lump in the groin increases in size.  5. Any leg numbness, aching, or discomfort (immediately).  6. Increasing discomfort in the leg at the insertion site.  7. Chest pains, even if relieved by Nitroglycerin.    MISCELLANEOUS INSTRUCTIONS  1. Bruising may occur as a result of heart catheterization. Some of the discoloration may travel down the leg, going from blue to green in color.  2. A small round lump under the catheterization site will remain for up to six weeks.  3. If any questions arise call your physician's office. You can also call the [employer] HOTLINE open 24 hours/day, 7 days/week and speak to a nurse at [phone], or toll free at [phone].   4. You should call 911 if you develop problems with breathing or chest pain.    FOR PROBLEMS CALL REMA Liu AT: [phone]  Angiogram, Care After  Refer to this sheet in the next few weeks. These instructions provide you with information about caring for yourself after your procedure. Your health care provider may also give you more specific instructions. Your treatment has been planned according to current medical practices, but problems sometimes occur. Call your health care provider if you have any problems or questions after your procedure.  WHAT TO EXPECT AFTER THE PROCEDURE  After your procedure, it is typical to have the following:  · Bruising at the catheter insertion site that usually fades within 1-2 weeks.  · Blood collecting in the tissue (hematoma) that may be painful to the touch. It should usually decrease in size and tenderness within 1-2 weeks.  HOME CARE INSTRUCTIONS  · Take medicines only as directed by your health care provider.  · You may shower 24-48 hours after the procedure or as directed by your health care provider. Remove the bandage (dressing) and gently wash the site  with plain soap and water. Pat the area dry with a clean towel. Do not rub the site, because this may cause bleeding.  · Do not take baths, swim, or use a hot tub until your health care provider approves.  · Check your insertion site every day for redness, swelling, or drainage.  · Do not apply powder or lotion to the site.  · Do not lift over 10 lb (4.5 kg) for 5 days after your procedure or as directed by your health care provider.  · Ask your health care provider when it is okay to:  ¨ Return to work or school.  ¨ Resume usual physical activities or sports.  ¨ Resume sexual activity.  · Do not drive home if you are discharged the same day as the procedure. Have someone else drive you.  · You may drive 24 hours after the procedure unless otherwise instructed by your health care provider.  · Do not operate machinery or power tools for 24 hours after the procedure or as directed by your health care provider.  · If your procedure was done as an outpatient procedure, which means that you went home the same day as your procedure, a responsible adult should be with you for the first 24 hours after you arrive home.  · Keep all follow-up visits as directed by your health care provider. This is important.  SEEK MEDICAL CARE IF:  · You have a fever.  · You have chills.  · You have increased bleeding from the catheter insertion site. Hold pressure on the site.  SEEK IMMEDIATE MEDICAL CARE IF:  · You have unusual pain at the catheter insertion site.  · You have redness, warmth, or swelling at the catheter insertion site.  · You have drainage (other than a small amount of blood on the dressing) from the catheter insertion site.  · The catheter insertion site is bleeding, and the bleeding does not stop after 30 minutes of holding steady pressure on the site.  · The area near or just beyond the catheter insertion site becomes pale, cool, tingly, or numb.     This information is not intended to replace advice given to you by your  health care provider. Make sure you discuss any questions you have with your health care provider.     Document Released: 07/06/2006 Document Revised: 01/08/2016 Document Reviewed: 07/06/2015  [employer] Interactive Patient Education ©2016 [employer]      I acknowledge receipt and understanding of these Home Care instructions.  ·

## 2019-04-30 NOTE — PROCEDURES
Cardiac Catheterization report    4/30/2019  10:28 AM    Referring MD:     Primary Care Provider: Oxana Parkinson M.D.    Indication for procedure: Cardiomyopathy, Congestive heart failure systolic    Procedure:  ·  Coronary and bypass graft arteriograms  · Left heart catheterization and Left ventriculogram     Complications:  None.    EBL: <10 CC    Specimens: None    Procedure:  The risks and benefits of cardiac catheterization and possible intervention were explained to the patient including death, heart attack, stroke, and emergency surgery.  The patient verbalized understanding and wished to proceed.  The patient was brought to the cardiac catheterization laboratory in the fasting state and prepped and draped in the usual sterile fashion.  The right groin was locally anesthetized with lidocaine and the right radial artery was cannulated with 5/6-German equipment .  Coronary angiography was performed using JR 4 and JL 3.5  diagnostic catheters in the usual fashion, results mentioned below.  JR 4 catheter was used to cross the aortic valve to perform  left heart catheterization and left ventriculogram.    1.  Left main coronary artery:  Normal.  2.  Left anterior descending artery:  Occluded in proximal portion. Distal LAD fills through left to left and right to left collaterals.  3.  Left circumflex coronary artery: Chronically occluded, marginal artery fills through right to left collaterals, this was not bypassed.  Large Ramus branch has 70% proximal disease, distal vessel fills through LIMA.  4.  Right coronary artery:  Distal RCA is occluded,fills through vein graft.  This is a right dominant system.  5. Vein graft to RCA: widely patent, good run off  6. Vein graft to LAD: occluded  7.  LIMA to large Ramus: widely patent, good run off.  5.  Left ventricular end diastolic pressure:  22mmHg.  No signficant gradient across the aortic valve.  6.  Left ventriculogram:  Ejection fraction of  25-30%.    A brief attempt was performed to open LAD, I was able to cross the proximal cap with  200 with balloon support but not distal cap.  Patient is fairly asymptomatic, agressive wires were not used, aborted the procedure..    Once all the views were obtained, all wires and catheters were removed from the patient without difficulty.  A Vasc-Band was placed over the right radial artery and the radial artery sheath was removed without difficulty.      Complications:  There was no evidence of hematoma or other complications.  The patient was transferred to the recovery area in stable condition.     Impression:  Coronary artery disease as mentioned above.    Recommendations:  Guideline directed medical therapy   Cardiovascular Risk factor modification    Sedation time:  I supervised moderate sedation over a trained independent nursing staff,  Sedation Start time:08:09      Sedation Stop time: 09:14      MORAIMA Kerr  [facility]

## 2019-05-01 ENCOUNTER — TELEPHONE (OUTPATIENT)
Dept: MEDICAL GROUP | Facility: MEDICAL CENTER | Age: 69
End: 2019-05-01

## 2019-05-01 ENCOUNTER — OFFICE VISIT (OUTPATIENT)
Dept: CARDIOLOGY | Facility: MEDICAL CENTER | Age: 69
End: 2019-05-01
Payer: MEDICARE

## 2019-05-01 VITALS
BODY MASS INDEX: 28 KG/M2 | HEIGHT: 71 IN | WEIGHT: 200 LBS | DIASTOLIC BLOOD PRESSURE: 48 MMHG | HEART RATE: 94 BPM | SYSTOLIC BLOOD PRESSURE: 108 MMHG | OXYGEN SATURATION: 94 %

## 2019-05-01 DIAGNOSIS — I25.84 CORONARY ARTERY DISEASE DUE TO CALCIFIED CORONARY LESION: ICD-10-CM

## 2019-05-01 DIAGNOSIS — I50.22 CHRONIC SYSTOLIC CONGESTIVE HEART FAILURE (HCC): Chronic | ICD-10-CM

## 2019-05-01 DIAGNOSIS — E78.5 HYPERLIPIDEMIA, UNSPECIFIED HYPERLIPIDEMIA TYPE: ICD-10-CM

## 2019-05-01 DIAGNOSIS — I25.5 ISCHEMIC CARDIOMYOPATHY: ICD-10-CM

## 2019-05-01 DIAGNOSIS — Z95.1 S/P CABG X 3: ICD-10-CM

## 2019-05-01 DIAGNOSIS — I25.10 CORONARY ARTERY DISEASE DUE TO CALCIFIED CORONARY LESION: ICD-10-CM

## 2019-05-01 PROCEDURE — 99215 OFFICE O/P EST HI 40 MIN: CPT | Performed by: INTERNAL MEDICINE

## 2019-05-01 RX ORDER — CARVEDILOL 25 MG/1
25 TABLET ORAL 2 TIMES DAILY WITH MEALS
Qty: 180 TAB | Refills: 3 | Status: SHIPPED | OUTPATIENT
Start: 2019-05-01 | End: 2019-06-05 | Stop reason: SDUPTHER

## 2019-05-01 RX ORDER — CLOPIDOGREL BISULFATE 75 MG/1
75 TABLET ORAL DAILY
Qty: 90 TAB | Refills: 3 | Status: SHIPPED | OUTPATIENT
Start: 2019-05-01 | End: 2020-03-04

## 2019-05-01 RX ORDER — ROSUVASTATIN CALCIUM 20 MG/1
20 TABLET, COATED ORAL EVERY EVENING
Qty: 90 TAB | Refills: 3 | Status: SHIPPED
Start: 2019-05-01 | End: 2019-12-17 | Stop reason: SDUPTHER

## 2019-05-01 NOTE — LETTER
PROCEDURE/SURGERY CLEARANCE FORM      Encounter Date: 5/1/2019    Patient: Luis Renner  YOB: 1950    CARDIOLOGIST:  Shun Benoit M.D.    REFERRING DOCTOR:  Dr Wheeler    The above patient is cleared to have the following procedure/surgery: back stimilator                                           Additional comments:  He can safely stop plavix/aspirin at least 7 days       It is my pleasure to participate in the care of Mr. Renner.  Please do not hesitate to contact me with questions or concerns. [facility] is available 24/7 for consultative services at [phone] in the perioperative period.    Electronically Signed    Shun Benoit MD PhD [facility]  Cardiologist [facility]    Please note that this dictation was created using voice recognition software. I have worked with consultants from the vendor as well as technical experts from [facility] to optimize the interface. I have made every reasonable attempt to correct obvious errors, but I expect that there are errors of grammar and possibly content I did not discover before finalizing the note.

## 2019-05-01 NOTE — TELEPHONE ENCOUNTER
ANNUAL WELLNESS VISIT PRE-VISIT PLANNING WITH OUTREACH    1.  If any orders were placed at last visit, do we have Results/Consult Notes?        •  Labs - Labs ordered, but not to be completed until Future.  Note: If patient appointment is for lab review and patient did not complete labs, check with provider if OK to reschedule patient until labs completed.       •  Imaging - Imaging was not ordered at last office visit.       •  Referrals - No referrals were ordered at last office visit.    2.  Immunizations were updated in Epic using WebIZ?:Yes       •  Web[employer] Recommendations: HEPATITIS B and SHINGRIX (Shingles)       •  Is patient due for Tdap? NO       •  Is patient due for Shingrx? YES. Patient was not notified of copay/out of pocket cost.    3.  Patient has the following Care Path diagnoses on Problem List:  DIABETES    Has patient ever had diabetes education? Unknown      4.  Orders for overdue Health Maintenance topics pended in Pre-Charting? NO

## 2019-05-03 ASSESSMENT — ENCOUNTER SYMPTOMS
FALLS: 0
SORE THROAT: 0
COUGH: 0
SHORTNESS OF BREATH: 0
BRUISES/BLEEDS EASILY: 0
BLURRED VISION: 0
DIZZINESS: 0
ABDOMINAL PAIN: 0
NAUSEA: 0
FEVER: 0
CHILLS: 0
WEAKNESS: 0
FOCAL WEAKNESS: 0
CLAUDICATION: 0
PALPITATIONS: 0
PND: 0

## 2019-05-03 NOTE — PROGRESS NOTES
Chief Complaint   Patient presents with   • Coronary Artery Disease       Subjective:   Luis Renner is a 68 y.o. male who presents today for follow-up of his history of coronary disease status post stenting to the LAD remotely and CABG with CT of the right coronary LAD and left circumflex    Just had angiogram for reduced EF which showed occlusion of his prior LAD stenting initial attempt at revascularization was done but unable to easily pass a wire so would require more complex PCI    He is obviously frustrated by progression of his coronary disease he has not had any angina or heart failure symptoms reports adequate exercise tolerance    Past Medical History:   Diagnosis Date   • Backpain     6-7/10   • Bilateral renal cysts    • Bronchitis    • C. difficile diarrhea     pt with hx of c diff after hospitalization [id number]   • CAD (coronary artery disease) March 2013    ACS. PCI/ANA PAULA x 2 of the LAD (2.5 x 12mm - mid; 2.75 x 16mm - proximal). Distal RCA is occluded; distal circumflex is occluded with good collateralization.   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   • Myocardial infarct (HCC) 3/7/2013   • Psychiatric problem     anxiety     Past Surgical History:   Procedure Laterality Date   • MULTIPLE CORONARY ARTERY BYPASS ENDO VEIN HARVEST  7/22/2016    Procedure: MULTIPLE CORONARY ARTERY BYPASS ENDO VEIN HARVEST;  Surgeon: David Flowers M.D.;  Location: SURGERY [facility];  Service:    • LILLY  7/22/2016    Procedure: LILLY;  Surgeon: David Flowers M.D.;  Location: SURGERY [facility];  Service:    • RECOVERY  7/21/2016    Procedure: CATH LAB [facility] WITH POSSIBLE DR. LYN;  Surgeon:  Recoveryonly Surgery;  Location: SURGERY [facility] [place] [facility];  Service:    • OTHER NEUROLOGICAL SURG  2014    diskectomy   • OTHER CARDIAC SURGERY  2013    stent   • CHOLECYSTECTOMY  2004     Family History   Problem Relation Age of Onset   • Cancer Mother    • Hypertension Father    • Hyperlipidemia Brother         x 2   • Hypertension Brother         x2   • Heart Disease Brother         x2, afib; cad x 1   • Cancer Paternal Uncle    • Diabetes Neg Hx      Social History     Social History   • Marital status:      Spouse name: N/A   • Number of children: N/A   • Years of education: N/A     Occupational History   • Not on file.     Social History Main Topics   • Smoking status: Never Smoker   • Smokeless tobacco: Never Used   • Alcohol use No   • Drug use: No   • Sexual activity: Yes     Partners: Female     Other Topics Concern   • Not on file     Social History Narrative   • No narrative on file     Allergies   Allergen Reactions   • Byetta      urticaria   • Other Environmental      Grass and cats     Outpatient Encounter Prescriptions as of 5/1/2019   Medication Sig Dispense Refill   • carvedilol (COREG) 25 MG Tab Take 1 Tab by mouth 2 times a day, with meals. 180 Tab 3   • rosuvastatin (CRESTOR) 20 MG Tab Take 1 Tab by mouth every evening. 90 Tab 3   • clopidogrel (PLAVIX) 75 MG Tab Take 1 Tab by mouth every day. 90 Tab 3   • methocarbamol (ROBAXIN) 500 MG Tab Take 1,000 mg by mouth 3 times a day as needed.     • tamsulosin (FLOMAX) 0.4 MG capsule Take 1 Cap by mouth every evening.     • citalopram (CELEXA) 20 MG Tab Take 1 Tab by mouth every 48 hours. 30 Tab 11   • ezetimibe (ZETIA) 10 MG Tab Take 1 Tab by mouth every bedtime. 90 Tab 3   • liraglutide (VICTOZA) 18 MG/3ML Solution Pen-injector injection Inject 0.3 mL as instructed every bedtime.     • allopurinol (ZYLOPRIM) 300 MG Tab TAKE 1 TABLET BY MOUTH ONCE DAILY 90 Tab 3   • lisinopril (PRINIVIL) 5 MG Tab Take 1 Tab by mouth every day. 90 Tab 3  "  • JARDIANCE 25 MG Tab Take  by mouth every day.     • glipiZIDE (GLUCOTROL) 5 MG Tab Take 2.5 mg by mouth 2 times a day.     • metFORMIN (GLUCOPHAGE) 850 MG Tab 2 times a day.     • aspirin 81 MG tablet Take 81 mg by mouth every bedtime.     • GABAPENTIN PO Take 300 mg by mouth as needed.     • omeprazole (PRILOSEC) 20 MG delayed-release capsule Take 1 Cap by mouth 1 time daily as needed. TAKE 1 CAPSULE BY MOUTH ONCE DAILY     • [DISCONTINUED] carvedilol (COREG) 12.5 MG Tab Take 1 Tab by mouth 2 times a day, with meals. 180 Tab 3   • HYDROcodone-acetaminophen (NORCO) 5-325 MG Tab per tablet      • Lancets 1 Each by Does not apply route 3 times a day. Lancets for Freestyle Lite meter. Sig: use TID and prn ssx high or low sugar. 100 Each 11   • [DISCONTINUED] rosuvastatin (CRESTOR) 10 MG Tab Take 1 Tab by mouth every evening. 90 Tab 3     No facility-administered encounter medications on file as of 5/1/2019.      Review of Systems   Constitutional: Negative for chills and fever.   HENT: Negative for sore throat.    Eyes: Negative for blurred vision.   Respiratory: Negative for cough and shortness of breath.    Cardiovascular: Negative for chest pain, palpitations, claudication, leg swelling and PND.   Gastrointestinal: Negative for abdominal pain and nausea.   Musculoskeletal: Negative for falls and joint pain.   Skin: Negative for rash.   Neurological: Negative for dizziness, focal weakness and weakness.   Endo/Heme/Allergies: Does not bruise/bleed easily.        Objective:   /48 (BP Location: Left arm, Patient Position: Sitting, BP Cuff Size: Adult)   Pulse 94   Ht 1.803 m (5' 11\")   Wt 90.7 kg (200 lb)   SpO2 94%   BMI 27.89 kg/m²     Physical Exam   Constitutional: No distress.   HENT:   Mouth/Throat: Oropharynx is clear and moist. No oropharyngeal exudate.   Eyes: No scleral icterus.   Neck: No JVD present.   Cardiovascular: Normal rate and normal heart sounds.  Exam reveals no gallop and no friction " rub.    No murmur heard.  Pulmonary/Chest: No respiratory distress. He has no wheezes. He has no rales.   Abdominal: Soft. Bowel sounds are normal.   Musculoskeletal: He exhibits no edema.   Neurological: He is alert.   Skin: No rash noted. He is not diaphoretic.   Psychiatric: He has a normal mood and affect.     We reviewed the images of his coronary angiogram    We reviewed in person the most recent labs  Recent Results (from the past 4032 hour(s))   EKG    Collection Time: 19  6:19 PM   Result Value Ref Range    Report       [facility] Emergency Dept.    Test Date:  2019  Pt Name:    NIRMALA RODRIGUEZ                 Department: [facility]  MRN:        [id number]                      Room:  Gender:     Male                         Technician: TOO  :        1950                   Requested By:ER TRIAGE PROTOCOL  Order #:    [id number]                    Reading MD:    Measurements  Intervals                                Axis  Rate:       116                          P:          44  MS:         180                          QRS:        -47  QRSD:       90                           T:          66  QT:         308  QTc:        428    Interpretive Statements  SINUS TACHYCARDIA  PROBABLE INFERIOR INFARCT, OLD  ANTERIOR INFARCT, AGE INDETERMINATE  Compared to ECG 2016 11:31:34  Sinus rhythm no longer present  Myocardial infarct finding still present     CBC WITH DIFFERENTIAL    Collection Time: 19  6:40 PM   Result Value Ref Range    WBC 7.8 4.8 - 10.8 K/uL    RBC 6.14 (H) 4.70 - 6.10 M/uL    Hemoglobin 15.2 14.0 - 18.0 g/dL    Hematocrit 48.3 42.0 - 52.0 %    MCV 78.7 (L) 81.4 - 97.8 fL    MCH 24.8 (L) 27.0 - 33.0 pg    MCHC 31.5 (L) 33.7 - 35.3 g/dL    RDW 45.2 35.9 - 50.0 fL    Platelet Count 164 164 - 446 K/uL    MPV 10.3 9.0 - 12.9 fL    Neutrophils-Polys 86.50 (H) 44.00 - 72.00 %    Lymphocytes 6.80 (L) 22.00 - 41.00 %    Monocytes 5.30 0.00 - 13.40 %    Eosinophils  0.40 0.00 - 6.90 %    Basophils 0.50 0.00 - 1.80 %    Immature Granulocytes 0.50 0.00 - 0.90 %    Nucleated RBC 0.00 /100 WBC    Neutrophils (Absolute) 6.74 1.82 - 7.42 K/uL    Lymphs (Absolute) 0.53 (L) 1.00 - 4.80 K/uL    Monos (Absolute) 0.41 0.00 - 0.85 K/uL    Eos (Absolute) 0.03 0.00 - 0.51 K/uL    Baso (Absolute) 0.04 0.00 - 0.12 K/uL    Immature Granulocytes (abs) 0.04 0.00 - 0.11 K/uL    NRBC (Absolute) 0.00 K/uL   COMP METABOLIC PANEL    Collection Time: 03/21/19  6:40 PM   Result Value Ref Range    Sodium 135 135 - 145 mmol/L    Potassium 4.1 3.6 - 5.5 mmol/L    Chloride 104 96 - 112 mmol/L    Co2 24 20 - 33 mmol/L    Anion Gap 7.0 0.0 - 11.9    Glucose 162 (H) 65 - 99 mg/dL    Bun 24 (H) 8 - 22 mg/dL    Creatinine 1.16 0.50 - 1.40 mg/dL    Calcium 9.5 8.4 - 10.2 mg/dL    AST(SGOT) 25 12 - 45 U/L    ALT(SGPT) 35 2 - 50 U/L    Alkaline Phosphatase 55 30 - 99 U/L    Total Bilirubin 0.8 0.1 - 1.5 mg/dL    Albumin 4.5 3.2 - 4.9 g/dL    Total Protein 7.3 6.0 - 8.2 g/dL    Globulin 2.8 1.9 - 3.5 g/dL    A-G Ratio 1.6 g/dL   LIPASE    Collection Time: 03/21/19  6:40 PM   Result Value Ref Range    Lipase 64 (H) 7 - 58 U/L   TROPONIN    Collection Time: 03/21/19  6:40 PM   Result Value Ref Range    Troponin I <0.02 0.00 - 0.04 ng/mL   LACTIC ACID    Collection Time: 03/21/19  6:40 PM   Result Value Ref Range    Lactic Acid 1.4 0.5 - 2.0 mmol/L   TSH    Collection Time: 03/21/19  6:40 PM   Result Value Ref Range    TSH 0.860 0.380 - 5.330 uIU/mL   ESTIMATED GFR    Collection Time: 03/21/19  6:40 PM   Result Value Ref Range    GFR If African American >60 >60 mL/min/1.73 m 2    GFR If Non African American >60 >60 mL/min/1.73 m 2   D-DIMER    Collection Time: 03/21/19  6:40 PM   Result Value Ref Range    D-Dimer Screen 0.46 0.00 - 0.50 ug/mL (FEU)   BLOOD CULTURE    Collection Time: 03/21/19  7:00 PM   Result Value Ref Range    Significant Indicator NEG     Source BLD     Site PERIPHERAL     Blood Culture       No  growth after 5 days of incubation.  Blood culture testing and Gram stain, if indicated, are  performed at [facility], [address] [place] [address].  Positive blood cultures are  sent to [facility], [address] [place] [address], for organism identification and  susceptibility testing.     Influenza A/B By PCR (Adult - Flu Only)    Collection Time: 19  7:18 PM   Result Value Ref Range    Influenza virus A RNA Negative Negative    Influenza virus B, PCR Negative Negative   BLOOD CULTURE    Collection Time: 19  9:02 PM   Result Value Ref Range    Significant Indicator NEG     Source BLD     Site PERIPHERAL     Blood Culture       No growth after 5 days of incubation.  Blood culture testing and Gram stain, if indicated, are  performed at [facility], [address] [place] [address].  Positive blood cultures are  sent to [facility], [address] [place] [address], for organism identification and  susceptibility testing.     Lactic acid (lactate)    Collection Time: 19 10:19 PM   Result Value Ref Range    Lactic Acid 1.6 0.5 - 2.0 mmol/L   TROPONIN    Collection Time: 19 10:19 PM   Result Value Ref Range    Troponin I <0.02 0.00 - 0.04 ng/mL   EKG (NOW)    Collection Time: 19 10:21 PM   Result Value Ref Range    Report       [facility] Emergency Dept.    Test Date:  2019  Pt Name:    NIRMALA RODRIGUEZ                 Department: [facility]  MRN:        [id number]                      Room:       3313  Gender:     Male                         Technician:   :        1950                   Requested By:AN ARMENTA  Order #:    [id number]                    Reading MD: An Armenta    Measurements  Intervals                                Axis  Rate:       102                          P:          45  WV:         193                          QRS:        -35  QRSD:        94                           T:          64  QT:         380  QTc:        496    Interpretive Statements  Sinus tachycardia  Left axis deviation  Anterior infarct, old  Baseline wander in lead(s) V1,V4  Compared to ECG 03/21/2019 18:19:59  Left-axis deviation now present  Myocardial infarct finding still present    Electronically Signed On 3- 3:34:17 PDT by Jacob Peña     URINALYSIS    Collection Time: 03/21/19 11:10 PM   Result Value Ref Range    Color Yellow     Character Clear     Specific Gravity 1.010 <1.035    Ph 5.5 5.0 - 8.0    Glucose >=1000 (A) Negative mg/dL    Ketones Negative Negative mg/dL    Protein Negative Negative mg/dL    Bilirubin Negative Negative    Nitrite Negative Negative    Leukocyte Esterase Negative Negative    Occult Blood Negative Negative    Micro Urine Req see below    URINE CULTURE(NEW)    Collection Time: 03/21/19 11:10 PM   Result Value Ref Range    Significant Indicator NEG     Source UR     Site -     Urine Culture No growth at 48 hours    Troponin in four (4) hours    Collection Time: 03/22/19  4:04 AM   Result Value Ref Range    Troponin I <0.02 0.00 - 0.04 ng/mL   CBC WITH DIFFERENTIAL    Collection Time: 03/22/19  4:04 AM   Result Value Ref Range    WBC 6.5 4.8 - 10.8 K/uL    RBC 5.64 4.70 - 6.10 M/uL    Hemoglobin 14.1 14.0 - 18.0 g/dL    Hematocrit 44.2 42.0 - 52.0 %    MCV 78.4 (L) 81.4 - 97.8 fL    MCH 25.0 (L) 27.0 - 33.0 pg    MCHC 31.9 (L) 33.7 - 35.3 g/dL    RDW 45.2 35.9 - 50.0 fL    Platelet Count 120 (L) 164 - 446 K/uL    MPV 10.7 9.0 - 12.9 fL    Neutrophils-Polys 83.50 (H) 44.00 - 72.00 %    Lymphocytes 9.80 (L) 22.00 - 41.00 %    Monocytes 5.90 0.00 - 13.40 %    Eosinophils 0.20 0.00 - 6.90 %    Basophils 0.30 0.00 - 1.80 %    Immature Granulocytes 0.30 0.00 - 0.90 %    Nucleated RBC 0.00 /100 WBC    Neutrophils (Absolute) 5.39 1.82 - 7.42 K/uL    Lymphs (Absolute) 0.63 (L) 1.00 - 4.80 K/uL    Monos (Absolute) 0.38 0.00 - 0.85 K/uL    Eos (Absolute) 0.01  0.00 - 0.51 K/uL    Baso (Absolute) 0.02 0.00 - 0.12 K/uL    Immature Granulocytes (abs) 0.02 0.00 - 0.11 K/uL    NRBC (Absolute) 0.00 K/uL   Basic Metabolic Panel (BMP)    Collection Time: 19  4:04 AM   Result Value Ref Range    Sodium 135 135 - 145 mmol/L    Potassium 3.8 3.6 - 5.5 mmol/L    Chloride 103 96 - 112 mmol/L    Co2 24 20 - 33 mmol/L    Glucose 145 (H) 65 - 99 mg/dL    Bun 17 8 - 22 mg/dL    Creatinine 1.07 0.50 - 1.40 mg/dL    Calcium 9.0 8.4 - 10.2 mg/dL    Anion Gap 8.0 0.0 - 11.9   ESTIMATED GFR    Collection Time: 19  4:04 AM   Result Value Ref Range    GFR If African American >60 >60 mL/min/1.73 m 2    GFR If Non African American >60 >60 mL/min/1.73 m 2   EKG in four (4) hours    Collection Time: 19  6:54 AM   Result Value Ref Range    Report       [facility]    Test Date:  2019  Pt Name:    NIRMALA RODRIGUEZ                 Department: [facility]  MRN:        [id number]                      Room:       3313  Gender:     Male                         Technician: CHASIDY  :        1950                   Requested By:EVELYN ALEJANDRE  Order #:    [id number]                    Reading MD: Brianna Posadas    Measurements  Intervals                                Axis  Rate:       93                           P:          51  AL:         186                          QRS:        -49  QRSD:       101                          T:          59  QT:         362  QTc:        451    Interpretive Statements  Sinus rhythm  Atrial premature complex  Anterior infarct, old  LAFP      Electronically Signed On 3- 7:02:23 PDT by Brianna Posadas     Troponin    Collection Time: 19  7:43 AM   Result Value Ref Range    Troponin I <0.02 0.00 - 0.04 ng/mL   ACCU-CHEK GLUCOSE    Collection Time: 19 11:56 AM   Result Value Ref Range    Glucose - Accu-Ck 200 (H) 65 - 99 mg/dL   EC-ECHOCARDIOGRAM COMPLETE W/O CONT    Collection Time: 19  2:00 PM   Result Value Ref Range     Eject.Frac. MOD BP 44.06     Eject.Frac. MOD 4C 42.86     Eject.Frac. MOD 2C 45.21     Left Ventrical Ejection Fraction 35    CBC WITHOUT DIFFERENTIAL    Collection Time: 19  1:05 PM   Result Value Ref Range    WBC 7.9 4.8 - 10.8 K/uL    RBC 5.70 4.70 - 6.10 M/uL    Hemoglobin 14.3 14.0 - 18.0 g/dL    Hematocrit 46.3 42.0 - 52.0 %    MCV 81.2 (L) 81.4 - 97.8 fL    MCH 25.1 (L) 27.0 - 33.0 pg    MCHC 30.9 (L) 33.7 - 35.3 g/dL    RDW 47.3 35.9 - 50.0 fL    Platelet Count 199 164 - 446 K/uL    MPV 10.9 9.0 - 12.9 fL   APTT    Collection Time: 19  1:05 PM   Result Value Ref Range    APTT 29.8 24.7 - 36.0 sec   PT    Collection Time: 19  1:05 PM   Result Value Ref Range    PT 13.7 12.0 - 14.6 sec    INR 1.04 0.87 - 1.13   Comp Metabolic Panel    Collection Time: 19  1:05 PM   Result Value Ref Range    Sodium 141 135 - 145 mmol/L    Potassium 4.3 3.6 - 5.5 mmol/L    Chloride 106 96 - 112 mmol/L    Co2 27 20 - 33 mmol/L    Anion Gap 8.0 0.0 - 11.9    Glucose 153 (H) 65 - 99 mg/dL    Bun 18 8 - 22 mg/dL    Creatinine 0.99 0.50 - 1.40 mg/dL    Calcium 9.5 8.5 - 10.5 mg/dL    AST(SGOT) 20 12 - 45 U/L    ALT(SGPT) 24 2 - 50 U/L    Alkaline Phosphatase 49 30 - 99 U/L    Total Bilirubin 0.6 0.1 - 1.5 mg/dL    Albumin 4.5 3.2 - 4.9 g/dL    Total Protein 6.5 6.0 - 8.2 g/dL    Globulin 2.0 1.9 - 3.5 g/dL    A-G Ratio 2.3 g/dL   ESTIMATED GFR    Collection Time: 19  1:05 PM   Result Value Ref Range    GFR If African American >60 >60 mL/min/1.73 m 2    GFR If Non African American >60 >60 mL/min/1.73 m 2   EKG    Collection Time: 19  1:06 PM   Result Value Ref Range    Report       [facility]    Test Date:  2019  Pt Name:    NIRMALA RODRIGUEZ                 Department: [facility]  MRN:        [id number]                      Room:  Gender:     Male                         Technician: FRACISCO  :        1950                   Requested By:COREY BILLY  Order #:    [id number]                     Reading MD: Shun Benoit MD    Measurements  Intervals                                Axis  Rate:       67                           P:          49  HI:         192                          QRS:        -45  QRSD:       100                          T:          86  QT:         428  QTc:        452    Interpretive Statements  SINUS RHYTHM  LAD, CONSIDER LEFT ANTERIOR FASCICULAR BLOCK  ANTERIOR INFARCT, AGE INDETERMINATE  Compared to ECG 03/22/2019 06:54:52  NO SIGNIFICANT CHANGES    Electronically Signed On 4- 17:11:27 PDT by Shun Benoit MD     POC ACT (resulted by nursing)    Collection Time: 04/30/19  9:07 AM   Result Value Ref Range    Istat Activated Clotting Time 279 (H) 74 - 137 sec         Assessment:     1. Coronary artery disease due to calcified coronary lesion  MR-CARDIAC MORPH/FUNC WITH & W/O    REFERRAL TO CARDIOTHORACIC SURGERY   2. S/P CABG x 3  MR-CARDIAC MORPH/FUNC WITH & W/O    carvedilol (COREG) 25 MG Tab    REFERRAL TO CARDIOTHORACIC SURGERY   3. Ischemic cardiomyopathy     4. Chronic systolic congestive heart failure (HCC)     5. Hyperlipidemia, unspecified hyperlipidemia type  rosuvastatin (CRESTOR) 20 MG Tab       Medical Decision Making:  Today's Assessment / Status / Plan:     It was my pleasure to meet with Mr. Renner.    He is accompanied by his wife    He did have significant infarct back at the time of his initial stenting we could see if he has a viable myocardium in the LAD area quite likely he does given the extent of the collateralization but that may help inform the decision about consideration for revascularization given his reduced EF he did have SVG to the distal LAD which had occluded perhaps to the severity of the target vessel.    Well continue to optomize CHF regimen I further increased his coreg and could switch to entresto but I don't thin k his BP will allow both    He will meet with surgery about redo CABG as well as with Dr. Liu  to talk  about complex PCI.    We started the discussion not a candidate for revascularization or EF does not improve would benefit from ICD    I will see Mr. Renner back in 4 months time and encouraged him to follow up with us over the phone or e-mail using my MyChart as issues arise.    It is my pleasure to participate in the care of Mr. Renner.  Please do not hesitate to contact me with questions or concerns.    Shun Benoit MD PhD [facility]  Cardiologist [facility]    Please note that this dictation was created using voice recognition software. I have worked with consultants from the vendor as well as technical experts from [facility] to optimize the interface. I have made every reasonable attempt to correct obvious errors, but I expect that there are errors of grammar and possibly content I did not discover before finalizing the note.     His care is highly complex due to the extensive discussion about complex procedures to review of prior records remotely

## 2019-05-08 ENCOUNTER — PATIENT OUTREACH (OUTPATIENT)
Dept: HEALTH INFORMATION MANAGEMENT | Facility: OTHER | Age: 69
End: 2019-05-08

## 2019-05-08 ENCOUNTER — PATIENT MESSAGE (OUTPATIENT)
Dept: HEALTH INFORMATION MANAGEMENT | Facility: OTHER | Age: 69
End: 2019-05-08

## 2019-05-13 NOTE — PROGRESS NOTES
Outreach call to the patient for ccm services. The patient states that he has received a letter and is planning on attending Heart Failure support group at [facility]. The patient declines other services at this time.  Plan: will not continue to follow at this time.

## 2019-05-15 DIAGNOSIS — E11.8 CONTROLLED TYPE 2 DIABETES MELLITUS WITH COMPLICATION, WITHOUT LONG-TERM CURRENT USE OF INSULIN (HCC): ICD-10-CM

## 2019-05-15 NOTE — TELEPHONE ENCOUNTER
Was the patient seen in the last year in this department? Yes    Does patient have an active prescription for medications requested? No     Received Request Via: Pharmacy       Pharmacy requesting we send for 90 days.

## 2019-05-22 ENCOUNTER — OFFICE VISIT (OUTPATIENT)
Dept: CARDIOLOGY | Facility: MEDICAL CENTER | Age: 69
End: 2019-05-22
Payer: MEDICARE

## 2019-05-22 ENCOUNTER — OFFICE VISIT (OUTPATIENT)
Dept: SURGERY | Facility: MEDICAL CENTER | Age: 69
End: 2019-05-22
Payer: MEDICARE

## 2019-05-22 VITALS
BODY MASS INDEX: 29.29 KG/M2 | TEMPERATURE: 96.4 F | HEIGHT: 71 IN | DIASTOLIC BLOOD PRESSURE: 60 MMHG | OXYGEN SATURATION: 93 % | SYSTOLIC BLOOD PRESSURE: 102 MMHG | HEART RATE: 77 BPM | WEIGHT: 209.22 LBS

## 2019-05-22 VITALS
HEIGHT: 71 IN | WEIGHT: 209.77 LBS | SYSTOLIC BLOOD PRESSURE: 120 MMHG | OXYGEN SATURATION: 94 % | BODY MASS INDEX: 29.37 KG/M2 | HEART RATE: 72 BPM | DIASTOLIC BLOOD PRESSURE: 82 MMHG

## 2019-05-22 DIAGNOSIS — I25.84 CORONARY ARTERY DISEASE DUE TO CALCIFIED CORONARY LESION: ICD-10-CM

## 2019-05-22 DIAGNOSIS — I25.10 CORONARY ARTERY DISEASE DUE TO CALCIFIED CORONARY LESION: ICD-10-CM

## 2019-05-22 PROCEDURE — 99214 OFFICE O/P EST MOD 30 MIN: CPT | Performed by: INTERNAL MEDICINE

## 2019-05-22 PROCEDURE — 99205 OFFICE O/P NEW HI 60 MIN: CPT | Performed by: THORACIC SURGERY (CARDIOTHORACIC VASCULAR SURGERY)

## 2019-05-22 NOTE — PROGRESS NOTES
Cardiology Follow-up Consultation Note    Date of note:    5/22/2019    Primary Care Provider: Oxana Parkinson M.D.    Name:             Luis Renner   YOB: 1950  MRN:               [id number]    CC: Follow up LAD     Patient ID/HPI:   69-year-old male patient here to discuss regarding his LAD .  He had a history of coronary artery bypass surgery, recent evaluation showed drop in his ejection fraction, coronary angiogram showed occluded LAD graft with chronic total occlusion of LAD.  He was evaluated by my partner Dr. Benoit, refer him to discuss risks benefits of  PCI.  He says he is completely asymptomatic and able to carry on all the activities that he would like to do.  Denies chest pain or shortness of breath with exertion, denies leg swelling.      ROS    All systems reviewed and negative except mentioned above    Past Medical History:   Diagnosis Date   • Backpain     6-7/10   • Bilateral renal cysts    • Bronchitis    • C. difficile diarrhea     pt with hx of c diff after hospitalization [id number]   • CAD (coronary artery disease) March 2013    ACS. PCI/ANA PAULA x 2 of the LAD (2.5 x 12mm - mid; 2.75 x 16mm - proximal). Distal RCA is occluded; distal circumflex is occluded with good collateralization.   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   • Myocardial infarct (HCC) 3/7/2013   • Psychiatric problem     anxiety         Past Surgical History:   Procedure Laterality Date   • MULTIPLE CORONARY ARTERY BYPASS ENDO VEIN HARVEST  7/22/2016    Procedure: MULTIPLE CORONARY ARTERY BYPASS ENDO VEIN HARVEST;  Surgeon: David Flowers M.D.;  Location: SURGERY [facility];   Service:    • LILLY  7/22/2016    Procedure: LILLY;  Surgeon: David Flowers M.D.;  Location: SURGERY [facility];  Service:    • RECOVERY  7/21/2016    Procedure: CATH LAB [facility] WITH POSSIBLE DR. LYN;  Surgeon: Recoveryonly Surgery;  Location: SURGERY PRE-POST PROC UNIT [facility];  Service:    • OTHER NEUROLOGICAL SURG  2014    diskectomy   • OTHER CARDIAC SURGERY  2013    stent   • CHOLECYSTECTOMY  2004         Current Outpatient Prescriptions   Medication Sig Dispense Refill   • liraglutide (VICTOZA) 18 MG/3ML Solution Pen-injector injection Inject 0.3 mL as instructed every bedtime. 27 mL 1   • carvedilol (COREG) 25 MG Tab Take 1 Tab by mouth 2 times a day, with meals. 180 Tab 3   • rosuvastatin (CRESTOR) 20 MG Tab Take 1 Tab by mouth every evening. 90 Tab 3   • clopidogrel (PLAVIX) 75 MG Tab Take 1 Tab by mouth every day. 90 Tab 3   • GABAPENTIN PO Take 300 mg by mouth as needed.     • methocarbamol (ROBAXIN) 500 MG Tab Take 1,000 mg by mouth 3 times a day as needed.     • omeprazole (PRILOSEC) 20 MG delayed-release capsule Take 1 Cap by mouth 1 time daily as needed. TAKE 1 CAPSULE BY MOUTH ONCE DAILY     • tamsulosin (FLOMAX) 0.4 MG capsule Take 1 Cap by mouth every evening.     • citalopram (CELEXA) 20 MG Tab Take 1 Tab by mouth every 48 hours. 30 Tab 11   • ezetimibe (ZETIA) 10 MG Tab Take 1 Tab by mouth every bedtime. 90 Tab 3   • HYDROcodone-acetaminophen (NORCO) 5-325 MG Tab per tablet      • allopurinol (ZYLOPRIM) 300 MG Tab TAKE 1 TABLET BY MOUTH ONCE DAILY 90 Tab 3   • lisinopril (PRINIVIL) 5 MG Tab Take 1 Tab by mouth every day. 90 Tab 3   • JARDIANCE 25 MG Tab Take  by mouth every day.     • glipiZIDE (GLUCOTROL) 5 MG Tab Take 2.5 mg by mouth 2 times a day.     • metFORMIN (GLUCOPHAGE) 850 MG Tab 2 times a day.     • aspirin 81 MG tablet Take 81 mg by mouth every bedtime.     • Lancets 1 Each by Does not apply route 3 times a day. Lancets for Freestyle Lite meter. Sig: use TID and prn ssx high or  "low sugar. 100 Each 11     No current facility-administered medications for this visit.          Allergies   Allergen Reactions   • Byetta      urticaria   • Other Environmental      Grass and cats         Family History   Problem Relation Age of Onset   • Cancer Mother    • Hypertension Father    • Hyperlipidemia Brother         x 2   • Hypertension Brother         x2   • Heart Disease Brother         x2, afib; cad x 1   • Cancer Paternal Uncle    • Diabetes Neg Hx          Social History     Social History   • Marital status:      Spouse name: N/A   • Number of children: N/A   • Years of education: N/A     Occupational History   • Not on file.     Social History Main Topics   • Smoking status: Never Smoker   • Smokeless tobacco: Never Used   • Alcohol use No   • Drug use: No   • Sexual activity: Yes     Partners: Female     Other Topics Concern   • Not on file     Social History Narrative   • No narrative on file         Physical Exam:  Ambulatory Vitals  /82 (BP Location: Right arm, Patient Position: Sitting, BP Cuff Size: Adult)   Pulse 72   Ht 1.803 m (5' 11\")   Wt 95.1 kg (209 lb 12.3 oz)   SpO2 94%    Oxygen Therapy:  Pulse Oximetry: 94 %  BP Readings from Last 4 Encounters:   05/22/19 120/82   05/22/19 102/60   05/01/19 108/48   04/30/19 138/85       Weight/BMI: Body mass index is 29.26 kg/m².  Wt Readings from Last 4 Encounters:   05/22/19 95.1 kg (209 lb 12.3 oz)   05/22/19 94.9 kg (209 lb 3.5 oz)   05/01/19 90.7 kg (200 lb)   04/30/19 93.9 kg (207 lb 0.2 oz)     General: Well appearing and in no apparent distress  Head: atrumatic  Eyes: No conjunctival pallor   ENT: normal external appearance of nose and ears  Neck: JVD absent, carotid bruits absent  Lungs: respiratory sounds  normal, additional breath sounds absent  Heart: Regular rhythm,   No palpable thrills on palpation, murmurs absent, no rubs,   Lower extremity edema absent.   Pedal pulses normal  Abdomen: soft, non tender, non " distended.  Extremities/MSK: no clubbing, no cyanosis  Neurological: normal orientation, Gait normal   Psychiatric: Appropriate affect, intact judgement and insight  Skin: Warm extremities        Lab Data Review:  Lab Results   Component Value Date/Time    CHOLSTRLTOT 115 11/13/2018 08:45 AM    LDL 44 11/13/2018 08:45 AM    HDL 45 11/13/2018 08:45 AM    TRIGLYCERIDE 132 11/13/2018 08:45 AM       Lab Results   Component Value Date/Time    SODIUM 141 04/29/2019 01:05 PM    POTASSIUM 4.3 04/29/2019 01:05 PM    CHLORIDE 106 04/29/2019 01:05 PM    CO2 27 04/29/2019 01:05 PM    GLUCOSE 153 (H) 04/29/2019 01:05 PM    BUN 18 04/29/2019 01:05 PM    CREATININE 0.99 04/29/2019 01:05 PM     Lab Results   Component Value Date/Time    ALKPHOSPHAT 49 04/29/2019 01:05 PM    ASTSGOT 20 04/29/2019 01:05 PM    ALTSGPT 24 04/29/2019 01:05 PM    TBILIRUBIN 0.6 04/29/2019 01:05 PM      Lab Results   Component Value Date/Time    WBC 7.9 04/29/2019 01:05 PM     4/30/2019  1.  Left main coronary artery:  Normal.  2.  Left anterior descending artery:  Occluded in proximal portion. Distal LAD fills through left to left and right to left collaterals.  3.  Left circumflex coronary artery: Chronically occluded, marginal artery fills through right to left collaterals, this was not bypassed.  Large Ramus branch has 70% proximal disease, distal vessel fills through LIMA.  4.  Right coronary artery:  Distal RCA is occluded,fills through vein graft.  This is a right dominant system.  5. Vein graft to RCA: widely patent, good run off  6. Vein graft to LAD: occluded  7.  LIMA to large Ramus: widely patent, good run off.  5.  Left ventricular end diastolic pressure:  22mmHg.  No signficant gradient across the aortic valve.  6.  Left ventriculogram:  Ejection fraction of 25-30%.    Impression and Plan:  69-year-old male patient with ischemic cardiomyopathy, coronary artery disease,  of LAD here to discuss regarding percutaneous coronary intervention  of .  During his coronary angiogram I tried briefly with a soft wire, I could able to pass to the proximal But unable to advance even a 2 mm balloon within stent segment.  He will likely need laser atherectomy if he could able to cross the .  I would give about 3 to 5% chance of complications with the procedure.  There is no good evidence that  PCI would improve LV function.  Patient is doing really well clinically, I would advise reserve  PCI only if he becomes symptomatic.  Patient understands, agrees with plan.      Jerad VALERA  Interventional cardiologist  [facility], [address]  [address] [address]  Phone: [phone]  Fax: [phone]

## 2019-06-04 ENCOUNTER — TELEPHONE (OUTPATIENT)
Dept: CARDIOLOGY | Facility: MEDICAL CENTER | Age: 69
End: 2019-06-04

## 2019-06-04 DIAGNOSIS — I50.22 CHRONIC SYSTOLIC CONGESTIVE HEART FAILURE (HCC): Chronic | ICD-10-CM

## 2019-06-04 DIAGNOSIS — I25.10 CORONARY ARTERY DISEASE DUE TO CALCIFIED CORONARY LESION: ICD-10-CM

## 2019-06-04 DIAGNOSIS — I10 ESSENTIAL HYPERTENSION: Chronic | ICD-10-CM

## 2019-06-04 DIAGNOSIS — I25.84 CORONARY ARTERY DISEASE DUE TO CALCIFIED CORONARY LESION: ICD-10-CM

## 2019-06-04 DIAGNOSIS — Z95.1 S/P CABG X 3: ICD-10-CM

## 2019-06-04 DIAGNOSIS — I25.5 ISCHEMIC CARDIOMYOPATHY: ICD-10-CM

## 2019-06-04 NOTE — TELEPHONE ENCOUNTER
Luis Benoit M.D. 2 days ago         [place]   Taking 50 of Coreg daily   Makes me lightheaded and caused me to fall   Even changes my vision   ???    Attempted to call pt at [phone], left voicemail with call back instructions. Since pt is currently out of the country and may not have access to phone calls, the following [employer] message was sent:     ===========================================    Me   to Luis Renner         1:58 PM   Hi Luis,     I'm sorry to hear that you're having troubles while on vacation! I tried calling you at [phone], but if you're out of the country you might not be able to receive calls.     I see that your Coreg was increased by Dr. Benoit on 5/1/19. Is now the first time you have had issues since increasing the dose?  Are you taking the 50mg in two separate doses, 25mg in the morning and 25mg in the evening? Do you have any way to check your blood pressure? How have your blood sugars been?     Of course if you are having any acute symptoms or concerns or think you injured yourself or hit your head with your fall, please go to the emergency room.     Take care,     Ewelina DOAN/ Dr. Tierney

## 2019-06-05 RX ORDER — CARVEDILOL 12.5 MG/1
12.5 TABLET ORAL 2 TIMES DAILY WITH MEALS
Qty: 90 TAB | Refills: 3 | Status: SHIPPED | OUTPATIENT
Start: 2019-06-05 | End: 2019-06-11

## 2019-06-05 NOTE — TELEPHONE ENCOUNTER
Jerad Liu M.D.   You 5 minutes ago (4:20 PM)      Advised him to cut it back to 12.5 BID. (Routing comment)      Sent [employer] message to pt advising him of Dr. Liu's recommendations. Encouraged him to get back to us, either call or MyChart, with how he does with lowered dose. Rx updated.

## 2019-06-05 NOTE — TELEPHONE ENCOUNTER
Luis Hanson Zurdo   You 9 hours ago (11:25 PM)         Yes first time with issues   Yes I take the dose separately   I have no way to check bp and it is not from low blood sugar   I luckily did not injure myself badly falling.      To Dr. Liu: Please advise

## 2019-06-11 RX ORDER — CARVEDILOL 25 MG/1
25 TABLET ORAL 2 TIMES DAILY
Qty: 180 TAB | Refills: 3 | Status: SHIPPED | OUTPATIENT
Start: 2019-06-11 | End: 2019-06-27 | Stop reason: SDUPTHER

## 2019-06-11 NOTE — TELEPHONE ENCOUNTER
Called pt to follow up. He is now back in the country and reports that he continued taking Coreg 25mg BID and did not decrease it. He reports that he didn't have any further issues and would like to continue taking original dose. Med rec updated to 25mg BID. Encouraged pt to call us back if he has any additional issues.

## 2019-06-24 RX ORDER — PEN NEEDLE, DIABETIC 32GX 5/32"
NEEDLE, DISPOSABLE MISCELLANEOUS
Qty: 100 EACH | Refills: 11 | Status: SHIPPED
Start: 2019-06-24 | End: 2020-07-09

## 2019-06-26 ENCOUNTER — APPOINTMENT (OUTPATIENT)
Dept: RADIOLOGY | Facility: MEDICAL CENTER | Age: 69
End: 2019-06-26
Attending: INTERNAL MEDICINE
Payer: MEDICARE

## 2019-06-27 ENCOUNTER — TELEPHONE (OUTPATIENT)
Dept: CARDIOLOGY | Facility: MEDICAL CENTER | Age: 69
End: 2019-06-27

## 2019-06-27 DIAGNOSIS — I25.5 ISCHEMIC CARDIOMYOPATHY: ICD-10-CM

## 2019-06-27 DIAGNOSIS — I25.10 CORONARY ARTERY DISEASE DUE TO CALCIFIED CORONARY LESION: ICD-10-CM

## 2019-06-27 DIAGNOSIS — I10 ESSENTIAL HYPERTENSION: Chronic | ICD-10-CM

## 2019-06-27 DIAGNOSIS — I25.84 CORONARY ARTERY DISEASE DUE TO CALCIFIED CORONARY LESION: ICD-10-CM

## 2019-06-27 DIAGNOSIS — I50.22 CHRONIC SYSTOLIC CONGESTIVE HEART FAILURE (HCC): Chronic | ICD-10-CM

## 2019-06-27 DIAGNOSIS — Z95.1 S/P CABG X 3: ICD-10-CM

## 2019-06-27 RX ORDER — CARVEDILOL 6.25 MG/1
6.25 TABLET ORAL 2 TIMES DAILY WITH MEALS
Qty: 180 TAB | Refills: 3 | Status: SHIPPED
Start: 2019-06-27 | End: 2019-12-17 | Stop reason: SDUPTHER

## 2019-06-27 NOTE — TELEPHONE ENCOUNTER
Replied to pt via [employer] regarding APRN recommendations.    Carvedilol 6.25mg BID ordered and sent to pt preferred pharmacy, [facility].    ---------------  Prescription Question   MACY Rothman.   You 32 minutes ago (12:56 PM)      Prior CW patient but AK was ADD when he cut coreg in half, with worsening symptoms-recommend cut coreg again in half to 6.25 mg BID and watch BP/HR daily. Also recommend coming in for apt with recurrence of these events. There may be something else going on. SC (Routing comment)      Luis Benoit M.D. Yesterday (12:23 PM)      I have cut my carvedilol dos in half.   On Monday I experience a white out effect in the sunlight where I basically couldn’t see and felt very weak.

## 2019-07-05 ENCOUNTER — HOSPITAL ENCOUNTER (OUTPATIENT)
Dept: LAB | Facility: MEDICAL CENTER | Age: 69
End: 2019-07-05
Attending: INTERNAL MEDICINE
Payer: MEDICARE

## 2019-07-05 ENCOUNTER — HOSPITAL ENCOUNTER (OUTPATIENT)
Dept: LAB | Facility: MEDICAL CENTER | Age: 69
End: 2019-07-05
Attending: NURSE PRACTITIONER
Payer: MEDICARE

## 2019-07-05 ENCOUNTER — HOSPITAL ENCOUNTER (OUTPATIENT)
Dept: LAB | Facility: MEDICAL CENTER | Age: 69
End: 2019-07-05
Attending: ANESTHESIOLOGY
Payer: MEDICARE

## 2019-07-05 DIAGNOSIS — E78.5 DYSLIPIDEMIA: ICD-10-CM

## 2019-07-05 DIAGNOSIS — D69.6 THROMBOCYTOPENIA (HCC): ICD-10-CM

## 2019-07-05 LAB
ALBUMIN SERPL BCP-MCNC: 4.3 G/DL (ref 3.2–4.9)
ALBUMIN/GLOB SERPL: 1.5 G/DL
ALP SERPL-CCNC: 61 U/L (ref 30–99)
ALT SERPL-CCNC: 33 U/L (ref 2–50)
ANION GAP SERPL CALC-SCNC: 7 MMOL/L (ref 0–11.9)
ANION GAP SERPL CALC-SCNC: 9 MMOL/L (ref 0–11.9)
ANISOCYTOSIS BLD QL SMEAR: ABNORMAL
APPEARANCE UR: CLEAR
APTT PPP: 29.7 SEC (ref 24.7–36)
AST SERPL-CCNC: 24 U/L (ref 12–45)
BACTERIA #/AREA URNS HPF: NEGATIVE /HPF
BASOPHILS # BLD AUTO: 0.9 % (ref 0–1.8)
BASOPHILS # BLD AUTO: 1 % (ref 0–1.8)
BASOPHILS # BLD: 0.09 K/UL (ref 0–0.12)
BASOPHILS # BLD: 0.11 K/UL (ref 0–0.12)
BILIRUB SERPL-MCNC: 0.6 MG/DL (ref 0.1–1.5)
BILIRUB UR QL STRIP.AUTO: NEGATIVE
BUN SERPL-MCNC: 16 MG/DL (ref 8–22)
BUN SERPL-MCNC: 18 MG/DL (ref 8–22)
BURR CELLS BLD QL SMEAR: NORMAL
CALCIUM SERPL-MCNC: 10 MG/DL (ref 8.5–10.5)
CALCIUM SERPL-MCNC: 9.8 MG/DL (ref 8.5–10.5)
CHLORIDE SERPL-SCNC: 103 MMOL/L (ref 96–112)
CHLORIDE SERPL-SCNC: 104 MMOL/L (ref 96–112)
CHOLEST SERPL-MCNC: 94 MG/DL (ref 100–199)
CO2 SERPL-SCNC: 26 MMOL/L (ref 20–33)
CO2 SERPL-SCNC: 27 MMOL/L (ref 20–33)
COLOR UR: YELLOW
COMMENT 1642: NORMAL
CREAT SERPL-MCNC: 1.05 MG/DL (ref 0.5–1.4)
CREAT SERPL-MCNC: 1.35 MG/DL (ref 0.5–1.4)
EOSINOPHIL # BLD AUTO: 0.34 K/UL (ref 0–0.51)
EOSINOPHIL # BLD AUTO: 0.36 K/UL (ref 0–0.51)
EOSINOPHIL NFR BLD: 3.4 % (ref 0–6.9)
EOSINOPHIL NFR BLD: 3.5 % (ref 0–6.9)
EPI CELLS #/AREA URNS HPF: NEGATIVE /HPF
ERYTHROCYTE [DISTWIDTH] IN BLOOD BY AUTOMATED COUNT: 47.2 FL (ref 35.9–50)
ERYTHROCYTE [DISTWIDTH] IN BLOOD BY AUTOMATED COUNT: 47.8 FL (ref 35.9–50)
GLOBULIN SER CALC-MCNC: 2.9 G/DL (ref 1.9–3.5)
GLUCOSE SERPL-MCNC: 248 MG/DL (ref 65–99)
GLUCOSE SERPL-MCNC: 99 MG/DL (ref 65–99)
GLUCOSE UR STRIP.AUTO-MCNC: >=1000 MG/DL
HCT VFR BLD AUTO: 46.6 % (ref 42–52)
HCT VFR BLD AUTO: 46.7 % (ref 42–52)
HDLC SERPL-MCNC: 33 MG/DL
HGB BLD-MCNC: 13.7 G/DL (ref 14–18)
HGB BLD-MCNC: 14.3 G/DL (ref 14–18)
HYALINE CASTS #/AREA URNS LPF: ABNORMAL /LPF
IMM GRANULOCYTES # BLD AUTO: 0.06 K/UL (ref 0–0.11)
IMM GRANULOCYTES # BLD AUTO: 0.07 K/UL (ref 0–0.11)
IMM GRANULOCYTES NFR BLD AUTO: 0.6 % (ref 0–0.9)
IMM GRANULOCYTES NFR BLD AUTO: 0.7 % (ref 0–0.9)
INR PPP: 1.01 (ref 0.87–1.13)
KETONES UR STRIP.AUTO-MCNC: NEGATIVE MG/DL
LDLC SERPL CALC-MCNC: 38 MG/DL
LEUKOCYTE ESTERASE UR QL STRIP.AUTO: NEGATIVE
LYMPHOCYTES # BLD AUTO: 1.89 K/UL (ref 1–4.8)
LYMPHOCYTES # BLD AUTO: 1.99 K/UL (ref 1–4.8)
LYMPHOCYTES NFR BLD: 17.7 % (ref 22–41)
LYMPHOCYTES NFR BLD: 20.7 % (ref 22–41)
MCH RBC QN AUTO: 23.9 PG (ref 27–33)
MCH RBC QN AUTO: 25 PG (ref 27–33)
MCHC RBC AUTO-ENTMCNC: 29.3 G/DL (ref 33.7–35.3)
MCHC RBC AUTO-ENTMCNC: 30.7 G/DL (ref 33.7–35.3)
MCV RBC AUTO: 81.5 FL (ref 81.4–97.8)
MCV RBC AUTO: 81.5 FL (ref 81.4–97.8)
MICRO URNS: ABNORMAL
MICROCYTES BLD QL SMEAR: ABNORMAL
MONOCYTES # BLD AUTO: 0.69 K/UL (ref 0–0.85)
MONOCYTES # BLD AUTO: 0.74 K/UL (ref 0–0.85)
MONOCYTES NFR BLD AUTO: 6.9 % (ref 0–13.4)
MONOCYTES NFR BLD AUTO: 7.2 % (ref 0–13.4)
MORPHOLOGY BLD-IMP: NORMAL
NEUTROPHILS # BLD AUTO: 6.46 K/UL (ref 1.82–7.42)
NEUTROPHILS # BLD AUTO: 7.5 K/UL (ref 1.82–7.42)
NEUTROPHILS NFR BLD: 67.1 % (ref 44–72)
NEUTROPHILS NFR BLD: 70.3 % (ref 44–72)
NITRITE UR QL STRIP.AUTO: NEGATIVE
NRBC # BLD AUTO: 0 K/UL
NRBC # BLD AUTO: 0 K/UL
NRBC BLD-RTO: 0 /100 WBC
NRBC BLD-RTO: 0 /100 WBC
OVALOCYTES BLD QL SMEAR: NORMAL
PH UR STRIP.AUTO: 5.5 [PH]
PLATELET # BLD AUTO: 217 K/UL (ref 164–446)
PLATELET # BLD AUTO: 247 K/UL (ref 164–446)
PLATELET BLD QL SMEAR: NORMAL
PMV BLD AUTO: 10.9 FL (ref 9–12.9)
PMV BLD AUTO: 11.1 FL (ref 9–12.9)
POIKILOCYTOSIS BLD QL SMEAR: NORMAL
POTASSIUM SERPL-SCNC: 4.1 MMOL/L (ref 3.6–5.5)
POTASSIUM SERPL-SCNC: 4.2 MMOL/L (ref 3.6–5.5)
PROT SERPL-MCNC: 7.2 G/DL (ref 6–8.2)
PROT UR QL STRIP: 30 MG/DL
PROTHROMBIN TIME: 13.5 SEC (ref 12–14.6)
RBC # BLD AUTO: 5.72 M/UL (ref 4.7–6.1)
RBC # BLD AUTO: 5.73 M/UL (ref 4.7–6.1)
RBC # URNS HPF: ABNORMAL /HPF
RBC BLD AUTO: PRESENT
RBC UR QL AUTO: NEGATIVE
SODIUM SERPL-SCNC: 137 MMOL/L (ref 135–145)
SODIUM SERPL-SCNC: 139 MMOL/L (ref 135–145)
SP GR UR STRIP.AUTO: 1.04
TRIGL SERPL-MCNC: 117 MG/DL (ref 0–149)
UROBILINOGEN UR STRIP.AUTO-MCNC: 0.2 MG/DL
WBC # BLD AUTO: 10.7 K/UL (ref 4.8–10.8)
WBC # BLD AUTO: 9.6 K/UL (ref 4.8–10.8)
WBC #/AREA URNS HPF: ABNORMAL /HPF

## 2019-07-05 PROCEDURE — 80053 COMPREHEN METABOLIC PANEL: CPT

## 2019-07-05 PROCEDURE — 36415 COLL VENOUS BLD VENIPUNCTURE: CPT

## 2019-07-05 PROCEDURE — 85730 THROMBOPLASTIN TIME PARTIAL: CPT

## 2019-07-05 PROCEDURE — 85025 COMPLETE CBC W/AUTO DIFF WBC: CPT

## 2019-07-05 PROCEDURE — 85025 COMPLETE CBC W/AUTO DIFF WBC: CPT | Mod: 91

## 2019-07-05 PROCEDURE — 80061 LIPID PANEL: CPT

## 2019-07-05 PROCEDURE — 85610 PROTHROMBIN TIME: CPT

## 2019-07-05 PROCEDURE — 81001 URINALYSIS AUTO W/SCOPE: CPT

## 2019-07-05 PROCEDURE — 80048 BASIC METABOLIC PNL TOTAL CA: CPT

## 2019-07-08 ENCOUNTER — OFFICE VISIT (OUTPATIENT)
Dept: MEDICAL GROUP | Facility: MEDICAL CENTER | Age: 69
End: 2019-07-08
Payer: MEDICARE

## 2019-07-08 VITALS
OXYGEN SATURATION: 95 % | HEIGHT: 70 IN | WEIGHT: 202.4 LBS | SYSTOLIC BLOOD PRESSURE: 116 MMHG | TEMPERATURE: 97.7 F | HEART RATE: 80 BPM | BODY MASS INDEX: 28.98 KG/M2 | DIASTOLIC BLOOD PRESSURE: 78 MMHG

## 2019-07-08 DIAGNOSIS — Z23 NEED FOR VACCINATION: ICD-10-CM

## 2019-07-08 DIAGNOSIS — I10 ESSENTIAL HYPERTENSION: Chronic | ICD-10-CM

## 2019-07-08 DIAGNOSIS — N28.1 BILATERAL RENAL CYSTS: ICD-10-CM

## 2019-07-08 DIAGNOSIS — R94.4 DECREASED GFR: ICD-10-CM

## 2019-07-08 DIAGNOSIS — E11.29 MICROALBUMINURIA DUE TO TYPE 2 DIABETES MELLITUS (HCC): ICD-10-CM

## 2019-07-08 DIAGNOSIS — F41.1 GAD (GENERALIZED ANXIETY DISORDER): ICD-10-CM

## 2019-07-08 DIAGNOSIS — I25.84 CORONARY ARTERY DISEASE DUE TO CALCIFIED CORONARY LESION: ICD-10-CM

## 2019-07-08 DIAGNOSIS — I25.10 CORONARY ARTERY DISEASE DUE TO CALCIFIED CORONARY LESION: ICD-10-CM

## 2019-07-08 DIAGNOSIS — L57.0 ACTINIC KERATOSIS: ICD-10-CM

## 2019-07-08 DIAGNOSIS — M10.00 IDIOPATHIC GOUT, UNSPECIFIED CHRONICITY, UNSPECIFIED SITE: ICD-10-CM

## 2019-07-08 DIAGNOSIS — Z00.00 HEALTH CARE MAINTENANCE: ICD-10-CM

## 2019-07-08 DIAGNOSIS — E78.5 DYSLIPIDEMIA: ICD-10-CM

## 2019-07-08 DIAGNOSIS — E11.8 CONTROLLED TYPE 2 DIABETES MELLITUS WITH COMPLICATION, WITHOUT LONG-TERM CURRENT USE OF INSULIN (HCC): ICD-10-CM

## 2019-07-08 DIAGNOSIS — Z12.5 SCREENING FOR MALIGNANT NEOPLASM OF PROSTATE: ICD-10-CM

## 2019-07-08 DIAGNOSIS — Z95.1 S/P CABG X 3: ICD-10-CM

## 2019-07-08 DIAGNOSIS — K21.9 GASTROESOPHAGEAL REFLUX DISEASE WITHOUT ESOPHAGITIS: ICD-10-CM

## 2019-07-08 DIAGNOSIS — I50.22 CHRONIC SYSTOLIC CONGESTIVE HEART FAILURE (HCC): Chronic | ICD-10-CM

## 2019-07-08 DIAGNOSIS — D64.9 ANEMIA, UNSPECIFIED TYPE: ICD-10-CM

## 2019-07-08 DIAGNOSIS — R80.9 MICROALBUMINURIA DUE TO TYPE 2 DIABETES MELLITUS (HCC): ICD-10-CM

## 2019-07-08 DIAGNOSIS — I25.5 ISCHEMIC CARDIOMYOPATHY: ICD-10-CM

## 2019-07-08 DIAGNOSIS — Z00.00 MEDICARE ANNUAL WELLNESS VISIT, SUBSEQUENT: ICD-10-CM

## 2019-07-08 DIAGNOSIS — Z11.59 NEED FOR HEPATITIS C SCREENING TEST: ICD-10-CM

## 2019-07-08 DIAGNOSIS — N40.1 BENIGN PROSTATIC HYPERPLASIA WITH LOWER URINARY TRACT SYMPTOMS, SYMPTOM DETAILS UNSPECIFIED: ICD-10-CM

## 2019-07-08 PROCEDURE — G0010 ADMIN HEPATITIS B VACCINE: HCPCS | Performed by: INTERNAL MEDICINE

## 2019-07-08 PROCEDURE — 90746 HEPB VACCINE 3 DOSE ADULT IM: CPT | Performed by: INTERNAL MEDICINE

## 2019-07-08 PROCEDURE — G0009 ADMIN PNEUMOCOCCAL VACCINE: HCPCS | Performed by: INTERNAL MEDICINE

## 2019-07-08 PROCEDURE — 90732 PPSV23 VACC 2 YRS+ SUBQ/IM: CPT | Performed by: INTERNAL MEDICINE

## 2019-07-08 PROCEDURE — G0439 PPPS, SUBSEQ VISIT: HCPCS | Performed by: INTERNAL MEDICINE

## 2019-07-08 PROCEDURE — 92250 FUNDUS PHOTOGRAPHY W/I&R: CPT | Mod: TC | Performed by: INTERNAL MEDICINE

## 2019-07-08 RX ORDER — LISINOPRIL 5 MG/1
5 TABLET ORAL DAILY
Qty: 90 TAB | Refills: 3 | Status: SHIPPED | OUTPATIENT
Start: 2019-07-08 | End: 2019-07-17

## 2019-07-08 ASSESSMENT — ENCOUNTER SYMPTOMS: GENERAL WELL-BEING: FAIR

## 2019-07-08 ASSESSMENT — ACTIVITIES OF DAILY LIVING (ADL): BATHING_REQUIRES_ASSISTANCE: 0

## 2019-07-08 ASSESSMENT — PATIENT HEALTH QUESTIONNAIRE - PHQ9: CLINICAL INTERPRETATION OF PHQ2 SCORE: 0

## 2019-07-08 NOTE — PROGRESS NOTES
Chief Complaint   Patient presents with   • Annual Wellness Visit   Labs, abnormal    HPI:  Luis is a 69 y.o. here for Medicare Annual Wellness Visit    DM2 with CKD stage III, microalbuminuria  Onset:  DM2 for 10 yrs.       Meds:   - Victoza 1.2 mg daily  - Jardiance, 25 mg QD  - glipizide 5 mg BID  - metformin 850 mg QD  Used meds as prescribed.    Compliance to meds: yes  Checking feet daily/wear soft socks/shoes: yes     The last A1c: 7.9     Checking blood sugar: yes, once daily  Mean BS: in 150'  Hypoglycemia:  one remote episode   Symptoms of hypoglycemia: sweating, fatigue, pale skin, hunger.     ASA: yes  ACE: yes  Statin: yes     Diet: regular  Exercise: at least 4 d/w  BMI: 28     DIABETES COMPLICATIONS:   Peripheral neuropathy:  No numbness or tingling sensation in the feet.  Retinopathy: No evidence of retinopathy. Last eye exam: 4/2018  Nephropathy: Last microalbumin in 11/2018, pos  CVS: Has CAD  GI: No symptoms of gastropathy (nausea/vomiting).     FH of DM:  none     Decreased GFR  New problem.  The patient had decreased GFR at 53.  Fluid intake: varied.  NSAIDs use: had in the past.    Hypertension/ CAD (St post CABG in 7/16), CHF, cardiomyopathy  Meds: Lisinopril, 5 mg QD, spironolactone, 25 mg QD, carvedilol, 6.25 mg BID; taking as prescribed.    He is not measuring BP at home.  Denies: - headaches, vision problems, tinnitus.  - chest pain/pressure, palpitations, irregular heart beats, exertional, dyspnea, peripheral edema.  Low salt diet: yes  Diet / exercise BMI: as above    FH: multiple  He has been followed up by cardiology.     Dyslipidemia  Meds: Crestor, 10 mg QD, Zetia 10 mg QD. No muscle weakness, cramps, nausea,abdominal discomfort.    Diet / exercise BMI: as above    FH: 2 brothers, father  He has been f/u by cardiology.     Anemia  The patient had slightly lobar hemoglobin;   -He has intermittently abnormal CBC in the past.  He has he had recurrent surgical procedures that might be  the cause.    Patient Active Problem List    Diagnosis Date Noted   • S/P CABG x 3 04/04/2017     Priority: Medium   • Diabetes mellitus type 2, controlled, with complications (HCC) 09/23/2016     Priority: Medium   • Ischemic cardiomyopathy 07/23/2013     Priority: Medium   • Coronary artery disease due to calcified coronary lesion 03/08/2013     Priority: Medium   • Essential hypertension 03/07/2013     Priority: Medium   • Benign prostatic hyperplasia 10/18/2017     Priority: Low   • Idiopathic gout 07/27/2017     Priority: Low   • Actinic keratosis 07/27/2017     Priority: Low   • TEMO (generalized anxiety disorder) 04/04/2017     Priority: Low   • Gastroesophageal reflux disease without esophagitis 01/04/2017     Priority: Low   • Bilateral renal cysts 01/14/2015     Priority: Low   • Chronic systolic congestive heart failure (HCC)        Current Outpatient Prescriptions   Medication Sig Dispense Refill   • carvedilol (COREG) 6.25 MG Tab Take 1 Tab by mouth 2 times a day, with meals. 180 Tab 3   • BD PEN NEEDLE DENIS U/F USE DAILY WITH VICTOZA (100 DAY SUPPLY) 100 Each 11   • liraglutide (VICTOZA) 18 MG/3ML Solution Pen-injector injection Inject 0.3 mL as instructed every bedtime. 27 mL 1   • rosuvastatin (CRESTOR) 20 MG Tab Take 1 Tab by mouth every evening. 90 Tab 3   • clopidogrel (PLAVIX) 75 MG Tab Take 1 Tab by mouth every day. 90 Tab 3   • GABAPENTIN PO Take 300 mg by mouth as needed.     • tamsulosin (FLOMAX) 0.4 MG capsule Take 1 Cap by mouth every evening.     • citalopram (CELEXA) 20 MG Tab Take 1 Tab by mouth every 48 hours. 30 Tab 11   • ezetimibe (ZETIA) 10 MG Tab Take 1 Tab by mouth every bedtime. 90 Tab 3   • Lancets 1 Each by Does not apply route 3 times a day. Lancets for Freestyle Lite meter. Sig: use TID and prn ssx high or low sugar. 100 Each 11   • allopurinol (ZYLOPRIM) 300 MG Tab TAKE 1 TABLET BY MOUTH ONCE DAILY 90 Tab 3   • lisinopril (PRINIVIL) 5 MG Tab Take 1 Tab by mouth every day. 90  Tab 3   • JARDIANCE 25 MG Tab Take  by mouth every day.     • glipiZIDE (GLUCOTROL) 5 MG Tab Take 2.5 mg by mouth 2 times a day.     • metFORMIN (GLUCOPHAGE) 850 MG Tab 2 times a day.     • aspirin 81 MG tablet Take 81 mg by mouth every bedtime.     • methocarbamol (ROBAXIN) 500 MG Tab Take 1,000 mg by mouth 3 times a day as needed.     • omeprazole (PRILOSEC) 20 MG delayed-release capsule Take 1 Cap by mouth 1 time daily as needed. TAKE 1 CAPSULE BY MOUTH ONCE DAILY     • HYDROcodone-acetaminophen (NORCO) 5-325 MG Tab per tablet        No current facility-administered medications for this visit.       Patient is taking medications as noted in medication list.  Current supplements as per medication list.     Allergies: Other environmental    Current social contact/activities: pt reports meeting with family and friends     Is patient current with immunizations? No, due for HEPATITIS B and PNEUMOVAX (PPSV23). Patient is interested in receiving HEPATITIS B and PNEUMOVAX (PPSV23) today.    He  reports that he has never smoked. He has never used smokeless tobacco. He reports that he does not drink alcohol or use drugs.  Counseling given: Yes        DPA/Advanced directive: Patient has Advanced Directive, but it is not on file. Instructed to bring in a copy to scan into their chart.    ROS:    Gait: Uses no assistive device   Ostomy: No   Other tubes: No   Amputations: No   Chronic oxygen use No   Last eye exam pt reports about 1 year ago   Wears hearing aids: No   : Denies any urinary leakage during the last 6 months    Screening:    DIABETES  Has patient ever had diabetes education? Yes, and is NOT interested in more at this time.    CHF  1.  Date of last echo: 3/22/19, EF 35%  2.  Has patient ever had education regarding CHF? Yes, and is NOT interested in more at this time.  3.  Is patient under the care of a cardiologist? Yes, CareTeam was updated.    Depression Screening  Little interest or pleasure in doing things?   0 - not at all  Feeling down, depressed, or hopeless? 0 - not at all  Patient Health Questionnaire Score: 0    Screening for Cognitive Impairment  Three Minute Recall (village, kitchen, baby)  3/3    Winston clock face with all 12 numbers and set the hands to show 10 past 10.  Yes    If cognitive concerns identified, deferred for follow up unless specifically addressed in assessment and plan.    Fall Risk Assessment  Has the patient had two or more falls in the last year or any fall with injury in the last year?  Yes  If fall risk identified, deferred for follow up unless specifically addressed in assessment and plan.    Safety Assessment  Throw rugs on floor.  Yes  Handrails on all stairs.  Yes  Good lighting in all hallways.  Yes  Difficulty hearing.  No  Patient counseled about all safety risks that were identified.    Functional Assessment ADLs  Are there any barriers preventing you from cooking for yourself or meeting nutritional needs?  No.    Are there any barriers preventing you from driving safely or obtaining transportation?  No.    Are there any barriers preventing you from using a telephone or calling for help?  No.    Are there any barriers preventing you from shopping?  No.    Are there any barriers preventing you from taking care of your own finances?  No.    Are there any barriers preventing you from managing your medications?  No.    Are there any barriers preventing you from showering, bathing or dressing yourself?  No.    Are you currently engaging in any exercise or physical activity?  Yes.  Pt reports being in too much pain in his back, still does a little walking  What is your perception of your health?  Fair.    Health Maintenance Summary                HEPATITIS C SCREENING Overdue 1950     IMM HEP B VACCINE Overdue 5/13/1970     IMM ZOSTER VACCINES Overdue 5/13/2001     Annual Wellness Visit Overdue 7/28/2018      Done 7/27/2017      Patient has more history with this topic...    A1C  SCREENING Overdue 5/13/2019      Done 11/13/2018 HEMOGLOBIN A1C      Patient has more history with this topic...    IMM PNEUMOCOCCAL 65+ (ADULT) LOW/MEDIUM RISK SERIES Overdue 5/25/2019      Done 5/25/2018 Imm Admin: Pneumococcal Conjugate Vaccine (Prevnar/PCV-13)     Patient has more history with this topic...    IMM INFLUENZA Next Due 9/1/2019      Done 10/30/2018 Imm Admin: Influenza Vaccine Adult HD     Patient has more history with this topic...    URINE ACR / MICROALBUMIN Next Due 11/13/2019      Done 11/13/2018 MICROALBUMIN CREAT RATIO URINE      Patient has more history with this topic...    DIABETES MONOFILAMENT / LE EXAM Next Due 12/13/2019      Done 12/13/2018      Patient has more history with this topic...    FASTING LIPID PROFILE Next Due 7/5/2020      Done 7/5/2019 LIPID PROFILE      Patient has more history with this topic...    SERUM CREATININE Next Due 7/5/2020      Done 7/5/2019 COMP METABOLIC PANEL     Patient has more history with this topic...    RETINAL SCREENING Next Due 7/8/2020      Done 7/8/2019      Patient has more history with this topic...    COLONOSCOPY Next Due 2/16/2021      Done 2/16/2018 REFERRAL TO GI FOR COLONOSCOPY     Patient has more history with this topic...    IMM DTaP/Tdap/Td Vaccine Next Due 5/7/2025      Done 5/7/2015 Imm Admin: Tdap Vaccine        Patient Care Team:  Oxana Parkinson M.D. as PCP - General (Family Medicine)  Randy Long M.D. as Consulting Physician (Endocrinology)  Monica Morrison M.D. as Consulting Physician (Cardiology)  Ainsley Moyer R.N. as Registered Nurse (Diabetic Education)  Shun Benoit M.D. as Consulting Physician (Cardiology)    Social History   Substance Use Topics   • Smoking status: Never Smoker   • Smokeless tobacco: Never Used   • Alcohol use No     Family History   Problem Relation Age of Onset   • Cancer Mother    • Hypertension Father    • Hyperlipidemia Brother         x 2   • Hypertension Brother          "x2   • Heart Disease Brother         x2, afib; cad x 1   • Cancer Paternal Uncle    • Diabetes Neg Hx      He  has a past medical history of Backpain; Bilateral renal cysts; Bronchitis; C. difficile diarrhea; CAD (coronary artery disease) (March 2013); Chronic anticoagulation; Chronic systolic congestive heart failure (HCC); DIABETES MELLITUS ( ); Heart burn; Hyperlipidemia ( ); Hypertension; Indigestion; Left ventricular apical thrombus (March 2013); Myocardial infarct (HCC) (3/7/2013); and Psychiatric problem.   Past Surgical History:   Procedure Laterality Date   • MULTIPLE CORONARY ARTERY BYPASS ENDO VEIN HARVEST  7/22/2016    Procedure: MULTIPLE CORONARY ARTERY BYPASS ENDO VEIN HARVEST;  Surgeon: David Flowers M.D.;  Location: SURGERY [facility];  Service:    • LILLY  7/22/2016    Procedure: LILLY;  Surgeon: David Flowers M.D.;  Location: SURGERY [facility];  Service:    • RECOVERY  7/21/2016    Procedure: CATH LAB [facility] WITH POSSIBLE DR. LYN;  Surgeon: Recoveryonly Surgery;  [place] [facility] [place]  Service:    • OTHER NEUROLOGICAL SURG  2014    diskectomy   • OTHER CARDIAC SURGERY  2013    stent   • CHOLECYSTECTOMY  2004       Exam:   /78 (BP Location: Left arm, Patient Position: Sitting, BP Cuff Size: Adult)   Pulse 80   Temp 36.5 °C (97.7 °F) (Temporal)   Ht 1.773 m (5' 9.8\")   Wt 91.8 kg (202 lb 6.4 oz)   SpO2 95%  Body mass index is 29.21 kg/m².  Hearing good.    Dentition good  Alert, oriented in no acute distress.  Eye contact is good, speech goal directed, affect calm    Labs  Reviewed and discussed  Lab Results   Component Value Date/Time    CHOLSTRLTOT 94 (L) 07/05/2019 12:01 PM    LDL 38 07/05/2019 12:01 PM    HDL 33 (A) 07/05/2019 12:01 PM    TRIGLYCERIDE 117 07/05/2019 12:01 PM       Lab Results   Component Value Date/Time    SODIUM 137 07/05/2019 03:18 PM    POTASSIUM 4.1 07/05/2019 03:18 PM    CHLORIDE 104 07/05/2019 03:18 PM    CO2 26 07/05/2019 " 03:18 PM    GLUCOSE 248 (H) 07/05/2019 03:18 PM    BUN 18 07/05/2019 03:18 PM    CREATININE 1.35 07/05/2019 03:18 PM     Lab Results   Component Value Date/Time    ALKPHOSPHAT 61 07/05/2019 12:01 PM    ASTSGOT 24 07/05/2019 12:01 PM    ALTSGPT 33 07/05/2019 12:01 PM    TBILIRUBIN 0.6 07/05/2019 12:01 PM      Lab Results   Component Value Date/Time    HBA1C 7.9 (H) 11/13/2018 08:45 AM    HBA1C 7.2 (H) 05/21/2018 02:07 PM    HBA1C 6.5 (H) 02/27/2018 09:55 AM     No results found for: TSH  Lab Results   Component Value Date/Time    FREET4 0.82 03/08/2013 01:00 AM     Lab Results   Component Value Date/Time    WBC 10.7 07/05/2019 03:18 PM    RBC 5.73 07/05/2019 03:18 PM    HEMOGLOBIN 13.7 (L) 07/05/2019 03:18 PM    HEMATOCRIT 46.7 07/05/2019 03:18 PM    MCV 81.5 07/05/2019 03:18 PM    MCH 23.9 (L) 07/05/2019 03:18 PM    MCHC 29.3 (L) 07/05/2019 03:18 PM    MPV 11.1 07/05/2019 03:18 PM    NEUTSPOLYS 70.30 07/05/2019 03:18 PM    LYMPHOCYTES 17.70 (L) 07/05/2019 03:18 PM    MONOCYTES 6.90 07/05/2019 03:18 PM    EOSINOPHILS 3.40 07/05/2019 03:18 PM    BASOPHILS 1.00 07/05/2019 03:18 PM    HYPOCHROMIA 1+ 09/16/2013 01:13 PM    ANISOCYTOSIS 1+ 07/05/2019 03:18 PM      Assessment and Plan    1. Medicare annual wellness visit, subsequent  Reviewed PMH, PSH, FH, SH, ALL, MEDS, HCM/IMM.   Advised healthy habits, diet, exercise.  - Subsequent Annual Wellness Visit - Includes PPPS ()    2. Health care maintenance  Per HPI  - Subsequent Annual Wellness Visit - Includes PPPS ()    3. Need for vaccination  - Pneumococal Polysaccharide Vaccine 23-Valent =>1YO SQ/IM  - Hepatitis B Vaccine Adult IM  Information was provided to the patient regarding the vaccine, including side effects. Vaccine was given by my medical assistant under my supervision.    4. Need for hepatitis C screening test  - HEP C VIRUS ANTIBODY; Future    5. Screening for malignant neoplasm of prostate  - PROSTATE SPECIFIC AG SCREENING; Future    6. Controlled  type 2 diabetes mellitus with complication, without long-term current use of insulin (HCC)  Last labs were done in December 2018;  -The last A1c was 7.9  -Pending labs    - Subsequent Annual Wellness Visit - Includes PPPS ()  - Comp Metabolic Panel; Future  - HEMOGLOBIN A1C; Future  - MICROALBUMIN CREAT RATIO URINE; Future  - Lipid Profile; Future  - POCT Retinal Eye Exam    7. Microalbuminuria due to type 2 diabetes mellitus (HCC)  As above    8. Decreased GFR  Advised to increase fluid intake, avoid NSAIDs    9. Essential hypertension  Controlled, continue current treatment and cardiology follow-up  - Subsequent Annual Wellness Visit - Includes PPPS ()  - lisinopril (PRINIVIL) 5 MG Tab; Take 1 Tab by mouth every day.  Dispense: 90 Tab; Refill: 3    10. S/P CABG x 3  Stable cardiovascular conditions, continue current treatment and cardiology follow-up    - Subsequent Annual Wellness Visit - Includes PPPS ()  11. Coronary artery disease due to calcified coronary lesion  - Subsequent Annual Wellness Visit - Includes PPPS ()  12. Ischemic cardiomyopathy  - Subsequent Annual Wellness Visit - Includes PPPS ()  13. Chronic systolic congestive heart failure (HCC)  - Subsequent Annual Wellness Visit - Includes PPPS ()    14. Dyslipidemia  Pending labs, continue current treatment  -Lipid panel, Future    15. Gastroesophageal reflux disease without esophagitis  Controlled, continue current treatment, omeprazole 20 mg daily  - Subsequent Annual Wellness Visit - Includes PPPS ()    16. Bilateral renal cysts  Will be followed up  - Subsequent Annual Wellness Visit - Includes PPPS ()    17. Benign prostatic hyperplasia with lower urinary tract symptoms, symptom details unspecified  Controlled, continue Flomax 0.4 mg daily  - Subsequent Annual Wellness Visit - Includes PPPS ()    18. Idiopathic gout, unspecified chronicity, unspecified site  Controlled, continue allopurinol 300 mg  daily  - Subsequent Annual Wellness Visit - Includes PPPS ()    19. TEMO (generalized anxiety disorder)  Controlled, continue Celexa as needed  - Subsequent Annual Wellness Visit - Includes PPPS ()    20. Actinic keratosis  Advised to avoid sun exposure, use sunscreen  - Subsequent Annual Wellness Visit - Includes PPPS ()    21. Anemia, unspecified type  Follow-up labs  CBC, future    Services suggested: No services needed at this time  Health Care Screening recommendations as per orders if indicated.  Referrals offered: PT/OT/Nutrition counseling/Behavioral Health/Smoking cessation as per orders if indicated.    Discussion today about general wellness and lifestyle habits:    · Prevent falls and reduce trip hazards; Cautioned about securing or removing rugs.  · Have a working fire alarm and carbon monoxide detector;   · Engage in regular physical activity and social activities     Follow-up: in 4 weeks, DM labs    I attest that I saw this patient on this date and due to technical issues am not showing as the author of the note.

## 2019-07-09 ENCOUNTER — TELEPHONE (OUTPATIENT)
Dept: CARDIOLOGY | Facility: MEDICAL CENTER | Age: 69
End: 2019-07-09

## 2019-07-09 ENCOUNTER — HOSPITAL ENCOUNTER (OUTPATIENT)
Dept: RADIOLOGY | Facility: MEDICAL CENTER | Age: 69
End: 2019-07-09
Attending: INTERNAL MEDICINE
Payer: MEDICARE

## 2019-07-09 DIAGNOSIS — I25.84 CORONARY ARTERY DISEASE DUE TO CALCIFIED CORONARY LESION: ICD-10-CM

## 2019-07-09 DIAGNOSIS — Z95.1 S/P CABG X 3: ICD-10-CM

## 2019-07-09 DIAGNOSIS — I25.10 CORONARY ARTERY DISEASE DUE TO CALCIFIED CORONARY LESION: ICD-10-CM

## 2019-07-09 PROCEDURE — 700117 HCHG RX CONTRAST REV CODE 255: Performed by: INTERNAL MEDICINE

## 2019-07-09 PROCEDURE — 75561 CARDIAC MRI FOR MORPH W/DYE: CPT

## 2019-07-09 PROCEDURE — A9577 INJ MULTIHANCE: HCPCS | Performed by: INTERNAL MEDICINE

## 2019-07-09 RX ADMIN — GADOBENATE DIMEGLUMINE 18 ML: 529 INJECTION, SOLUTION INTRAVENOUS at 14:59

## 2019-07-09 NOTE — TELEPHONE ENCOUNTER
STAT clearance request received from [facility] neurosurgery for [facility] clearance to proceed under general anesthesia for permanent spinal cord stimulator placement surgery scheduled for 7/11/19.    AK on vacation so clearance given to ADD TT. Clearance completed and pt is okay to proceed and hold ASA and Plavix as needed. Clearance faxed to [phone] and sent to scanning.

## 2019-07-09 NOTE — TELEPHONE ENCOUNTER
Fracnes Almaguer R.N.             AK/Elizabeth Rao at [facility] is asking for clearance to be faxed to her at [phone]. She can be reached at [phone].      Done.

## 2019-07-13 ENCOUNTER — TELEPHONE (OUTPATIENT)
Dept: CARDIOLOGY | Facility: MEDICAL CENTER | Age: 69
End: 2019-07-13

## 2019-07-13 DIAGNOSIS — I50.22 CHRONIC SYSTOLIC CONGESTIVE HEART FAILURE (HCC): Chronic | ICD-10-CM

## 2019-07-13 DIAGNOSIS — I25.5 ISCHEMIC CARDIOMYOPATHY: ICD-10-CM

## 2019-07-13 RX ORDER — SPIRONOLACTONE 25 MG/1
25 TABLET ORAL DAILY
Qty: 30 TAB | Refills: 3 | Status: SHIPPED | OUTPATIENT
Start: 2019-07-13 | End: 2019-08-21 | Stop reason: SDUPTHER

## 2019-07-13 NOTE — TELEPHONE ENCOUNTER
Called pt MRI confirms LAD infarct EF 33, not a candidate for revascularization, unlikely to improve ef with medical therapy, pt did not tolerate higher dose of carvedilol    Will start entresto instead of lisinopril,  add spironolactone chek labs in 2 weeks    Likely icd will discuss in October    Started conversation about possible need for heart transplant pt prefers [place]      Please call him in a week to see that he understood these instructions and is tolerating the new meds    Thank you

## 2019-07-15 ENCOUNTER — APPOINTMENT (OUTPATIENT)
Dept: ADMISSIONS | Facility: MEDICAL CENTER | Age: 69
End: 2019-07-15
Attending: ANESTHESIOLOGY
Payer: MEDICARE

## 2019-07-15 DIAGNOSIS — Z01.812 PRE-OPERATIVE LABORATORY EXAMINATION: ICD-10-CM

## 2019-07-15 PROCEDURE — 36415 COLL VENOUS BLD VENIPUNCTURE: CPT

## 2019-07-15 PROCEDURE — 83036 HEMOGLOBIN GLYCOSYLATED A1C: CPT

## 2019-07-15 RX ORDER — COLESEVELAM HYDROCHLORIDE 625 MG/1
625 TABLET, FILM COATED ORAL DAILY
COMMUNITY
Start: 2019-06-22

## 2019-07-15 NOTE — TELEPHONE ENCOUNTER
FW: Please order bun/ creatinine for patient   Shun Benoit M.D.  Jenni Cao R.N.     Orders placed. Will call pt in 1 week to ensure he understands instructions and will have labs completed.

## 2019-07-16 ENCOUNTER — TELEPHONE (OUTPATIENT)
Dept: CARDIOLOGY | Facility: MEDICAL CENTER | Age: 69
End: 2019-07-16

## 2019-07-16 ENCOUNTER — APPOINTMENT (OUTPATIENT)
Dept: RADIOLOGY | Facility: MEDICAL CENTER | Age: 69
End: 2019-07-16
Attending: ANESTHESIOLOGY
Payer: MEDICARE

## 2019-07-16 ENCOUNTER — ANESTHESIA (OUTPATIENT)
Dept: SURGERY | Facility: MEDICAL CENTER | Age: 69
End: 2019-07-16
Payer: MEDICARE

## 2019-07-16 ENCOUNTER — ANESTHESIA EVENT (OUTPATIENT)
Dept: SURGERY | Facility: MEDICAL CENTER | Age: 69
End: 2019-07-16
Payer: MEDICARE

## 2019-07-16 ENCOUNTER — HOSPITAL ENCOUNTER (OUTPATIENT)
Facility: MEDICAL CENTER | Age: 69
End: 2019-07-16
Attending: ANESTHESIOLOGY | Admitting: ANESTHESIOLOGY
Payer: MEDICARE

## 2019-07-16 VITALS
DIASTOLIC BLOOD PRESSURE: 85 MMHG | SYSTOLIC BLOOD PRESSURE: 136 MMHG | BODY MASS INDEX: 28.55 KG/M2 | WEIGHT: 203.93 LBS | RESPIRATION RATE: 16 BRPM | HEART RATE: 74 BPM | OXYGEN SATURATION: 94 % | TEMPERATURE: 97.3 F | HEIGHT: 71 IN

## 2019-07-16 LAB
EST. AVERAGE GLUCOSE BLD GHB EST-MCNC: 137 MG/DL
GLUCOSE BLD-MCNC: 110 MG/DL (ref 65–99)
HBA1C MFR BLD: 6.4 % (ref 0–5.6)

## 2019-07-16 PROCEDURE — C1778 LEAD, NEUROSTIMULATOR: HCPCS | Performed by: ANESTHESIOLOGY

## 2019-07-16 PROCEDURE — 502240 HCHG MISC OR SUPPLY RC 0272: Performed by: ANESTHESIOLOGY

## 2019-07-16 PROCEDURE — 82962 GLUCOSE BLOOD TEST: CPT

## 2019-07-16 PROCEDURE — 160048 HCHG OR STATISTICAL LEVEL 1-5: Performed by: ANESTHESIOLOGY

## 2019-07-16 PROCEDURE — 700101 HCHG RX REV CODE 250: Performed by: ANESTHESIOLOGY

## 2019-07-16 PROCEDURE — 700111 HCHG RX REV CODE 636 W/ 250 OVERRIDE (IP): Performed by: ANESTHESIOLOGY

## 2019-07-16 PROCEDURE — 160029 HCHG SURGERY MINUTES - 1ST 30 MINS LEVEL 4: Performed by: ANESTHESIOLOGY

## 2019-07-16 PROCEDURE — 160009 HCHG ANES TIME/MIN: Performed by: ANESTHESIOLOGY

## 2019-07-16 PROCEDURE — 700105 HCHG RX REV CODE 258: Performed by: ANESTHESIOLOGY

## 2019-07-16 PROCEDURE — 160035 HCHG PACU - 1ST 60 MINS PHASE I: Performed by: ANESTHESIOLOGY

## 2019-07-16 PROCEDURE — 501838 HCHG SUTURE GENERAL: Performed by: ANESTHESIOLOGY

## 2019-07-16 PROCEDURE — 502000 HCHG MISC OR IMPLANTS RC 0278: Performed by: ANESTHESIOLOGY

## 2019-07-16 PROCEDURE — 160002 HCHG RECOVERY MINUTES (STAT): Performed by: ANESTHESIOLOGY

## 2019-07-16 PROCEDURE — 160046 HCHG PACU - 1ST 60 MINS PHASE II: Performed by: ANESTHESIOLOGY

## 2019-07-16 PROCEDURE — A9270 NON-COVERED ITEM OR SERVICE: HCPCS | Performed by: ANESTHESIOLOGY

## 2019-07-16 PROCEDURE — 160041 HCHG SURGERY MINUTES - EA ADDL 1 MIN LEVEL 4: Performed by: ANESTHESIOLOGY

## 2019-07-16 PROCEDURE — 700101 HCHG RX REV CODE 250

## 2019-07-16 PROCEDURE — 700105 HCHG RX REV CODE 258

## 2019-07-16 PROCEDURE — 160025 RECOVERY II MINUTES (STATS): Performed by: ANESTHESIOLOGY

## 2019-07-16 PROCEDURE — C1787 PATIENT PROGR, NEUROSTIM: HCPCS | Performed by: ANESTHESIOLOGY

## 2019-07-16 PROCEDURE — 700102 HCHG RX REV CODE 250 W/ 637 OVERRIDE(OP): Performed by: ANESTHESIOLOGY

## 2019-07-16 PROCEDURE — C1820 GENERATOR NEURO RECHG BAT SY: HCPCS | Performed by: ANESTHESIOLOGY

## 2019-07-16 PROCEDURE — A6402 STERILE GAUZE <= 16 SQ IN: HCPCS | Performed by: ANESTHESIOLOGY

## 2019-07-16 RX ORDER — ACETAMINOPHEN 500 MG
1500 TABLET ORAL 2 TIMES DAILY
COMMUNITY

## 2019-07-16 RX ORDER — BACITRACIN 50000 [IU]/1
INJECTION, POWDER, FOR SOLUTION INTRAMUSCULAR
Status: DISCONTINUED | OUTPATIENT
Start: 2019-07-16 | End: 2019-07-16 | Stop reason: HOSPADM

## 2019-07-16 RX ORDER — ONDANSETRON 2 MG/ML
4 INJECTION INTRAMUSCULAR; INTRAVENOUS
Status: DISCONTINUED | OUTPATIENT
Start: 2019-07-16 | End: 2019-07-16 | Stop reason: HOSPADM

## 2019-07-16 RX ORDER — OXYCODONE HCL 5 MG/5 ML
5 SOLUTION, ORAL ORAL
Status: DISCONTINUED | OUTPATIENT
Start: 2019-07-16 | End: 2019-07-16 | Stop reason: HOSPADM

## 2019-07-16 RX ORDER — HYDROMORPHONE HYDROCHLORIDE 1 MG/ML
0.4 INJECTION, SOLUTION INTRAMUSCULAR; INTRAVENOUS; SUBCUTANEOUS
Status: DISCONTINUED | OUTPATIENT
Start: 2019-07-16 | End: 2019-07-16 | Stop reason: HOSPADM

## 2019-07-16 RX ORDER — HYDROMORPHONE HYDROCHLORIDE 1 MG/ML
0.1 INJECTION, SOLUTION INTRAMUSCULAR; INTRAVENOUS; SUBCUTANEOUS
Status: DISCONTINUED | OUTPATIENT
Start: 2019-07-16 | End: 2019-07-16 | Stop reason: HOSPADM

## 2019-07-16 RX ORDER — OXYCODONE HCL 5 MG/5 ML
10 SOLUTION, ORAL ORAL
Status: DISCONTINUED | OUTPATIENT
Start: 2019-07-16 | End: 2019-07-16 | Stop reason: HOSPADM

## 2019-07-16 RX ORDER — LIDOCAINE HYDROCHLORIDE 20 MG/ML
INJECTION, SOLUTION INFILTRATION; PERINEURAL
Status: DISCONTINUED | OUTPATIENT
Start: 2019-07-16 | End: 2019-07-16 | Stop reason: HOSPADM

## 2019-07-16 RX ORDER — SODIUM CHLORIDE, SODIUM LACTATE, POTASSIUM CHLORIDE, CALCIUM CHLORIDE 600; 310; 30; 20 MG/100ML; MG/100ML; MG/100ML; MG/100ML
INJECTION, SOLUTION INTRAVENOUS
Status: COMPLETED
Start: 2019-07-16 | End: 2019-07-16

## 2019-07-16 RX ORDER — HYDROMORPHONE HYDROCHLORIDE 1 MG/ML
0.2 INJECTION, SOLUTION INTRAMUSCULAR; INTRAVENOUS; SUBCUTANEOUS
Status: DISCONTINUED | OUTPATIENT
Start: 2019-07-16 | End: 2019-07-16 | Stop reason: HOSPADM

## 2019-07-16 RX ORDER — SODIUM CHLORIDE, SODIUM LACTATE, POTASSIUM CHLORIDE, CALCIUM CHLORIDE 600; 310; 30; 20 MG/100ML; MG/100ML; MG/100ML; MG/100ML
INJECTION, SOLUTION INTRAVENOUS
Status: DISCONTINUED | OUTPATIENT
Start: 2019-07-16 | End: 2019-07-16 | Stop reason: SURG

## 2019-07-16 RX ORDER — SODIUM CHLORIDE, SODIUM LACTATE, POTASSIUM CHLORIDE, CALCIUM CHLORIDE 600; 310; 30; 20 MG/100ML; MG/100ML; MG/100ML; MG/100ML
1000 INJECTION, SOLUTION INTRAVENOUS
Status: COMPLETED | OUTPATIENT
Start: 2019-07-16 | End: 2019-07-16

## 2019-07-16 RX ORDER — DIPHENHYDRAMINE HYDROCHLORIDE 50 MG/ML
12.5 INJECTION INTRAMUSCULAR; INTRAVENOUS
Status: DISCONTINUED | OUTPATIENT
Start: 2019-07-16 | End: 2019-07-16 | Stop reason: HOSPADM

## 2019-07-16 RX ORDER — HALOPERIDOL 5 MG/ML
1 INJECTION INTRAMUSCULAR
Status: DISCONTINUED | OUTPATIENT
Start: 2019-07-16 | End: 2019-07-16 | Stop reason: HOSPADM

## 2019-07-16 RX ORDER — ACETAMINOPHEN 500 MG
1000 TABLET ORAL ONCE
Status: COMPLETED | OUTPATIENT
Start: 2019-07-16 | End: 2019-07-16

## 2019-07-16 RX ORDER — SODIUM CHLORIDE, SODIUM LACTATE, POTASSIUM CHLORIDE, CALCIUM CHLORIDE 600; 310; 30; 20 MG/100ML; MG/100ML; MG/100ML; MG/100ML
INJECTION, SOLUTION INTRAVENOUS CONTINUOUS
Status: DISCONTINUED | OUTPATIENT
Start: 2019-07-16 | End: 2019-07-16 | Stop reason: HOSPADM

## 2019-07-16 RX ORDER — BUPIVACAINE HYDROCHLORIDE AND EPINEPHRINE 2.5; 5 MG/ML; UG/ML
INJECTION, SOLUTION EPIDURAL; INFILTRATION; INTRACAUDAL; PERINEURAL
Status: DISCONTINUED | OUTPATIENT
Start: 2019-07-16 | End: 2019-07-16 | Stop reason: HOSPADM

## 2019-07-16 RX ORDER — GABAPENTIN 300 MG/1
300 CAPSULE ORAL ONCE
Status: DISCONTINUED | OUTPATIENT
Start: 2019-07-16 | End: 2019-07-16 | Stop reason: HOSPADM

## 2019-07-16 RX ORDER — CEFAZOLIN SODIUM 1 G/3ML
INJECTION, POWDER, FOR SOLUTION INTRAMUSCULAR; INTRAVENOUS PRN
Status: DISCONTINUED | OUTPATIENT
Start: 2019-07-16 | End: 2019-07-16 | Stop reason: SURG

## 2019-07-16 RX ADMIN — SODIUM CHLORIDE, POTASSIUM CHLORIDE, SODIUM LACTATE AND CALCIUM CHLORIDE 1000 ML: 600; 310; 30; 20 INJECTION, SOLUTION INTRAVENOUS at 11:47

## 2019-07-16 RX ADMIN — EPHEDRINE SULFATE 20 MG: 50 INJECTION INTRAMUSCULAR; INTRAVENOUS; SUBCUTANEOUS at 13:16

## 2019-07-16 RX ADMIN — MIDAZOLAM HYDROCHLORIDE 2 MG: 1 INJECTION, SOLUTION INTRAMUSCULAR; INTRAVENOUS at 12:49

## 2019-07-16 RX ADMIN — PROPOFOL 130 MG: 10 INJECTION, EMULSION INTRAVENOUS at 12:49

## 2019-07-16 RX ADMIN — SUGAMMADEX 200 MG: 100 INJECTION, SOLUTION INTRAVENOUS at 15:16

## 2019-07-16 RX ADMIN — CEFAZOLIN 2 G: 1 INJECTION, POWDER, FOR SOLUTION INTRAVENOUS at 12:49

## 2019-07-16 RX ADMIN — SODIUM CHLORIDE, SODIUM LACTATE, POTASSIUM CHLORIDE, CALCIUM CHLORIDE 1000 ML: 600; 310; 30; 20 INJECTION, SOLUTION INTRAVENOUS at 11:47

## 2019-07-16 RX ADMIN — SODIUM CHLORIDE, POTASSIUM CHLORIDE, SODIUM LACTATE AND CALCIUM CHLORIDE: 600; 310; 30; 20 INJECTION, SOLUTION INTRAVENOUS at 12:45

## 2019-07-16 RX ADMIN — FENTANYL CITRATE 100 MCG: 50 INJECTION, SOLUTION INTRAMUSCULAR; INTRAVENOUS at 12:49

## 2019-07-16 RX ADMIN — SODIUM CHLORIDE, POTASSIUM CHLORIDE, SODIUM LACTATE AND CALCIUM CHLORIDE: 600; 310; 30; 20 INJECTION, SOLUTION INTRAVENOUS at 14:10

## 2019-07-16 RX ADMIN — LIDOCAINE HYDROCHLORIDE 0.5 ML: 10 INJECTION, SOLUTION INFILTRATION; PERINEURAL at 11:47

## 2019-07-16 RX ADMIN — ROCURONIUM BROMIDE 40 MG: 10 INJECTION INTRAVENOUS at 12:49

## 2019-07-16 ASSESSMENT — PAIN SCALES - GENERAL: PAIN_LEVEL: 0

## 2019-07-16 NOTE — ANESTHESIA POSTPROCEDURE EVALUATION
Patient: Luis Renner    Procedure Summary     Date:  07/16/19 Room / Location:   OR  / SURGERY [facility]    Anesthesia Start:  1245 Anesthesia Stop:  1532    Procedure:  INSERTION, NEUROSTIMULATOR, PERMANENT, SPINAL CORD- CERVICAL AND LUMBAR (Back) Diagnosis:  (LUMBAR POST lameninectomy SYNDROME; cerivcal post lameninectomy)    Surgeon:  Dileep Wheeler M.D. Responsible Provider:  Parth Prince M.D.    Anesthesia Type:  general ASA Status:  3          Final Anesthesia Type: general  Last vitals  BP   Blood Pressure : 137/80    Temp   36.2 °C (97.2 °F)    Pulse   Pulse: 73, Heart Rate (Monitored): 77   Resp   14    SpO2   97 %      Anesthesia Post Evaluation    Patient location during evaluation: PACU  Patient participation: complete - patient participated  Level of consciousness: awake and alert  Pain score: 0    Airway patency: patent  Anesthetic complications: no  Cardiovascular status: hemodynamically stable  Respiratory status: acceptable  Hydration status: euvolemic    PONV: none           Nurse Pain Score: 0 (NPRS)

## 2019-07-16 NOTE — ANESTHESIA TIME REPORT
Anesthesia Start and Stop Event Times     Date Time Event    7/16/2019 1245 Anesthesia Start     1532 Anesthesia Stop        Responsible Staff  07/16/19    Name Role Begin End    Parth Prince M.D. Anesth 1245 1532        Preop Diagnosis (Free Text):  Pre-op Diagnosis     LUMBAR POST lameninectomy SYNDROME; cerivcal post lameninectomy        Preop Diagnosis (Codes):  Diagnosis Information     Diagnosis Code(s):         Post op Diagnosis  Lumbar post-laminectomy syndrome      Premium Reason  A. 3PM - 7AM    Comments:

## 2019-07-16 NOTE — ANESTHESIA PROCEDURE NOTES
Airway  Date/Time: 7/16/2019 12:50 PM  Performed by: JUICE DALTON  Authorized by: JUICE DALTON     Location:  OR  Urgency:  Elective  Indications for Airway Management:  Anesthesia  Spontaneous Ventilation: absent    Sedation Level:  Deep  Preoxygenated: Yes    Patient Position:  Sniffing  Final Airway Type:  Endotracheal airway  Final Endotracheal Airway:  ETT  Cuffed: Yes    Technique Used for Successful ETT Placement:  Direct laryngoscopy  Insertion Site:  Oral  Blade Type:  Weaver  Laryngoscope Blade/Videolaryngoscope Blade Size:  2  ETT Size (mm):  8.0  Measured from:  Teeth  ETT to Teeth (cm):  23  Placement Verified by: auscultation and capnometry    Cormack-Lehane Classification:  Grade I - full view of glottis  Number of Attempts at Approach:  1

## 2019-07-16 NOTE — ANESTHESIA PREPROCEDURE EVALUATION
Relevant Problems   (+) Bilateral renal cysts   (+) Chronic systolic congestive heart failure (HCC)   (+) Coronary artery disease due to calcified coronary lesion   (+) Essential hypertension   (+) Gastroesophageal reflux disease without esophagitis   (+) S/P CABG x 3       Physical Exam    Airway   Mallampati: II  TM distance: >3 FB  Neck ROM: full       Cardiovascular - normal exam  Rhythm: regular  Rate: normal  (-) murmur     Dental - normal exam         Pulmonary - normal exam  Breath sounds clear to auscultation     Abdominal    Neurological - normal exam                 Anesthesia Plan    ASA 3   ASA physical status 3 criteria: CAD/stents (> 3 months)    Plan - general       Airway plan will be ETT        Induction: intravenous    Postoperative Plan: Postoperative administration of opioids is intended.    Pertinent diagnostic labs and testing reviewed    Informed Consent:    Anesthetic plan and risks discussed with patient.    Use of blood products discussed with: patient whom consented to blood products.

## 2019-07-16 NOTE — DISCHARGE INSTRUCTIONS
ACTIVITY: Rest and take it easy for the first 24 hours.  A responsible adult is recommended to remain with you during that time.  It is normal to feel sleepy.  We encourage you to not do anything that requires balance, judgment or coordination.    MILD FLU-LIKE SYMPTOMS ARE NORMAL. YOU MAY EXPERIENCE GENERALIZED MUSCLE ACHES, THROAT IRRITATION, HEADACHE AND/OR SOME NAUSEA.    FOR 24 HOURS DO NOT:  Drive, operate machinery or run household appliances.  Drink beer or alcoholic beverages.   Make important decisions or sign legal documents.    SPECIAL INSTRUCTIONS: Keep dressing clean, dry and intact until instructed otherwise by your surgeon. May use ice pack on incisions for comfort.    DIET: To avoid nausea, slowly advance diet as tolerated, avoiding spicy or greasy foods for the first day.  Add more substantial food to your diet according to your physician's instructions.  Babies can be fed formula or breast milk as soon as they are hungry.  INCREASE FLUIDS AND FIBER TO AVOID CONSTIPATION.      FOLLOW-UP APPOINTMENT:  A follow-up appointment should be arranged with your doctor; call to schedule.    You should CALL YOUR PHYSICIAN if you develop:  Fever greater than 101 degrees F.  Pain not relieved by medication, or persistent nausea or vomiting.  Excessive bleeding (blood soaking through dressing) or unexpected drainage from the wound.  Extreme redness or swelling around the incision site, drainage of pus or foul smelling drainage.  Inability to urinate or empty your bladder within 8 hours.  Problems with breathing or chest pain.    You should call [phone] if you develop problems with breathing or chest pain.  If you are unable to contact your doctor or surgical center, you should go to the nearest emergency room or urgent care center.      Dr. Wheeler's telephone #: [phone]    If any questions arise, call your doctor.  If your doctor is not available, please feel free to call the Surgical Center at [phone].   The Center is open Monday through Friday from 7AM to 7PM.  You can also call the HEALTH HOTLINE open 24 hours/day, 7 days/week and speak to a nurse at [phone], or toll free at [phone].    A registered nurse may call you a few days after your surgery to see how you are doing after your procedure.    MEDICATIONS: Resume taking daily medication.  Take prescribed pain medication with food.  If no medication is prescribed, you may take non-aspirin pain medication if needed.  PAIN MEDICATION CAN BE VERY CONSTIPATING.  Take a stool softener or laxative such as senokot, pericolace, or milk of magnesia if needed.      If your physician has prescribed pain medication that includes Acetaminophen (Tylenol), do not take additional Acetaminophen (Tylenol) while taking the prescribed medication.    Depression / Suicide Risk    As you are discharged from this [facility] facility, it is important to learn how to keep safe from harming yourself.    Recognize the warning signs:  · Abrupt changes in personality, positive or negative- including increase in energy   · Giving away possessions  · Change in eating patterns- significant weight changes-  positive or negative  · Change in sleeping patterns- unable to sleep or sleeping all the time   · Unwillingness or inability to communicate  · Depression  · Unusual sadness, discouragement and loneliness  · Talk of wanting to die  · Neglect of personal appearance   · Rebelliousness- reckless behavior  · Withdrawal from people/activities they love  · Confusion- inability to concentrate     If you or a loved one observes any of these behaviors or has concerns about self-harm, here's what you can do:  · Talk about it- your feelings and reasons for harming yourself  · Remove any means that you might use to hurt yourself (examples: pills, rope, extension cords, firearm)  · Get professional help from the community (Mental Health, Substance Abuse, psychological counseling)  · Do not  be alone:Call your Safe Contact- someone whom you trust who will be there for you.  · Call your local CRISIS HOTLINE 977-7083 or 143-321-2734  · Call your local Children's Mobile Crisis Response Team [place] (315) 943-3819 or www.[facility]  · Call the toll free National Suicide Prevention Hotlines   · National Suicide Prevention Lifeline 864-846-NMOS (1125)  · National [employer] Census Line Network 800-SUICIDE (190-9294)

## 2019-07-16 NOTE — TELEPHONE ENCOUNTER
Patient Calls   Rain Schmitz, Med Ass't   You 35 minutes ago (10:21 AM)      Please note that lisinopril is still listed on patient's med list and should not be on therapy with Entresto   (Routing comment)      Clarification relayed to MD for advise.

## 2019-07-16 NOTE — TELEPHONE ENCOUNTER
PAR approved for Entresto:  Luis Renner (Peterson: [id number])   Entresto 24-26MG tablets   Form  [employer] Electronic PA Form   Created   4 minutes ago   Sent to Plan   3 minutes ago   Plan Response   3 minutes ago   Submit Clinical Questions   Expires 13 days from now   Determination   Favorable   now   Message from Plan  Questionnaire submitted. PA Case [id number] Status: Approved. Authorization and Notifications Completed.

## 2019-07-17 NOTE — TELEPHONE ENCOUNTER
Medication list updated.    Called patient and reviewed update.  Pt states he has not taken Lisinopril for 3 days.  He verbalizes understanding and is appreciative of information given.

## 2019-07-18 NOTE — OP REPORT
SCS Cervical Percutaneous Implant:  1. Percutaneous implant of epidural lumbar electrode right side.    2. Percutaneous implant of epidural lumbar electrode left side.     3. Percutaneous implant of epidural cervical electrode right side.      4. Percutaneous implant of epidural cervical electrode left side.    5. Incision and placement of neurostimulator generator complex.    (BS Spectra Wavewriter Spinal Cord Stimulator System MRI Compatible brain only) 6. Complex programming 1st hour.    ANESTHESIA: Local with conscious sedation.    ASA CLASSIFICATION: II    PROCEDURE IN DETAIL: After informed written consent was obtained, intravenous access was placed and the patient was given cefazolin 2 grams IV 30 minutes prior to the procedure. She was then taken to the operating room and placed in the prone position. Following Chloraprep and sterile drape, local infiltration was performed at the T2/3 interspace with an equal mixture of lidocaine 2% plus bupivacaine 0.25% with epinephrine 1:200,000.    A midline incision was then made down to the underlying fascial layer. Meticulous attention was made to hemostasis. Using the standard 14 gauge 4-inch Tuohy needle, epidural access was obtained using a loss of resistance to PF Saline technique. A [employer] 70 cm 8 contact stimulating electrode was fluoroscopically guided just left of midline to the top of the C2 vertebral body. A second 14 gauge Tuohy needle was then introduced just parallel to the first, again using a loss of resistance to PF Saline technique. A second 70 cm 8 contact stimulating electrode was fluoroscopically guided just to the right of midline, again to the top of the C2 vertebral body. Intraoperative testing   was not performed because the coverage area during trial was reliable  . The leads were secured using the enclosed [employer] clik lead anchors secured to the dorsal fascia using 0 Ethibond and a hex wrench to close down on the dorsal  fascia. Repeating fluoroscopic imaging confirmed proper and consistent location of the leads after securing.    Next, A midline incision was then made down to the underlying fascial layer. Meticulous attention was made to hemostasis. Using the standard 14 gauge 4-inch Tuohy needle, epidural access was obtained using a loss of resistance to PF Saline technique. A [employer]   50 cm 8 contact stimulating electrode was fluoroscopically guided just left of midline to the top of the   T6 vertebral body. A second 14 gauge Tuohy needle was then introduced just parallel to the first, again using a loss of resistance to PF Saline technique. A second 50 cm 8 contact stimulating electrode was fluoroscopically guided just to the right of midline, again to the top of the   T6 vertebral body. Intraoperative testing   was not performed because the coverage area during trial was reliable. The leads were secured using the enclosed [employer] clik lead anchors secured to the dorsal fascia using 0 Ethibond and a hex wrench to close down on the dorsal fascia. Repeating fluoroscopic imaging confirmed proper and consistent location of the leads after securing.     Next the right flank pocket was created using a combination of blunt dissection and electrocautery. Using the enclosed tunneler, the tunneler was passed from the midline incision to the subcutaneous pocket. A single tension loop was placed at the midline back incision as well as underneath the ipsy Wavewriter MRI Conditional. Prior to securing the leads within the IPG unit, intraoperative impedances were checked. The IPG unit was placed within the pocket.    Both incisions were then closed using a combination of 2-0 interrupted vicryl sutures followed by 4-0 Monocryl running subcuticular suture. Dermabond was then applied to both incisions, followed by Telfa and Tegaderm. No complications were encountered. Initial programming will be  accomplished in the PACU to cover her pain. He will be contacted by the [employer] representative tomorrow to check on his progress, and has a follow-up for wound check and for reprogramming if necessary.    The patient tolerated this surgery very well and was then reprogrammed in Recovery after awake. We did have good coverage of his low back and neck without any leg discomfort.    TOTAL FLUORO TIME: 3.4 min     Assessment / Plan    1. Post-laminectomy syndrome  M96.1: Postlaminectomy syndrome, not elsewhere classified       Return to Office   · COLEEN Miramontes for PM POST OP 30 at [facility] NEURO [facility] on 07/25/2019 at 03:30 PM  Encounter Sign-Off    Encounter signed-off by Dileep Wheeler MD, 07/16/2019.  Encounter performed and documented by Dileep Wheeler MD   Encounter reviewed & signed by Dileep Wheeler MD on 07/16/2019 at 4:38pm

## 2019-08-07 ENCOUNTER — TELEPHONE (OUTPATIENT)
Dept: CARDIOLOGY | Facility: MEDICAL CENTER | Age: 69
End: 2019-08-07

## 2019-08-07 NOTE — TELEPHONE ENCOUNTER
Received [employer] message from patient.    Upon chart review MD ordered Entresto, Spironolactone, BMP, BUN, and Creatinine on 07/13.    Findings relayed to pt via [employer].    ----------------   Prescription Question   You  Luis Renner Just now (10:08 AM)     Dr. Cy Garcia already ordered your lab draw the same day he renewed your Entresto and Spironolactone.     You may go to any [facility].     Thank you,   Jenni DOAN For Dr. Benoit      This [employer] message has not been read.   Luis Renner Christopher R, M.D. 23 hours ago (10:49 AM)     Pharmacist says I need a potassium test because of entresto and spironolactone interaction on the kidneys.   Can u order the test and I can go to a [facility] to have it done?   Thanks

## 2019-08-16 LAB — RETINAL SCREEN: POSITIVE

## 2019-08-21 ENCOUNTER — OFFICE VISIT (OUTPATIENT)
Dept: MEDICAL GROUP | Facility: MEDICAL CENTER | Age: 69
End: 2019-08-21
Payer: MEDICARE

## 2019-08-21 VITALS
WEIGHT: 201.5 LBS | DIASTOLIC BLOOD PRESSURE: 62 MMHG | SYSTOLIC BLOOD PRESSURE: 96 MMHG | OXYGEN SATURATION: 94 % | HEIGHT: 71 IN | HEART RATE: 84 BPM | BODY MASS INDEX: 28.21 KG/M2 | TEMPERATURE: 97.8 F

## 2019-08-21 DIAGNOSIS — Z12.5 SCREENING FOR MALIGNANT NEOPLASM OF PROSTATE: ICD-10-CM

## 2019-08-21 DIAGNOSIS — L57.0 ACTINIC KERATOSIS: ICD-10-CM

## 2019-08-21 DIAGNOSIS — I25.10 CORONARY ARTERY DISEASE DUE TO CALCIFIED CORONARY LESION: ICD-10-CM

## 2019-08-21 DIAGNOSIS — E11.29 MICROALBUMINURIA DUE TO TYPE 2 DIABETES MELLITUS (HCC): ICD-10-CM

## 2019-08-21 DIAGNOSIS — I25.84 CORONARY ARTERY DISEASE DUE TO CALCIFIED CORONARY LESION: ICD-10-CM

## 2019-08-21 DIAGNOSIS — I50.22 CHRONIC SYSTOLIC CONGESTIVE HEART FAILURE (HCC): Chronic | ICD-10-CM

## 2019-08-21 DIAGNOSIS — Z95.1 S/P CABG X 3: ICD-10-CM

## 2019-08-21 DIAGNOSIS — I10 ESSENTIAL HYPERTENSION: Chronic | ICD-10-CM

## 2019-08-21 DIAGNOSIS — E78.5 DYSLIPIDEMIA: ICD-10-CM

## 2019-08-21 DIAGNOSIS — I25.5 ISCHEMIC CARDIOMYOPATHY: ICD-10-CM

## 2019-08-21 DIAGNOSIS — Z12.83 SKIN CANCER SCREENING: ICD-10-CM

## 2019-08-21 DIAGNOSIS — Z76.0 MEDICATION REFILL: ICD-10-CM

## 2019-08-21 DIAGNOSIS — E11.8 CONTROLLED TYPE 2 DIABETES MELLITUS WITH COMPLICATION, WITHOUT LONG-TERM CURRENT USE OF INSULIN (HCC): ICD-10-CM

## 2019-08-21 DIAGNOSIS — R80.9 MICROALBUMINURIA DUE TO TYPE 2 DIABETES MELLITUS (HCC): ICD-10-CM

## 2019-08-21 PROCEDURE — 99214 OFFICE O/P EST MOD 30 MIN: CPT | Mod: 25 | Performed by: INTERNAL MEDICINE

## 2019-08-21 PROCEDURE — 17000 DESTRUCT PREMALG LESION: CPT | Performed by: INTERNAL MEDICINE

## 2019-08-21 PROCEDURE — 17003 DESTRUCT PREMALG LES 2-14: CPT | Performed by: INTERNAL MEDICINE

## 2019-08-21 RX ORDER — TAMSULOSIN HYDROCHLORIDE 0.4 MG/1
0.4 CAPSULE ORAL EVERY EVENING
Qty: 90 CAP | Refills: 3 | Status: SHIPPED | OUTPATIENT
Start: 2019-08-21 | End: 2020-07-02

## 2019-08-21 RX ORDER — SPIRONOLACTONE 25 MG/1
25 TABLET ORAL DAILY
Qty: 90 TAB | Refills: 1 | Status: SHIPPED | OUTPATIENT
Start: 2019-08-21 | End: 2020-03-04

## 2019-08-21 RX ORDER — FLUOROURACIL 50 MG/ML
SOLUTION TOPICAL
Qty: 10 ML | Refills: 1 | Status: SHIPPED | OUTPATIENT
Start: 2019-08-21 | End: 2019-10-02

## 2019-08-21 NOTE — PROGRESS NOTES
CHIEF COMPLAINT  Chief Complaint   Patient presents with   • Results     HPI  Luis Renner is a 69 y.o. male who presents today for the following     DM2 with CKD stage III, microalbuminuria  Onset:  DM2 for 10 yrs.       Meds:   - Victoza 1.8 mg daily  - Jardiance, 25 mg QD  - glipizide 2.5 mg BID  - metformin 850 mg BID  Used meds as prescribed.    Compliance to meds: yes  Checking feet daily/wear soft socks/shoes: yes     The last A1c: 6.4, was 7.9     Checking blood sugar: yes, once daily  Mean BS: in 150'  Hypoglycemia:  one remote episode   Symptoms of hypoglycemia: sweating, fatigue, pale skin, hunger.     ASA: yes  ACE: yes  Statin: yes     Diet: regular  Exercise: at least 4 d/w  BMI: 28     DIABETES COMPLICATIONS:   Peripheral neuropathy:  No numbness or tingling sensation in the feet.  Retinopathy: No evidence of retinopathy. Last eye exam: 4/2018  Nephropathy: Last microalbumin in 11/2018, pos  CVS: Has CAD  GI: No symptoms of gastropathy (nausea/vomiting).     FH of DM:  none     Decreased GFR  New problem.  The patient had decreased GFR at 53.  Fluid intake: varied.  NSAIDs use: had in the past.     Hypertension/ CAD (St post CABG in 7/16), CHF, cardiomyopathy  Meds: Lisinopril, 5 mg QD, spironolactone, 25 mg QD, carvedilol, 6.25 mg BID; taking as prescribed.    He is not measuring BP at home.  Denies: - headaches, vision problems, tinnitus.  - chest pain/pressure, palpitations, irregular heart beats, exertional, dyspnea, peripheral edema.  Low salt diet: yes  Diet / exercise BMI: as above    FH: multiple  He has been followed up by cardiology.     Dyslipidemia  Meds: Crestor, 10 mg QD, Zetia 10 mg QD. No muscle weakness, cramps, nausea,abdominal discomfort.    Diet / exercise BMI: as above    FH: 2 brothers, father  He has been f/u by cardiology.     Actinic keratosis  The patient has had multiple lesions consistent with actinic keratoses on scalp.  He had significant sun exposure in the  past.  Used sunscreen and mechanical protection/hat.  Treated by cryotherapy in the past.    Reviewed PMH, PSH, FH, SH, ALL, HCM/IMM, no changes  Reviewed MEDS, no changes    Patient Active Problem List    Diagnosis Date Noted   • S/P CABG x 3 04/04/2017     Priority: Medium   • Diabetes mellitus type 2, controlled, with complications ([facility]) 09/23/2016     Priority: Medium   • Ischemic cardiomyopathy 07/23/2013     Priority: Medium   • Coronary artery disease due to calcified coronary lesion 03/08/2013     Priority: Medium   • Essential hypertension 03/07/2013     Priority: Medium   • Benign prostatic hyperplasia 10/18/2017     Priority: Low   • Idiopathic gout 07/27/2017     Priority: Low   • Actinic keratosis 07/27/2017     Priority: Low   • TEMO (generalized anxiety disorder) 04/04/2017     Priority: Low   • Gastroesophageal reflux disease without esophagitis 01/04/2017     Priority: Low   • Bilateral renal cysts 01/14/2015     Priority: Low   • Chronic systolic congestive heart failure (HCC)    • Microalbuminuria due to type 2 diabetes mellitus ([facility]) 12/13/2018     CURRENT MEDICATIONS  Current Outpatient Medications   Medication Sig Dispense Refill   • acetaminophen (TYLENOL) 500 MG Tab Take 1,500 mg by mouth 2 Times a Day.     • WELCHOL 625 MG Tab Take 625 mg by mouth every day.     • sacubitril-valsartan (ENTRESTO) 24-26 MG Tab tablet Take 1 Tab by mouth 2 Times a Day. Stop lisinopril at least 48 hours before 60 Tab 6   • spironolactone (ALDACTONE) 25 MG Tab Take 1 Tab by mouth every day. Please check nonfasting labs 2-3 weeks after starting 30 Tab 3   • carvedilol (COREG) 6.25 MG Tab Take 1 Tab by mouth 2 times a day, with meals. 180 Tab 3   • BD PEN NEEDLE DENIS U/F USE DAILY WITH VICTOZA (100 DAY SUPPLY) 100 Each 11   • liraglutide (VICTOZA) 18 MG/3ML Solution Pen-injector injection Inject 0.3 mL as instructed every bedtime. 27 mL 1   • rosuvastatin (CRESTOR) 20 MG Tab Take 1 Tab by mouth every evening. 90 Tab  3   • clopidogrel (PLAVIX) 75 MG Tab Take 1 Tab by mouth every day. 90 Tab 3   • GABAPENTIN PO Take 300 mg by mouth as needed.     • omeprazole (PRILOSEC) 20 MG delayed-release capsule Take 1 Cap by mouth 1 time daily as needed. TAKE 1 CAPSULE BY MOUTH ONCE DAILY     • tamsulosin (FLOMAX) 0.4 MG capsule Take 1 Cap by mouth every evening.     • citalopram (CELEXA) 20 MG Tab Take 1 Tab by mouth every 48 hours. 30 Tab 11   • ezetimibe (ZETIA) 10 MG Tab Take 1 Tab by mouth every bedtime. 90 Tab 3   • Lancets 1 Each by Does not apply route 3 times a day. Lancets for Freestyle Lite meter. Sig: use TID and prn ssx high or low sugar. 100 Each 11   • allopurinol (ZYLOPRIM) 300 MG Tab TAKE 1 TABLET BY MOUTH ONCE DAILY 90 Tab 3   • JARDIANCE 25 MG Tab Take  by mouth every day.     • glipiZIDE (GLUCOTROL) 5 MG Tab Take 2.5 mg by mouth 2 times a day.     • metFORMIN (GLUCOPHAGE) 850 MG Tab 2 times a day.     • aspirin 81 MG tablet Take 81 mg by mouth every bedtime.       No current facility-administered medications for this visit.      ALLERGIES  Allergies: Other environmental  PAST MEDICAL HISTORY  Past Medical History:   Diagnosis Date   • Myocardial infarct (HCC) 3/7/2013   • Left ventricular apical thrombus March 2013    Inferoapical clot measuring 1.1cm x 0.8cm, started on anticoagulation.   • CAD (coronary artery disease) March 2013    ACS. PCI/ANA PAULA x 2 of the LAD (2.5 x 12mm - mid; 2.75 x 16mm - proximal). Distal RCA is occluded; distal circumflex is occluded with good collateralization.   • Backpain     6-7/10   • Bilateral renal cysts    • Bronchitis    • C. difficile diarrhea     pt with hx of c diff after hospitalization [id number]   • Chronic anticoagulation    • Chronic systolic congestive heart failure (HCC)    • DIABETES MELLITUS      oral diet and insulin   • Heart burn    • Hyperlipidemia     • Hypertension     well controlled on meds   • Indigestion    • Psychiatric problem     anxiety     SURGICAL HISTORY  He  has  "a past surgical history that includes other neurological surg (); other cardiac surgery (); cholecystectomy (); recovery (2016); multiple coronary artery bypass endo vein harvest (2016); miller (2016); and pr implant neurostim/ (2019).  SOCIAL HISTORY  Social History     Tobacco Use   • Smoking status: Never Smoker   • Smokeless tobacco: Never Used   Substance Use Topics   • Alcohol use: No     Alcohol/week: 0.6 oz     Types: 1 Glasses of wine per week   • Drug use: No     Social History     Social History Narrative   • Not on file     FAMILY HISTORY  Family History   Problem Relation Age of Onset   • Cancer Mother    • Hypertension Father    • Hyperlipidemia Brother         x 2   • Hypertension Brother         x2   • Heart Disease Brother         x2, afib; cad x 1   • Cancer Paternal Uncle    • Diabetes Neg Hx      Family Status   Relation Name Status   • Mo   at age 79         colon ca   • Fa   at age 87   • Bro  (Not Specified)   • Bro  (Not Specified)   • Bro  (Not Specified)   • PUnc  (Not Specified)   • Neg Hx  (Not Specified)     ROS   Constitutional: Negative for fever, chills, fatigue.  HENT: Negative for congestion, sore throat.  Eyes: Negative for vision problems.   Respiratory: Negative for cough, shortness of breath.  Cardiovascular: Negative for chest pain, palpitations.   Gastrointestinal: Negative for heartburn, nausea, abdominal pain.   Genitourinary: Negative for dysuria.  Musculoskeletal: h/o gout.   Skin: As above.   Neuro: Negative for dizziness, weakness and headaches.   Endo/Heme/Allergies: Does not bruise/bleed easily.   Psychiatric/Behavioral: Negative for depression.    PHYSICAL EXAM   BP (!) 96/62   Pulse 84   Temp 36.6 °C (97.8 °F) (Temporal)   Ht 1.803 m (5' 11\")   Wt 91.4 kg (201 lb 8 oz)   SpO2 94%  Body mass index is 28.1 kg/m².  General:  NAD, well appearing  HEENT:   NC/AT, PERRLA, EOMI, TMs are clear. Oropharyngeal mucosa is " pink,  without lesions;  no cervical / supraclavicular  lymphadenopathy, no thyromegaly.    Cardiovascular: RRR.   No m/r/g.       Lungs:   CTAB, no w/r/r, no respiratory distress.  Abdomen: Soft, NT/ND; no hepatosplenomegaly.  Extremities:  2+ DP and radial pulses bilaterally.  No c/c/e.   Skin:  Warm, dry.  Multiple lesions consistent with actinic keratosis on scalp, without irritation, inflammation or redness.   Neurologic: Alert & oriented x 3. CN II-XII grossly intact. No focal deficits.  Psychiatric:  Affect normal, mood normal, judgment normal.    Labs     Labs are reviewed and discussed with a patient  Lab Results   Component Value Date/Time    CHOLSTRLTOT 94 (L) 07/05/2019 12:01 PM    LDL 38 07/05/2019 12:01 PM    HDL 33 (A) 07/05/2019 12:01 PM    TRIGLYCERIDE 117 07/05/2019 12:01 PM       Lab Results   Component Value Date/Time    SODIUM 137 07/05/2019 03:18 PM    POTASSIUM 4.1 07/05/2019 03:18 PM    CHLORIDE 104 07/05/2019 03:18 PM    CO2 26 07/05/2019 03:18 PM    GLUCOSE 248 (H) 07/05/2019 03:18 PM    BUN 18 07/05/2019 03:18 PM    CREATININE 1.35 07/05/2019 03:18 PM     Lab Results   Component Value Date/Time    ALKPHOSPHAT 61 07/05/2019 12:01 PM    ASTSGOT 24 07/05/2019 12:01 PM    ALTSGPT 33 07/05/2019 12:01 PM    TBILIRUBIN 0.6 07/05/2019 12:01 PM      Lab Results   Component Value Date/Time    HBA1C 6.4 (H) 07/15/2019 10:27 AM    HBA1C 7.9 (H) 11/13/2018 08:45 AM    HBA1C 7.2 (H) 05/21/2018 02:07 PM     No results found for: TSH  Lab Results   Component Value Date/Time    FREET4 0.82 03/08/2013 01:00 AM       Lab Results   Component Value Date/Time    WBC 10.7 07/05/2019 03:18 PM    RBC 5.73 07/05/2019 03:18 PM    HEMOGLOBIN 13.7 (L) 07/05/2019 03:18 PM    HEMATOCRIT 46.7 07/05/2019 03:18 PM    MCV 81.5 07/05/2019 03:18 PM    MCH 23.9 (L) 07/05/2019 03:18 PM    MCHC 29.3 (L) 07/05/2019 03:18 PM    MPV 11.1 07/05/2019 03:18 PM    NEUTSPOLYS 70.30 07/05/2019 03:18 PM    LYMPHOCYTES 17.70 (L) 07/05/2019  03:18 PM    MONOCYTES 6.90 07/05/2019 03:18 PM    EOSINOPHILS 3.40 07/05/2019 03:18 PM    BASOPHILS 1.00 07/05/2019 03:18 PM    HYPOCHROMIA 1+ 09/16/2013 01:13 PM    ANISOCYTOSIS 1+ 07/05/2019 03:18 PM      Imaging     None    Assessment and Plan     Luis Renner is a 69 y.o. male    1. Controlled type 2 diabetes mellitus with complication, without long-term current use of insulin (HCC)  Controlled, continue current treatment  - Comp Metabolic Panel; Future  - HEMOGLOBIN A1C; Future  - MICROALBUMIN CREAT RATIO URINE; Future    2. Microalbuminuria due to type 2 diabetes mellitus (HCC)     3. Essential hypertension  Controlled, continue current treatment  - Comp Metabolic Panel; Future    4. Coronary artery disease due to calcified coronary lesion  5. Chronic systolic congestive heart failure (HCC)  6. Ischemic cardiomyopathy  7. S/P CABG x 3  Stable and controlled cardiovascular conditions, continue current treatment and cardiology follow-up    8. Dyslipidemia  - Comp Metabolic Panel; Future  - Lipid Profile; Future    9. Actinic keratosis x 7, no irritation, inflammation or redness  7 lesions were treated with liquid nitrogen in usual manner; some of them are large, may develop basal cell cancer.  Advised to continue to avoid sun exposure, use sunscreen and mechanical protection.  CRYOTHERAPY:  Risks (including, but not limited to: hypo or hyperpigmentation, redness, blister, blood blister, recurrence, need for further treatment, infection, scar) and benefits of cryotherapy discussed. Patient verbally agreed to proceed with treatment. 2 cryotherapy freeze thaw cycles of 10 seconds were applied.. Patient tolerated procedure well. Aftercare instructions given.  - discussed importance of regular sun protection/sunscreen use, SPF 30 or greater with broad spectrum coverage, need for reapplication every  minutes    - REFERRAL TO DERMATOLOGY    10. Skin cancer screening  - REFERRAL TO DERMATOLOGY    11. Screening  for malignant neoplasm of prostate  - PROSTATE SPECIFIC AG SCREENING; Future    Counseling:   - Smoking:  Nonsmoker    Followup: Return in about 4 months (around 12/21/2019).    All questions are answered.    Please note that this dictation was created using voice recognition software, and that there might be errors of mary and possibly content.

## 2019-08-22 RX ORDER — CITALOPRAM 20 MG/1
TABLET ORAL
Qty: 90 TAB | Refills: 1 | Status: SHIPPED
Start: 2019-08-22 | End: 2020-01-16 | Stop reason: SDUPTHER

## 2019-10-02 ENCOUNTER — TELEPHONE (OUTPATIENT)
Dept: CARDIOLOGY | Facility: MEDICAL CENTER | Age: 69
End: 2019-10-02

## 2019-10-02 ENCOUNTER — OFFICE VISIT (OUTPATIENT)
Dept: CARDIOLOGY | Facility: MEDICAL CENTER | Age: 69
End: 2019-10-02
Payer: MEDICARE

## 2019-10-02 VITALS
DIASTOLIC BLOOD PRESSURE: 64 MMHG | OXYGEN SATURATION: 93 % | HEART RATE: 82 BPM | HEIGHT: 71 IN | BODY MASS INDEX: 28.42 KG/M2 | WEIGHT: 203 LBS | SYSTOLIC BLOOD PRESSURE: 110 MMHG

## 2019-10-02 DIAGNOSIS — I50.22 CHRONIC SYSTOLIC CONGESTIVE HEART FAILURE (HCC): Chronic | ICD-10-CM

## 2019-10-02 DIAGNOSIS — Z95.1 S/P CABG X 3: ICD-10-CM

## 2019-10-02 DIAGNOSIS — I10 ESSENTIAL HYPERTENSION: Chronic | ICD-10-CM

## 2019-10-02 DIAGNOSIS — I25.10 CORONARY ARTERY DISEASE DUE TO CALCIFIED CORONARY LESION: ICD-10-CM

## 2019-10-02 DIAGNOSIS — I25.84 CORONARY ARTERY DISEASE DUE TO CALCIFIED CORONARY LESION: ICD-10-CM

## 2019-10-02 DIAGNOSIS — I25.5 ISCHEMIC CARDIOMYOPATHY: ICD-10-CM

## 2019-10-02 PROCEDURE — 99215 OFFICE O/P EST HI 40 MIN: CPT | Performed by: INTERNAL MEDICINE

## 2019-10-02 NOTE — TELEPHONE ENCOUNTER
Dr. Leyva,    Dr. Benoit is recommending an ICD insert for this patient. Would you prefer to see this patient first or am I ok to schedule this patient for the ICD?    Please advise.    Thank You,  Taylor

## 2019-10-03 NOTE — TELEPHONE ENCOUNTER
Can go straight to ICD. Please go ahead and schedule for my next available, at the patient's convenience. No meds to hold. I will place order for ICD. [employer] please.    Chart reviewed: Pt with Ischemic Cardiomyopathy and Chronic systolic heart failure, NYHA II-III, with EF <35%.     CANDIDA

## 2019-10-06 ASSESSMENT — ENCOUNTER SYMPTOMS
WEAKNESS: 0
NAUSEA: 0
CHILLS: 0
SHORTNESS OF BREATH: 0
BLURRED VISION: 0
FOCAL WEAKNESS: 0
FEVER: 0
SORE THROAT: 0
CLAUDICATION: 0
DIZZINESS: 0
BRUISES/BLEEDS EASILY: 0
ABDOMINAL PAIN: 0
COUGH: 0
PND: 0
PALPITATIONS: 0
FALLS: 0

## 2019-10-06 NOTE — PROGRESS NOTES
Chief Complaint   Patient presents with   • Coronary Artery Disease       Subjective:   Orlando Renner is a 69 y.o. male who presents today for follow-up of his history of ischemic cardiomyopathy with failure of his prior LAD stenting reduced ejection fraction    He was able to establish at [facility] they did not do a cardiopulmonary test but reiterated our prior discussion    Doing well for his heart no edema remains active    Past Medical History:   Diagnosis Date   • Backpain     6-7/10   • Bilateral renal cysts    • Bronchitis    • C. difficile diarrhea     pt with hx of c diff after hospitalization [id number]   • CAD (coronary artery disease) March 2013    ACS. PCI/ANA PAULA x 2 of the LAD (2.5 x 12mm - mid; 2.75 x 16mm - proximal). Distal RCA is occluded; distal circumflex is occluded with good collateralization.   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   • Myocardial infarct (HCC) 3/7/2013   • Psychiatric problem     anxiety     Past Surgical History:   Procedure Laterality Date   • PB IMPLANT NEUROSTIM/  7/16/2019    Procedure: INSERTION, NEUROSTIMULATOR, PERMANENT, SPINAL CORD- CERVICAL AND LUMBAR;  Surgeon: Dileep Wheeler M.D.;  Location: [facility];  Service: Pain Management   • MULTIPLE CORONARY ARTERY BYPASS ENDO VEIN HARVEST  7/22/2016    Procedure: MULTIPLE CORONARY ARTERY BYPASS ENDO VEIN HARVEST;  Surgeon: David Flowers M.D.;  Location: SURGERY [facility];  Service:    • LILLY  7/22/2016    Procedure: LILLY;  Surgeon: David Flowers M.D.;  Location: SURGERY [facility];  Service:    • RECOVERY  7/21/2016    Procedure: CATH LAB [facility] WITH POSSIBLE DR. LYN;  Surgeon:  Recoveryonly Surgery;  Location: SURGERY PRE-POST PROC UNIT [facility];  Service:    • OTHER NEUROLOGICAL SURG  2014    diskectomy   • OTHER CARDIAC SURGERY  2013    stent   • CHOLECYSTECTOMY  2004     Family History   Problem Relation Age of Onset   • Cancer Mother    • Hypertension Father    • Hyperlipidemia Brother         x 2   • Hypertension Brother         x2   • Heart Disease Brother         x2, afib; cad x 1   • Cancer Paternal Uncle    • Diabetes Neg Hx      Social History     Socioeconomic History   • Marital status:      Spouse name: Not on file   • Number of children: Not on file   • Years of education: Not on file   • Highest education level: Not on file   Occupational History   • Not on file   Social Needs   • Financial resource strain: Not on file   • Food insecurity:     Worry: Not on file     Inability: Not on file   • Transportation needs:     Medical: Not on file     Non-medical: Not on file   Tobacco Use   • Smoking status: Never Smoker   • Smokeless tobacco: Never Used   Substance and Sexual Activity   • Alcohol use: No     Alcohol/week: 0.6 oz     Types: 1 Glasses of wine per week   • Drug use: No   • Sexual activity: Yes     Partners: Female   Lifestyle   • Physical activity:     Days per week: Not on file     Minutes per session: Not on file   • Stress: Not on file   Relationships   • Social connections:     Talks on phone: Not on file     Gets together: Not on file     Attends [employer] service: Not on file     Active member of club or organization: Not on file     Attends meetings of clubs or organizations: Not on file     Relationship status: Not on file   • Intimate partner violence:     Fear of current or ex partner: Not on file     Emotionally abused: Not on file     Physically abused: Not on file     Forced sexual activity: Not on file   Other Topics Concern   • Not on file   Social History Narrative   • Not on file     Allergies   Allergen Reactions   • Other Environmental      Grass  and cats     Outpatient Encounter Medications as of 10/2/2019   Medication Sig Dispense Refill   • sacubitril-valsartan (ENTRESTO) 24-26 MG Tab tablet Take 1 Tab by mouth.     • citalopram (CELEXA) 20 MG Tab TAKE ONE TABLET BY MOUTH EVERY DAY 90 Tab 1   • spironolactone (ALDACTONE) 25 MG Tab Take 1 Tab by mouth every day. Please check nonfasting labs 2-3 weeks after starting 90 Tab 1   • tamsulosin (FLOMAX) 0.4 MG capsule Take 1 Cap by mouth every evening. 90 Cap 3   • acetaminophen (TYLENOL) 500 MG Tab Take 1,500 mg by mouth 2 Times a Day.     • WELCHOL 625 MG Tab Take 625 mg by mouth every day.     • carvedilol (COREG) 6.25 MG Tab Take 1 Tab by mouth 2 times a day, with meals. 180 Tab 3   • BD PEN NEEDLE DENIS U/F USE DAILY WITH VICTOZA (100 DAY SUPPLY) 100 Each 11   • liraglutide (VICTOZA) 18 MG/3ML Solution Pen-injector injection Inject 0.3 mL as instructed every bedtime. 27 mL 1   • rosuvastatin (CRESTOR) 20 MG Tab Take 1 Tab by mouth every evening. 90 Tab 3   • clopidogrel (PLAVIX) 75 MG Tab Take 1 Tab by mouth every day. 90 Tab 3   • GABAPENTIN PO Take 300 mg by mouth as needed.     • omeprazole (PRILOSEC) 20 MG delayed-release capsule Take 1 Cap by mouth 1 time daily as needed. TAKE 1 CAPSULE BY MOUTH ONCE DAILY     • ezetimibe (ZETIA) 10 MG Tab Take 1 Tab by mouth every bedtime. 90 Tab 3   • Lancets 1 Each by Does not apply route 3 times a day. Lancets for Freestyle Lite meter. Sig: use TID and prn ssx high or low sugar. 100 Each 11   • allopurinol (ZYLOPRIM) 300 MG Tab TAKE 1 TABLET BY MOUTH ONCE DAILY 90 Tab 3   • JARDIANCE 25 MG Tab Take  by mouth every day.     • glipiZIDE (GLUCOTROL) 5 MG Tab Take 2.5 mg by mouth 2 times a day.     • metFORMIN (GLUCOPHAGE) 850 MG Tab 2 times a day.     • [] Fluorouracil 5 % Solution Thin layer to affect skin 1-2 times daily for 2-6 weeks. Skin may blister. (Patient not taking: Reported on 10/2/2019) 10 mL 1   • citalopram (CELEXA) 20 MG Tab Take 1 Tab by mouth  "every 48 hours. 30 Tab 11   • aspirin 81 MG tablet Take 81 mg by mouth every bedtime.       No facility-administered encounter medications on file as of 10/2/2019.      Review of Systems   Constitutional: Negative for chills and fever.   HENT: Negative for sore throat.    Eyes: Negative for blurred vision.   Respiratory: Negative for cough and shortness of breath.    Cardiovascular: Negative for chest pain, palpitations, claudication, leg swelling and PND.   Gastrointestinal: Negative for abdominal pain and nausea.   Musculoskeletal: Negative for falls and joint pain.   Skin: Negative for rash.   Neurological: Negative for dizziness, focal weakness and weakness.   Endo/Heme/Allergies: Does not bruise/bleed easily.        Objective:   /64 (BP Location: Left arm, Patient Position: Sitting, BP Cuff Size: Adult)   Pulse 82   Ht 1.803 m (5' 11\")   Wt 92.1 kg (203 lb)   SpO2 93%   BMI 28.31 kg/m²     Physical Exam   Constitutional: No distress.   HENT:   Mouth/Throat: Oropharynx is clear and moist. No oropharyngeal exudate.   Eyes: No scleral icterus.   Neck: No JVD present.   Cardiovascular: Normal rate and normal heart sounds. Exam reveals no gallop and no friction rub.   No murmur heard.  Pulmonary/Chest: No respiratory distress. He has no wheezes. He has no rales.   Abdominal: Soft. Bowel sounds are normal.   Musculoskeletal: He exhibits no edema.   Neurological: He is alert.   Skin: No rash noted. He is not diaphoretic.   Psychiatric: He has a normal mood and affect.     We reviewed in person the most recent labs  Recent Results (from the past 4032 hour(s))   CBC WITHOUT DIFFERENTIAL    Collection Time: 04/29/19  1:05 PM   Result Value Ref Range    WBC 7.9 4.8 - 10.8 K/uL    RBC 5.70 4.70 - 6.10 M/uL    Hemoglobin 14.3 14.0 - 18.0 g/dL    Hematocrit 46.3 42.0 - 52.0 %    MCV 81.2 (L) 81.4 - 97.8 fL    MCH 25.1 (L) 27.0 - 33.0 pg    MCHC 30.9 (L) 33.7 - 35.3 g/dL    RDW 47.3 35.9 - 50.0 fL    Platelet Count " 199 164 - 446 K/uL    MPV 10.9 9.0 - 12.9 fL   APTT    Collection Time: 19  1:05 PM   Result Value Ref Range    APTT 29.8 24.7 - 36.0 sec   PT    Collection Time: 19  1:05 PM   Result Value Ref Range    PT 13.7 12.0 - 14.6 sec    INR 1.04 0.87 - 1.13   Comp Metabolic Panel    Collection Time: 19  1:05 PM   Result Value Ref Range    Sodium 141 135 - 145 mmol/L    Potassium 4.3 3.6 - 5.5 mmol/L    Chloride 106 96 - 112 mmol/L    Co2 27 20 - 33 mmol/L    Anion Gap 8.0 0.0 - 11.9    Glucose 153 (H) 65 - 99 mg/dL    Bun 18 8 - 22 mg/dL    Creatinine 0.99 0.50 - 1.40 mg/dL    Calcium 9.5 8.5 - 10.5 mg/dL    AST(SGOT) 20 12 - 45 U/L    ALT(SGPT) 24 2 - 50 U/L    Alkaline Phosphatase 49 30 - 99 U/L    Total Bilirubin 0.6 0.1 - 1.5 mg/dL    Albumin 4.5 3.2 - 4.9 g/dL    Total Protein 6.5 6.0 - 8.2 g/dL    Globulin 2.0 1.9 - 3.5 g/dL    A-G Ratio 2.3 g/dL   ESTIMATED GFR    Collection Time: 19  1:05 PM   Result Value Ref Range    GFR If African American >60 >60 mL/min/1.73 m 2    GFR If Non African American >60 >60 mL/min/1.73 m 2   EKG    Collection Time: 19  1:06 PM   Result Value Ref Range    Report       [facility]    Test Date:  2019  Pt Name:    NIMRALA RODRIGUEZ                 Department: [facility]  MRN:        [id number]                      Room:  Gender:     Male                         Technician: FRACISCO  :        1950                   Requested By:COREY BILLY  Order #:    [id number]                    Reading MD: Shun Benoit MD    Measurements  Intervals                                Axis  Rate:       67                           P:          49  VA:         192                          QRS:        -45  QRSD:       100                          T:          86  QT:         428  QTc:        452    Interpretive Statements  SINUS RHYTHM  LAD, CONSIDER LEFT ANTERIOR FASCICULAR BLOCK  ANTERIOR INFARCT, AGE INDETERMINATE  Compared to ECG 2019 06:54:52  NO  SIGNIFICANT CHANGES    Electronically Signed On 4- 17:11:27 PDT by Shun Benoit MD     POC ACT (resulted by nursing)    Collection Time: 04/30/19  9:07 AM   Result Value Ref Range    Istat Activated Clotting Time 279 (H) 74 - 137 sec   URINE MICROSCOPIC (W/UA)    Collection Time: 07/05/19 11:59 AM   Result Value Ref Range    WBC 0-2 (A) /hpf    RBC 0-2 (A) /hpf    Bacteria Negative None /hpf    Epithelial Cells Negative /hpf    Hyaline Cast 0-2 /lpf   URINALYSIS,CULTURE IF INDICATED    Collection Time: 07/05/19 11:59 AM   Result Value Ref Range    Color Yellow     Character Clear     Specific Gravity 1.036 <1.035    Ph 5.5 5.0 - 8.0    Glucose >=1000 (A) Negative mg/dL    Ketones Negative Negative mg/dL    Protein 30 (A) Negative mg/dL    Bilirubin Negative Negative    Urobilinogen, Urine 0.2 Negative    Nitrite Negative Negative    Leukocyte Esterase Negative Negative    Occult Blood Negative Negative    Micro Urine Req Microscopic    Comp Metabolic Panel    Collection Time: 07/05/19 12:01 PM   Result Value Ref Range    Sodium 139 135 - 145 mmol/L    Potassium 4.2 3.6 - 5.5 mmol/L    Chloride 103 96 - 112 mmol/L    Co2 27 20 - 33 mmol/L    Anion Gap 9.0 0.0 - 11.9    Glucose 99 65 - 99 mg/dL    Bun 16 8 - 22 mg/dL    Creatinine 1.05 0.50 - 1.40 mg/dL    Calcium 10.0 8.5 - 10.5 mg/dL    AST(SGOT) 24 12 - 45 U/L    ALT(SGPT) 33 2 - 50 U/L    Alkaline Phosphatase 61 30 - 99 U/L    Total Bilirubin 0.6 0.1 - 1.5 mg/dL    Albumin 4.3 3.2 - 4.9 g/dL    Total Protein 7.2 6.0 - 8.2 g/dL    Globulin 2.9 1.9 - 3.5 g/dL    A-G Ratio 1.5 g/dL   Lipid Profile    Collection Time: 07/05/19 12:01 PM   Result Value Ref Range    Cholesterol,Tot 94 (L) 100 - 199 mg/dL    Triglycerides 117 0 - 149 mg/dL    HDL 33 (A) >=40 mg/dL    LDL 38 <100 mg/dL   ESTIMATED GFR    Collection Time: 07/05/19 12:01 PM   Result Value Ref Range    GFR If African American >60 >60 mL/min/1.73 m 2    GFR If Non  >60 >60  mL/min/1.73 m 2   CBC WITH DIFFERENTIAL    Collection Time: 07/05/19 12:02 PM   Result Value Ref Range    WBC 9.6 4.8 - 10.8 K/uL    RBC 5.72 4.70 - 6.10 M/uL    Hemoglobin 14.3 14.0 - 18.0 g/dL    Hematocrit 46.6 42.0 - 52.0 %    MCV 81.5 81.4 - 97.8 fL    MCH 25.0 (L) 27.0 - 33.0 pg    MCHC 30.7 (L) 33.7 - 35.3 g/dL    RDW 47.8 35.9 - 50.0 fL    Platelet Count 217 164 - 446 K/uL    MPV 10.9 9.0 - 12.9 fL    Neutrophils-Polys 67.10 44.00 - 72.00 %    Lymphocytes 20.70 (L) 22.00 - 41.00 %    Monocytes 7.20 0.00 - 13.40 %    Eosinophils 3.50 0.00 - 6.90 %    Basophils 0.90 0.00 - 1.80 %    Immature Granulocytes 0.60 0.00 - 0.90 %    Nucleated RBC 0.00 /100 WBC    Neutrophils (Absolute) 6.46 1.82 - 7.42 K/uL    Lymphs (Absolute) 1.99 1.00 - 4.80 K/uL    Monos (Absolute) 0.69 0.00 - 0.85 K/uL    Eos (Absolute) 0.34 0.00 - 0.51 K/uL    Baso (Absolute) 0.09 0.00 - 0.12 K/uL    Immature Granulocytes (abs) 0.06 0.00 - 0.11 K/uL    NRBC (Absolute) 0.00 K/uL   Basic Metabolic Panel    Collection Time: 07/05/19  3:18 PM   Result Value Ref Range    Sodium 137 135 - 145 mmol/L    Potassium 4.1 3.6 - 5.5 mmol/L    Chloride 104 96 - 112 mmol/L    Co2 26 20 - 33 mmol/L    Glucose 248 (H) 65 - 99 mg/dL    Bun 18 8 - 22 mg/dL    Creatinine 1.35 0.50 - 1.40 mg/dL    Calcium 9.8 8.5 - 10.5 mg/dL    Anion Gap 7.0 0.0 - 11.9   CBC WITH DIFFERENTIAL    Collection Time: 07/05/19  3:18 PM   Result Value Ref Range    WBC 10.7 4.8 - 10.8 K/uL    RBC 5.73 4.70 - 6.10 M/uL    Hemoglobin 13.7 (L) 14.0 - 18.0 g/dL    Hematocrit 46.7 42.0 - 52.0 %    MCV 81.5 81.4 - 97.8 fL    MCH 23.9 (L) 27.0 - 33.0 pg    MCHC 29.3 (L) 33.7 - 35.3 g/dL    RDW 47.2 35.9 - 50.0 fL    Platelet Count 247 164 - 446 K/uL    MPV 11.1 9.0 - 12.9 fL    Neutrophils-Polys 70.30 44.00 - 72.00 %    Lymphocytes 17.70 (L) 22.00 - 41.00 %    Monocytes 6.90 0.00 - 13.40 %    Eosinophils 3.40 0.00 - 6.90 %    Basophils 1.00 0.00 - 1.80 %    Immature Granulocytes 0.70 0.00 - 0.90  %    Nucleated RBC 0.00 /100 WBC    Neutrophils (Absolute) 7.50 (H) 1.82 - 7.42 K/uL    Lymphs (Absolute) 1.89 1.00 - 4.80 K/uL    Monos (Absolute) 0.74 0.00 - 0.85 K/uL    Eos (Absolute) 0.36 0.00 - 0.51 K/uL    Baso (Absolute) 0.11 0.00 - 0.12 K/uL    Immature Granulocytes (abs) 0.07 0.00 - 0.11 K/uL    NRBC (Absolute) 0.00 K/uL    Anisocytosis 1+     Microcytosis 1+    Prothrombin Time    Collection Time: 07/05/19  3:18 PM   Result Value Ref Range    PT 13.5 12.0 - 14.6 sec    INR 1.01 0.87 - 1.13   APTT    Collection Time: 07/05/19  3:18 PM   Result Value Ref Range    APTT 29.7 24.7 - 36.0 sec   ESTIMATED GFR    Collection Time: 07/05/19  3:18 PM   Result Value Ref Range    GFR If African American >60 >60 mL/min/1.73 m 2    GFR If Non  53 (A) >60 mL/min/1.73 m 2   PERIPHERAL SMEAR REVIEW    Collection Time: 07/05/19  3:18 PM   Result Value Ref Range    Peripheral Smear Review see below    PLATELET ESTIMATE    Collection Time: 07/05/19  3:18 PM   Result Value Ref Range    Plt Estimation Normal    MORPHOLOGY    Collection Time: 07/05/19  3:18 PM   Result Value Ref Range    RBC Morphology Present     Poikilocytosis 2+     Ovalocytes 2+     Echinocytes 1+    DIFFERENTIAL COMMENT    Collection Time: 07/05/19  3:18 PM   Result Value Ref Range    Comments-Diff see below    POCT Retinal Eye Exam    Collection Time: 07/08/19  8:00 AM   Result Value Ref Range    RETINAL SCREEN Positive (A) Negative, Indeterminate, None Detected, Valid, Invalid   HEMOGLOBIN A1C    Collection Time: 07/15/19 10:27 AM   Result Value Ref Range    Glycohemoglobin 6.4 (H) 0.0 - 5.6 %    Est Avg Glucose 137 mg/dL   ACCU-CHEK GLUCOSE    Collection Time: 07/16/19 12:18 PM   Result Value Ref Range    Glucose - Accu-Ck 110 (H) 65 - 99 mg/dL       Assessment:     1. S/P CABG x 3  CL-IMPLANTABLE CARDIOVERTER DEFIBRILLATOR   2. Essential hypertension     3. Coronary artery disease due to calcified coronary lesion  CL-IMPLANTABLE  CARDIOVERTER DEFIBRILLATOR   4. Chronic systolic congestive heart failure (HCC)  CL-IMPLANTABLE CARDIOVERTER DEFIBRILLATOR       Medical Decision Making:  Today's Assessment / Status / Plan:     It was my pleasure to meet with Mr. Renner.    Blood pressure is well controlled.      He is on appropriate statin.    We discussed his advanced ischemic cardiomyopathy he is doing well clinically but certainly at risk for arrhythmias he is agreeable to defibrillator for primary prevention of arrhythmia    I will see Mr. Renner back in 6 months time and encouraged him to follow up with us over the phone or electronically using my thereNowhart as issues arise.    It is my pleasure to participate in the care of Mr. Renner.  Please do not hesitate to contact me with questions or concerns.    Shun Benoit MD PhD [facility]  Cardiologist [facility]    Please note that this dictation was created using voice recognition software. I have worked with consultants from the vendor as well as technical experts from [facility] to optimize the interface. I have made every reasonable attempt to correct obvious errors, but I expect that there are errors of grammar and possibly content I did not discover before finalizing the note.     Mr. Renner's care is highly complex due to high risk diagnoses, high risk medication and discussion about complications from procedures or medications.  I have personally spent extra time in discussion about these facts with Mr. Renner and reviewed the available records including labs, imaging and EKGs as appropriate.

## 2019-10-07 NOTE — TELEPHONE ENCOUNTER
Patient scheduled for ICD insert on 10-15-19 at [facility] with Dr. Leyva.

## 2019-10-10 DIAGNOSIS — Z01.810 PRE-OPERATIVE CARDIOVASCULAR EXAMINATION: ICD-10-CM

## 2019-10-10 DIAGNOSIS — Z01.812 PRE-OPERATIVE LABORATORY EXAMINATION: ICD-10-CM

## 2019-10-10 LAB
ALBUMIN SERPL BCP-MCNC: 4.2 G/DL (ref 3.2–4.9)
ALBUMIN/GLOB SERPL: 1.8 G/DL
ALP SERPL-CCNC: 54 U/L (ref 30–99)
ALT SERPL-CCNC: 19 U/L (ref 2–50)
ANION GAP SERPL CALC-SCNC: 7 MMOL/L (ref 0–11.9)
AST SERPL-CCNC: 20 U/L (ref 12–45)
BILIRUB SERPL-MCNC: 0.6 MG/DL (ref 0.1–1.5)
BUN SERPL-MCNC: 17 MG/DL (ref 8–22)
CALCIUM SERPL-MCNC: 9.8 MG/DL (ref 8.5–10.5)
CHLORIDE SERPL-SCNC: 108 MMOL/L (ref 96–112)
CO2 SERPL-SCNC: 28 MMOL/L (ref 20–33)
CREAT SERPL-MCNC: 1.17 MG/DL (ref 0.5–1.4)
EKG IMPRESSION: NORMAL
ERYTHROCYTE [DISTWIDTH] IN BLOOD BY AUTOMATED COUNT: 46.3 FL (ref 35.9–50)
GLOBULIN SER CALC-MCNC: 2.4 G/DL (ref 1.9–3.5)
GLUCOSE SERPL-MCNC: 164 MG/DL (ref 65–99)
HCT VFR BLD AUTO: 45.5 % (ref 42–52)
HGB BLD-MCNC: 13.9 G/DL (ref 14–18)
INR PPP: 0.97 (ref 0.87–1.13)
MCH RBC QN AUTO: 24.1 PG (ref 27–33)
MCHC RBC AUTO-ENTMCNC: 30.5 G/DL (ref 33.7–35.3)
MCV RBC AUTO: 79 FL (ref 81.4–97.8)
PLATELET # BLD AUTO: 169 K/UL (ref 164–446)
PMV BLD AUTO: 11.2 FL (ref 9–12.9)
POTASSIUM SERPL-SCNC: 4.4 MMOL/L (ref 3.6–5.5)
PROT SERPL-MCNC: 6.6 G/DL (ref 6–8.2)
PROTHROMBIN TIME: 13.1 SEC (ref 12–14.6)
RBC # BLD AUTO: 5.76 M/UL (ref 4.7–6.1)
SODIUM SERPL-SCNC: 143 MMOL/L (ref 135–145)
WBC # BLD AUTO: 8 K/UL (ref 4.8–10.8)

## 2019-10-10 PROCEDURE — 80053 COMPREHEN METABOLIC PANEL: CPT

## 2019-10-10 PROCEDURE — 85610 PROTHROMBIN TIME: CPT

## 2019-10-10 PROCEDURE — 93005 ELECTROCARDIOGRAM TRACING: CPT | Performed by: INTERNAL MEDICINE

## 2019-10-10 PROCEDURE — 93010 ELECTROCARDIOGRAM REPORT: CPT | Performed by: INTERNAL MEDICINE

## 2019-10-10 PROCEDURE — 36415 COLL VENOUS BLD VENIPUNCTURE: CPT

## 2019-10-10 PROCEDURE — 85027 COMPLETE CBC AUTOMATED: CPT

## 2019-10-10 NOTE — TELEPHONE ENCOUNTER
Per Dr. Leyva's request - changed device company to [employer] due to the spinal stimulator. LM for Mari in the cath lab to change device company to [employer]. Aleena notified with [employer] and Yana with [employer] notified.

## 2019-10-11 ENCOUNTER — TELEPHONE (OUTPATIENT)
Dept: CARDIOLOGY | Facility: MEDICAL CENTER | Age: 69
End: 2019-10-11

## 2019-10-11 NOTE — TELEPHONE ENCOUNTER
Associated Results   Result Notes for Comp Metabolic Panel   Notes recorded by Shun Benoit M.D. on 10/10/2019 at 4:43 PM PDT    Labs look good, please let him know     Thank you     Called patient and reviewed MD recommendations.  Pt verbalizes understanding.  Pt states no other concerns or questions at this time and is appreciative of information given.

## 2019-10-15 ENCOUNTER — APPOINTMENT (OUTPATIENT)
Dept: RADIOLOGY | Facility: MEDICAL CENTER | Age: 69
End: 2019-10-15
Attending: INTERNAL MEDICINE
Payer: MEDICARE

## 2019-10-15 ENCOUNTER — APPOINTMENT (OUTPATIENT)
Dept: CARDIOLOGY | Facility: MEDICAL CENTER | Age: 69
End: 2019-10-15
Attending: INTERNAL MEDICINE
Payer: MEDICARE

## 2019-10-15 ENCOUNTER — HOSPITAL ENCOUNTER (OUTPATIENT)
Facility: MEDICAL CENTER | Age: 69
End: 2019-10-16
Attending: INTERNAL MEDICINE | Admitting: INTERNAL MEDICINE
Payer: MEDICARE

## 2019-10-15 DIAGNOSIS — I25.5 ISCHEMIC CARDIOMYOPATHY: ICD-10-CM

## 2019-10-15 LAB
EKG IMPRESSION: NORMAL
GLUCOSE BLD-MCNC: 113 MG/DL (ref 65–99)
GLUCOSE BLD-MCNC: 160 MG/DL (ref 65–99)

## 2019-10-15 PROCEDURE — G0378 HOSPITAL OBSERVATION PER HR: HCPCS

## 2019-10-15 PROCEDURE — 700111 HCHG RX REV CODE 636 W/ 250 OVERRIDE (IP): Performed by: INTERNAL MEDICINE

## 2019-10-15 PROCEDURE — 93010 ELECTROCARDIOGRAM REPORT: CPT | Performed by: INTERNAL MEDICINE

## 2019-10-15 PROCEDURE — 82962 GLUCOSE BLOOD TEST: CPT

## 2019-10-15 PROCEDURE — 700102 HCHG RX REV CODE 250 W/ 637 OVERRIDE(OP): Performed by: INTERNAL MEDICINE

## 2019-10-15 PROCEDURE — 700111 HCHG RX REV CODE 636 W/ 250 OVERRIDE (IP)

## 2019-10-15 PROCEDURE — 33249 INSJ/RPLCMT DEFIB W/LEAD(S): CPT

## 2019-10-15 PROCEDURE — 700117 HCHG RX CONTRAST REV CODE 255: Performed by: INTERNAL MEDICINE

## 2019-10-15 PROCEDURE — 99152 MOD SED SAME PHYS/QHP 5/>YRS: CPT | Performed by: INTERNAL MEDICINE

## 2019-10-15 PROCEDURE — 700101 HCHG RX REV CODE 250

## 2019-10-15 PROCEDURE — 33249 INSJ/RPLCMT DEFIB W/LEAD(S): CPT | Performed by: INTERNAL MEDICINE

## 2019-10-15 PROCEDURE — 71045 X-RAY EXAM CHEST 1 VIEW: CPT

## 2019-10-15 PROCEDURE — A9270 NON-COVERED ITEM OR SERVICE: HCPCS | Performed by: INTERNAL MEDICINE

## 2019-10-15 PROCEDURE — 160002 HCHG RECOVERY MINUTES (STAT)

## 2019-10-15 PROCEDURE — 93005 ELECTROCARDIOGRAM TRACING: CPT | Performed by: INTERNAL MEDICINE

## 2019-10-15 PROCEDURE — 96365 THER/PROPH/DIAG IV INF INIT: CPT

## 2019-10-15 PROCEDURE — 700105 HCHG RX REV CODE 258

## 2019-10-15 RX ORDER — EZETIMIBE 10 MG/1
10 TABLET ORAL
Status: DISCONTINUED | OUTPATIENT
Start: 2019-10-15 | End: 2019-10-16 | Stop reason: HOSPADM

## 2019-10-15 RX ORDER — MIDAZOLAM HYDROCHLORIDE 1 MG/ML
INJECTION INTRAMUSCULAR; INTRAVENOUS
Status: COMPLETED
Start: 2019-10-15 | End: 2019-10-15

## 2019-10-15 RX ORDER — SPIRONOLACTONE 25 MG/1
25 TABLET ORAL DAILY
Status: DISCONTINUED | OUTPATIENT
Start: 2019-10-16 | End: 2019-10-16 | Stop reason: HOSPADM

## 2019-10-15 RX ORDER — CITALOPRAM 20 MG/1
20 TABLET ORAL DAILY
Status: DISCONTINUED | OUTPATIENT
Start: 2019-10-16 | End: 2019-10-16 | Stop reason: HOSPADM

## 2019-10-15 RX ORDER — ACETAMINOPHEN 325 MG/1
650 TABLET ORAL EVERY 4 HOURS PRN
Status: DISCONTINUED | OUTPATIENT
Start: 2019-10-15 | End: 2019-10-16 | Stop reason: HOSPADM

## 2019-10-15 RX ORDER — CEFAZOLIN SODIUM 1 G/3ML
INJECTION, POWDER, FOR SOLUTION INTRAMUSCULAR; INTRAVENOUS
Status: COMPLETED
Start: 2019-10-15 | End: 2019-10-15

## 2019-10-15 RX ORDER — TRAMADOL HYDROCHLORIDE 50 MG/1
50 TABLET ORAL EVERY 6 HOURS PRN
Status: DISCONTINUED | OUTPATIENT
Start: 2019-10-15 | End: 2019-10-16 | Stop reason: HOSPADM

## 2019-10-15 RX ORDER — CLOPIDOGREL BISULFATE 75 MG/1
75 TABLET ORAL DAILY
Status: DISCONTINUED | OUTPATIENT
Start: 2019-10-16 | End: 2019-10-16 | Stop reason: HOSPADM

## 2019-10-15 RX ORDER — CARVEDILOL 6.25 MG/1
6.25 TABLET ORAL 2 TIMES DAILY WITH MEALS
Status: DISCONTINUED | OUTPATIENT
Start: 2019-10-15 | End: 2019-10-16 | Stop reason: HOSPADM

## 2019-10-15 RX ORDER — CEFAZOLIN SODIUM 2 G/100ML
2 INJECTION, SOLUTION INTRAVENOUS EVERY 8 HOURS
Status: COMPLETED | OUTPATIENT
Start: 2019-10-15 | End: 2019-10-16

## 2019-10-15 RX ORDER — ROSUVASTATIN CALCIUM 20 MG/1
20 TABLET, COATED ORAL EVERY EVENING
Status: DISCONTINUED | OUTPATIENT
Start: 2019-10-16 | End: 2019-10-16 | Stop reason: HOSPADM

## 2019-10-15 RX ORDER — BUPIVACAINE HYDROCHLORIDE 2.5 MG/ML
INJECTION, SOLUTION EPIDURAL; INFILTRATION; INTRACAUDAL
Status: COMPLETED
Start: 2019-10-15 | End: 2019-10-15

## 2019-10-15 RX ORDER — TAMSULOSIN HYDROCHLORIDE 0.4 MG/1
0.4 CAPSULE ORAL EVERY EVENING
Status: DISCONTINUED | OUTPATIENT
Start: 2019-10-15 | End: 2019-10-16 | Stop reason: HOSPADM

## 2019-10-15 RX ORDER — OMEPRAZOLE 20 MG/1
20 CAPSULE, DELAYED RELEASE ORAL DAILY
Status: DISCONTINUED | OUTPATIENT
Start: 2019-10-16 | End: 2019-10-16 | Stop reason: HOSPADM

## 2019-10-15 RX ORDER — LIDOCAINE HYDROCHLORIDE 20 MG/ML
INJECTION, SOLUTION INFILTRATION; PERINEURAL
Status: COMPLETED
Start: 2019-10-15 | End: 2019-10-15

## 2019-10-15 RX ORDER — ALLOPURINOL 300 MG/1
300 TABLET ORAL DAILY
Status: DISCONTINUED | OUTPATIENT
Start: 2019-10-16 | End: 2019-10-16 | Stop reason: HOSPADM

## 2019-10-15 RX ORDER — SODIUM CHLORIDE 9 MG/ML
INJECTION, SOLUTION INTRAVENOUS
Status: COMPLETED
Start: 2019-10-15 | End: 2019-10-15

## 2019-10-15 RX ADMIN — LIDOCAINE HYDROCHLORIDE: 20 INJECTION, SOLUTION INFILTRATION; PERINEURAL at 13:40

## 2019-10-15 RX ADMIN — CARVEDILOL 6.25 MG: 6.25 TABLET, FILM COATED ORAL at 18:26

## 2019-10-15 RX ADMIN — CEFAZOLIN 3000 MG: 330 INJECTION, POWDER, FOR SOLUTION INTRAMUSCULAR; INTRAVENOUS at 14:01

## 2019-10-15 RX ADMIN — MIDAZOLAM HYDROCHLORIDE 1 MG: 1 INJECTION, SOLUTION INTRAMUSCULAR; INTRAVENOUS at 14:39

## 2019-10-15 RX ADMIN — BUPIVACAINE HYDROCHLORIDE: 2.5 INJECTION, SOLUTION EPIDURAL; INFILTRATION; INTRACAUDAL; PERINEURAL at 13:38

## 2019-10-15 RX ADMIN — CEFAZOLIN 1000 MG: 330 INJECTION, POWDER, FOR SOLUTION INTRAMUSCULAR; INTRAVENOUS at 14:29

## 2019-10-15 RX ADMIN — SODIUM CHLORIDE 500 ML: 9 INJECTION, SOLUTION INTRAVENOUS at 20:47

## 2019-10-15 RX ADMIN — EZETIMIBE 10 MG: 10 TABLET ORAL at 19:46

## 2019-10-15 RX ADMIN — ACETAMINOPHEN 650 MG: 325 TABLET, FILM COATED ORAL at 19:46

## 2019-10-15 RX ADMIN — MIDAZOLAM HYDROCHLORIDE 2 MG: 1 INJECTION, SOLUTION INTRAMUSCULAR; INTRAVENOUS at 14:02

## 2019-10-15 RX ADMIN — TAMSULOSIN HYDROCHLORIDE 0.4 MG: 0.4 CAPSULE ORAL at 18:26

## 2019-10-15 RX ADMIN — FENTANYL CITRATE 100 MCG: 50 INJECTION INTRAMUSCULAR; INTRAVENOUS at 14:14

## 2019-10-15 RX ADMIN — IOHEXOL 10 ML: 350 INJECTION, SOLUTION INTRAVENOUS at 14:15

## 2019-10-15 RX ADMIN — CEFAZOLIN SODIUM 2 G: 2 INJECTION, SOLUTION INTRAVENOUS at 20:42

## 2019-10-15 ASSESSMENT — COGNITIVE AND FUNCTIONAL STATUS - GENERAL
SUGGESTED CMS G CODE MODIFIER MOBILITY: CH
DAILY ACTIVITIY SCORE: 24
MOBILITY SCORE: 24
SUGGESTED CMS G CODE MODIFIER DAILY ACTIVITY: CH

## 2019-10-15 ASSESSMENT — LIFESTYLE VARIABLES
HOW MANY TIMES IN THE PAST YEAR HAVE YOU HAD 5 OR MORE DRINKS IN A DAY: 0
TOTAL SCORE: 0
ON A TYPICAL DAY WHEN YOU DRINK ALCOHOL HOW MANY DRINKS DO YOU HAVE: 2
HAVE YOU EVER FELT YOU SHOULD CUT DOWN ON YOUR DRINKING: NO
CONSUMPTION TOTAL: NEGATIVE
TOTAL SCORE: 0
TOTAL SCORE: 0
AVERAGE NUMBER OF DAYS PER WEEK YOU HAVE A DRINK CONTAINING ALCOHOL: 0
ALCOHOL_USE: YES
EVER FELT BAD OR GUILTY ABOUT YOUR DRINKING: NO
DOES PATIENT WANT TO STOP DRINKING: NO
EVER HAD A DRINK FIRST THING IN THE MORNING TO STEADY YOUR NERVES TO GET RID OF A HANGOVER: NO
HAVE PEOPLE ANNOYED YOU BY CRITICIZING YOUR DRINKING: NO

## 2019-10-15 ASSESSMENT — PATIENT HEALTH QUESTIONNAIRE - PHQ9
SUM OF ALL RESPONSES TO PHQ9 QUESTIONS 1 AND 2: 0
1. LITTLE INTEREST OR PLEASURE IN DOING THINGS: NOT AT ALL
2. FEELING DOWN, DEPRESSED, IRRITABLE, OR HOPELESS: NOT AT ALL

## 2019-10-15 NOTE — OR NURSING
1502 Pt report received from cath lab RN, pt brought over on a gurney by cath lab RN, pt placed on Tele monitor, VSS, no C/O pain at this. Left upper quadrant PPM site is clean, dry and soft, dressing intact. Pt given water and a snack.     1515 Pt left upper quadrant site clean, dry and soft, no C/O pain     1530 Pt left upper quadrant site clean, dry and soft, no C/O pain     1545 Pt left upper quadrant site clean, dry and soft, no C/O pain     1635 Pt report given to accepting RN on tele floor, pt and family updated on transfer to room. Pt taken up to tele floor with RN on monitor. Site reviewed with accepting RN.

## 2019-10-15 NOTE — PROGRESS NOTES
EP Faculty Note    Pt of Dr Benoit referred for ICD implantation. ICM EF 33% on optimal medical therapy >3 months without recent revascularization, for primary prevention of SCD.    The risk, benefits, and alternatives to ICD placement were discussed in great detail, specific risks mentioned including bleeding, infection, cardiac perforation with possible tamponade requiring pericardiocentesis or open heart surgery.  In addition the possibility of lead dislodgment (2-3%), inappropriate shocks, pneumothorax (3%), hemothorax were discussed. Also mentioned were the possibility of death, stroke, and myocardial infarction. The patient verbalized understanding of these potential complications and wishes to proceed with this procedure.     Proceed with single chamber ICD implantation, PERLA.    Riley Leyva MD  Cardiac Electrophysiology

## 2019-10-15 NOTE — CATH LAB
Electrophysiology Procedure Note  [facility]      Date of procedure: 10/15/2019     Procedure Performed: Placement of AICD, moderate sedation administered by RN and supervised by physician    Indication: ICM, chronic systolic heart failure NYHA II, EF <35% on optimal medical therapy >3 months for primary prevention of SCD    Physician(s): Riley Leyva MD    Anesthesia: Moderate sedation,  start time 1355, stop time 1441  The moderate sedation document has been reviewed, signed and scanned into media     Medications:  3mg Versed, 100mcg Fentanyl  2g Ancef    Specimen(s) Removed: None     Estimated Blood Loss:  30cc    Complications: none    Pre Procedure ECG: NSR    Post Procedure ECG: NSR    DESCRIPTION OF PROCEDURE: After informed written consent, the patient was brought to the electrophysiology lab in the fasting, unsedated state. The patient was prepped and draped in the usual sterile fashion. The procedure was performed under moderate sedation with local anesthetic. A left infraclavicular incision was made with a scalpel and the pre-pectoral device pocket was created using a combination of blunt dissection and electrocautery. The modified Seldinger technique was used to gain access to the left axillary vein. A peel-away hemostasis sheath was placed in the vein. Under fluoroscopic guidance, the ICD lead was introduced into the heart. The ventricular lead was advanced into the RVOT position the pulled back and advanced to the RV apex. The lead was tested and had satisfactory sensing and pacing parameters. High output pacing did not produce extracardiac stimulation.  The lead was sutured to the underlying pectoral muscle with interrupted silk over a silastic suture sleeve. The device pocket was irrigated with antibiotic solution, inspected, and no bleeding was seen. The leads were connected to the ICD pulse generator and the device was inserted into the pocket. The wound was closed with three  layers of absorbable sutures and covered with Steri-Strips.   I personally supervised the administration of moderate sedation by the RN and observed the level of consciousness and physiologic status throughout the procedure.  Following recovery from sedation, the patient was transferred to a monitored bed in good condition.    IMPLANTED DEVICE INFORMATION:    Pulse generator is a [employer] model [id number]   Serial number [id number]      LEAD INFORMATION:  1. Right ventricular lead is a [employer] model 7122Q, serial number [id number], R wave 17.0 millivolts, threshold 0.5 Volts, pacing impedance 810 Ohms.    DEVICE PROGRAMMING:    Javi therapy: VVI 40  Tachy therapy: 3 Zone (  Monitor 171)    DEFIBRILLATION THRESHOLD TESTING:    DFTs deferred    FLUOROSCOPY TIME: 1.1 min    SUMMARY/CONCLUSIONS:  1. Successful single chamber ICD implantation.    RECOMMENDATIONS:  1. Admit to monitored bed.  2. PA and lateral chest x-ray.  3. Implantable cardioverter defibrillator interrogation prior to hospital discharge.  4. IV antibiotics for 2 doses  5. Follow-up in device clinic for wound check and device interrogation.

## 2019-10-15 NOTE — PROGRESS NOTES
Patient arrived from PPU to [id number] accompanied by ACLS RN monitored by rae. Bedside report complete. Patient ambulated from gurney to chair with a SBA. No complaints of pain and dressing is CDI. Unable to visualize incision. VSS and at baseline and sating >90% on RA. Call light within reach and patient verbalizes understanding that he needs to call staff in order to ambulate.

## 2019-10-16 ENCOUNTER — APPOINTMENT (OUTPATIENT)
Dept: RADIOLOGY | Facility: MEDICAL CENTER | Age: 69
End: 2019-10-16
Attending: INTERNAL MEDICINE
Payer: MEDICARE

## 2019-10-16 VITALS
DIASTOLIC BLOOD PRESSURE: 83 MMHG | HEIGHT: 71 IN | RESPIRATION RATE: 17 BRPM | WEIGHT: 205.03 LBS | BODY MASS INDEX: 28.7 KG/M2 | OXYGEN SATURATION: 94 % | SYSTOLIC BLOOD PRESSURE: 123 MMHG | TEMPERATURE: 97.7 F | HEART RATE: 64 BPM

## 2019-10-16 LAB
EKG IMPRESSION: NORMAL
GLUCOSE BLD-MCNC: 108 MG/DL (ref 65–99)

## 2019-10-16 PROCEDURE — 99024 POSTOP FOLLOW-UP VISIT: CPT | Performed by: NURSE PRACTITIONER

## 2019-10-16 PROCEDURE — 82962 GLUCOSE BLOOD TEST: CPT

## 2019-10-16 PROCEDURE — A9270 NON-COVERED ITEM OR SERVICE: HCPCS | Performed by: INTERNAL MEDICINE

## 2019-10-16 PROCEDURE — 93005 ELECTROCARDIOGRAM TRACING: CPT | Performed by: INTERNAL MEDICINE

## 2019-10-16 PROCEDURE — G0378 HOSPITAL OBSERVATION PER HR: HCPCS

## 2019-10-16 PROCEDURE — 700111 HCHG RX REV CODE 636 W/ 250 OVERRIDE (IP): Performed by: INTERNAL MEDICINE

## 2019-10-16 PROCEDURE — 71045 X-RAY EXAM CHEST 1 VIEW: CPT

## 2019-10-16 PROCEDURE — 93010 ELECTROCARDIOGRAM REPORT: CPT | Performed by: INTERNAL MEDICINE

## 2019-10-16 PROCEDURE — 700102 HCHG RX REV CODE 250 W/ 637 OVERRIDE(OP): Performed by: INTERNAL MEDICINE

## 2019-10-16 PROCEDURE — 96366 THER/PROPH/DIAG IV INF ADDON: CPT

## 2019-10-16 RX ADMIN — CEFAZOLIN SODIUM 2 G: 2 INJECTION, SOLUTION INTRAVENOUS at 06:14

## 2019-10-16 RX ADMIN — ALLOPURINOL 300 MG: 300 TABLET ORAL at 05:22

## 2019-10-16 RX ADMIN — TRAMADOL HYDROCHLORIDE 50 MG: 50 TABLET ORAL at 00:08

## 2019-10-16 RX ADMIN — OMEPRAZOLE 20 MG: 20 CAPSULE, DELAYED RELEASE ORAL at 05:23

## 2019-10-16 RX ADMIN — SACUBITRIL AND VALSARTAN 1 TABLET: 24; 26 TABLET, FILM COATED ORAL at 05:23

## 2019-10-16 RX ADMIN — CLOPIDOGREL BISULFATE 75 MG: 75 TABLET ORAL at 05:26

## 2019-10-16 RX ADMIN — CARVEDILOL 6.25 MG: 6.25 TABLET, FILM COATED ORAL at 08:48

## 2019-10-16 RX ADMIN — SPIRONOLACTONE 25 MG: 25 TABLET ORAL at 05:24

## 2019-10-16 RX ADMIN — CITALOPRAM HYDROBROMIDE 20 MG: 20 TABLET ORAL at 05:22

## 2019-10-16 RX ADMIN — TRAMADOL HYDROCHLORIDE 50 MG: 50 TABLET ORAL at 06:02

## 2019-10-16 NOTE — CARE PLAN
Patient ambulates in hallways with a SBA with staff. Call light within reach at all times. Daughter at bedside and supportive. VSS with no complaints of pain. Dressing remains CDI and arm sling utilized for patient comfort.     Problem: Safety  Goal: Will remain free from injury  Outcome: PROGRESSING AS EXPECTED       12hr Chart Check

## 2019-10-16 NOTE — DISCHARGE INSTRUCTIONS
Pacemaker Discharge Instructions/[facility]     1.  No showers until seen in follow up; may take sponge bath.  Keep dressing dry  & in place until seen at for you follow up visit at the cardiology office.     2.  No lifting over 10 lbs with left arm for six weeks.   3.  Do not raise left arm above shoulder level or behind head for six weeks.   4.  Avoid excessive pushing, pulling, or twisting with left arm for six weeks.   5.  No driving for the first week.   6.  Call our office ([phone]) if you notice any increased swelling, redness, warmth, or drainage at the implant site.   7.  Needs to be seen in emergency if you develop fever > 101F or uncontrolled pain.   8.  Always check with device clinic before any planned MRI to see if device is MRI compatible.   9.  No routine dental work or cleanings for 3 months.   10.  May remove arm sling after one day, but please wear if you have trouble remembering to keep your arm down.  Please wear at night as a reminder.   11. Do not place cell phones or mobile devices directly over implanted device.     ICD Instructions   If has 1 shock: if feeling fine can notify cardiology office and be seen for interrogation.  If feeling poorly after needs to call 911.  2 Shocks or more in 24 hours: call 911.        Discharge Instructions    Discharged to home by car with relative. Discharged via walking, hospital escort: Refused.  Special equipment needed: Arm sling    Be sure to schedule a follow-up appointment with your primary care doctor or any specialists as instructed.     Discharge Plan:   Influenza Vaccine Indication: Indicated: 65 years and older    I understand that a diet low in cholesterol, fat, and sodium is recommended for good health. Unless I have been given specific instructions below for another diet, I accept this instruction as my diet prescription.       Special Instructions: None    · Is patient discharged on Warfarin / Coumadin? No     Depression / Suicide  Risk    As you are discharged from this [facility] facility, it is important to learn how to keep safe from harming yourself.    Recognize the warning signs:  · Abrupt changes in personality, positive or negative- including increase in energy   · Giving away possessions  · Change in eating patterns- significant weight changes-  positive or negative  · Change in sleeping patterns- unable to sleep or sleeping all the time   · Unwillingness or inability to communicate  · Depression  · Unusual sadness, discouragement and loneliness  · Talk of wanting to die  · Neglect of personal appearance   · Rebelliousness- reckless behavior  · Withdrawal from people/activities they love  · Confusion- inability to concentrate     If you or a loved one observes any of these behaviors or has concerns about self-harm, here's what you can do:  · Talk about it- your feelings and reasons for harming yourself  · Remove any means that you might use to hurt yourself (examples: pills, rope, extension cords, firearm)  · Get professional help from the community (Mental Health, Substance Abuse, psychological counseling)  · Do not be alone:Call your Safe Contact- someone whom you trust who will be there for you.  · Call your local CRISIS HOTLINE 728-9214 or 521-192-8229  · Call your local Children's Mobile Crisis Response Team [place] (855) 090-3035 or www.CloudCover  · Call the toll free National Suicide Prevention Hotlines   · National Suicide Prevention Lifeline 047-566-HMVF (6317)  · National Hope Line Network 800-SUICIDE (349-8148)

## 2019-10-16 NOTE — CARE PLAN
Discharge criteria met and discharge order present. Patient is A&Ox4. Discharge instructions reviewed with patient by NP and RN. Instructions include but not limited to S/S to monitor for, when to seek medical attention, follow-up appointments and incision care. Patient verbalizes understanding and agreeable to discharge. PIV and cardiac monitor DCd and cardiac monitor placed at charge nurse station. Patient refuses escort and wheelchair to hospital exit and states he has a friend picking him up in a private vehicle to take him home. Patient states he has all of his belongings.       Problem: Discharge Barriers/Planning  Goal: Patient's continuum of care needs will be met  Outcome: MET

## 2019-10-16 NOTE — DISCHARGE SUMMARY
CHIEF COMPLAINT ON ADMISSION  Elective admission for ICD implantation.     CODE STATUS  Full Code    HPI & HOSPITAL COURSE  This is a 69 y.o. year old male here for elective admission for primary prevention ICD implantation secondary to persistent reduced left ventricular systolic function despite appropriate GDMT .  The patient is followed longitudinally in our office by Dr. Benoit, last seen 10/2/19 at which time ICD implantation was discussed and agreed upon and patient was scheduled accordingly.  Past medical history also significant for MI, CAD S/P PCI/ANA PAULA x 2 LAD.  Has distal RCA occlusion and distal circ occlusion with collateralization, chronic systolic heart failure, previous LV apical thrombus, and diabetes.    Procedural Conclusions per Dr. Leyva's Op Note:  IMPLANTED DEVICE INFORMATION:    Pulse generator is a [employer] model AI2172-64W   Serial number [id number]    LEAD INFORMATION:  1. Right ventricular lead is a [employer] model 7122Q, serial number [id number], R wave 17.0 millivolts, threshold 0.5 Volts, pacing impedance 810 Ohms.  DEVICE PROGRAMMING:    Javi therapy: VVI 40  Tachy therapy: 3 Zone (  Monitor 171)  DEFIBRILLATION THRESHOLD TESTING:    DFTs deferred  SUMMARY/CONCLUSIONS:  1. Successful single chamber ICD implantation.    The patient has been seen and examined in EP rounds this AM.  His monitored rhythm is sinus presently.  Telemetry history has been reviewed and is without acute abnormalities/arrhythmias.  EKG, Vial signs, Imaging data, and labs have been reviewed and are stable.  The patient denies any chest pain, dyspnea, dizziness, paraesthesias, or other complaints.  His physical exam is without acute findings; specifically his left chest ICD site is clean and dry; there is no evidence of erythema or hematoma; there is scant-mild edema noted and slight soft ecchymosis.  He has been out of bed and ambulated without difficulty.     CXR Conclusions:  Showed no evidence of late  pneumothorax, lead position appears stable.     [facility] Device Interrogation: showed normal device function this AM.  RV: r waves 12.0 mV, impedance 590 ohms, Threshold 0.5 V.     Therefore, he is discharged in good and stable condition with close outpatient follow-up.    PROCEDURES  [facility] single chamber ICD Implantation, Dr. Leyva.  Please see procedural note for full details.     CONSULTATIONS  None.     DISCHARGE PROBLEM LIST  1. Chronic systolic heart failure with LVEF 35%.  2. S/P successful primary prevention [facility] single chamber ICD implant.     MEDICATIONS ON DISCHARGE   Zurdo Luis Ira   Home Medication Instructions ABRAHAN:[id number]    Printed on:10/16/19 2436   Medication Information                      acetaminophen (TYLENOL) 500 MG Tab  Take 1,500 mg by mouth 2 Times a Day.             allopurinol (ZYLOPRIM) 300 MG Tab  TAKE 1 TABLET BY MOUTH ONCE DAILY             BD PEN NEEDLE DENIS U/F  USE DAILY WITH VICTOZA (100 DAY SUPPLY)             carvedilol (COREG) 6.25 MG Tab  Take 1 Tab by mouth 2 times a day, with meals.             citalopram (CELEXA) 20 MG Tab  TAKE ONE TABLET BY MOUTH EVERY DAY             clopidogrel (PLAVIX) 75 MG Tab  Take 1 Tab by mouth every day.             ezetimibe (ZETIA) 10 MG Tab  Take 1 Tab by mouth every bedtime.             GABAPENTIN PO  Take 300 mg by mouth as needed.             glipiZIDE (GLUCOTROL) 5 MG Tab  Take 2.5 mg by mouth 2 times a day.             JARDIANCE 25 MG Tab  Take  by mouth every day.             Lancets  1 Each by Does not apply route 3 times a day. Lancets for Freestyle Lite meter. Sig: use TID and prn ssx high or low sugar.             liraglutide (VICTOZA) 18 MG/3ML Solution Pen-injector injection  Inject 0.3 mL as instructed every bedtime.             metFORMIN (GLUCOPHAGE) 850 MG Tab  2 times a day.             omeprazole (PRILOSEC) 20 MG delayed-release capsule  Take 1 Cap by mouth 1 time daily as needed. TAKE 1 CAPSULE BY MOUTH ONCE DAILY              rosuvastatin (CRESTOR) 20 MG Tab  Take 1 Tab by mouth every evening.             sacubitril-valsartan (ENTRESTO) 24-26 MG Tab tablet  Take 1 Tab by mouth 2 Times a Day. Indications: Heart Failure             spironolactone (ALDACTONE) 25 MG Tab  Take 1 Tab by mouth every day. Please check nonfasting labs 2-3 weeks after starting             tamsulosin (FLOMAX) 0.4 MG capsule  Take 1 Cap by mouth every evening.             WELCHOL 625 MG Tab  Take 625 mg by mouth every day.               Discharge Medications were reviewed in detail with the patient.     DIET  Cardiac.     ACTIVITY/POST ICD INSTRUCTIONS:  Discharge Instructions/[facility]    1.  No showers until seen in follow up; may take sponge bath.  Keep dressing dry & in place until seen at for you follow up visit at the cardiology office.     2.  No lifting over 10 lbs with left arm for six weeks.  3.  Do not raise left arm above shoulder level or behind head for six weeks.  4.  Avoid excessive pushing, pulling, or twisting for six weeks.  5.  No driving for the first week.  6.  Call our office ([phone]) if you notice any increased swelling, redness, warmth, or drainage at the implant site.  7.  Needs to be seen in emergency if you develop fever > 101F or uncontrolled pain.  8.  Always check with device clinic before any planned MRI to see if device is MRI compatible.  9.  No routine dental work or cleanings for 3 months.  10.  May remove arm sling after one day, but please wear if you have trouble remembering to keep your arm down.  11. Do not place cell phones or mobile devices directly over implanted device.     ICD Instructions  If has 1 shock: if feeling fine can notify cardiology office and be seen for interrogation.  If feeling poorly after needs to call 911.  2 Shocks or more in 24 hours: call 911.        LABORATORY  Lab Results   Component Value Date/Time    SODIUM 143 10/10/2019 01:57 PM    POTASSIUM 4.4 10/10/2019 01:57 PM    CHLORIDE  108 10/10/2019 01:57 PM    CO2 28 10/10/2019 01:57 PM    GLUCOSE 164 (H) 10/10/2019 01:57 PM    BUN 17 10/10/2019 01:57 PM    CREATININE 1.17 10/10/2019 01:57 PM        Lab Results   Component Value Date/Time    WBC 8.0 10/10/2019 01:57 PM    HEMOGLOBIN 13.9 (L) 10/10/2019 01:57 PM    HEMATOCRIT 45.5 10/10/2019 01:57 PM    PLATELETCT 169 10/10/2019 01:57 PM        FOLLOW UP  Future Appointments   Date Time Provider Department Center   10/24/2019  1:30 PM PACER CHECK-[facility] None   12/20/2019  2:40 PM Oxana Parkinson M.D. [facility]       SPECIFIC OUTPATIENT FOLLOW-UP  Patient will be seen in 7-10 days for wound check and device interrogation.  He will keep their regularly scheduled follow up appointments as well.    The above discharge plan was created in collaboration with Dr. Leyva.  The patient verbalizes understanding and is in agreement of the discharge plan.    Akilah Elliott   10/16/2019 8:25 AM

## 2019-10-16 NOTE — PROGRESS NOTES
2 RN skin check completed with Sharmaine.   Devices in place PIV and arm sling.  Skin assessed under devices completed.    Patients skin is clean dry and intact with a left upper chest incision. Unable to visualize incision as surgical dressing is in place. Dressing is CDI.     The following interventions in place: Patient educated on the importance of maintaining clean dry skin and repositioning at least every two hours to prevent skin breakdown. Patient ambulates and repositions  Independently.

## 2019-10-16 NOTE — PROGRESS NOTES
BP marginal. Dr. Gordon mcmillan-text for parameters for carvedilol. Awaiting response.     Page sent out to cardiology on-call. Orders received.

## 2019-10-24 ENCOUNTER — NON-PROVIDER VISIT (OUTPATIENT)
Dept: CARDIOLOGY | Facility: MEDICAL CENTER | Age: 69
End: 2019-10-24
Payer: MEDICARE

## 2019-10-24 DIAGNOSIS — I50.22 CHRONIC SYSTOLIC CONGESTIVE HEART FAILURE (HCC): Chronic | ICD-10-CM

## 2019-10-24 DIAGNOSIS — Z95.810 AICD (AUTOMATIC CARDIOVERTER/DEFIBRILLATOR) PRESENT: ICD-10-CM

## 2019-10-24 DIAGNOSIS — I25.5 ISCHEMIC CARDIOMYOPATHY: ICD-10-CM

## 2019-10-24 PROCEDURE — 93282 PRGRMG EVAL IMPLANTABLE DFB: CPT | Performed by: INTERNAL MEDICINE

## 2019-10-24 NOTE — NON-PROVIDER
Wound site is healing well.  Patient advised to watch for increased redness, swelling, or oozing--verbalized understanding.

## 2019-11-12 ENCOUNTER — TELEPHONE (OUTPATIENT)
Dept: CARDIOLOGY | Facility: MEDICAL CENTER | Age: 69
End: 2019-11-12

## 2019-11-12 NOTE — TELEPHONE ENCOUNTER
Received fax from [facility] - (P:[phone] F: [phone]) requesting:    - Cardiac clearance for upcoming EGD procedure  - Plavix hold 5 days prior to procedure.    Upon chart review, pt ICD placement on 10/15/2019.    Called [facility], [phone], spoke to the procedure , and reviewed findings.  Encouraged to hold off on the EGD until after 3 months of ICD placement.  She states she will make note of the finding.      Request sent to scanning for reference.

## 2019-12-03 ENCOUNTER — NON-PROVIDER VISIT (OUTPATIENT)
Dept: CARDIOLOGY | Facility: MEDICAL CENTER | Age: 69
End: 2019-12-03
Payer: MEDICARE

## 2019-12-03 DIAGNOSIS — I25.5 ISCHEMIC CARDIOMYOPATHY: ICD-10-CM

## 2019-12-03 PROCEDURE — 93282 PRGRMG EVAL IMPLANTABLE DFB: CPT | Performed by: INTERNAL MEDICINE

## 2019-12-03 NOTE — NON-PROVIDER
Final testing. Appropriate device function demonstrated. Wound site appears clear. Remote check in 3 months.

## 2019-12-08 DIAGNOSIS — K21.9 GASTROESOPHAGEAL REFLUX DISEASE WITHOUT ESOPHAGITIS: ICD-10-CM

## 2019-12-08 DIAGNOSIS — M10.00 IDIOPATHIC GOUT, UNSPECIFIED CHRONICITY, UNSPECIFIED SITE: ICD-10-CM

## 2019-12-09 RX ORDER — OMEPRAZOLE 20 MG/1
20 CAPSULE, DELAYED RELEASE ORAL
Qty: 90 CAP | Refills: 3 | Status: SHIPPED
Start: 2019-12-09 | End: 2020-02-24

## 2019-12-09 RX ORDER — ALLOPURINOL 300 MG/1
TABLET ORAL
Qty: 90 TAB | Refills: 3 | Status: SHIPPED | OUTPATIENT
Start: 2019-12-09 | End: 2020-09-25

## 2019-12-16 DIAGNOSIS — E78.5 DYSLIPIDEMIA: ICD-10-CM

## 2019-12-17 DIAGNOSIS — Z95.1 S/P CABG X 3: ICD-10-CM

## 2019-12-17 DIAGNOSIS — I50.22 CHRONIC SYSTOLIC CONGESTIVE HEART FAILURE (HCC): Chronic | ICD-10-CM

## 2019-12-17 DIAGNOSIS — I25.84 CORONARY ARTERY DISEASE DUE TO CALCIFIED CORONARY LESION: ICD-10-CM

## 2019-12-17 DIAGNOSIS — I25.5 ISCHEMIC CARDIOMYOPATHY: ICD-10-CM

## 2019-12-17 DIAGNOSIS — I25.10 CORONARY ARTERY DISEASE DUE TO CALCIFIED CORONARY LESION: ICD-10-CM

## 2019-12-17 DIAGNOSIS — E78.5 HYPERLIPIDEMIA, UNSPECIFIED HYPERLIPIDEMIA TYPE: ICD-10-CM

## 2019-12-17 DIAGNOSIS — I10 ESSENTIAL HYPERTENSION: Chronic | ICD-10-CM

## 2019-12-17 RX ORDER — EZETIMIBE 10 MG/1
TABLET ORAL
Qty: 90 TAB | Refills: 0 | Status: SHIPPED
Start: 2019-12-17 | End: 2020-01-16 | Stop reason: SDUPTHER

## 2019-12-17 RX ORDER — CARVEDILOL 6.25 MG/1
6.25 TABLET ORAL 2 TIMES DAILY WITH MEALS
Qty: 180 TAB | Refills: 1 | Status: SHIPPED | OUTPATIENT
Start: 2019-12-17 | End: 2020-07-30

## 2019-12-17 RX ORDER — ROSUVASTATIN CALCIUM 20 MG/1
20 TABLET, COATED ORAL EVERY EVENING
Qty: 90 TAB | Refills: 1 | Status: SHIPPED | OUTPATIENT
Start: 2019-12-17 | End: 2020-06-02

## 2019-12-19 ENCOUNTER — APPOINTMENT (RX ONLY)
Dept: URBAN - METROPOLITAN AREA CLINIC 22 | Facility: CLINIC | Age: 69
Setting detail: DERMATOLOGY
End: 2019-12-19

## 2019-12-19 DIAGNOSIS — L57.0 ACTINIC KERATOSIS: ICD-10-CM

## 2019-12-19 DIAGNOSIS — L259 CONTACT DERMATITIS AND OTHER ECZEMA, UNSPECIFIED CAUSE: ICD-10-CM

## 2019-12-19 DIAGNOSIS — D22 MELANOCYTIC NEVI: ICD-10-CM

## 2019-12-19 DIAGNOSIS — Z71.89 OTHER SPECIFIED COUNSELING: ICD-10-CM

## 2019-12-19 DIAGNOSIS — L81.4 OTHER MELANIN HYPERPIGMENTATION: ICD-10-CM

## 2019-12-19 DIAGNOSIS — L82.1 OTHER SEBORRHEIC KERATOSIS: ICD-10-CM

## 2019-12-19 DIAGNOSIS — D18.0 HEMANGIOMA: ICD-10-CM

## 2019-12-19 PROBLEM — D22.5 MELANOCYTIC NEVI OF TRUNK: Status: ACTIVE | Noted: 2019-12-19

## 2019-12-19 PROBLEM — D18.01 HEMANGIOMA OF SKIN AND SUBCUTANEOUS TISSUE: Status: ACTIVE | Noted: 2019-12-19

## 2019-12-19 PROBLEM — L30.8 OTHER SPECIFIED DERMATITIS: Status: ACTIVE | Noted: 2019-12-19

## 2019-12-19 PROCEDURE — ? TREATMENT REGIMEN

## 2019-12-19 PROCEDURE — 99203 OFFICE O/P NEW LOW 30 MIN: CPT | Mod: 25

## 2019-12-19 PROCEDURE — ? COUNSELING

## 2019-12-19 PROCEDURE — 17004 DESTROY PREMAL LESIONS 15/>: CPT

## 2019-12-19 PROCEDURE — ? LIQUID NITROGEN

## 2019-12-19 PROCEDURE — ? PRESCRIPTION

## 2019-12-19 RX ORDER — TRIAMCINOLONE ACETONIDE 1 MG/G
CREAM TOPICAL BID
Qty: 1 | Refills: 1 | Status: ERX | COMMUNITY
Start: 2019-12-19

## 2019-12-19 RX ADMIN — TRIAMCINOLONE ACETONIDE 1: 1 CREAM TOPICAL at 00:00

## 2019-12-19 ASSESSMENT — LOCATION DETAILED DESCRIPTION DERM
LOCATION DETAILED: RIGHT MEDIAL ZYGOMA
LOCATION DETAILED: LEFT MEDIAL FOREHEAD
LOCATION DETAILED: LEFT SUPERIOR MEDIAL UPPER BACK
LOCATION DETAILED: RIGHT SUPERIOR PARIETAL SCALP
LOCATION DETAILED: MID-FRONTAL SCALP
LOCATION DETAILED: EPIGASTRIC SKIN
LOCATION DETAILED: LEFT SUPERIOR FOREHEAD
LOCATION DETAILED: LEFT ULNAR DORSAL HAND
LOCATION DETAILED: RIGHT ULNAR DORSAL HAND
LOCATION DETAILED: RIGHT INFERIOR FOREHEAD
LOCATION DETAILED: RIGHT RADIAL DORSAL HAND
LOCATION DETAILED: LEFT POSTERIOR PARIETAL SCALP
LOCATION DETAILED: LEFT INFERIOR FOREHEAD
LOCATION DETAILED: LEFT SUPERIOR PARIETAL SCALP
LOCATION DETAILED: LEFT CENTRAL MALAR CHEEK
LOCATION DETAILED: RIGHT SUPERIOR MEDIAL MIDBACK

## 2019-12-19 ASSESSMENT — LOCATION SIMPLE DESCRIPTION DERM
LOCATION SIMPLE: RIGHT FOREHEAD
LOCATION SIMPLE: LEFT HAND
LOCATION SIMPLE: LEFT CHEEK
LOCATION SIMPLE: SCALP
LOCATION SIMPLE: RIGHT LOWER BACK
LOCATION SIMPLE: RIGHT HAND
LOCATION SIMPLE: LEFT FOREHEAD
LOCATION SIMPLE: LEFT UPPER BACK
LOCATION SIMPLE: ANTERIOR SCALP
LOCATION SIMPLE: POSTERIOR SCALP
LOCATION SIMPLE: RIGHT ZYGOMA
LOCATION SIMPLE: ABDOMEN

## 2019-12-19 ASSESSMENT — LOCATION ZONE DERM
LOCATION ZONE: TRUNK
LOCATION ZONE: SCALP
LOCATION ZONE: HAND
LOCATION ZONE: FACE

## 2019-12-19 NOTE — PROCEDURE: LIQUID NITROGEN
Detail Level: Detailed
Consent: The patient's consent was obtained including but not limited to risks of crusting, scabbing, blistering, scarring, darker or lighter pigmentary change, recurrence, incomplete removal and infection.
Render Note In Bullet Format When Appropriate: No
Duration Of Freeze Thaw-Cycle (Seconds): 3
Post-Care Instructions: I reviewed with the patient in detail post-care instructions. Patient is to wear sunprotection, and avoid picking at any of the treated lesions. Pt may apply Vaseline to crusted or scabbing areas.
Number Of Freeze-Thaw Cycles: 2 freeze-thaw cycles

## 2019-12-20 ENCOUNTER — OFFICE VISIT (OUTPATIENT)
Dept: MEDICAL GROUP | Facility: MEDICAL CENTER | Age: 69
End: 2019-12-20
Payer: MEDICARE

## 2019-12-20 VITALS
HEIGHT: 71 IN | HEART RATE: 83 BPM | RESPIRATION RATE: 12 BRPM | DIASTOLIC BLOOD PRESSURE: 58 MMHG | TEMPERATURE: 97.1 F | SYSTOLIC BLOOD PRESSURE: 118 MMHG | BODY MASS INDEX: 29.32 KG/M2 | WEIGHT: 209.44 LBS | OXYGEN SATURATION: 95 %

## 2019-12-20 DIAGNOSIS — Z95.1 S/P CABG X 3: ICD-10-CM

## 2019-12-20 DIAGNOSIS — I25.10 CORONARY ARTERY DISEASE DUE TO CALCIFIED CORONARY LESION: ICD-10-CM

## 2019-12-20 DIAGNOSIS — Z12.5 SCREENING FOR MALIGNANT NEOPLASM OF PROSTATE: ICD-10-CM

## 2019-12-20 DIAGNOSIS — R80.9 MICROALBUMINURIA DUE TO TYPE 2 DIABETES MELLITUS (HCC): ICD-10-CM

## 2019-12-20 DIAGNOSIS — E78.5 DYSLIPIDEMIA: ICD-10-CM

## 2019-12-20 DIAGNOSIS — I25.84 CORONARY ARTERY DISEASE DUE TO CALCIFIED CORONARY LESION: ICD-10-CM

## 2019-12-20 DIAGNOSIS — D64.9 ANEMIA, UNSPECIFIED TYPE: ICD-10-CM

## 2019-12-20 DIAGNOSIS — E11.29 MICROALBUMINURIA DUE TO TYPE 2 DIABETES MELLITUS (HCC): ICD-10-CM

## 2019-12-20 DIAGNOSIS — I25.5 ISCHEMIC CARDIOMYOPATHY: ICD-10-CM

## 2019-12-20 DIAGNOSIS — I10 ESSENTIAL HYPERTENSION: Chronic | ICD-10-CM

## 2019-12-20 DIAGNOSIS — I50.22 CHRONIC SYSTOLIC CONGESTIVE HEART FAILURE (HCC): Chronic | ICD-10-CM

## 2019-12-20 DIAGNOSIS — E11.8 CONTROLLED TYPE 2 DIABETES MELLITUS WITH COMPLICATION, WITHOUT LONG-TERM CURRENT USE OF INSULIN (HCC): ICD-10-CM

## 2019-12-20 DIAGNOSIS — E55.9 HYPOVITAMINOSIS D: ICD-10-CM

## 2019-12-20 PROCEDURE — 99214 OFFICE O/P EST MOD 30 MIN: CPT | Performed by: INTERNAL MEDICINE

## 2019-12-21 NOTE — PROGRESS NOTES
CHIEF COMPLAINT  Chief Complaint   Patient presents with   • Follow-Up   Cardiomyopathy    HPI  Luis Renner is a 69 y.o. male who presents today for the following     DM2 with CKD stage III, microalbuminuria  Onset:  DM2 for 10 yrs.       Meds:   - Victoza 1.2 mg daily  - Jardiance, 25 mg QD  - glipizide 5 mg BID  - metformin 850 mg QD  Used meds as prescribed.    Compliance to meds: yes  Checking feet daily/wear soft socks/shoes: yes     The last A1c: 7.9     Checking blood sugar: yes, once daily  Mean BS: > 150  Hypoglycemia:  one remote episode   Symptoms of hypoglycemia: sweating, fatigue, pale skin, hunger.     ASA: yes  ACE: yes  Statin: yes     Diet: regular  Exercise: at least 4 d/w  BMI: 29     DM complications:   Peripheral neuropathy:   No numbness or tingling in feet.  Retinopathy:    No retinopathy. Last eye exam: 8/2019  Nephropathy:    Microalbumin in 11/2018: pos  CVS:     Has CAD  GI:     No gastropathy.     FH of DM:  none    Hypertension, cardiomyopathy, CAD (St post CABG in 7/16), CHF  Meds: Lisinopril, 5 mg QD, spironolactone, 25 mg QD, carvedilol, 6.25 mg BID; taking as prescribed.    He is not measuring BP at home.  Denies: - headaches, vision problems, tinnitus.  - chest pain/pressure, palpitations, irregular heart beats, exertional, dyspnea, peripheral edema.  Low salt diet: yes  Diet / exercise BMI: as above    FH: multiple  He has been followed up by cardiology.     Dyslipidemia  Meds: Crestor, 10 mg QD, Zetia 10 mg QD. No muscle weakness, cramps, nausea,abdominal discomfort.    Diet / exercise BMI: as above    FH: 2 brothers, father  He has been f/u by cardiology.     Anemia  The patient had slightly lobar hemoglobin;   -He has intermittently abnormal CBC in the past.  He has he had recurrent surgical procedures that might be the cause.      Hypovitaminosis D  The patient had low vitamin D level.  Vitamin D supplement: Multivitamins.    Reviewed PMH, PSH, FH, SH, ALL, HCM/IMM, no  changes  Reviewed MEDS, no changes    Patient Active Problem List    Diagnosis Date Noted   • S/P CABG x 3 04/04/2017     Priority: Medium   • Diabetes mellitus type 2, controlled, with complications (formerly [facility]) 09/23/2016     Priority: Medium   • Ischemic cardiomyopathy 07/23/2013     Priority: Medium   • Coronary artery disease due to calcified coronary lesion 03/08/2013     Priority: Medium   • Essential hypertension 03/07/2013     Priority: Medium   • Benign prostatic hyperplasia 10/18/2017     Priority: Low   • Idiopathic gout 07/27/2017     Priority: Low   • Actinic keratosis 07/27/2017     Priority: Low   • TEMO (generalized anxiety disorder) 04/04/2017     Priority: Low   • Gastroesophageal reflux disease without esophagitis 01/04/2017     Priority: Low   • Bilateral renal cysts 01/14/2015     Priority: Low   • Chronic systolic congestive heart failure (HCC)    • Microalbuminuria due to type 2 diabetes mellitus (formerly [facility]) 12/13/2018     CURRENT MEDICATIONS  Current Outpatient Medications   Medication Sig Dispense Refill   • ezetimibe (ZETIA) 10 MG Tab TAKE ONE TABLET BY MOUTH EVERY DAY 90 Tab 0   • carvedilol (COREG) 6.25 MG Tab Take 1 Tab by mouth 2 times a day, with meals. 180 Tab 1   • rosuvastatin (CRESTOR) 20 MG Tab Take 1 Tab by mouth every evening. 90 Tab 1   • sacubitril-valsartan (ENTRESTO) 24-26 MG Tab tablet Take 1 Tab by mouth 2 Times a Day. Indications: Heart Failure 180 Tab 1   • allopurinol (ZYLOPRIM) 300 MG Tab TAKE ONE TABLET BY MOUTH EVERY DAY 90 Tab 3   • omeprazole (PRILOSEC) 20 MG delayed-release capsule Take 1 Cap by mouth 1 time daily as needed. TAKE 1 CAPSULE BY MOUTH ONCE DAILY 90 Cap 3   • citalopram (CELEXA) 20 MG Tab TAKE ONE TABLET BY MOUTH EVERY DAY 90 Tab 1   • spironolactone (ALDACTONE) 25 MG Tab Take 1 Tab by mouth every day. Please check nonfasting labs 2-3 weeks after starting 90 Tab 1   • tamsulosin (FLOMAX) 0.4 MG capsule Take 1 Cap by mouth every evening. 90 Cap 3   • acetaminophen  (TYLENOL) 500 MG Tab Take 1,500 mg by mouth 2 Times a Day.     • WELCHOL 625 MG Tab Take 625 mg by mouth every day.     • BD PEN NEEDLE DENIS U/F USE DAILY WITH VICTOZA (100 DAY SUPPLY) 100 Each 11   • liraglutide (VICTOZA) 18 MG/3ML Solution Pen-injector injection Inject 0.3 mL as instructed every bedtime. 27 mL 1   • clopidogrel (PLAVIX) 75 MG Tab Take 1 Tab by mouth every day. 90 Tab 3   • GABAPENTIN PO Take 300 mg by mouth as needed.     • omeprazole (PRILOSEC) 20 MG delayed-release capsule Take 1 Cap by mouth 1 time daily as needed. TAKE 1 CAPSULE BY MOUTH ONCE DAILY     • Lancets 1 Each by Does not apply route 3 times a day. Lancets for Freestyle Lite meter. Sig: use TID and prn ssx high or low sugar. 100 Each 11   • JARDIANCE 25 MG Tab Take  by mouth every day.     • glipiZIDE (GLUCOTROL) 5 MG Tab Take 2.5 mg by mouth 2 times a day.     • metFORMIN (GLUCOPHAGE) 850 MG Tab 2 times a day.       No current facility-administered medications for this visit.      ALLERGIES  Allergies: Patient has no known allergies.  PAST MEDICAL HISTORY  Past Medical History:   Diagnosis Date   • Backpain 10/10/2019    and neck, 0/10   • Myocardial infarct (HCC) 3/7/2013   • Left ventricular apical thrombus March 2013    Inferoapical clot measuring 1.1cm x 0.8cm, started on anticoagulation.   • CAD (coronary artery disease) March 2013    ACS. PCI/ANA PAULA x 2 of the LAD (2.5 x 12mm - mid; 2.75 x 16mm - proximal). Distal RCA is occluded; distal circumflex is occluded with good collateralization.   • Bilateral renal cysts    • C. difficile diarrhea     pt with hx of c diff after hospitalization [id number]   • Cataract     removed bilat   • Chronic anticoagulation    • Chronic systolic congestive heart failure (HCC)    • DIABETES MELLITUS      oral & diet    • Environmental and seasonal allergies    • Heart burn    • High cholesterol    • Hyperlipidemia     • Hypertension     well controlled on meds   • Indigestion    • Ischemic cardiomyopathy     • Psychiatric problem     anxiety   • Snoring     no sleep study     SURGICAL HISTORY  He  has a past surgical history that includes other cardiac surgery (); cholecystectomy (); recovery (2016); multiple coronary artery bypass endo vein harvest (2016); miller (2016); pr implant neurostim/ (2019); and other neurological surg ().  SOCIAL HISTORY  Social History     Tobacco Use   • Smoking status: Never Smoker   • Smokeless tobacco: Never Used   Substance Use Topics   • Alcohol use: Yes     Alcohol/week: 0.6 oz     Types: 1 Glasses of wine per week     Comment: reports 1/day   • Drug use: Yes     Comment: CBD     Social History     Patient does not qualify to have social determinant information on file (likely too young).   Social History Narrative   • Not on file     FAMILY HISTORY  Family History   Problem Relation Age of Onset   • Cancer Mother    • Hypertension Father    • Hyperlipidemia Brother         x 2   • Hypertension Brother         x2   • Heart Disease Brother         x2, afib; cad x 1   • Cancer Paternal Uncle    • Diabetes Neg Hx      Family Status   Relation Name Status   • Mo   at age 79         colon ca   • Fa   at age 87   • Bro  (Not Specified)   • Bro  (Not Specified)   • Bro  (Not Specified)   • PUnc  (Not Specified)   • Neg Hx  (Not Specified)       ROS   Constitutional: Negative for fever, chills, fatigue.  HENT: Negative for congestion, sore throat.  Eyes: Negative for vision problems.   Respiratory: Negative for cough, shortness of breath.  Cardiovascular: Negative for chest pain, palpitations.   Gastrointestinal: Negative for heartburn, nausea, abdominal pain.   Genitourinary: Negative for dysuria.  Musculoskeletal: Negative for significant myalgia, back and joint pain.   Skin: Negative for rash.   Neuro: Negative for dizziness, weakness and headaches.   Endo/Heme/Allergies: Does not bruise/bleed easily.   Psychiatric/Behavioral:  "Negative for depression.    PHYSICAL EXAM   Blood Pressure 118/58   Pulse 83   Temperature 36.2 °C (97.1 °F)   Respiration 12   Height 1.803 m (5' 11\")   Weight 95 kg (209 lb 7 oz)   Oxygen Saturation 95%  Body mass index is 29.21 kg/m².  General:  NAD, well appearing  HEENT:   NC/AT, PERRLA, EOMI, TMs are clear. Oropharyngeal mucosa is pink,  without lesions;  no cervical / supraclavicular  lymphadenopathy, no thyromegaly.    Cardiovascular: RRR.   No m/r/g.       Lungs:   CTAB, no w/r/r, no respiratory distress.  Abdomen: Soft, NT/ND; no hepatosplenomegaly.  Extremities:  2+ DP and radial pulses bilaterally.  No c/c/e.   Skin:  Warm, dry.  No erythema. No rash.   Neurologic: Alert & oriented x 3. CN II-XII grossly intact. No focal deficits.  Psychiatric:  Affect normal, mood normal, judgment normal.    Labs     Labs are reviewed and discussed with a patient  Lab Results   Component Value Date/Time    CHOLSTRLTOT 94 (L) 07/05/2019 12:01 PM    LDL 38 07/05/2019 12:01 PM    HDL 33 (A) 07/05/2019 12:01 PM    TRIGLYCERIDE 117 07/05/2019 12:01 PM       Lab Results   Component Value Date/Time    SODIUM 143 10/10/2019 01:57 PM    POTASSIUM 4.4 10/10/2019 01:57 PM    CHLORIDE 108 10/10/2019 01:57 PM    CO2 28 10/10/2019 01:57 PM    GLUCOSE 164 (H) 10/10/2019 01:57 PM    BUN 17 10/10/2019 01:57 PM    CREATININE 1.17 10/10/2019 01:57 PM     Lab Results   Component Value Date/Time    ALKPHOSPHAT 54 10/10/2019 01:57 PM    ASTSGOT 20 10/10/2019 01:57 PM    ALTSGPT 19 10/10/2019 01:57 PM    TBILIRUBIN 0.6 10/10/2019 01:57 PM      Lab Results   Component Value Date/Time    HBA1C 6.4 (H) 07/15/2019 10:27 AM    HBA1C 7.9 (H) 11/13/2018 08:45 AM    HBA1C 7.2 (H) 05/21/2018 02:07 PM     No results found for: TSH  Lab Results   Component Value Date/Time    FREET4 0.82 03/08/2013 01:00 AM       Lab Results   Component Value Date/Time    WBC 8.0 10/10/2019 01:57 PM    RBC 5.76 10/10/2019 01:57 PM    HEMOGLOBIN 13.9 (L) 10/10/2019 " 01:57 PM    HEMATOCRIT 45.5 10/10/2019 01:57 PM    MCV 79.0 (L) 10/10/2019 01:57 PM    MCH 24.1 (L) 10/10/2019 01:57 PM    MCHC 30.5 (L) 10/10/2019 01:57 PM    MPV 11.2 10/10/2019 01:57 PM    NEUTSPOLYS 70.30 07/05/2019 03:18 PM    LYMPHOCYTES 17.70 (L) 07/05/2019 03:18 PM    MONOCYTES 6.90 07/05/2019 03:18 PM    EOSINOPHILS 3.40 07/05/2019 03:18 PM    BASOPHILS 1.00 07/05/2019 03:18 PM    HYPOCHROMIA 1+ 09/16/2013 01:13 PM    ANISOCYTOSIS 1+ 07/05/2019 03:18 PM        Imaging     None    Assessment and Plan     Luis Renner is a 69 y.o. male    1. Controlled type 2 diabetes mellitus with complication, without long-term current use of insulin (HCC)  Pending labs, continue current treatment  - MICROALBUMIN CREAT RATIO URINE; Future  - HEMOGLOBIN A1C; Future  - Comp Metabolic Panel; Future    2. Microalbuminuria due to type 2 diabetes mellitus (HCC)  As above  - Comp Metabolic Panel; Future    3. Essential hypertension  Controlled cardiovascular conditions, continue current treatment and cardiology follow-up  - Comp Metabolic Panel; Future  4. Ischemic cardiomyopathy  5. Coronary artery disease due to calcified coronary lesion  6. S/P CABG x 3  7. Chronic systolic congestive heart failure (HCC)  He was recently seen in [facility] in [place], diagnosed as a cardiomyopathy.    8. Dyslipidemia  Pending labs, continue current treatment  - Lipid Profile; Future  - Comp Metabolic Panel; Future    9. Anemia, unspecified type  Follow-up labs  - CBC WITH DIFFERENTIAL; Future  -Iron/TIBC, Future  -B12/folate, Future    10. Hypovitaminosis D  Pending labs, continue current supplement  - VITAMIN D,25 HYDROXY; Future    11. Screening for malignant neoplasm of prostate  - PROSTATE SPECIFIC AG SCREENING; Future    Counseling:   - Smoking:  Nonsmoker    Followup: Return in about 2 weeks (around 1/3/2020) for Labs, DM.    All questions are answered.    Please note that this dictation was created using voice  recognition software, and that there might be errors of mary and possibly content.

## 2019-12-23 RX ORDER — LIRAGLUTIDE 6 MG/ML
INJECTION SUBCUTANEOUS
Qty: 27 ML | Refills: 0 | Status: SHIPPED
Start: 2019-12-23 | End: 2020-01-16 | Stop reason: SDUPTHER

## 2020-01-08 ENCOUNTER — APPOINTMENT (OUTPATIENT)
Dept: MEDICAL GROUP | Facility: MEDICAL CENTER | Age: 70
End: 2020-01-08
Payer: MEDICARE

## 2020-01-16 DIAGNOSIS — E11.8 CONTROLLED TYPE 2 DIABETES MELLITUS WITH COMPLICATION, WITHOUT LONG-TERM CURRENT USE OF INSULIN (HCC): ICD-10-CM

## 2020-01-16 DIAGNOSIS — Z76.0 MEDICATION REFILL: ICD-10-CM

## 2020-01-16 DIAGNOSIS — E78.5 DYSLIPIDEMIA: ICD-10-CM

## 2020-01-16 RX ORDER — CITALOPRAM 20 MG/1
TABLET ORAL
Qty: 90 TAB | Refills: 0 | Status: SHIPPED | OUTPATIENT
Start: 2020-01-16 | End: 2020-04-03

## 2020-01-16 RX ORDER — EZETIMIBE 10 MG/1
10 TABLET ORAL
Qty: 90 TAB | Refills: 0 | Status: SHIPPED | OUTPATIENT
Start: 2020-01-16 | End: 2020-05-04

## 2020-01-16 NOTE — TELEPHONE ENCOUNTER
Please send refills to [facility]. 90 day supply with refills.     Please review       Was the patient seen in the last year in this department? Yes    Does patient have an active prescription for medications requested? No     Received Request Via: Pharmacy

## 2020-01-20 ENCOUNTER — HOSPITAL ENCOUNTER (EMERGENCY)
Facility: MEDICAL CENTER | Age: 70
End: 2020-01-21
Attending: EMERGENCY MEDICINE
Payer: MEDICARE

## 2020-01-20 DIAGNOSIS — M62.838 MUSCLE SPASMS OF NECK: ICD-10-CM

## 2020-01-20 DIAGNOSIS — G89.4 CHRONIC PAIN SYNDROME: ICD-10-CM

## 2020-01-20 DIAGNOSIS — I10 ESSENTIAL HYPERTENSION: Chronic | ICD-10-CM

## 2020-01-20 DIAGNOSIS — Z86.79 HISTORY OF CORONARY ARTERY DISEASE: ICD-10-CM

## 2020-01-20 DIAGNOSIS — I25.5 ISCHEMIC CARDIOMYOPATHY: ICD-10-CM

## 2020-01-20 DIAGNOSIS — E11.8 CONTROLLED TYPE 2 DIABETES MELLITUS WITH COMPLICATION, WITHOUT LONG-TERM CURRENT USE OF INSULIN (HCC): ICD-10-CM

## 2020-01-20 DIAGNOSIS — M54.2 ACUTE NECK PAIN: ICD-10-CM

## 2020-01-20 PROCEDURE — 96374 THER/PROPH/DIAG INJ IV PUSH: CPT

## 2020-01-20 PROCEDURE — 99284 EMERGENCY DEPT VISIT MOD MDM: CPT

## 2020-01-21 ENCOUNTER — HOSPITAL ENCOUNTER (OUTPATIENT)
Dept: LAB | Facility: MEDICAL CENTER | Age: 70
End: 2020-01-21
Attending: INTERNAL MEDICINE
Payer: MEDICARE

## 2020-01-21 ENCOUNTER — APPOINTMENT (OUTPATIENT)
Dept: RADIOLOGY | Facility: MEDICAL CENTER | Age: 70
End: 2020-01-21
Attending: EMERGENCY MEDICINE
Payer: MEDICARE

## 2020-01-21 VITALS
HEART RATE: 75 BPM | SYSTOLIC BLOOD PRESSURE: 142 MMHG | WEIGHT: 212.3 LBS | DIASTOLIC BLOOD PRESSURE: 89 MMHG | TEMPERATURE: 97.5 F | OXYGEN SATURATION: 98 % | RESPIRATION RATE: 16 BRPM | BODY MASS INDEX: 29.72 KG/M2 | HEIGHT: 71 IN

## 2020-01-21 DIAGNOSIS — E78.5 DYSLIPIDEMIA: ICD-10-CM

## 2020-01-21 DIAGNOSIS — I10 ESSENTIAL HYPERTENSION: Chronic | ICD-10-CM

## 2020-01-21 DIAGNOSIS — E55.9 HYPOVITAMINOSIS D: ICD-10-CM

## 2020-01-21 DIAGNOSIS — D64.9 ANEMIA, UNSPECIFIED TYPE: ICD-10-CM

## 2020-01-21 DIAGNOSIS — R80.9 MICROALBUMINURIA DUE TO TYPE 2 DIABETES MELLITUS (HCC): ICD-10-CM

## 2020-01-21 DIAGNOSIS — Z12.5 SCREENING FOR MALIGNANT NEOPLASM OF PROSTATE: ICD-10-CM

## 2020-01-21 DIAGNOSIS — E11.8 CONTROLLED TYPE 2 DIABETES MELLITUS WITH COMPLICATION, WITHOUT LONG-TERM CURRENT USE OF INSULIN (HCC): ICD-10-CM

## 2020-01-21 DIAGNOSIS — E11.29 MICROALBUMINURIA DUE TO TYPE 2 DIABETES MELLITUS (HCC): ICD-10-CM

## 2020-01-21 LAB
25(OH)D3 SERPL-MCNC: 30 NG/ML (ref 30–100)
ALBUMIN SERPL BCP-MCNC: 4.5 G/DL (ref 3.2–4.9)
ALBUMIN SERPL BCP-MCNC: 4.5 G/DL (ref 3.2–4.9)
ALBUMIN/GLOB SERPL: 1.9 G/DL
ALBUMIN/GLOB SERPL: 2 G/DL
ALP SERPL-CCNC: 54 U/L (ref 30–99)
ALP SERPL-CCNC: 58 U/L (ref 30–99)
ALT SERPL-CCNC: 17 U/L (ref 2–50)
ALT SERPL-CCNC: 17 U/L (ref 2–50)
ANION GAP SERPL CALC-SCNC: 8 MMOL/L (ref 0–11.9)
ANION GAP SERPL CALC-SCNC: 8 MMOL/L (ref 0–11.9)
AST SERPL-CCNC: 15 U/L (ref 12–45)
AST SERPL-CCNC: 16 U/L (ref 12–45)
BASOPHILS # BLD AUTO: 0.5 % (ref 0–1.8)
BASOPHILS # BLD AUTO: 0.6 % (ref 0–1.8)
BASOPHILS # BLD: 0.06 K/UL (ref 0–0.12)
BASOPHILS # BLD: 0.07 K/UL (ref 0–0.12)
BILIRUB SERPL-MCNC: 0.6 MG/DL (ref 0.1–1.5)
BILIRUB SERPL-MCNC: 0.6 MG/DL (ref 0.1–1.5)
BUN SERPL-MCNC: 23 MG/DL (ref 8–22)
BUN SERPL-MCNC: 26 MG/DL (ref 8–22)
CALCIUM SERPL-MCNC: 10.3 MG/DL (ref 8.5–10.5)
CALCIUM SERPL-MCNC: 9.9 MG/DL (ref 8.5–10.5)
CHLORIDE SERPL-SCNC: 106 MMOL/L (ref 96–112)
CHLORIDE SERPL-SCNC: 107 MMOL/L (ref 96–112)
CHOLEST SERPL-MCNC: 88 MG/DL (ref 100–199)
CO2 SERPL-SCNC: 25 MMOL/L (ref 20–33)
CO2 SERPL-SCNC: 28 MMOL/L (ref 20–33)
CREAT SERPL-MCNC: 1.25 MG/DL (ref 0.5–1.4)
CREAT SERPL-MCNC: 1.4 MG/DL (ref 0.5–1.4)
CREAT UR-MCNC: 154.1 MG/DL
EKG IMPRESSION: NORMAL
EOSINOPHIL # BLD AUTO: 0.29 K/UL (ref 0–0.51)
EOSINOPHIL # BLD AUTO: 0.34 K/UL (ref 0–0.51)
EOSINOPHIL NFR BLD: 2.5 % (ref 0–6.9)
EOSINOPHIL NFR BLD: 3.1 % (ref 0–6.9)
ERYTHROCYTE [DISTWIDTH] IN BLOOD BY AUTOMATED COUNT: 45.6 FL (ref 35.9–50)
ERYTHROCYTE [DISTWIDTH] IN BLOOD BY AUTOMATED COUNT: 46.3 FL (ref 35.9–50)
ERYTHROCYTE [SEDIMENTATION RATE] IN BLOOD BY WESTERGREN METHOD: 3 MM/HOUR (ref 0–20)
EST. AVERAGE GLUCOSE BLD GHB EST-MCNC: 154 MG/DL
FASTING STATUS PATIENT QL REPORTED: NORMAL
GLOBULIN SER CALC-MCNC: 2.3 G/DL (ref 1.9–3.5)
GLOBULIN SER CALC-MCNC: 2.4 G/DL (ref 1.9–3.5)
GLUCOSE SERPL-MCNC: 115 MG/DL (ref 65–99)
GLUCOSE SERPL-MCNC: 179 MG/DL (ref 65–99)
HBA1C MFR BLD: 7 % (ref 0–5.6)
HCT VFR BLD AUTO: 45.5 % (ref 42–52)
HCT VFR BLD AUTO: 46.2 % (ref 42–52)
HDLC SERPL-MCNC: 35 MG/DL
HGB BLD-MCNC: 13.8 G/DL (ref 14–18)
HGB BLD-MCNC: 14.1 G/DL (ref 14–18)
IMM GRANULOCYTES # BLD AUTO: 0.04 K/UL (ref 0–0.11)
IMM GRANULOCYTES # BLD AUTO: 0.08 K/UL (ref 0–0.11)
IMM GRANULOCYTES NFR BLD AUTO: 0.4 % (ref 0–0.9)
IMM GRANULOCYTES NFR BLD AUTO: 0.6 % (ref 0–0.9)
IRON SATN MFR SERPL: 6 % (ref 15–55)
IRON SERPL-MCNC: 30 UG/DL (ref 50–180)
LDLC SERPL CALC-MCNC: 14 MG/DL
LYMPHOCYTES # BLD AUTO: 1.72 K/UL (ref 1–4.8)
LYMPHOCYTES # BLD AUTO: 1.83 K/UL (ref 1–4.8)
LYMPHOCYTES NFR BLD: 13.2 % (ref 22–41)
LYMPHOCYTES NFR BLD: 18.3 % (ref 22–41)
MCH RBC QN AUTO: 23.7 PG (ref 27–33)
MCH RBC QN AUTO: 23.8 PG (ref 27–33)
MCHC RBC AUTO-ENTMCNC: 30.3 G/DL (ref 33.7–35.3)
MCHC RBC AUTO-ENTMCNC: 30.5 G/DL (ref 33.7–35.3)
MCV RBC AUTO: 77.8 FL (ref 81.4–97.8)
MCV RBC AUTO: 78.4 FL (ref 81.4–97.8)
MICROALBUMIN UR-MCNC: 21.7 MG/DL
MICROALBUMIN/CREAT UR: 141 MG/G (ref 0–30)
MONOCYTES # BLD AUTO: 0.85 K/UL (ref 0–0.85)
MONOCYTES # BLD AUTO: 0.88 K/UL (ref 0–0.85)
MONOCYTES NFR BLD AUTO: 6.4 % (ref 0–13.4)
MONOCYTES NFR BLD AUTO: 9.1 % (ref 0–13.4)
NEUTROPHILS # BLD AUTO: 10.64 K/UL (ref 1.82–7.42)
NEUTROPHILS # BLD AUTO: 6.42 K/UL (ref 1.82–7.42)
NEUTROPHILS NFR BLD: 68.5 % (ref 44–72)
NEUTROPHILS NFR BLD: 76.8 % (ref 44–72)
NRBC # BLD AUTO: 0 K/UL
NRBC # BLD AUTO: 0 K/UL
NRBC BLD-RTO: 0 /100 WBC
NRBC BLD-RTO: 0 /100 WBC
PLATELET # BLD AUTO: 169 K/UL (ref 164–446)
PLATELET # BLD AUTO: 175 K/UL (ref 164–446)
PMV BLD AUTO: 10.4 FL (ref 9–12.9)
PMV BLD AUTO: 11.1 FL (ref 9–12.9)
POTASSIUM SERPL-SCNC: 4.1 MMOL/L (ref 3.6–5.5)
POTASSIUM SERPL-SCNC: 4.4 MMOL/L (ref 3.6–5.5)
PROT SERPL-MCNC: 6.8 G/DL (ref 6–8.2)
PROT SERPL-MCNC: 6.9 G/DL (ref 6–8.2)
PSA SERPL-MCNC: 2.12 NG/ML (ref 0–4)
RBC # BLD AUTO: 5.8 M/UL (ref 4.7–6.1)
RBC # BLD AUTO: 5.94 M/UL (ref 4.7–6.1)
SODIUM SERPL-SCNC: 140 MMOL/L (ref 135–145)
SODIUM SERPL-SCNC: 142 MMOL/L (ref 135–145)
TIBC SERPL-MCNC: 531 UG/DL (ref 250–450)
TRIGL SERPL-MCNC: 197 MG/DL (ref 0–149)
TROPONIN T SERPL-MCNC: 10 NG/L (ref 6–19)
WBC # BLD AUTO: 13.8 K/UL (ref 4.8–10.8)
WBC # BLD AUTO: 9.4 K/UL (ref 4.8–10.8)

## 2020-01-21 PROCEDURE — 80053 COMPREHEN METABOLIC PANEL: CPT | Mod: 91

## 2020-01-21 PROCEDURE — A9270 NON-COVERED ITEM OR SERVICE: HCPCS | Performed by: EMERGENCY MEDICINE

## 2020-01-21 PROCEDURE — 80061 LIPID PANEL: CPT

## 2020-01-21 PROCEDURE — 72125 CT NECK SPINE W/O DYE: CPT

## 2020-01-21 PROCEDURE — 83550 IRON BINDING TEST: CPT

## 2020-01-21 PROCEDURE — 82570 ASSAY OF URINE CREATININE: CPT

## 2020-01-21 PROCEDURE — 83036 HEMOGLOBIN GLYCOSYLATED A1C: CPT | Mod: GA

## 2020-01-21 PROCEDURE — 80053 COMPREHEN METABOLIC PANEL: CPT

## 2020-01-21 PROCEDURE — 82306 VITAMIN D 25 HYDROXY: CPT

## 2020-01-21 PROCEDURE — 85652 RBC SED RATE AUTOMATED: CPT

## 2020-01-21 PROCEDURE — 85025 COMPLETE CBC W/AUTO DIFF WBC: CPT | Mod: 91

## 2020-01-21 PROCEDURE — 700111 HCHG RX REV CODE 636 W/ 250 OVERRIDE (IP): Performed by: EMERGENCY MEDICINE

## 2020-01-21 PROCEDURE — 96374 THER/PROPH/DIAG INJ IV PUSH: CPT

## 2020-01-21 PROCEDURE — 36415 COLL VENOUS BLD VENIPUNCTURE: CPT

## 2020-01-21 PROCEDURE — 84484 ASSAY OF TROPONIN QUANT: CPT

## 2020-01-21 PROCEDURE — 84153 ASSAY OF PSA TOTAL: CPT | Mod: GA

## 2020-01-21 PROCEDURE — 700102 HCHG RX REV CODE 250 W/ 637 OVERRIDE(OP): Performed by: EMERGENCY MEDICINE

## 2020-01-21 PROCEDURE — 93005 ELECTROCARDIOGRAM TRACING: CPT | Performed by: EMERGENCY MEDICINE

## 2020-01-21 PROCEDURE — 85025 COMPLETE CBC W/AUTO DIFF WBC: CPT

## 2020-01-21 PROCEDURE — 83540 ASSAY OF IRON: CPT

## 2020-01-21 PROCEDURE — 82043 UR ALBUMIN QUANTITATIVE: CPT

## 2020-01-21 RX ORDER — ASPIRIN 81 MG/1
324 TABLET, CHEWABLE ORAL ONCE
Status: COMPLETED | OUTPATIENT
Start: 2020-01-21 | End: 2020-01-21

## 2020-01-21 RX ORDER — CYCLOBENZAPRINE HCL 10 MG
10 TABLET ORAL 3 TIMES DAILY PRN
Qty: 9 TAB | Refills: 0 | Status: SHIPPED | OUTPATIENT
Start: 2020-01-21 | End: 2020-01-24

## 2020-01-21 RX ORDER — KETOROLAC TROMETHAMINE 30 MG/ML
15 INJECTION, SOLUTION INTRAMUSCULAR; INTRAVENOUS ONCE
Status: COMPLETED | OUTPATIENT
Start: 2020-01-21 | End: 2020-01-21

## 2020-01-21 RX ADMIN — KETOROLAC TROMETHAMINE 15 MG: 30 INJECTION, SOLUTION INTRAMUSCULAR at 01:09

## 2020-01-21 RX ADMIN — ASPIRIN 81 MG 324 MG: 81 TABLET ORAL at 01:09

## 2020-01-21 NOTE — DISCHARGE INSTRUCTIONS
You were seen and evaluated in the Emergency Department at [facility] for:     Atraumatic neck pain    You had the following tests and studies:    Thankfully your EKG and CT scan and blood tests are all stable you do have degenerative changes in your neck but nothing needing surgery today.    You received the following medications:    Ketorolac.    You received the following prescriptions:    Cyclobenzaprine, a muscle relaxant to take only if needed.   ----------------------------    Please make sure to follow up with:    Your primary care and spine providers for recheck and routine health care tomorrow, but please come right back for any new or worse pain, fevers, numbness, weakness, or any other new or worse symptoms.    Good luck, we hope you get better soon!  ----------------------------    We always encourage patients to return IMMEDIATELY if they have:  Increased or changing pain, passing out, fevers over 100.4 (taken in your mouth or rectally) for more than 2 days, redness or swelling of skin or tissues, feeling like your heart is beating fast, chest pain that is new or worsening, trouble breathing, feeling like your throat is closing up and can not breath, inability to walk, weakness of any part of your body, new dizziness, severe bleeding that won't stop from any part of your body, if you can't eat or drink, or if you have any other concerns.   If you feel worse, please know that you can always return with any questions, concerns, worse symptoms, or you are feeling unsafe. We certainly cannot say for sure that we have ruled out every illness or dangerous disease, but we feel that at this specific time, your exam, tests, and vital signs like heart rate and blood pressure are safe for discharge.

## 2020-01-21 NOTE — ED TRIAGE NOTES
"Chief Complaint   Patient presents with   • Neck Pain     \"my neck is in total spasm since this morning and it's on fire\"     Pt ambulatory to triage for above complaint. Pt has had a neck fusion 4 years ago so is in a brace from home. Pt is uncomfortable to sit. Pt appears uncomfortable. Pt has had this happen before in his hips and states toradol worked perfectly. Pt reports doing nothing when the pain/spasm started and has had no relief with home medications or heat/ice.     /93   Pulse 79   Temp 36.4 °C (97.5 °F) (Oral)   Resp 16   Ht 1.803 m (5' 11\")   Wt 96.3 kg (212 lb 4.9 oz)   SpO2 97%   BMI 29.61 kg/m²     "

## 2020-01-21 NOTE — ED PROVIDER NOTES
"ED PROVIDER NOTE     Scribed for Sander Cates M.D. by Betina Flores. 1/21/2020, 12:43 AM.    CHIEF COMPLAINT  Chief Complaint   Patient presents with   • Neck Pain     \"my neck is in total spasm since this morning and it's on fire\"       HPI    Primary care provider: Oxana Parkinson M.D.  Means of arrival: Walk-in  History obtained from: Patient  History limited by: None    Luis Renner is a 69 y.o. male, with a history of congestive heart failure, who presents with acute, waxing and waning, non-radiating bilateral posterior neck pain which began this morning with worsening severity since onset. The patient reports that he had a neck fusion of C3-C7 at performed about four years ago at [facility] but has never experienced any pain or complications with his neck since the procedure. Today, the patient woke up at his baseline and then dropped off his wife at the airport at 6 AM. After returning home, he notes that he took a nap on the couch for about thirty minutes in duration. Shortly after waking up from his nap, the patient began to complain of sudden, moderate bilateral posterior neck pain that he describes as \"spasming\" in nature. He notes that his pain is severe and states \"on a scale from one to ten, I rate my pain as a one-hundred because it just hurts so bad\". No exacerbating or alleviating factors were reported. He notes that he took two doses of Flexeril, Gabapentin, Tylenol and CBD with no relief of his pain. The patient denies recent trauma, heavy-lifting or falls. He further denies recent symptoms of fevers, chills, chest pain, shortness of breath, abdominal pain, bilateral lower extremity edema, nausea, vomiting, vision changes or headaches. The patient denies numbness, weakness or tingling in the upper or lower extremities. The patient reports that he experienced similar pain to his hips before and notes that Toradol moderately alleviated his pain. Reported past medical history " includes congestive heart failure, pacemaker placement, diskectomy, Coronary Artery Bypass Grafting, and Multiple coronary bypass. He notes that he takes Plavix, Metformin, Prilosec and Celexa daily. His cardiologist is Dr. Benoit.     REVIEW OF SYSTEMS  Constitutional: Negative for fever or chills.   HENT: Bilateral posterior Neck pain. Negative for nosebleeds or sore throat.    Eyes: Negative for vision changes or redness.   Respiratory: Negative for cough or shortness of breath.    Cardiovascular: Negative for chest pain or palpitations.   Gastrointestinal: Negative for nausea, vomiting, abdominal pain.   Genitourinary: Negative for dysuria or flank pain.   Musculoskeletal: Neck pain. Negative for back pain, lower extremity swelling or joint pain.   Skin: Negative for itching or rash.   Neurological: Negative for headaches, sensory or motor changes.   All other systems reviewed and are negative.    PAST MEDICAL HISTORY   has a past medical history of Backpain (10/10/2019), Bilateral renal cysts, C. 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    PAST FAMILY HISTORY  Family History   Problem Relation Age of Onset   • Cancer Mother    • Hypertension Father    • Hyperlipidemia Brother         x 2   • Hypertension Brother         x2   • Heart Disease Brother         x2, afib; cad x 1   • Cancer Paternal Uncle    • Diabetes Neg Hx        SOCIAL HISTORY  Social History     Tobacco Use   • Smoking status: Never Smoker   • Smokeless tobacco: Never Used   Substance and Sexual Activity   • Alcohol use: Yes     Alcohol/week: 0.6 oz     Types: 1  "Glasses of wine per week     Comment: occ   • Drug use: Yes     Comment: CBD   • Sexual activity: Yes     Partners: Female       SURGICAL HISTORY   has a past surgical history that includes other cardiac surgery (2013); cholecystectomy (2004); recovery (7/21/2016); multiple coronary artery bypass endo vein harvest (7/22/2016); miller (7/22/2016); implant neurostim/ (7/16/2019); and other neurological surg (2014).      CURRENT MEDICATIONS  Home Medications     Reviewed by Milady Batista R.N. (Registered Nurse) on 01/20/20 at 2340  Med List Status: Not Addressed   Medication Last Dose Status   acetaminophen (TYLENOL) 500 MG Tab  Active   allopurinol (ZYLOPRIM) 300 MG Tab  Active   BD PEN NEEDLE DENIS U/F  Active   carvedilol (COREG) 6.25 MG Tab  Active   citalopram (CELEXA) 20 MG Tab  Active   clopidogrel (PLAVIX) 75 MG Tab  Active   ezetimibe (ZETIA) 10 MG Tab  Active   GABAPENTIN PO  Active   glipiZIDE (GLUCOTROL) 5 MG Tab  Active   JARDIANCE 25 MG Tab  Active   Lancets  Active   liraglutide (VICTOZA) 18 MG/3ML Solution Pen-injector  Active   metFORMIN (GLUCOPHAGE) 850 MG Tab  Active   omeprazole (PRILOSEC) 20 MG delayed-release capsule  Active   omeprazole (PRILOSEC) 20 MG delayed-release capsule  Active   rosuvastatin (CRESTOR) 20 MG Tab  Active   sacubitril-valsartan (ENTRESTO) 24-26 MG Tab tablet  Active   spironolactone (ALDACTONE) 25 MG Tab  Active   tamsulosin (FLOMAX) 0.4 MG capsule  Active   WELCHOL 625 MG Tab  Active                ALLERGIES  No Known Allergies    PHYSICAL EXAM  VITAL SIGNS: /93   Pulse 79   Temp 36.4 °C (97.5 °F) (Oral)   Resp 16   Ht 1.803 m (5' 11\")   Wt 96.3 kg (212 lb 4.9 oz)   SpO2 97%   BMI 29.61 kg/m²    Pulse ox interpretation: On room air, I interpret this pulse ox as normal.  Constitutional: Well-developed, well-nourished. Well-appearing for age. Laying on the stretcher in moderate distress.   HEENT: Normocephalic, atraumatic. Posterior pharynx clear, mucous " membranes moist.  Eyes:  EOMI. Normal sclerae.  Neck: Supple. Midline and bilateral paraspinous neck tenderness with no step-off's. He is currently wearing a [place] J collar.  Chest/Pulmonary: Clear to ausculation bilaterally, no wheezes or rhonchi.  Cardiovascular: Regular rate and rhythm, no obvious murmur.   Abdomen: Soft, nontender; no rebound, guarding, or masses.  Back: No CVA or midline tenderness.   Musculoskeletal: No deformity or edema.  Neuro: Clear speech, normal coordination, cranial nerves II-XII grossly intact, no focal asymmetry or sensory deficits.   Psych: Normal mood and affect.  Skin: No rashes, warm and dry.        DIAGNOSTIC STUDIES / PROCEDURES    LABS & EKG  Results for orders placed or performed during the hospital encounter of 01/20/20   CBC WITH DIFFERENTIAL   Result Value Ref Range    WBC 13.8 (H) 4.8 - 10.8 K/uL    RBC 5.94 4.70 - 6.10 M/uL    Hemoglobin 14.1 14.0 - 18.0 g/dL    Hematocrit 46.2 42.0 - 52.0 %    MCV 77.8 (L) 81.4 - 97.8 fL    MCH 23.7 (L) 27.0 - 33.0 pg    MCHC 30.5 (L) 33.7 - 35.3 g/dL    RDW 45.6 35.9 - 50.0 fL    Platelet Count 175 164 - 446 K/uL    MPV 10.4 9.0 - 12.9 fL    Neutrophils-Polys 76.80 (H) 44.00 - 72.00 %    Lymphocytes 13.20 (L) 22.00 - 41.00 %    Monocytes 6.40 0.00 - 13.40 %    Eosinophils 2.50 0.00 - 6.90 %    Basophils 0.50 0.00 - 1.80 %    Immature Granulocytes 0.60 0.00 - 0.90 %    Nucleated RBC 0.00 /100 WBC    Neutrophils (Absolute) 10.64 (H) 1.82 - 7.42 K/uL    Lymphs (Absolute) 1.83 1.00 - 4.80 K/uL    Monos (Absolute) 0.88 (H) 0.00 - 0.85 K/uL    Eos (Absolute) 0.34 0.00 - 0.51 K/uL    Baso (Absolute) 0.07 0.00 - 0.12 K/uL    Immature Granulocytes (abs) 0.08 0.00 - 0.11 K/uL    NRBC (Absolute) 0.00 K/uL   CMP   Result Value Ref Range    Sodium 140 135 - 145 mmol/L    Potassium 4.1 3.6 - 5.5 mmol/L    Chloride 107 96 - 112 mmol/L    Co2 25 20 - 33 mmol/L    Anion Gap 8.0 0.0 - 11.9    Glucose 179 (H) 65 - 99 mg/dL    Bun 23 (H) 8 - 22 mg/dL     Creatinine 1.25 0.50 - 1.40 mg/dL    Calcium 9.9 8.5 - 10.5 mg/dL    AST(SGOT) 16 12 - 45 U/L    ALT(SGPT) 17 2 - 50 U/L    Alkaline Phosphatase 54 30 - 99 U/L    Total Bilirubin 0.6 0.1 - 1.5 mg/dL    Albumin 4.5 3.2 - 4.9 g/dL    Total Protein 6.9 6.0 - 8.2 g/dL    Globulin 2.4 1.9 - 3.5 g/dL    A-G Ratio 1.9 g/dL   TROPONIN   Result Value Ref Range    Troponin T 10 6 - 19 ng/L   Sed Rate   Result Value Ref Range    Sed Rate Westergren 3 0 - 20 mm/hour   ESTIMATED GFR   Result Value Ref Range    GFR If African American >60 >60 mL/min/1.73 m 2    GFR If Non  57 (A) >60 mL/min/1.73 m 2   EKG   Result Value Ref Range    Report       [facility] Emergency Dept.    Test Date:  2020  Pt Name:    NIRMALA RODRIGUEZ                 Department: ER  MRN:        [id number]                      Room:       [facility]  Gender:     Male                         Technician: [id number]  :        1950                   Requested By:SANDER BARR  Order #:    [id number]                    Reading MD: Sander Barr MD    Measurements  Intervals                                Axis  Rate:       81                           P:          48  WI:         200                          QRS:        -57  QRSD:       106                          T:          71  QT:         404  QTc:        469    Interpretive Statements  SINUS RHYTHM  VENTRICULAR PREMATURE COMPLEX  INTERPOLATED VENTRICULAR PREMATURE COMPLEX  PROBABLE LEFT ATRIAL ABNORMALITY  LEFT ANTERIOR FASCICULAR BLOCK  ANTERIOR INFARCT, OLD  Compared to ECG 10/16/2019 05:32:36  Ventricular premature complex(es) now present  Atrial premature complex(es) no longer prese nt  Myocardial infarct finding still present  Limited EKG secondary to motion artifact but no obvious STEMI or strain or  dysrhythmia fairly stable  Electronically Signed On 2020 8:34:16 PST by Sander Barr MD       All labs reviewed by me      RADIOLOGY  CT-CSPINE WITHOUT PLUS  RECONS   Final Result      1.  Postsurgical and degenerative changes as detailed.   2.  No acute abnormality.          COURSE & MEDICAL DECISION MAKING    This is a 69 y.o. male who presents with acute, waxing and waning, non-radiating bilateral posterior neck pain which began this morning with worsening severity since onset.    Differential Diagnosis includes but is not limited to:  sprain, spasm, hardware malfunction, abscess, ACS mimic    ED Course:  12:43 AM Patient was seen and evaluated at bedside. Patient presents to the ED for the above complaint.  The patient is afebrile, well-appearing with  midline and bilateral Paraspinous neck tenderness but with an otherwise normal physical exam with stable vital signs. Discussed plan of care with patient which includes treating the patient for his pain, obtaining a CT scan of his cervical spine and ordering lab work for further evaluation of his condition. Given his cardiac history, an EKG will be obtained to rule out ACS mimic. Patient's verbalizes their understanding and agreement to the plan of care. Ordered EKG, SED rate, troponin, CMP, CBC with differential, CT-CSpine. Patient was medicated with Aspirin 324 mg for possible ACS mimic    Thankfully, the patient has a very reassuring work-up.  His EKG shows no STEMI or strain or dysrhythmia although it is slightly limited to motion artifact, pain all day troponin is negative highly doubt ACS.  He does not have actual chest pain he just needed to be screened for any possible ACS mimic.  His white count is slightly elevated but he has no fevers here highly doubt infection, symptoms began after taking a nap perhaps he strained his neck at this time.  He is not critically anemic, electrolytes are stable, inflammatory markers negative highly doubt abscess.  CT scan shows degenerative changes but nothing acute.    3:06 AM Recheck. The patient reports that his pain has moderately been improved but still feels slightly sore  but is comfortable enough to go home. I informed the patient that his lab work is reassuring and that his radiology results are consistent with a postsurgical and degenerative changes of the cervical spine, no acute spinal cord compromise, repeat neurologic examination normal. His radiology results do not show any acute abnormalities.  Given the patient's reassuring lab and imaging work and positive response to Toradol and Aspirin, he is stable for discharge. He will be given a prescribed for Flexeril for his muscle spasms, sedation and addiction precautions given regarding skeletal muscle relaxants. The patient reports that he is comfortable with discharge.  He will follow-up with his PCP and spine doctor tomorrow, but return immediately for any new or worsening symptoms.  Given normal neurologic examination and good response to NSAIDs I feel he is safe for discharge and outpatient follow-up and trust he will heed my advice and come right back if he gets any worse.    Medications   aspirin (ASA) chewable tab 324 mg (324 mg Oral Given 1/21/20 0109)   ketorolac (TORADOL) injection 15 mg (15 mg Intravenous Given 1/21/20 0109)       FINAL IMPRESSION  1. Acute neck pain    2. Muscle spasms of neck    3. History of coronary artery disease    4. Controlled type 2 diabetes mellitus with complication, without long-term current use of insulin (HCC)    5. Essential hypertension    6. Ischemic cardiomyopathy    7. Chronic pain syndrome        PRESCRIPTIONS  Discharge Medication List as of 1/21/2020  3:18 AM      START taking these medications    Details   cyclobenzaprine (FLEXERIL) 10 MG Tab Take 1 Tab by mouth 3 times a day as needed for up to 3 days., Disp-9 Tab, R-0, Print Rx Paper             FOLLOW UP  Oxana Parkinson M.D.  [address]  [facility] [address]  [facility] [address]  [phone]    Schedule an appointment as soon as possible for a visit in 2 days      [facility], Emergency Dept  [address]  [place] [id number] [phone]  Today  If you have ANY new or worse symptoms!        -DISCHARGE-       The patient is referred to his primary care provider for follow-up of today's complaint, as well as blood pressure management, diabetic screening, and for all other preventative health concerns.     Pertinent Labs & Imaging studies reviewed and verified by myself, as well as nursing notes and the patient's past medical, family, and social histories (See chart for details).    Results, exam findings, clinical impression, presumed diagnosis, treatment options, and strict return precautions were discussed with the patient, and they verbalized understanding, agreed with, and appreciated the plan of care.    IBetina (Scribe), am scribing for, and in the presence of, Sander Cates M.D..    Electronically signed by: Betina Flores ([email or web address]), 1/21/2020    Sander FENG M.D. personally performed the services described in this documentation, as scribed by Betina Flores in my presence, and it is both accurate and complete.    Portions of this record were made with voice recognition software.  Despite my review, spelling/grammar/context errors may still remain.  Interpretation of this chart should be taken in this context.    C    The note accurately reflects work and decisions made by me.  Sander Cates M.D.  1/21/2020  8:37 AM

## 2020-01-23 ENCOUNTER — APPOINTMENT (OUTPATIENT)
Dept: MEDICAL GROUP | Facility: MEDICAL CENTER | Age: 70
End: 2020-01-23
Payer: MEDICARE

## 2020-01-29 ENCOUNTER — TELEPHONE (OUTPATIENT)
Dept: CARDIOLOGY | Facility: MEDICAL CENTER | Age: 70
End: 2020-01-29

## 2020-01-29 NOTE — TELEPHONE ENCOUNTER
Associated Results   Result Notes for CBC WITH DIFFERENTIAL   Notes recorded by Shun Benoit M.D. on 1/21/2020 at 2:37 PM PST    Labs look good, please let him know     Thank you     Letter printed with MD recommendations and mailed to pt mailing address.

## 2020-01-29 NOTE — LETTER
January 29, 2020        Luis Renner  [address] [place] [address]          Dear Luis,    We have received the results of your recent: Blood draw    Your test came back and Dr. Benoit reviewed your results.  Great news, your labs look good!  Please follow up as previously discussed with your physician.      Feel free to call us with any questions.        Sincerely,          Jenni Benoit  Electronically Signed

## 2020-01-30 ENCOUNTER — OFFICE VISIT (OUTPATIENT)
Dept: MEDICAL GROUP | Facility: MEDICAL CENTER | Age: 70
End: 2020-01-30
Payer: MEDICARE

## 2020-01-30 VITALS
DIASTOLIC BLOOD PRESSURE: 64 MMHG | OXYGEN SATURATION: 94 % | BODY MASS INDEX: 29.32 KG/M2 | HEIGHT: 71 IN | WEIGHT: 209.44 LBS | TEMPERATURE: 98.2 F | SYSTOLIC BLOOD PRESSURE: 112 MMHG | HEART RATE: 86 BPM | RESPIRATION RATE: 16 BRPM

## 2020-01-30 DIAGNOSIS — R80.9 MICROALBUMINURIA DUE TO TYPE 2 DIABETES MELLITUS (HCC): ICD-10-CM

## 2020-01-30 DIAGNOSIS — I25.10 CORONARY ARTERY DISEASE DUE TO CALCIFIED CORONARY LESION: ICD-10-CM

## 2020-01-30 DIAGNOSIS — D64.9 ANEMIA, UNSPECIFIED TYPE: ICD-10-CM

## 2020-01-30 DIAGNOSIS — E11.29 MICROALBUMINURIA DUE TO TYPE 2 DIABETES MELLITUS (HCC): ICD-10-CM

## 2020-01-30 DIAGNOSIS — Z23 NEED FOR VACCINATION: ICD-10-CM

## 2020-01-30 DIAGNOSIS — E11.8 CONTROLLED TYPE 2 DIABETES MELLITUS WITH COMPLICATION, WITHOUT LONG-TERM CURRENT USE OF INSULIN (HCC): ICD-10-CM

## 2020-01-30 DIAGNOSIS — I25.5 ISCHEMIC CARDIOMYOPATHY: ICD-10-CM

## 2020-01-30 DIAGNOSIS — I25.84 CORONARY ARTERY DISEASE DUE TO CALCIFIED CORONARY LESION: ICD-10-CM

## 2020-01-30 DIAGNOSIS — I10 ESSENTIAL HYPERTENSION: Chronic | ICD-10-CM

## 2020-01-30 DIAGNOSIS — E55.9 HYPOVITAMINOSIS D: ICD-10-CM

## 2020-01-30 DIAGNOSIS — E78.5 DYSLIPIDEMIA: ICD-10-CM

## 2020-01-30 DIAGNOSIS — N18.30 CKD (CHRONIC KIDNEY DISEASE), STAGE III: ICD-10-CM

## 2020-01-30 DIAGNOSIS — I50.22 CHRONIC SYSTOLIC CONGESTIVE HEART FAILURE (HCC): Chronic | ICD-10-CM

## 2020-01-30 DIAGNOSIS — Z95.1 S/P CABG X 3: ICD-10-CM

## 2020-01-30 PROCEDURE — 90746 HEPB VACCINE 3 DOSE ADULT IM: CPT | Performed by: INTERNAL MEDICINE

## 2020-01-30 PROCEDURE — 99214 OFFICE O/P EST MOD 30 MIN: CPT | Mod: 25 | Performed by: INTERNAL MEDICINE

## 2020-01-30 PROCEDURE — G0010 ADMIN HEPATITIS B VACCINE: HCPCS | Performed by: INTERNAL MEDICINE

## 2020-01-30 ASSESSMENT — PATIENT HEALTH QUESTIONNAIRE - PHQ9: CLINICAL INTERPRETATION OF PHQ2 SCORE: 0

## 2020-01-30 NOTE — PROGRESS NOTES
CHIEF COMPLAINT  Chief Complaint   Patient presents with   • Results   DM    HPI  Luis Renner is a 69 y.o. male who presents today for the following     DM2 with CKD stage III, microalbuminuria  Onset:  DM2 for 10 yrs.       Meds:   - Victoza 1.2 mg daily  - Jardiance, 25 mg QD  - glipizide 5 mg BID  - metformin 850 mg QD  Used meds as prescribed.    Compliance to meds: yes  Checking feet daily/wear soft socks/shoes: yes     The last A1c: 7.0     Checking blood sugar: yes, once daily  Mean BS: in 140'  Hypoglycemia:  one remote episode   Symptoms of hypoglycemia: sweating, fatigue, pale skin, hunger.     ASA: yes  ACE: yes  Statin: yes     Diet: regular  Exercise: at least 4 d/w  BMI: 29     DM complications:   Peripheral neuropathy:            No numbness or tingling in feet.  Retinopathy:                            No retinopathy. Last eye exam: 8/2019  Nephropathy:                          Microalbumin: pos  CVS:                                        Has CAD  GI:                                           No gastropathy.     FH of DM:  none     Hypertension, cardiomyopathy, CAD (St post CABG in 7/16), CHF  Meds: Lisinopril, 5 mg QD, spironolactone, 25 mg QD, carvedilol, 6.25 mg BID, entresto BID; taking as prescribed.    He is not measuring BP at home.  Denies: - headaches, vision problems, tinnitus.  - chest pain/pressure, palpitations, irregular heart beats, exertional, dyspnea, peripheral edema.  Low salt diet: yes  Diet / exercise BMI: as above    FH: multiple  He has been followed up by cardiology.     Dyslipidemia  Meds: Crestor, 10 mg QD, Zetia 10 mg QD. No muscle weakness, cramps, nausea,abdominal discomfort.    Diet / exercise BMI: as above    FH: 2 brothers, father  He has been f/u by cardiology.     Anemia  The patient had slightly lobar hemoglobin;   -He has intermittently abnormal CBC in the past.  He has he had recurrent surgical procedures that might be the cause.      Hypovitaminosis D  The  patient had low vitamin D level.  Vitamin D supplement: Multivitamins.     Reviewed PMH, PSH, FH, SH, ALL, HCM/IMM, no changes  Reviewed MEDS, no changes    Patient Active Problem List    Diagnosis Date Noted   • S/P CABG x 3 04/04/2017     Priority: Medium   • Diabetes mellitus type 2, controlled, with complications ([facility]) 09/23/2016     Priority: Medium   • Ischemic cardiomyopathy 07/23/2013     Priority: Medium   • Coronary artery disease due to calcified coronary lesion 03/08/2013     Priority: Medium   • Essential hypertension 03/07/2013     Priority: Medium   • Benign prostatic hyperplasia 10/18/2017     Priority: Low   • Idiopathic gout 07/27/2017     Priority: Low   • Actinic keratosis 07/27/2017     Priority: Low   • TEMO (generalized anxiety disorder) 04/04/2017     Priority: Low   • Gastroesophageal reflux disease without esophagitis 01/04/2017     Priority: Low   • Bilateral renal cysts 01/14/2015     Priority: Low   • Chronic systolic congestive heart failure (HCC)    • Microalbuminuria due to type 2 diabetes mellitus ([facility]) 12/13/2018     CURRENT MEDICATIONS  Current Outpatient Medications   Medication Sig Dispense Refill   • liraglutide (VICTOZA) 18 MG/3ML Solution Pen-injector Inject 1.8 mg as instructed every day. 27 mL 0   • ezetimibe (ZETIA) 10 MG Tab Take 1 Tab by mouth every day. 90 Tab 0   • citalopram (CELEXA) 20 MG Tab TAKE ONE TABLET BY MOUTH EVERY DAY 90 Tab 0   • carvedilol (COREG) 6.25 MG Tab Take 1 Tab by mouth 2 times a day, with meals. 180 Tab 1   • rosuvastatin (CRESTOR) 20 MG Tab Take 1 Tab by mouth every evening. 90 Tab 1   • sacubitril-valsartan (ENTRESTO) 24-26 MG Tab tablet Take 1 Tab by mouth 2 Times a Day. Indications: Heart Failure 180 Tab 1   • allopurinol (ZYLOPRIM) 300 MG Tab TAKE ONE TABLET BY MOUTH EVERY DAY 90 Tab 3   • omeprazole (PRILOSEC) 20 MG delayed-release capsule Take 1 Cap by mouth 1 time daily as needed. TAKE 1 CAPSULE BY MOUTH ONCE DAILY 90 Cap 3   •  spironolactone (ALDACTONE) 25 MG Tab Take 1 Tab by mouth every day. Please check nonfasting labs 2-3 weeks after starting 90 Tab 1   • tamsulosin (FLOMAX) 0.4 MG capsule Take 1 Cap by mouth every evening. 90 Cap 3   • acetaminophen (TYLENOL) 500 MG Tab Take 1,500 mg by mouth 2 Times a Day.     • WELCHOL 625 MG Tab Take 625 mg by mouth every day.     • BD PEN NEEDLE DENIS U/F USE DAILY WITH VICTOZA (100 DAY SUPPLY) 100 Each 11   • clopidogrel (PLAVIX) 75 MG Tab Take 1 Tab by mouth every day. 90 Tab 3   • GABAPENTIN PO Take 300 mg by mouth as needed.     • omeprazole (PRILOSEC) 20 MG delayed-release capsule Take 1 Cap by mouth 1 time daily as needed. TAKE 1 CAPSULE BY MOUTH ONCE DAILY     • Lancets 1 Each by Does not apply route 3 times a day. Lancets for Freestyle Lite meter. Sig: use TID and prn ssx high or low sugar. 100 Each 11   • JARDIANCE 25 MG Tab Take  by mouth every day.     • glipiZIDE (GLUCOTROL) 5 MG Tab Take 2.5 mg by mouth 2 times a day.     • metFORMIN (GLUCOPHAGE) 850 MG Tab 2 times a day.       No current facility-administered medications for this visit.      ALLERGIES  Allergies: Patient has no known allergies.  PAST MEDICAL HISTORY  Past Medical History:   Diagnosis Date   • Backpain 10/10/2019    and neck, 0/10   • Myocardial infarct (HCC) 3/7/2013   • Left ventricular apical thrombus March 2013    Inferoapical clot measuring 1.1cm x 0.8cm, started on anticoagulation.   • CAD (coronary artery disease) March 2013    ACS. PCI/ANA PAULA x 2 of the LAD (2.5 x 12mm - mid; 2.75 x 16mm - proximal). Distal RCA is occluded; distal circumflex is occluded with good collateralization.   • Bilateral renal cysts    • C. difficile diarrhea     pt with hx of c diff after hospitalization [id number]   • Cataract     removed bilat   • Chronic anticoagulation    • Chronic systolic congestive heart failure (HCC)    • DIABETES MELLITUS      oral & diet    • Environmental and seasonal allergies    • Heart burn    • High  cholesterol    • Hyperlipidemia     • Hypertension     well controlled on meds   • Indigestion    • Ischemic cardiomyopathy    • Psychiatric problem     anxiety   • Snoring     no sleep study     SURGICAL HISTORY  He  has a past surgical history that includes other cardiac surgery (); cholecystectomy (); recovery (2016); multiple coronary artery bypass endo vein harvest (2016); miller (2016); pr implant neurostim/ (2019); and other neurological surg ().  SOCIAL HISTORY  Social History     Tobacco Use   • Smoking status: Never Smoker   • Smokeless tobacco: Never Used   Substance Use Topics   • Alcohol use: Yes     Alcohol/week: 0.6 oz     Types: 1 Glasses of wine per week     Comment: occ   • Drug use: Yes     Comment: CBD     Social History     Patient does not qualify to have social determinant information on file (likely too young).   Social History Narrative   • Not on file     FAMILY HISTORY  Family History   Problem Relation Age of Onset   • Cancer Mother    • Hypertension Father    • Hyperlipidemia Brother         x 2   • Hypertension Brother         x2   • Heart Disease Brother         x2, afib; cad x 1   • Cancer Paternal Uncle    • Diabetes Neg Hx      Family Status   Relation Name Status   • Mo   at age 79         colon ca   • Fa   at age 87   • Bro  (Not Specified)   • Bro  (Not Specified)   • Bro  (Not Specified)   • PUnc  (Not Specified)   • Neg Hx  (Not Specified)       ROS   Constitutional: Negative for fever, chills, fatigue.  HENT: Negative for congestion, sore throat.  Eyes: Negative for vision problems.   Respiratory: Negative for cough, shortness of breath.  Cardiovascular: Negative for chest pain, palpitations.   Gastrointestinal: Negative for heartburn, nausea, abdominal pain.   Genitourinary: Negative for dysuria.  Musculoskeletal: Negative for significant myalgia, back and joint pain.   Skin: Negative for rash.   Neuro: Negative for  "dizziness, weakness and headaches.   Endo/Heme/Allergies: Does not bruise/bleed easily.   Psychiatric/Behavioral: Negative for depression.    PHYSICAL EXAM   Blood Pressure 112/64 (BP Location: Left arm, Patient Position: Sitting, BP Cuff Size: Adult)   Pulse 86   Temperature 36.8 °C (98.2 °F) (Temporal)   Respiration 16   Height 1.803 m (5' 11\")   Weight 95 kg (209 lb 7 oz)   Oxygen Saturation 94%  Body mass index is 29.21 kg/m².  General:  NAD, well appearing  HEENT:   NC/AT, PERRLA, EOMI, TMs are clear. Oropharyngeal mucosa is pink,  without lesions;  no cervical / supraclavicular  lymphadenopathy, no thyromegaly.    Cardiovascular: RRR.   No m/r/g.       Lungs:   CTAB, no w/r/r, no respiratory distress.  Abdomen: Soft, NT/ND; no hepatosplenomegaly.  Extremities:  2+ DP and radial pulses bilaterally.  No c/c/e.   Skin:  Warm, dry.  No erythema. No rash.   Neurologic: Alert & oriented x 3. CN II-XII grossly intact. No focal deficits.  Psychiatric:  Affect normal, mood normal, judgment normal.    Labs     Labs are reviewed and discussed with a patient  Lab Results   Component Value Date/Time    CHOLSTRLTOT 88 (L) 01/21/2020 09:32 AM    LDL 14 01/21/2020 09:32 AM    HDL 35 (A) 01/21/2020 09:32 AM    TRIGLYCERIDE 197 (H) 01/21/2020 09:32 AM       Lab Results   Component Value Date/Time    SODIUM 142 01/21/2020 09:32 AM    POTASSIUM 4.4 01/21/2020 09:32 AM    CHLORIDE 106 01/21/2020 09:32 AM    CO2 28 01/21/2020 09:32 AM    GLUCOSE 115 (H) 01/21/2020 09:32 AM    BUN 26 (H) 01/21/2020 09:32 AM    CREATININE 1.40 01/21/2020 09:32 AM     Lab Results   Component Value Date/Time    ALKPHOSPHAT 58 01/21/2020 09:32 AM    ASTSGOT 15 01/21/2020 09:32 AM    ALTSGPT 17 01/21/2020 09:32 AM    TBILIRUBIN 0.6 01/21/2020 09:32 AM      Lab Results   Component Value Date/Time    HBA1C 7.0 (H) 01/21/2020 09:32 AM    HBA1C 6.4 (H) 07/15/2019 10:27 AM    HBA1C 7.9 (H) 11/13/2018 08:45 AM     No results found for: TSH  Lab Results "   Component Value Date/Time    FREET4 0.82 03/08/2013 01:00 AM       Lab Results   Component Value Date/Time    WBC 9.4 01/21/2020 09:32 AM    RBC 5.80 01/21/2020 09:32 AM    HEMOGLOBIN 13.8 (L) 01/21/2020 09:32 AM    HEMATOCRIT 45.5 01/21/2020 09:32 AM    MCV 78.4 (L) 01/21/2020 09:32 AM    MCH 23.8 (L) 01/21/2020 09:32 AM    MCHC 30.3 (L) 01/21/2020 09:32 AM    MPV 11.1 01/21/2020 09:32 AM    NEUTSPOLYS 68.50 01/21/2020 09:32 AM    LYMPHOCYTES 18.30 (L) 01/21/2020 09:32 AM    MONOCYTES 9.10 01/21/2020 09:32 AM    EOSINOPHILS 3.10 01/21/2020 09:32 AM    BASOPHILS 0.60 01/21/2020 09:32 AM    HYPOCHROMIA 1+ 09/16/2013 01:13 PM    ANISOCYTOSIS 1+ 07/05/2019 03:18 PM      Imaging     None    Assessment and Plan     Luis Renner is a 69 y.o. male    1. Controlled type 2 diabetes mellitus with complication, without long-term current use of insulin (HCC)  He has been on Victoza, would like to switch to 1 weekly medication  - Dulaglutide 0.75 MG/0.5ML Solution Pen-injector; Inject 0.5 mL as instructed every 7 days.  Dispense: 12 PEN; Refill: 0  - HEMOGLOBIN A1C; Future  -CMP, Future    2. CKD (chronic kidney disease), stage III ([facility])  Stable, advised to continue good fluid intake, avoid NSAIDs  - Comp Metabolic Panel; Future    3. Microalbuminuria due to type 2 diabetes mellitus (HCC)  As above  - HEMOGLOBIN A1C; Future    4. Essential hypertension  Controlled cardiovascular conditions, continue current treatment and cardiology follow-up  5. Ischemic cardiomyopathy  6. Coronary artery disease due to calcified coronary lesion  7. S/P CABG x 3  8. Chronic systolic congestive heart failure (HCC)  As above    9. Dyslipidemia  Controlled, continue current treatment  - Lipid Profile; Future    10. Anemia, unspecified type  Follow-up labs  - OCCULT BLOOD FECES IMMUNOASSAY; Future  - VIT B12,  FOLIC ACID  - CBC WITH DIFFERENTIAL; Future    11. Hypovitaminosis D  Advised 2000 units of vitamin D daily, OTC  - VITAMIN D,25  HYDROXY; Future    12. Need for vaccination  Information was provided to the patient regarding the vaccine, including side effects. Vaccine was given by my medical assistant under my supervision.  - Hepatitis B Vaccine Adult 20+    Counseling:   - Smoking:  Nonsmoker    Followup: in 3 months and prn    All questions are answered.    Please note that this dictation was created using voice recognition software, and that there might be errors of mary and possibly content.

## 2020-02-12 ENCOUNTER — PATIENT MESSAGE (OUTPATIENT)
Dept: MEDICAL GROUP | Facility: MEDICAL CENTER | Age: 70
End: 2020-02-12

## 2020-02-13 NOTE — PATIENT COMMUNICATION
Called in Prescription:  Tessalon 100mg 60 caps X3 daily  To Pharmacy  [id number] - [place], [place] - [address]    Patient was called and updated    Reji Conner, Med Ass't

## 2020-02-18 ENCOUNTER — TELEPHONE (OUTPATIENT)
Dept: CARDIOLOGY | Facility: MEDICAL CENTER | Age: 70
End: 2020-02-18

## 2020-02-18 NOTE — TELEPHONE ENCOUNTER
Located clearance form. Pt is scheduled for gastroscopy with possible biopsy on 02/25/20. Pt had ICD placement on 10/15/19 and was advised to postpone GI procedure for 3 months. [facility] is requesting cardiac clearance and holding Plavix for 5 days prior to procedure.    To Dr. Liu (ADD)

## 2020-02-18 NOTE — TELEPHONE ENCOUNTER
[facility], she faxed it to [phone] on 1/17. Pt will need to hold his meds this week. #[phone]

## 2020-02-20 NOTE — TELEPHONE ENCOUNTER
Jerad Liu M.D.  You 15 hours ago (5:02 PM)      Ok to stop.        Clearance form given to Denis DOAN to retrieve signature from AK today. Will fax clearance to [facility] once signature obtained.    Attempted to call Xiao back at [facility], .    Called pt and left , called pt's wife Aaron, discussed pt ok to stop taking Plavix starting today prior to his procedure on 02/25/20. She verbalized understanding and will relay instructions to pt. Appreciative of call.

## 2020-02-20 NOTE — TELEPHONE ENCOUNTER
Xiao at [facility] is calling about clearance for 02/25 procedure. She said they need the patient to stop his plavix tomorrow. She can be reached at [phone].

## 2020-02-21 NOTE — TELEPHONE ENCOUNTER
Clearance form signed by AK. Faxed clearance to [facility] at [phone], received receipt for confirmation.

## 2020-02-24 ENCOUNTER — APPOINTMENT (OUTPATIENT)
Dept: ADMISSIONS | Facility: MEDICAL CENTER | Age: 70
End: 2020-02-24
Payer: MEDICARE

## 2020-02-24 NOTE — OR NURSING
"Preadmit appointment: \" Preparing for your Procedure information\" sheet given to patient with verbal and written instructions. Patient instructed to continue prescribed medications through the day before surgery, instructed to take the following medications the day of surgery per anesthesia protocol: omeprazole, carvedilol. Patient states he has stopped plavix per MD instructions.  "

## 2020-02-25 ENCOUNTER — HOSPITAL ENCOUNTER (OUTPATIENT)
Facility: MEDICAL CENTER | Age: 70
End: 2020-02-25
Attending: INTERNAL MEDICINE | Admitting: INTERNAL MEDICINE
Payer: MEDICARE

## 2020-02-25 ENCOUNTER — ANESTHESIA EVENT (OUTPATIENT)
Dept: SURGERY | Facility: MEDICAL CENTER | Age: 70
End: 2020-02-25
Payer: MEDICARE

## 2020-02-25 ENCOUNTER — ANESTHESIA (OUTPATIENT)
Dept: SURGERY | Facility: MEDICAL CENTER | Age: 70
End: 2020-02-25
Payer: MEDICARE

## 2020-02-25 VITALS
WEIGHT: 211.86 LBS | HEIGHT: 71 IN | RESPIRATION RATE: 16 BRPM | HEART RATE: 72 BPM | BODY MASS INDEX: 29.66 KG/M2 | SYSTOLIC BLOOD PRESSURE: 106 MMHG | TEMPERATURE: 97.9 F | OXYGEN SATURATION: 95 % | DIASTOLIC BLOOD PRESSURE: 68 MMHG

## 2020-02-25 LAB
ANION GAP SERPL CALC-SCNC: 11 MMOL/L (ref 7–16)
BUN SERPL-MCNC: 20 MG/DL (ref 8–22)
CALCIUM SERPL-MCNC: 9.8 MG/DL (ref 8.4–10.2)
CHLORIDE SERPL-SCNC: 105 MMOL/L (ref 96–112)
CO2 SERPL-SCNC: 25 MMOL/L (ref 20–33)
CREAT SERPL-MCNC: 0.98 MG/DL (ref 0.5–1.4)
ERYTHROCYTE [DISTWIDTH] IN BLOOD BY AUTOMATED COUNT: 50.5 FL (ref 35.9–50)
GLUCOSE BLD-MCNC: 135 MG/DL (ref 65–99)
GLUCOSE SERPL-MCNC: 145 MG/DL (ref 65–99)
HCT VFR BLD AUTO: 45.1 % (ref 42–52)
HGB BLD-MCNC: 13.8 G/DL (ref 14–18)
MCH RBC QN AUTO: 23.8 PG (ref 27–33)
MCHC RBC AUTO-ENTMCNC: 30.6 G/DL (ref 33.7–35.3)
MCV RBC AUTO: 77.6 FL (ref 81.4–97.8)
PATHOLOGY CONSULT NOTE: NORMAL
PLATELET # BLD AUTO: 179 K/UL (ref 164–446)
PMV BLD AUTO: 10.4 FL (ref 9–12.9)
POTASSIUM SERPL-SCNC: 4.1 MMOL/L (ref 3.6–5.5)
RBC # BLD AUTO: 5.81 M/UL (ref 4.7–6.1)
SODIUM SERPL-SCNC: 141 MMOL/L (ref 135–145)
WBC # BLD AUTO: 7.5 K/UL (ref 4.8–10.8)

## 2020-02-25 PROCEDURE — 160048 HCHG OR STATISTICAL LEVEL 1-5: Performed by: INTERNAL MEDICINE

## 2020-02-25 PROCEDURE — 160025 RECOVERY II MINUTES (STATS): Performed by: INTERNAL MEDICINE

## 2020-02-25 PROCEDURE — 88305 TISSUE EXAM BY PATHOLOGIST: CPT | Mod: 59

## 2020-02-25 PROCEDURE — 160046 HCHG PACU - 1ST 60 MINS PHASE II: Performed by: INTERNAL MEDICINE

## 2020-02-25 PROCEDURE — 500066 HCHG BITE BLOCK, ECT: Performed by: INTERNAL MEDICINE

## 2020-02-25 PROCEDURE — 700105 HCHG RX REV CODE 258: Performed by: INTERNAL MEDICINE

## 2020-02-25 PROCEDURE — 160009 HCHG ANES TIME/MIN: Performed by: INTERNAL MEDICINE

## 2020-02-25 PROCEDURE — 700111 HCHG RX REV CODE 636 W/ 250 OVERRIDE (IP): Performed by: ANESTHESIOLOGY

## 2020-02-25 PROCEDURE — 700101 HCHG RX REV CODE 250: Performed by: ANESTHESIOLOGY

## 2020-02-25 PROCEDURE — 160203 HCHG ENDO MINUTES - 1ST 30 MINS LEVEL 4: Performed by: INTERNAL MEDICINE

## 2020-02-25 PROCEDURE — 85027 COMPLETE CBC AUTOMATED: CPT

## 2020-02-25 PROCEDURE — 160035 HCHG PACU - 1ST 60 MINS PHASE I: Performed by: INTERNAL MEDICINE

## 2020-02-25 PROCEDURE — 82962 GLUCOSE BLOOD TEST: CPT

## 2020-02-25 PROCEDURE — 160002 HCHG RECOVERY MINUTES (STAT): Performed by: INTERNAL MEDICINE

## 2020-02-25 PROCEDURE — 80048 BASIC METABOLIC PNL TOTAL CA: CPT

## 2020-02-25 RX ORDER — SODIUM CHLORIDE, SODIUM LACTATE, POTASSIUM CHLORIDE, CALCIUM CHLORIDE 600; 310; 30; 20 MG/100ML; MG/100ML; MG/100ML; MG/100ML
INJECTION, SOLUTION INTRAVENOUS CONTINUOUS
Status: DISCONTINUED | OUTPATIENT
Start: 2020-02-25 | End: 2020-02-25 | Stop reason: HOSPADM

## 2020-02-25 RX ORDER — LIDOCAINE HYDROCHLORIDE 20 MG/ML
INJECTION, SOLUTION EPIDURAL; INFILTRATION; INTRACAUDAL; PERINEURAL PRN
Status: DISCONTINUED | OUTPATIENT
Start: 2020-02-25 | End: 2020-02-25 | Stop reason: SURG

## 2020-02-25 RX ORDER — HYDROMORPHONE HYDROCHLORIDE 1 MG/ML
0.2 INJECTION, SOLUTION INTRAMUSCULAR; INTRAVENOUS; SUBCUTANEOUS
Status: DISCONTINUED | OUTPATIENT
Start: 2020-02-25 | End: 2020-02-25 | Stop reason: HOSPADM

## 2020-02-25 RX ORDER — OXYCODONE HCL 5 MG/5 ML
5 SOLUTION, ORAL ORAL
Status: DISCONTINUED | OUTPATIENT
Start: 2020-02-25 | End: 2020-02-25 | Stop reason: HOSPADM

## 2020-02-25 RX ORDER — HALOPERIDOL 5 MG/ML
1 INJECTION INTRAMUSCULAR
Status: DISCONTINUED | OUTPATIENT
Start: 2020-02-25 | End: 2020-02-25 | Stop reason: HOSPADM

## 2020-02-25 RX ORDER — MEPERIDINE HYDROCHLORIDE 25 MG/ML
25 INJECTION INTRAMUSCULAR; INTRAVENOUS; SUBCUTANEOUS
Status: DISCONTINUED | OUTPATIENT
Start: 2020-02-25 | End: 2020-02-25 | Stop reason: HOSPADM

## 2020-02-25 RX ORDER — HYDROMORPHONE HYDROCHLORIDE 1 MG/ML
0.4 INJECTION, SOLUTION INTRAMUSCULAR; INTRAVENOUS; SUBCUTANEOUS
Status: DISCONTINUED | OUTPATIENT
Start: 2020-02-25 | End: 2020-02-25 | Stop reason: HOSPADM

## 2020-02-25 RX ORDER — ONDANSETRON 2 MG/ML
INJECTION INTRAMUSCULAR; INTRAVENOUS PRN
Status: DISCONTINUED | OUTPATIENT
Start: 2020-02-25 | End: 2020-02-25 | Stop reason: SURG

## 2020-02-25 RX ORDER — DEXAMETHASONE SODIUM PHOSPHATE 4 MG/ML
INJECTION, SOLUTION INTRA-ARTICULAR; INTRALESIONAL; INTRAMUSCULAR; INTRAVENOUS; SOFT TISSUE PRN
Status: DISCONTINUED | OUTPATIENT
Start: 2020-02-25 | End: 2020-02-25 | Stop reason: SURG

## 2020-02-25 RX ORDER — OXYCODONE HCL 5 MG/5 ML
10 SOLUTION, ORAL ORAL
Status: DISCONTINUED | OUTPATIENT
Start: 2020-02-25 | End: 2020-02-25 | Stop reason: HOSPADM

## 2020-02-25 RX ORDER — DIPHENHYDRAMINE HYDROCHLORIDE 50 MG/ML
12.5 INJECTION INTRAMUSCULAR; INTRAVENOUS
Status: DISCONTINUED | OUTPATIENT
Start: 2020-02-25 | End: 2020-02-25 | Stop reason: HOSPADM

## 2020-02-25 RX ORDER — ONDANSETRON 2 MG/ML
4 INJECTION INTRAMUSCULAR; INTRAVENOUS
Status: DISCONTINUED | OUTPATIENT
Start: 2020-02-25 | End: 2020-02-25 | Stop reason: HOSPADM

## 2020-02-25 RX ORDER — HYDROMORPHONE HYDROCHLORIDE 1 MG/ML
0.1 INJECTION, SOLUTION INTRAMUSCULAR; INTRAVENOUS; SUBCUTANEOUS
Status: DISCONTINUED | OUTPATIENT
Start: 2020-02-25 | End: 2020-02-25 | Stop reason: HOSPADM

## 2020-02-25 RX ORDER — LORAZEPAM 2 MG/ML
0.5 INJECTION INTRAMUSCULAR
Status: DISCONTINUED | OUTPATIENT
Start: 2020-02-25 | End: 2020-02-25 | Stop reason: HOSPADM

## 2020-02-25 RX ORDER — MIDAZOLAM HYDROCHLORIDE 1 MG/ML
INJECTION INTRAMUSCULAR; INTRAVENOUS PRN
Status: DISCONTINUED | OUTPATIENT
Start: 2020-02-25 | End: 2020-02-25 | Stop reason: SURG

## 2020-02-25 RX ORDER — HYDRALAZINE HYDROCHLORIDE 20 MG/ML
5 INJECTION INTRAMUSCULAR; INTRAVENOUS
Status: DISCONTINUED | OUTPATIENT
Start: 2020-02-25 | End: 2020-02-25 | Stop reason: HOSPADM

## 2020-02-25 RX ORDER — LABETALOL HYDROCHLORIDE 5 MG/ML
5 INJECTION, SOLUTION INTRAVENOUS
Status: DISCONTINUED | OUTPATIENT
Start: 2020-02-25 | End: 2020-02-25 | Stop reason: HOSPADM

## 2020-02-25 RX ADMIN — FENTANYL CITRATE 100 MCG: 50 INJECTION, SOLUTION INTRAMUSCULAR; INTRAVENOUS at 08:15

## 2020-02-25 RX ADMIN — LIDOCAINE HYDROCHLORIDE 50 MG: 20 INJECTION, SOLUTION EPIDURAL; INFILTRATION; INTRACAUDAL; PERINEURAL at 08:17

## 2020-02-25 RX ADMIN — SODIUM CHLORIDE, POTASSIUM CHLORIDE, SODIUM LACTATE AND CALCIUM CHLORIDE: 600; 310; 30; 20 INJECTION, SOLUTION INTRAVENOUS at 07:17

## 2020-02-25 RX ADMIN — DEXAMETHASONE SODIUM PHOSPHATE 4 MG: 4 INJECTION, SOLUTION INTRAMUSCULAR; INTRAVENOUS at 08:20

## 2020-02-25 RX ADMIN — PROPOFOL 50 MG: 10 INJECTION, EMULSION INTRAVENOUS at 08:17

## 2020-02-25 RX ADMIN — MIDAZOLAM HYDROCHLORIDE 2 MG: 1 INJECTION, SOLUTION INTRAMUSCULAR; INTRAVENOUS at 08:15

## 2020-02-25 RX ADMIN — ONDANSETRON 4 MG: 2 INJECTION INTRAMUSCULAR; INTRAVENOUS at 08:20

## 2020-02-25 ASSESSMENT — PAIN SCALES - GENERAL: PAIN_LEVEL: 1

## 2020-02-25 NOTE — DISCHARGE INSTRUCTIONS
ENDOSCOPY HOME CARE INSTRUCTIONS    GASTROSCOPY OR ERCP  1. Don't eat or drink anything for about an hour after the test. You can then resume your regular diet.  2. Don't drive or drink alcohol for 24 hours. The medication you received will make you too drowsy.  3. Don't take any coffee, tea, or aspirin products until after you see your doctor. These can harm the lining of your stomach.  4. If you begin to vomit bloody material, or develop black or bloody stools, call your doctor as soon as possible.  5. If you have any neck, chest, abdominal pain or temp of 100 degrees, call your doctor.  6. See your doctor   7. Additional instructions: POSTOPERATIVE DIAGNOSES:  1.  Higginbotham's esophagus.  2.  Gastroesophageal reflux disease.  3.  Hiatal hernia.  4.  Gastric polyps.     ANESTHESIA:  Propofol anesthesia per anesthesiologist.     DESCRIPTION OF PROCEDURE:  After informed consent and appropriate sedation,   the patient was placed in left lateral position and the gastroscope was   advanced through the oropharynx into the esophagus through to the second   portion of the duodenum.  The ampulla was at an abnormal angle, but the   duodenum was visualized after intubating the pylorus.  The duodenum was   unremarkable.  The scope was withdrawn back into the stomach.  Gastric antrum   was unremarkable other than the anatomical distortion.  Mucosa appeared   completely normal and there were no obvious submucosal lesions.  There were   numerous gastric polyps that were up to 1 cm in the stomach.  Multiple were   biopsied with cold forceps.  On retroflexion, the gastric cardia and fundus   were unremarkable other than further polyps as well as a 3-cm hiatal hernia.    The stomach was decompressed.  The scope was withdrawn back into the   esophagus.  GE junction showed signs of Higginbotham's esophagus, C0M2 for Hardy   classification.  These areas were biopsied with cold forceps in one jar and   sent for pathology.  The scope was  withdrawn as the bowel was decompressed.    There were no immediate postop complications.     RECOMMENDATIONS:  1.  Follow up on biopsy results.  2.  Repeat EGD in 3 years; however, we will base of it ultimately upon the   biopsy results.  3.  Follow up in the office annually.  4.  Continue with PPI.  5.  Could consider upper GI series to evaluate further and rule out presence   of a paraesophageal hiatal hernia because of the slight abnormality of the   anatomy.  The patient certainly does have a sliding hiatal hernia in addition.  6.  Call with any questions or concerns.  Anticipate the patient will be   discharged directly home from the hospital today as an outpatient and will be   able to advance diet as tolerated.        ____________________________________     Bin Verduzco DO  8. Prescriptions: ***    COLONOSCOPY OR FLEXIBLE SIGMOIDOSCOPY  1. If you received a barium enema, take a mild laxative such as dulcolax, Aixa's M.O., or Milk of Magnesia to clean out the barium.  2. Drink plenty of fluids. Eat a diet high in fiber (whole-grain breads, fresh fruit and vegetables).  3. You may notice a few drops of blood with your first bowel movement. If you develop any large amount of bleeding, black stools, a fever, or abdominal pain, call your doctor right away.  4. Call your doctor for test results in {SRG NUMBER OF DAYS:0893108} {SRG DAY WEEK MONTH:4165720}.  5. Don't drive or drink alcohol for 24 hours. The medication you received will make you too drowsy.  6. No heavy lifting, no Aspirin products or Aspirin for 5 days ***  7. Additional instructions: ***  8. Prescriptions: ***    You should call 911 if you develop problems with breathing or chest pain.  If any questions arise, call your doctor. If your doctor is not available, please feel free to call {Endoscopy Dept Numbers:[id number]}. You can also call the [facility] HOTLINE open 24 hours/day, 7 days/week and speak to a nurse at [phone], or toll free [phone].    Depression / Suicide Risk    As you are discharged from this [facility] facility, it is important to learn how to keep safe from harming yourself.    Recognize the warning signs:  · Abrupt changes in personality, positive or negative- including increase in energy   · Giving away possessions  · Change in eating patterns- significant weight changes-  positive or negative  · Change in sleeping patterns- unable to sleep or sleeping all the time   · Unwillingness or inability to communicate  · Depression  · Unusual sadness, discouragement and loneliness  · Talk of wanting to die  · Neglect of personal appearance   · Rebelliousness- reckless behavior  · Withdrawal from people/activities they love  · Confusion- inability to concentrate     If you or a loved one observes any of these behaviors or has concerns about self-harm, here's what you can do:  · Talk about it- your feelings and reasons for harming yourself  · Remove any means that you might use to hurt yourself (examples: pills, rope, extension cords, firearm)  · Get professional help from the community (Mental Health, Substance Abuse, psychological counseling)  · Do not be alone:Call your Safe Contact- someone whom you trust who will be there for you.  · Call your local CRISIS HOTLINE 328-6942 or 592-107-2462  · Call your local Children's Mobile Crisis Response Team [place] (545) 149-6458 or www.DecisionDesk  · Call the toll free National Suicide Prevention Hotlines   · National Suicide Prevention Lifeline 879-575-FRAK (9845)  · National Hope Line Network 800-SUICIDE (701-1307)    I acknowledge receipt and understanding of these Home Care Instructions.

## 2020-02-25 NOTE — ANESTHESIA PREPROCEDURE EVALUATION
Relevant Problems   CARDIAC   (+) Chronic systolic congestive heart failure (HCC)   (+) Coronary artery disease due to calcified coronary lesion   (+) Essential hypertension   (+) S/P CABG x 3      GI   (+) Gastroesophageal reflux disease without esophagitis         (+) Bilateral renal cysts   (+) Microalbuminuria due to type 2 diabetes mellitus (HCC)      ENDO   (+) Diabetes mellitus type 2, controlled, with complications (HCC)       Physical Exam    Airway   Mallampati: II  TM distance: >3 FB  Neck ROM: full       Cardiovascular - normal exam  Rhythm: regular  Rate: normal  (-) murmur     Dental - normal exam         Pulmonary - normal exam  Breath sounds clear to auscultation     Abdominal    Neurological - normal exam                 Anesthesia Plan    ASA 3       Plan - general       Airway plan will be ETT        Induction: intravenous    Postoperative Plan: Postoperative administration of opioids is intended.    Pertinent diagnostic labs and testing reviewed    Informed Consent:    Anesthetic plan and risks discussed with patient.    Use of blood products discussed with: patient whom consented to blood products.

## 2020-02-25 NOTE — OR NURSING
0905: Report received from OLIMPIA Slaughter.   0911:Pt arrived to phase II AOx4, no signs of resp distress. Denies pain or nausea. Abdomen soft, non-tender. Pt dressing at bedside with CNA present. Steady gait.   0918: Pt ambulated to restroom and successfully voided.  Pt's daughter in law Aixa called and updated that pt is ready for d/c. She is currently parking at the hospital now.   0921: Aixa at bedside  0923: Pt and Aixa given d/c instructions regarding post anesthesia, post procedure, signs of infection, and signs of emergency. D/c instruction packet includes Dr. Verduzco's recommendations and phone # if any questions arise. Pt and Aixa verbalized acknowledgment and asked relevant questions.    0930: Pt continues to deny pain or nausea. IV discontinued without complication.   0935: Pt wheeled to Aixa's car.

## 2020-02-25 NOTE — ANESTHESIA QCDR
2019 [facility] Clinical Data Registry (for Quality Improvement)     Postoperative nausea/vomiting risk protocol (Adult = 18 yrs and Pediatric 3-17 yrs)- (430 and 463)  General inhalation anesthetic (NOT TIVA) with PONV risk factors: No  Provision of anti-emetic therapy with at least 2 different classes of agents: N/A  Patient DID NOT receive anti-emetic therapy and reason is documented in Medical Record: N/A    Multimodal Pain Management- (477)  Non-emergent surgery AND patient age >= 18: Yes  Use of Multimodal Pain Management, two or more drugs and/or interventions, NOT including systemic opioids: No  Exception: Documented allergy to multiple classes of analgesics:        Smoking Abstinence (404)  Patient is current smoker (cigarette, pipe, e-cig, marijuanna): No  Elective Surgery:   Abstinence instructions provided prior to day of surgery:   Patient abstained from smoking on day of surgery:     Pre-Op Beta-Blocker in Isolated CABG (44)  Isolated CABG AND patient age >= 18: No  Beta-blocker admin within 24 hours of surgical incision:   Exception:of medical reason(s) for not administering beta blocker within 24 hours prior to surgical incision (e.g., not  indicated,other medical reason):     PACU assessment of acute postoperative pain prior to Anesthesia Care End- Applies to Patients Age = 18- (ABG7)  Initial PACU pain score is which of the following: < 7/10  Patient unable to report pain score: N/A    Post-anesthetic transfer of care checklist/protocol to PACU/ICU- (426 and 427)  Upon conclusion of case, patient transferred to which of the following locations: PACU/Non-ICU  Use of transfer checklist/protocol: Yes  Exclusion: Service Performed in Patient Hospital Room (and thus did not require transfer): N/A  Unplanned admission to ICU related to anesthesia service up through end of PACU care- (MD51)  Unplanned admission to ICU (not initially anticipated at anesthesia start time): No

## 2020-02-25 NOTE — ANESTHESIA POSTPROCEDURE EVALUATION
Patient: Luis Renner    Procedure Summary     Date:  02/25/20 Room / Location:   ENDOSCOPIC ULTRASOUND ROOM / SURGERY [facility]    Anesthesia Start:  0815 Anesthesia Stop:  0835    Procedure:  GASTROSCOPY - POSS BIOPSY, DILATION, POLYPECTOMY, CONTROL OF HEMORRHAGE Diagnosis:       Lowe's esophagus      Gastric polyp      (LOWE'S )    Surgeon:  Bin Verduzco D.O. Responsible Provider:  Luis Bain M.D.    Anesthesia Type:  general ASA Status:  3          Final Anesthesia Type: general  Last vitals  BP   Blood Pressure : 100/64    Temp   36.8 °C (98.2 °F)    Pulse   Pulse: 70   Resp   16    SpO2   95 %      Anesthesia Post Evaluation    Patient location during evaluation: PACU  Patient participation: complete - patient participated  Level of consciousness: awake and alert  Pain score: 1    Airway patency: patent  Anesthetic complications: no  Cardiovascular status: hemodynamically stable  Respiratory status: acceptable  Hydration status: euvolemic    PONV: none           Nurse Pain Score: 0 (NPRS)

## 2020-02-25 NOTE — ANESTHESIA TIME REPORT
Anesthesia Start and Stop Event Times     Date Time Event    2/25/2020 0805 Ready for Procedure     0815 Anesthesia Start     0835 Anesthesia Stop        Responsible Staff  02/25/20    Name Role Begin End    Luis Bain M.D. Anesth 0815 0835        Preop Diagnosis (Free Text):  Pre-op Diagnosis     LOWE'S GERD        Preop Diagnosis (Codes):  Diagnosis Information     Diagnosis Code(s): Lowe's esophagus [K22.70]     Gastric polyp [K31.7]        Post op Diagnosis  Lowe's esophagus      Premium Reason  Non-Premium    Comments:

## 2020-03-02 DIAGNOSIS — E11.8 CONTROLLED TYPE 2 DIABETES MELLITUS WITH COMPLICATION, WITHOUT LONG-TERM CURRENT USE OF INSULIN (HCC): ICD-10-CM

## 2020-03-04 DIAGNOSIS — I10 ESSENTIAL HYPERTENSION: Chronic | ICD-10-CM

## 2020-03-04 DIAGNOSIS — I25.84 CORONARY ARTERY DISEASE DUE TO CALCIFIED CORONARY LESION: ICD-10-CM

## 2020-03-04 DIAGNOSIS — I25.10 CORONARY ARTERY DISEASE DUE TO CALCIFIED CORONARY LESION: ICD-10-CM

## 2020-03-04 RX ORDER — CLOPIDOGREL BISULFATE 75 MG/1
TABLET ORAL
Qty: 90 TAB | Refills: 3 | Status: SHIPPED | OUTPATIENT
Start: 2020-03-04 | End: 2021-02-04 | Stop reason: SDUPTHER

## 2020-03-04 RX ORDER — SPIRONOLACTONE 25 MG/1
TABLET ORAL
Qty: 90 TAB | Refills: 3 | Status: SHIPPED | OUTPATIENT
Start: 2020-03-04 | End: 2021-02-04 | Stop reason: SDUPTHER

## 2020-04-03 ENCOUNTER — NON-PROVIDER VISIT (OUTPATIENT)
Dept: CARDIOLOGY | Facility: MEDICAL CENTER | Age: 70
End: 2020-04-03
Payer: MEDICARE

## 2020-04-03 DIAGNOSIS — Z76.0 MEDICATION REFILL: ICD-10-CM

## 2020-04-03 DIAGNOSIS — Z95.810 AICD (AUTOMATIC CARDIOVERTER/DEFIBRILLATOR) PRESENT: ICD-10-CM

## 2020-04-03 DIAGNOSIS — I25.5 ISCHEMIC CARDIOMYOPATHY: ICD-10-CM

## 2020-04-03 DIAGNOSIS — E11.8 CONTROLLED TYPE 2 DIABETES MELLITUS WITH COMPLICATION, WITHOUT LONG-TERM CURRENT USE OF INSULIN (HCC): ICD-10-CM

## 2020-04-03 PROCEDURE — 93295 DEV INTERROG REMOTE 1/2/MLT: CPT | Performed by: INTERNAL MEDICINE

## 2020-04-03 RX ORDER — CITALOPRAM 20 MG/1
TABLET ORAL
Qty: 90 TAB | Refills: 0 | Status: SHIPPED | OUTPATIENT
Start: 2020-04-03 | End: 2020-07-02

## 2020-04-03 RX ORDER — DULAGLUTIDE 0.75 MG/.5ML
INJECTION, SOLUTION SUBCUTANEOUS
Qty: 6 ML | Refills: 0 | Status: SHIPPED | OUTPATIENT
Start: 2020-04-03 | End: 2020-07-02

## 2020-04-08 NOTE — TELEPHONE ENCOUNTER
Erroneous.    [place] sends email:    Ms. Moyer    I made a scheduled appointment on 11/14 when I got there - no doctor, no pa Etc - a nurse want to give me a hepatitis b injection which I have never discussed or requested with anyone. They told me to reschedule it would be January 29!  On 4 diabetes meds and a1c has risen - I did the doctor requested blood tests - anything I can do to get in earlier - the nurse responds to my chart request but I never get clear answers Thanks    I left a voice mail recommending [place] send [facility]SBG results for immediate adjustments of medications.  I notified him the DCV schedule will be changing in the next week or two and may provide earlier openings

## 2020-04-29 PROBLEM — N18.30 CKD (CHRONIC KIDNEY DISEASE), STAGE III (HCC): Status: ACTIVE | Noted: 2020-04-29

## 2020-04-29 PROBLEM — E78.5 DYSLIPIDEMIA: Status: ACTIVE | Noted: 2020-04-29

## 2020-04-29 PROBLEM — I42.9 CARDIOMYOPATHY (HCC): Status: ACTIVE | Noted: 2020-04-29

## 2020-04-29 NOTE — PROGRESS NOTES
Telemedicine Visit: Established Patient     This Remote Face to Face encounter was conducted via Screwpulp. Given the importance of social distancing and other strategies recommended to reduce the risk of COVID-19 transmission, I am providing medical care to this patient via audio/video visit in place of an in person visit at the request of the patient. Verbal consent to telehealth, risks, benefits, and consequences were discussed. Patient retains the right to withdraw at any time. All existing confidentiality protections apply. The patient has access to all transmitted medical information. No dissemination of any patient images or information to other entities without further written consent.  Subjective:   CC: DM, labs    Luis Renner is a 69 y.o. male presenting for evaluation and management of:    DM2 with CKD stage III, microalbuminuria  Onset:  DM2 for 10 yrs.       Meds:   - Trulicity 0.75 mg weekly  - Jardiance, 25 mg QD  - Glipizide 5 mg BID  - Metformin 850 mg QD  Used meds as prescribed.    Compliance to meds: yes  Checking feet daily/wear soft socks/shoes: yes     The last A1c: 7.0     Checking blood sugar: yes, once daily  Mean BS: in 140'  Hypoglycemia:  one remote episode   Symptoms of hypoglycemia: sweating, fatigue, pale skin, hunger.     ASA: yes  ACE: yes  Statin: yes     Diet: regular  Exercise: at least 4 d/w  BMI: 27     DM complications:   Peripheral neuropathy:            No numbness or tingling in feet.  Retinopathy:                            No retinopathy. Last eye exam: 8/2019  Nephropathy:                          Microalbumin: pos  CVS:                                        Has CAD  GI:                                           No gastropathy.     FH of DM:  none     Hypertension, cardiomyopathy, CAD (St post CABG in 7/16), CHF  Meds: Lisinopril, 5 mg QD, spironolactone, 25 mg QD, carvedilol, 6.25 mg BID, entresto BID; taking as prescribed.    He is not measuring BP at  home.  Denies: - headaches, vision problems, tinnitus.  - chest pain/pressure, palpitations, irregular heart beats, exertional, dyspnea, peripheral edema.  Low salt diet: yes  Diet / exercise BMI: as above    FH: multiple  He has been followed up by cardiology.     Dyslipidemia  Meds: Crestor, 10 mg QD, Zetia 10 mg QD. No muscle weakness, cramps, nausea,abdominal discomfort.    Diet / exercise BMI: as above    FH: 2 brothers, father  He has been f/u by cardiology.     Echocardiography, 3/22/2019  Akinetic mid to distal anterospetal wall and apex.  Hypokinetic basal inferoposterior wall.  Left ventricle is moderately dilated.  Moderately reduced left ventricular systolic function.  Left ventricular ejection fraction is visually estimated to be 35%.  Structurally normal mitral valve without significant stenosis or   regurgitation.  Aortic sclerosis without stenosis.  Moderately dilated left atrium.  Structurally normal tricuspid valve without significant stenosis or   regurgitation.  Unable to estimate pulmonary artery pressure due to an inadequate   tricuspid regurgitant jet.  Normal right ventricular size and systolic function.  Normal inferior vena cava size and inspiratory collapse.  No pericardial effusion seen.  Compared to prior echo done 09/12/16,  there has been no significant   change.     Anemia  The patient had slightly lobar hemoglobin;   -He has intermittently abnormal CBC in the past.  He has he had recurrent surgical procedures that might be the cause.      Hypovitaminosis D  The patient had low vitamin D level.  Vitamin D supplement: Multivitamins.     ROS     - Constitutional:  Negative for fever, chills, and fatigue.    - HEENT:  Negative for headaches, vision / hearing changes, ear pain, ear discharge, rhinorrhea, sinus congestion, sore throat.      - Respiratory: Negative for cough, sputum production, chest congestion, dyspnea, wheezing.      - Cardiovascular: Negative for chest pain,  palpitations, orthopnea, LE edema.     - Gastrointestinal: Negative for heartburn, N/V, abdominal pain, diarrhea, constipation.     - Genitourinary: Negative for dysuria, frequency.    - Musculoskeletal: Negative for muscle, back or joint pain.     - Skin: Negative for rash, itching.     - Neurological: Negative for dizziness, numbness, weakness, tingling, headache.     - Endo/Heme/Allergies: Does not bruise/bleed easily.     - Psychiatric/Behavioral: Negative for depression.    Patient Active Problem List    Diagnosis Date Noted   • S/P CABG x 3 04/04/2017     Priority: Medium   • Diabetes mellitus type 2, controlled, with complications ([facility]) 09/23/2016     Priority: Medium   • Ischemic cardiomyopathy 07/23/2013     Priority: Medium   • Coronary artery disease due to calcified coronary lesion 03/08/2013     Priority: Medium   • Essential hypertension 03/07/2013     Priority: Medium   • Benign prostatic hyperplasia 10/18/2017     Priority: Low   • Idiopathic gout 07/27/2017     Priority: Low   • Actinic keratosis 07/27/2017     Priority: Low   • TEMO (generalized anxiety disorder) 04/04/2017     Priority: Low   • Gastroesophageal reflux disease without esophagitis 01/04/2017     Priority: Low   • Bilateral renal cysts 01/14/2015     Priority: Low   • Chronic systolic congestive heart failure ([facility])    • Microalbuminuria due to type 2 diabetes mellitus ([facility]) 12/13/2018      Allergies:Patient has no known allergies.  Current Outpatient Medications   Medication Sig Dispense Refill   • citalopram (CELEXA) 20 MG Tab TAKE ONE TABLET BY MOUTH EVERY DAY 90 Tab 0   • TRULICITY 0.75 MG/0.5ML Solution Pen-injector Inject 0.5 mL as instructed every 7 days. 6 mL 0   • clopidogrel (PLAVIX) 75 MG Tab Take 1 tablet by mouth daily. 90 Tab 3   • spironolactone (ALDACTONE) 25 MG Tab Take 1 tablet by mouth daily. 90 Tab 3   • Iron-Folic Acid-Vit B12 (IRON FORMULA PO) Take  by mouth.     • FOLIC ACID PO Take  by mouth.     • BETA CAROTENE  PO Take  by mouth.     • Multiple Vitamin (MULTI-VITAMINS PO) Take  by mouth.     • ezetimibe (ZETIA) 10 MG Tab Take 1 Tab by mouth every day. 90 Tab 0   • carvedilol (COREG) 6.25 MG Tab Take 1 Tab by mouth 2 times a day, with meals. 180 Tab 1   • rosuvastatin (CRESTOR) 20 MG Tab Take 1 Tab by mouth every evening. 90 Tab 1   • sacubitril-valsartan (ENTRESTO) 24-26 MG Tab tablet Take 1 Tab by mouth 2 Times a Day. Indications: Heart Failure 180 Tab 1   • allopurinol (ZYLOPRIM) 300 MG Tab TAKE ONE TABLET BY MOUTH EVERY DAY 90 Tab 3   • tamsulosin (FLOMAX) 0.4 MG capsule Take 1 Cap by mouth every evening. 90 Cap 3   • acetaminophen (TYLENOL) 500 MG Tab Take 1,500 mg by mouth 2 Times a Day.     • WELCHOL 625 MG Tab Take 625 mg by mouth every day.     • BD PEN NEEDLE DENIS U/F USE DAILY WITH VICTOZA (100 DAY SUPPLY) 100 Each 11   • GABAPENTIN PO Take 300 mg by mouth as needed.     • omeprazole (PRILOSEC) 20 MG delayed-release capsule Take 1 Cap by mouth 1 time daily as needed. TAKE 1 CAPSULE BY MOUTH ONCE DAILY     • Lancets 1 Each by Does not apply route 3 times a day. Lancets for Freestyle Lite meter. Sig: use TID and prn ssx high or low sugar. 100 Each 11   • JARDIANCE 25 MG Tab Take  by mouth every day.     • glipiZIDE (GLUCOTROL) 5 MG Tab Take 2.5 mg by mouth 2 times a day.     • metFORMIN (GLUCOPHAGE) 850 MG Tab 2 times a day.       No current facility-administered medications for this visit.      Social History     Tobacco Use   • Smoking status: Never Smoker   • Smokeless tobacco: Never Used   Substance Use Topics   • Alcohol use: Yes     Alcohol/week: 0.6 oz     Types: 1 Glasses of wine per week     Comment: occ   • Drug use: Yes     Comment: CBD     Social History     Social History Narrative   • Not on file     Family History   Problem Relation Age of Onset   • Cancer Mother    • Hypertension Father    • Hyperlipidemia Brother         x 2   • Hypertension Brother         x2   • Heart Disease Brother         x2,  "afib; cad x 1   • Cancer Paternal Uncle    • Diabetes Neg Hx       Objective:   Vitals obtained by patient:  Height 1.803 m (5' 11\")   Weight 89.8 kg (198 lb)   Body Mass Index 27.62 kg/m²   Physical Exam:  Constitutional: Alert, no distress, well-groomed.  Skin: No rash in visible areas.  Eye: Round. Conjunctiva clear, lids normal.  ENMT: Lips pink without lesions, good dentition. Phonation normal.  Neck: No visible masses or thyromegaly. Moves freely without pain.  CV: Pulse as reported by patient  Respiratory: Unlabored respiratory effort, no cough or audible wheeze  Psych: Alert and oriented x3, normal affect and mood.     Labs:     Reviewed and discussed:    Lab Results   Component Value Date/Time    CHOLSTRLTOT 88 (L) 01/21/2020 09:32 AM    LDL 14 01/21/2020 09:32 AM    HDL 35 (A) 01/21/2020 09:32 AM    TRIGLYCERIDE 197 (H) 01/21/2020 09:32 AM       Lab Results   Component Value Date/Time    SODIUM 141 02/25/2020 07:25 AM    POTASSIUM 4.1 02/25/2020 07:25 AM    CHLORIDE 105 02/25/2020 07:25 AM    CO2 25 02/25/2020 07:25 AM    GLUCOSE 145 (H) 02/25/2020 07:25 AM    BUN 20 02/25/2020 07:25 AM    CREATININE 0.98 02/25/2020 07:25 AM     Lab Results   Component Value Date/Time    ALKPHOSPHAT 58 01/21/2020 09:32 AM    ASTSGOT 15 01/21/2020 09:32 AM    ALTSGPT 17 01/21/2020 09:32 AM    TBILIRUBIN 0.6 01/21/2020 09:32 AM      Lab Results   Component Value Date/Time    HBA1C 7.0 (H) 01/21/2020 09:32 AM    HBA1C 6.4 (H) 07/15/2019 10:27 AM    HBA1C 7.9 (H) 11/13/2018 08:45 AM     No results found for: TSH  Lab Results   Component Value Date/Time    FREET4 0.82 03/08/2013 01:00 AM     Lab Results   Component Value Date/Time    WBC 7.5 02/25/2020 07:25 AM    RBC 5.81 02/25/2020 07:25 AM    HEMOGLOBIN 13.8 (L) 02/25/2020 07:25 AM    HEMATOCRIT 45.1 02/25/2020 07:25 AM    MCV 77.6 (L) 02/25/2020 07:25 AM    MCH 23.8 (L) 02/25/2020 07:25 AM    MCHC 30.6 (L) 02/25/2020 07:25 AM    MPV 10.4 02/25/2020 07:25 AM    NEUTSPOLYS " 68.50 01/21/2020 09:32 AM    LYMPHOCYTES 18.30 (L) 01/21/2020 09:32 AM    MONOCYTES 9.10 01/21/2020 09:32 AM    EOSINOPHILS 3.10 01/21/2020 09:32 AM    BASOPHILS 0.60 01/21/2020 09:32 AM    HYPOCHROMIA 1+ 09/16/2013 01:13 PM    ANISOCYTOSIS 1+ 07/05/2019 03:18 PM      Imaging:     Per HPI    Assessment and Plan:   The following treatment plan was discussed:     1. Controlled type 2 diabetes mellitus with complication, without long-term current use of insulin (HCC)  Pending labs, continue current treatment.    2. CKD (chronic kidney disease), stage III ([facility])  Pending labs, already advised good fluid intake, avoid NSAIDs.    3. Microalbuminuria due to type 2 diabetes mellitus ([facility])  Improved.    4. Essential hypertension  Controlled, continue with current treatment.    5. Cardiomyopathy, unspecified type ([facility])  6. Coronary artery disease due to calcified coronary lesion  7. S/P CABG x 3  8. Chronic systolic congestive heart failure (HCC)  Controlled/asymptomatic cardiovascular conditions, continue current treatment and cardiology follow-up    9. Dyslipidemia  Pending labs, continue current treatment    10. Anemia, unspecified type  Pending labs    11. Hypovitaminosis D  Pending labs, continue current supplement    Follow-up: per lab results

## 2020-04-30 ENCOUNTER — TELEMEDICINE (OUTPATIENT)
Dept: MEDICAL GROUP | Age: 70
End: 2020-04-30
Payer: MEDICARE

## 2020-04-30 VITALS — BODY MASS INDEX: 27.72 KG/M2 | HEIGHT: 71 IN | WEIGHT: 198 LBS

## 2020-04-30 DIAGNOSIS — D64.9 ANEMIA, UNSPECIFIED TYPE: ICD-10-CM

## 2020-04-30 DIAGNOSIS — N18.30 CKD (CHRONIC KIDNEY DISEASE), STAGE III: ICD-10-CM

## 2020-04-30 DIAGNOSIS — I50.22 CHRONIC SYSTOLIC CONGESTIVE HEART FAILURE (HCC): Chronic | ICD-10-CM

## 2020-04-30 DIAGNOSIS — Z95.1 S/P CABG X 3: ICD-10-CM

## 2020-04-30 DIAGNOSIS — I42.9 CARDIOMYOPATHY, UNSPECIFIED TYPE (HCC): ICD-10-CM

## 2020-04-30 DIAGNOSIS — R80.9 MICROALBUMINURIA DUE TO TYPE 2 DIABETES MELLITUS (HCC): ICD-10-CM

## 2020-04-30 DIAGNOSIS — I10 ESSENTIAL HYPERTENSION: Chronic | ICD-10-CM

## 2020-04-30 DIAGNOSIS — E11.29 MICROALBUMINURIA DUE TO TYPE 2 DIABETES MELLITUS (HCC): ICD-10-CM

## 2020-04-30 DIAGNOSIS — I25.10 CORONARY ARTERY DISEASE DUE TO CALCIFIED CORONARY LESION: ICD-10-CM

## 2020-04-30 DIAGNOSIS — E11.8 CONTROLLED TYPE 2 DIABETES MELLITUS WITH COMPLICATION, WITHOUT LONG-TERM CURRENT USE OF INSULIN (HCC): ICD-10-CM

## 2020-04-30 DIAGNOSIS — E55.9 HYPOVITAMINOSIS D: ICD-10-CM

## 2020-04-30 DIAGNOSIS — E78.5 DYSLIPIDEMIA: ICD-10-CM

## 2020-04-30 DIAGNOSIS — I25.84 CORONARY ARTERY DISEASE DUE TO CALCIFIED CORONARY LESION: ICD-10-CM

## 2020-04-30 PROCEDURE — 99214 OFFICE O/P EST MOD 30 MIN: CPT | Mod: 95,CR | Performed by: INTERNAL MEDICINE

## 2020-04-30 ASSESSMENT — FIBROSIS 4 INDEX: FIB4 SCORE: 1.38

## 2020-05-03 DIAGNOSIS — E78.5 DYSLIPIDEMIA: ICD-10-CM

## 2020-05-04 RX ORDER — EZETIMIBE 10 MG/1
10 TABLET ORAL
Qty: 90 TAB | Refills: 0 | Status: SHIPPED | OUTPATIENT
Start: 2020-05-04 | End: 2020-07-29

## 2020-05-18 ENCOUNTER — HOSPITAL ENCOUNTER (OUTPATIENT)
Dept: LAB | Facility: MEDICAL CENTER | Age: 70
End: 2020-05-18
Attending: INTERNAL MEDICINE
Payer: MEDICARE

## 2020-05-18 DIAGNOSIS — D64.9 ANEMIA, UNSPECIFIED TYPE: ICD-10-CM

## 2020-05-18 DIAGNOSIS — I10 ESSENTIAL HYPERTENSION: Chronic | ICD-10-CM

## 2020-05-18 DIAGNOSIS — R80.9 MICROALBUMINURIA DUE TO TYPE 2 DIABETES MELLITUS (HCC): ICD-10-CM

## 2020-05-18 DIAGNOSIS — E78.5 DYSLIPIDEMIA: ICD-10-CM

## 2020-05-18 DIAGNOSIS — N18.30 CKD (CHRONIC KIDNEY DISEASE), STAGE III: ICD-10-CM

## 2020-05-18 DIAGNOSIS — E11.8 CONTROLLED TYPE 2 DIABETES MELLITUS WITH COMPLICATION, WITHOUT LONG-TERM CURRENT USE OF INSULIN (HCC): ICD-10-CM

## 2020-05-18 DIAGNOSIS — E11.29 MICROALBUMINURIA DUE TO TYPE 2 DIABETES MELLITUS (HCC): ICD-10-CM

## 2020-05-18 DIAGNOSIS — E55.9 HYPOVITAMINOSIS D: ICD-10-CM

## 2020-05-18 LAB
25(OH)D3 SERPL-MCNC: 42 NG/ML (ref 30–100)
ALBUMIN SERPL BCP-MCNC: 4.7 G/DL (ref 3.2–4.9)
ALBUMIN/GLOB SERPL: 2 G/DL
ALP SERPL-CCNC: 62 U/L (ref 30–99)
ALT SERPL-CCNC: 36 U/L (ref 2–50)
ANION GAP SERPL CALC-SCNC: 11 MMOL/L (ref 7–16)
AST SERPL-CCNC: 19 U/L (ref 12–45)
BASOPHILS # BLD AUTO: 0.6 % (ref 0–1.8)
BASOPHILS # BLD: 0.05 K/UL (ref 0–0.12)
BILIRUB SERPL-MCNC: 0.8 MG/DL (ref 0.1–1.5)
BUN SERPL-MCNC: 20 MG/DL (ref 8–22)
CALCIUM SERPL-MCNC: 10 MG/DL (ref 8.5–10.5)
CHLORIDE SERPL-SCNC: 100 MMOL/L (ref 96–112)
CHOLEST SERPL-MCNC: 117 MG/DL (ref 100–199)
CO2 SERPL-SCNC: 26 MMOL/L (ref 20–33)
CREAT SERPL-MCNC: 1.12 MG/DL (ref 0.5–1.4)
EOSINOPHIL # BLD AUTO: 0.11 K/UL (ref 0–0.51)
EOSINOPHIL NFR BLD: 1.2 % (ref 0–6.9)
ERYTHROCYTE [DISTWIDTH] IN BLOOD BY AUTOMATED COUNT: 49.4 FL (ref 35.9–50)
EST. AVERAGE GLUCOSE BLD GHB EST-MCNC: 169 MG/DL
GLOBULIN SER CALC-MCNC: 2.4 G/DL (ref 1.9–3.5)
GLUCOSE SERPL-MCNC: 126 MG/DL (ref 65–99)
HBA1C MFR BLD: 7.5 % (ref 0–5.6)
HCT VFR BLD AUTO: 50 % (ref 42–52)
HDLC SERPL-MCNC: 41 MG/DL
HGB BLD-MCNC: 15.5 G/DL (ref 14–18)
IMM GRANULOCYTES # BLD AUTO: 0.04 K/UL (ref 0–0.11)
IMM GRANULOCYTES NFR BLD AUTO: 0.4 % (ref 0–0.9)
LDLC SERPL CALC-MCNC: 47 MG/DL
LYMPHOCYTES # BLD AUTO: 1.9 K/UL (ref 1–4.8)
LYMPHOCYTES NFR BLD: 21 % (ref 22–41)
MCH RBC QN AUTO: 25 PG (ref 27–33)
MCHC RBC AUTO-ENTMCNC: 31 G/DL (ref 33.7–35.3)
MCV RBC AUTO: 80.5 FL (ref 81.4–97.8)
MONOCYTES # BLD AUTO: 0.59 K/UL (ref 0–0.85)
MONOCYTES NFR BLD AUTO: 6.5 % (ref 0–13.4)
NEUTROPHILS # BLD AUTO: 6.34 K/UL (ref 1.82–7.42)
NEUTROPHILS NFR BLD: 70.3 % (ref 44–72)
NRBC # BLD AUTO: 0 K/UL
NRBC BLD-RTO: 0 /100 WBC
PLATELET # BLD AUTO: 165 K/UL (ref 164–446)
PMV BLD AUTO: 11.1 FL (ref 9–12.9)
POTASSIUM SERPL-SCNC: 4.8 MMOL/L (ref 3.6–5.5)
PROT SERPL-MCNC: 7.1 G/DL (ref 6–8.2)
RBC # BLD AUTO: 6.21 M/UL (ref 4.7–6.1)
SODIUM SERPL-SCNC: 137 MMOL/L (ref 135–145)
TRIGL SERPL-MCNC: 146 MG/DL (ref 0–149)
WBC # BLD AUTO: 9 K/UL (ref 4.8–10.8)

## 2020-05-18 PROCEDURE — 82306 VITAMIN D 25 HYDROXY: CPT

## 2020-05-18 PROCEDURE — 80061 LIPID PANEL: CPT

## 2020-05-18 PROCEDURE — 80053 COMPREHEN METABOLIC PANEL: CPT

## 2020-05-18 PROCEDURE — 85025 COMPLETE CBC W/AUTO DIFF WBC: CPT

## 2020-05-18 PROCEDURE — 83036 HEMOGLOBIN GLYCOSYLATED A1C: CPT | Mod: GA

## 2020-05-18 PROCEDURE — 36415 COLL VENOUS BLD VENIPUNCTURE: CPT

## 2020-05-20 ENCOUNTER — TELEPHONE (OUTPATIENT)
Dept: CARDIOLOGY | Facility: MEDICAL CENTER | Age: 70
End: 2020-05-20

## 2020-05-20 NOTE — TELEPHONE ENCOUNTER
Associated Results   Result Notes for HEMOGLOBIN A1C  Result Notes for CBC WITH DIFFERENTIAL   Notes recorded by Shun Benoit M.D. on 5/19/2020 at 10:29 AM PDT     Labs look good except A1C has trended up, please let him know     Thank you      Letter printed with MD recommendations and mailed to pt mailing address.

## 2020-05-21 ENCOUNTER — HOSPITAL ENCOUNTER (OUTPATIENT)
Facility: MEDICAL CENTER | Age: 70
End: 2020-05-21
Attending: INTERNAL MEDICINE
Payer: MEDICARE

## 2020-05-21 PROCEDURE — 82274 ASSAY TEST FOR BLOOD FECAL: CPT

## 2020-05-27 DIAGNOSIS — D64.9 ANEMIA, UNSPECIFIED TYPE: ICD-10-CM

## 2020-05-28 LAB — HEMOCCULT STL QL IA: NEGATIVE

## 2020-06-01 ENCOUNTER — PATIENT OUTREACH (OUTPATIENT)
Dept: HEALTH INFORMATION MANAGEMENT | Facility: OTHER | Age: 70
End: 2020-06-01

## 2020-06-01 SDOH — ECONOMIC STABILITY: FOOD INSECURITY: WITHIN THE PAST 12 MONTHS, YOU WORRIED THAT YOUR FOOD WOULD RUN OUT BEFORE YOU GOT MONEY TO BUY MORE.: NEVER TRUE

## 2020-06-01 SDOH — ECONOMIC STABILITY: TRANSPORTATION INSECURITY
IN THE PAST 12 MONTHS, HAS LACK OF TRANSPORTATION KEPT YOU FROM MEETINGS, WORK, OR FROM GETTING THINGS NEEDED FOR DAILY LIVING?: NO

## 2020-06-01 SDOH — ECONOMIC STABILITY: FOOD INSECURITY: WITHIN THE PAST 12 MONTHS, THE FOOD YOU BOUGHT JUST DIDN'T LAST AND YOU DIDN'T HAVE MONEY TO GET MORE.: NEVER TRUE

## 2020-06-01 SDOH — ECONOMIC STABILITY: INCOME INSECURITY: HOW HARD IS IT FOR YOU TO PAY FOR THE VERY BASICS LIKE FOOD, HOUSING, MEDICAL CARE, AND HEATING?: NOT HARD AT ALL

## 2020-06-01 SDOH — ECONOMIC STABILITY: TRANSPORTATION INSECURITY
IN THE PAST 12 MONTHS, HAS THE LACK OF TRANSPORTATION KEPT YOU FROM MEDICAL APPOINTMENTS OR FROM GETTING MEDICATIONS?: NO

## 2020-06-01 NOTE — PROGRESS NOTES
Called patient to schedule for AWV.    Outcome:   Left Message Please transfer to Patient Outreach Team at [phone] when patient returns call.     Attempt # 1    -aep

## 2020-06-01 NOTE — PROGRESS NOTES
1. Attempt #:1    2. [facility] Verified: no    3. Verify PCP: yes    4. Review Care Team: yes    5. WebIZ Checked & Epic Updated: No WebIZ record  · Is patient due for Tdap? NO  · Is patient due for Shingles? YES. Patient was not notified of copay/out of pocket cost.    6. Reviewed/Updated the following with patient:       •   Communication Preference Obtained? YES       •   Preferred Pharmacy? YES       •   Preferred Lab? YES       •   Family History (document living status of immediate family members and if + hx of cancer, diabetes, hypertension, hyperlipidemia, heart attack, stroke) YES    7. Annual Wellness Visit Scheduling  · Scheduling Status:Scheduled     8. Care Gap Scheduling (Attempt to Schedule EACH Overdue Care Gap!)     Health Maintenance Due   Topic Date Due   • HEPATITIS C SCREENING  1950   • IMM ZOSTER VACCINES (1 of 2) 05/13/2001   • IMM HEP B VACCINE (3 of 3 - Risk 3-dose series) 05/30/2020      - Sent message to MA pool to provide update to patient on Zoster Vaccine.    Scheduled patient for Annual Wellness Visit     9. Nveloped Activation: already active    10. Nveloped Jonathan: yes    11. Virtual Visits: yes    12. Opt In to Text Messages: yes    13. Patient was advised: “This is a free wellness visit. The provider will screen for medical conditions to help you stay healthy. If you have other concerns to address you may be asked to discuss these at a separate visit or there may be an additional fee.”     14. Patient was informed to arrive 15 min prior to their scheduled appointment and bring in their medication bottles.

## 2020-06-02 DIAGNOSIS — E78.5 HYPERLIPIDEMIA, UNSPECIFIED HYPERLIPIDEMIA TYPE: ICD-10-CM

## 2020-06-02 DIAGNOSIS — I50.22 CHRONIC SYSTOLIC CONGESTIVE HEART FAILURE (HCC): Chronic | ICD-10-CM

## 2020-06-02 RX ORDER — SACUBITRIL AND VALSARTAN 24; 26 MG/1; MG/1
TABLET, FILM COATED ORAL
Qty: 180 TAB | Refills: 2 | Status: SHIPPED | OUTPATIENT
Start: 2020-06-02 | End: 2021-02-24

## 2020-06-02 RX ORDER — ROSUVASTATIN CALCIUM 20 MG/1
20 TABLET, COATED ORAL EVERY EVENING
Qty: 90 TAB | Refills: 2 | Status: SHIPPED | OUTPATIENT
Start: 2020-06-02 | End: 2020-12-02 | Stop reason: SDUPTHER

## 2020-06-18 ENCOUNTER — APPOINTMENT (RX ONLY)
Dept: URBAN - METROPOLITAN AREA CLINIC 22 | Facility: CLINIC | Age: 70
Setting detail: DERMATOLOGY
End: 2020-06-18

## 2020-06-18 DIAGNOSIS — D22 MELANOCYTIC NEVI: ICD-10-CM

## 2020-06-18 DIAGNOSIS — D18.0 HEMANGIOMA: ICD-10-CM

## 2020-06-18 DIAGNOSIS — Z71.89 OTHER SPECIFIED COUNSELING: ICD-10-CM

## 2020-06-18 DIAGNOSIS — L82.1 OTHER SEBORRHEIC KERATOSIS: ICD-10-CM

## 2020-06-18 DIAGNOSIS — L259 CONTACT DERMATITIS AND OTHER ECZEMA, UNSPECIFIED CAUSE: ICD-10-CM

## 2020-06-18 DIAGNOSIS — L57.0 ACTINIC KERATOSIS: ICD-10-CM

## 2020-06-18 DIAGNOSIS — L81.4 OTHER MELANIN HYPERPIGMENTATION: ICD-10-CM

## 2020-06-18 PROBLEM — D18.01 HEMANGIOMA OF SKIN AND SUBCUTANEOUS TISSUE: Status: ACTIVE | Noted: 2020-06-18

## 2020-06-18 PROBLEM — D22.5 MELANOCYTIC NEVI OF TRUNK: Status: ACTIVE | Noted: 2020-06-18

## 2020-06-18 PROBLEM — L30.8 OTHER SPECIFIED DERMATITIS: Status: ACTIVE | Noted: 2020-06-18

## 2020-06-18 PROCEDURE — ? TREATMENT REGIMEN

## 2020-06-18 PROCEDURE — 17004 DESTROY PREMAL LESIONS 15/>: CPT

## 2020-06-18 PROCEDURE — ? COUNSELING

## 2020-06-18 PROCEDURE — ? MEDICATION COUNSELING

## 2020-06-18 PROCEDURE — ? LIQUID NITROGEN

## 2020-06-18 PROCEDURE — 99214 OFFICE O/P EST MOD 30 MIN: CPT | Mod: 25

## 2020-06-18 PROCEDURE — ? PRESCRIPTION

## 2020-06-18 RX ORDER — TRIAMCINOLONE ACETONIDE 1 MG/G
1 CREAM TOPICAL BID PRN
Qty: 1 | Refills: 1 | Status: ERX

## 2020-06-18 ASSESSMENT — LOCATION SIMPLE DESCRIPTION DERM
LOCATION SIMPLE: LEFT FOREARM
LOCATION SIMPLE: LEFT OCCIPITAL SCALP
LOCATION SIMPLE: LEFT HAND
LOCATION SIMPLE: LEFT FOREHEAD
LOCATION SIMPLE: LEFT CHEEK
LOCATION SIMPLE: LEFT EAR
LOCATION SIMPLE: LEFT UPPER BACK
LOCATION SIMPLE: RIGHT OCCIPITAL SCALP
LOCATION SIMPLE: RIGHT HAND
LOCATION SIMPLE: RIGHT PRETIBIAL REGION
LOCATION SIMPLE: LEFT PRETIBIAL REGION
LOCATION SIMPLE: RIGHT FOREARM
LOCATION SIMPLE: NOSE
LOCATION SIMPLE: RIGHT THUMB
LOCATION SIMPLE: RIGHT LOWER BACK
LOCATION SIMPLE: ABDOMEN
LOCATION SIMPLE: LEFT SCALP
LOCATION SIMPLE: SCALP

## 2020-06-18 ASSESSMENT — LOCATION DETAILED DESCRIPTION DERM
LOCATION DETAILED: LEFT CENTRAL MALAR CHEEK
LOCATION DETAILED: LEFT CENTRAL PARIETAL SCALP
LOCATION DETAILED: LEFT SUPERIOR FOREHEAD
LOCATION DETAILED: LEFT SUPERIOR HELIX
LOCATION DETAILED: LEFT PROXIMAL PRETIBIAL REGION
LOCATION DETAILED: RIGHT ULNAR DORSAL HAND
LOCATION DETAILED: LEFT SUPERIOR OCCIPITAL SCALP
LOCATION DETAILED: LEFT INFERIOR MEDIAL FOREHEAD
LOCATION DETAILED: LEFT DISTAL DORSAL FOREARM
LOCATION DETAILED: RIGHT DISTAL ULNAR DORSAL FOREARM
LOCATION DETAILED: LEFT FOREHEAD
LOCATION DETAILED: LEFT INFERIOR CENTRAL MALAR CHEEK
LOCATION DETAILED: RIGHT SUPERIOR MEDIAL MIDBACK
LOCATION DETAILED: LEFT MEDIAL FOREHEAD
LOCATION DETAILED: 2ND WEB SPACE LEFT HAND
LOCATION DETAILED: RIGHT DISTAL DORSAL FOREARM
LOCATION DETAILED: LEFT SUPERIOR MEDIAL MALAR CHEEK
LOCATION DETAILED: NASAL DORSUM
LOCATION DETAILED: LEFT CENTRAL FRONTAL SCALP
LOCATION DETAILED: 1ST WEB SPACE RIGHT HAND
LOCATION DETAILED: LEFT ULNAR DORSAL HAND
LOCATION DETAILED: RIGHT CENTRAL PARIETAL SCALP
LOCATION DETAILED: LEFT RADIAL DORSAL HAND
LOCATION DETAILED: EPIGASTRIC SKIN
LOCATION DETAILED: LEFT SUPERIOR MEDIAL UPPER BACK
LOCATION DETAILED: RIGHT RADIAL DORSAL HAND
LOCATION DETAILED: RIGHT PROXIMAL DORSAL FOREARM
LOCATION DETAILED: RIGHT SUPERIOR OCCIPITAL SCALP
LOCATION DETAILED: RIGHT PROXIMAL PRETIBIAL REGION

## 2020-06-18 ASSESSMENT — LOCATION ZONE DERM
LOCATION ZONE: NOSE
LOCATION ZONE: SCALP
LOCATION ZONE: LEG
LOCATION ZONE: TRUNK
LOCATION ZONE: FACE
LOCATION ZONE: EAR
LOCATION ZONE: FINGER
LOCATION ZONE: ARM
LOCATION ZONE: HAND

## 2020-06-18 NOTE — PROCEDURE: MEDICATION COUNSELING
Cardiology and nephrology MD at bedside
Use Enhanced Medication Counseling?: No
Erythromycin Counseling:  I discussed with the patient the risks of erythromycin including but not limited to GI upset, allergic reaction, drug rash, diarrhea, increase in liver enzymes, and yeast infections.
Otezla Counseling: The side effects of Otezla were discussed with the patient, including but not limited to worsening or new depression, weight loss, diarrhea, nausea, upper respiratory tract infection, and headache. Patient instructed to call the office should any adverse effect occur.  The patient verbalized understanding of the proper use and possible adverse effects of Otezla.  All the patient's questions and concerns were addressed.
Dapsone Counseling: I discussed with the patient the risks of dapsone including but not limited to hemolytic anemia, agranulocytosis, rashes, methemoglobinemia, kidney failure, peripheral neuropathy, headaches, GI upset, and liver toxicity.  Patients who start dapsone require monitoring including baseline LFTs and weekly CBCs for the first month, then every month thereafter.  The patient verbalized understanding of the proper use and possible adverse effects of dapsone.  All of the patient's questions and concerns were addressed.
Topical Clindamycin Counseling: Patient counseled that this medication may cause skin irritation or allergic reactions.  In the event of skin irritation, the patient was advised to reduce the amount of the drug applied or use it less frequently.   The patient verbalized understanding of the proper use and possible adverse effects of clindamycin.  All of the patient's questions and concerns were addressed.
Imiquimod Counseling:  I discussed with the patient the risks of imiquimod including but not limited to erythema, scaling, itching, weeping, crusting, and pain.  Patient understands that the inflammatory response to imiquimod is variable from person to person and was educated regarded proper titration schedule.  If flu-like symptoms develop, patient knows to discontinue the medication and contact us.
Infliximab Pregnancy And Lactation Text: This medication is Pregnancy Category B and is considered safe during pregnancy. It is unknown if this medication is excreted in breast milk.
High Dose Vitamin A Counseling: Side effects reviewed, pt to contact office should one occur.
Griseofulvin Pregnancy And Lactation Text: This medication is Pregnancy Category X and is known to cause serious birth defects. It is unknown if this medication is excreted in breast milk but breast feeding should be avoided.
Azathioprine Pregnancy And Lactation Text: This medication is Pregnancy Category D and isn't considered safe during pregnancy. It is unknown if this medication is excreted in breast milk.
Ivermectin Pregnancy And Lactation Text: This medication is Pregnancy Category C and it isn't known if it is safe during pregnancy. It is also excreted in breast milk.
Valtrex Pregnancy And Lactation Text: this medication is Pregnancy Category B and is considered safe during pregnancy. This medication is not directly found in breast milk but it's metabolite acyclovir is present.
Erythromycin Pregnancy And Lactation Text: This medication is Pregnancy Category B and is considered safe during pregnancy. It is also excreted in breast milk.
Detail Level: Generalized
Odomzo Pregnancy And Lactation Text: This medication is Pregnancy Category X and is absolutely contraindicated during pregnancy. It is unknown if it is excreted in breast milk.
Colchicine Pregnancy And Lactation Text: This medication is Pregnancy Category C and isn't considered safe during pregnancy. It is excreted in breast milk.
Rituxan Counseling:  I discussed with the patient the risks of Rituxan infusions. Side effects can include infusion reactions, severe drug rashes including mucocutaneous reactions, reactivation of latent hepatitis and other infections and rarely progressive multifocal leukoencephalopathy.  All of the patient's questions and concerns were addressed.
Tazorac Pregnancy And Lactation Text: This medication is not safe during pregnancy. It is unknown if this medication is excreted in breast milk.
Hydroquinone Pregnancy And Lactation Text: This medication has not been assigned a Pregnancy Risk Category but animal studies failed to show danger with the topical medication. It is unknown if the medication is excreted in breast milk.
Azathioprine Counseling:  I discussed with the patient the risks of azathioprine including but not limited to myelosuppression, immunosuppression, hepatotoxicity, lymphoma, and infections.  The patient understands that monitoring is required including baseline LFTs, Creatinine, possible TPMP genotyping and weekly CBCs for the first month and then every 2 weeks thereafter.  The patient verbalized understanding of the proper use and possible adverse effects of azathioprine.  All of the patient's questions and concerns were addressed.
Isotretinoin Pregnancy And Lactation Text: This medication is Pregnancy Category X and is considered extremely dangerous during pregnancy. It is unknown if it is excreted in breast milk.
Itraconazole Counseling:  I discussed with the patient the risks of itraconazole including but not limited to liver damage, nausea/vomiting, neuropathy, and severe allergy.  The patient understands that this medication is best absorbed when taken with acidic beverages such as non-diet cola or ginger ale.  The patient understands that monitoring is required including baseline LFTs and repeat LFTs at intervals.  The patient understands that they are to contact us or the primary physician if concerning signs are noted.
Valtrex Counseling: I discussed with the patient the risks of valacyclovir including but not limited to kidney damage, nausea, vomiting and severe allergy.  The patient understands that if the infection seems to be worsening or is not improving, they are to call.
Odomzo Counseling- I discussed with the patient the risks of Odomzo including but not limited to nausea, vomiting, diarrhea, constipation, weight loss, changes in the sense of taste, decreased appetite, muscle spasms, and hair loss.  The patient verbalized understanding of the proper use and possible adverse effects of Odomzo.  All of the patient's questions and concerns were addressed.
Colchicine Counseling:  Patient counseled regarding adverse effects including but not limited to stomach upset (nausea, vomiting, stomach pain, or diarrhea).  Patient instructed to limit alcohol consumption while taking this medication.  Colchicine may reduce blood counts especially with prolonged use.  The patient understands that monitoring of kidney function and blood counts may be required, especially at baseline. The patient verbalized understanding of the proper use and possible adverse effects of colchicine.  All of the patient's questions and concerns were addressed.
Tazorac Counseling:  Patient advised that medication is irritating and drying.  Patient may need to apply sparingly and wash off after an hour before eventually leaving it on overnight.  The patient verbalized understanding of the proper use and possible adverse effects of tazorac.  All of the patient's questions and concerns were addressed.
Rituxan Pregnancy And Lactation Text: This medication is Pregnancy Category C and it isn't know if it is safe during pregnancy. It is unknown if this medication is excreted in breast milk but similar antibodies are known to be excreted.
Isotretinoin Counseling: Patient should get monthly blood tests, not donate blood, not drive at night if vision affected, not share medication, and not undergo elective surgery for 6 months after tx completed. Side effects reviewed, pt to contact office should one occur.
Hydroquinone Counseling:  Patient advised that medication may result in skin irritation, lightening (hypopigmentation), dryness, and burning.  In the event of skin irritation, the patient was advised to reduce the amount of the drug applied or use it less frequently.  Rarely, spots that are treated with hydroquinone can become darker (pseudoochronosis).  Should this occur, patient instructed to stop medication and call the office. The patient verbalized understanding of the proper use and possible adverse effects of hydroquinone.  All of the patient's questions and concerns were addressed.
Metronidazole Counseling:  I discussed with the patient the risks of metronidazole including but not limited to seizures, nausea/vomiting, a metallic taste in the mouth, nausea/vomiting and severe allergy.
Itraconazole Pregnancy And Lactation Text: This medication is Pregnancy Category C and it isn't know if it is safe during pregnancy. It is also excreted in breast milk.
Tranexamic Acid Pregnancy And Lactation Text: It is unknown if this medication is safe during pregnancy or breast feeding.
Nsaids Pregnancy And Lactation Text: These medications are considered safe up to 30 weeks gestation. It is excreted in breast milk.
Topical Retinoid Pregnancy And Lactation Text: This medication is Pregnancy Category C. It is unknown if this medication is excreted in breast milk.
Xolair Pregnancy And Lactation Text: This medication is Pregnancy Category B and is considered safe during pregnancy. This medication is excreted in breast milk.
Metronidazole Pregnancy And Lactation Text: This medication is Pregnancy Category B and considered safe during pregnancy.  It is also excreted in breast milk.
Bexarotene Pregnancy And Lactation Text: This medication is Pregnancy Category X and should not be given to women who are pregnant or may become pregnant. This medication should not be used if you are breast feeding.
Siliq Counseling:  I discussed with the patient the risks of Siliq including but not limited to new or worsening depression, suicidal thoughts and behavior, immunosuppression, malignancy, posterior leukoencephalopathy syndrome, and serious infections.  The patient understands that monitoring is required including a PPD at baseline and must alert us or the primary physician if symptoms of infection or other concerning signs are noted. There is also a special program designed to monitor depression which is required with Siliq.
Tranexamic Acid Counseling:  Patient advised of the small risk of bleeding problems with tranexamic acid. They were also instructed to call if they developed any nausea, vomiting or diarrhea. All of the patient's questions and concerns were addressed.
Ketoconazole Counseling:   Patient counseled regarding improving absorption with orange juice.  Adverse effects include but are not limited to breast enlargement, headache, diarrhea, nausea, upset stomach, liver function test abnormalities, taste disturbance, and stomach pain.  There is a rare possibility of liver failure that can occur when taking ketoconazole. The patient understands that monitoring of LFTs may be required, especially at baseline. The patient verbalized understanding of the proper use and possible adverse effects of ketoconazole.  All of the patient's questions and concerns were addressed.
Nsaids Counseling: NSAID Counseling: I discussed with the patient that NSAIDs should be taken with food. Prolonged use of NSAIDs can result in the development of stomach ulcers.  Patient advised to stop taking NSAIDs if abdominal pain occurs.  The patient verbalized understanding of the proper use and possible adverse effects of NSAIDs.  All of the patient's questions and concerns were addressed.
Minocycline Counseling: Patient advised regarding possible photosensitivity and discoloration of the teeth, skin, lips, tongue and gums.  Patient instructed to avoid sunlight, if possible.  When exposed to sunlight, patients should wear protective clothing, sunglasses, and sunscreen.  The patient was instructed to call the office immediately if the following severe adverse effects occur:  hearing changes, easy bruising/bleeding, severe headache, or vision changes.  The patient verbalized understanding of the proper use and possible adverse effects of minocycline.  All of the patient's questions and concerns were addressed.
Xolair Counseling:  Patient informed of potential adverse effects including but not limited to fever, muscle aches, rash and allergic reactions.  The patient verbalized understanding of the proper use and possible adverse effects of Xolair.  All of the patient's questions and concerns were addressed.
Clofazimine Counseling:  I discussed with the patient the risks of clofazimine including but not limited to skin and eye pigmentation, liver damage, nausea/vomiting, gastrointestinal bleeding and allergy.
Siliq Pregnancy And Lactation Text: The risk during pregnancy and breastfeeding is uncertain with this medication.
Topical Retinoid counseling:  Patient advised to apply a pea-sized amount only at bedtime and wait 30 minutes after washing their face before applying.  If too drying, patient may add a non-comedogenic moisturizer. The patient verbalized understanding of the proper use and possible adverse effects of retinoids.  All of the patient's questions and concerns were addressed.
Eucrisa Counseling: Patient may experience a mild burning sensation during topical application. Eucrisa is not approved in children less than 2 years of age.
Bexarotene Counseling:  I discussed with the patient the risks of bexarotene including but not limited to hair loss, dry lips/skin/eyes, liver abnormalities, hyperlipidemia, pancreatitis, depression/suicidal ideation, photosensitivity, drug rash/allergic reactions, hypothyroidism, anemia, leukopenia, infection, cataracts, and teratogenicity.  Patient understands that they will need regular blood tests to check lipid profile, liver function tests, white blood cell count, thyroid function tests and pregnancy test if applicable.
Cimzia Counseling:  I discussed with the patient the risks of Cimzia including but not limited to immunosuppression, allergic reactions and infections.  The patient understands that monitoring is required including a PPD at baseline and must alert us or the primary physician if symptoms of infection or other concerning signs are noted.
Ketoconazole Pregnancy And Lactation Text: This medication is Pregnancy Category C and it isn't know if it is safe during pregnancy. It is also excreted in breast milk and breast feeding isn't recommended.
Niacinamide Pregnancy And Lactation Text: These medications are considered safe during pregnancy.
Minocycline Pregnancy And Lactation Text: This medication is Pregnancy Category D and not consider safe during pregnancy. It is also excreted in breast milk.
Solaraze Pregnancy And Lactation Text: This medication is Pregnancy Category B and is considered safe. There is some data to suggest avoiding during the third trimester. It is unknown if this medication is excreted in breast milk.
Simponi Counseling:  I discussed with the patient the risks of golimumab including but not limited to myelosuppression, immunosuppression, autoimmune hepatitis, demyelinating diseases, lymphoma, and serious infections.  The patient understands that monitoring is required including a PPD at baseline and must alert us or the primary physician if symptoms of infection or other concerning signs are noted.
Acitretin Pregnancy And Lactation Text: This medication is Pregnancy Category X and should not be given to women who are pregnant or may become pregnant in the future. This medication is excreted in breast milk.
Terbinafine Counseling: Patient counseling regarding adverse effects of terbinafine including but not limited to headache, diarrhea, rash, upset stomach, liver function test abnormalities, itching, taste/smell disturbance, nausea, abdominal pain, and flatulence.  There is a rare possibility of liver failure that can occur when taking terbinafine.  The patient understands that a baseline LFT and kidney function test may be required. The patient verbalized understanding of the proper use and possible adverse effects of terbinafine.  All of the patient's questions and concerns were addressed.
Xelvladimirz Pregnancy And Lactation Text: This medication is Pregnancy Category D and is not considered safe during pregnancy.  The risk during breast feeding is also uncertain.
Cimzia Pregnancy And Lactation Text: This medication crosses the placenta but can be considered safe in certain situations. Cimzia may be excreted in breast milk.
Thalidomide Counseling: I discussed with the patient the risks of thalidomide including but not limited to birth defects, anxiety, weakness, chest pain, dizziness, cough and severe allergy.
Niacinamide Counseling: I recommended taking niacin or niacinamide, also know as vitamin B3, twice daily. Recent evidence suggests that taking vitamin B3 (500 mg twice daily) can reduce the risk of actinic keratoses and non-melanoma skin cancers. Side effects of vitamin B3 include flushing and headache.
Arava Counseling:  Patient counseled regarding adverse effects of Arava including but not limited to nausea, vomiting, abnormalities in liver function tests. Patients may develop mouth sores, rash, diarrhea, and abnormalities in blood counts. The patient understands that monitoring is required including LFTs and blood counts.  There is a rare possibility of scarring of the liver and lung problems that can occur when taking methotrexate. Persistent nausea, loss of appetite, pale stools, dark urine, cough, and shortness of breath should be reported immediately. Patient advised to discontinue Arava treatment and consult with a physician prior to attempting conception. The patient will have to undergo a treatment to eliminate Arava from the body prior to conception.
Solaraze Counseling:  I discussed with the patient the risks of Solaraze including but not limited to erythema, scaling, itching, weeping, crusting, and pain.
Elidel Counseling: Patient may experience a mild burning sensation during topical application. Elidel is not approved in children less than 2 years of age. There have been case reports of hematologic and skin malignancies in patients using topical calcineurin inhibitors although causality is questionable.
Xeljanz Counseling: I discussed with the patient the risks of Xeljanz therapy including increased risk of infection, liver issues, headache, diarrhea, or cold symptoms. Live vaccines should be avoided. They were instructed to call if they have any problems.
Quinolones Counseling:  I discussed with the patient the risks of fluoroquinolones including but not limited to GI upset, allergic reaction, drug rash, diarrhea, dizziness, photosensitivity, yeast infections, liver function test abnormalities, tendonitis/tendon rupture.
Acitretin Counseling:  I discussed with the patient the risks of acitretin including but not limited to hair loss, dry lips/skin/eyes, liver damage, hyperlipidemia, depression/suicidal ideation, photosensitivity.  Serious rare side effects can include but are not limited to pancreatitis, pseudotumor cerebri, bony changes, clot formation/stroke/heart attack.  Patient understands that alcohol is contraindicated since it can result in liver toxicity and significantly prolong the elimination of the drug by many years.
Terbinafine Pregnancy And Lactation Text: This medication is Pregnancy Category B and is considered safe during pregnancy. It is also excreted in breast milk and breast feeding isn't recommended.
Sski Pregnancy And Lactation Text: This medication is Pregnancy Category D and isn't considered safe during pregnancy. It is excreted in breast milk.
Azithromycin Counseling:  I discussed with the patient the risks of azithromycin including but not limited to GI upset, allergic reaction, drug rash, diarrhea, and yeast infections.
Hydroxychloroquine Pregnancy And Lactation Text: This medication has been shown to cause fetal harm but it isn't assigned a Pregnancy Risk Category. There are small amounts excreted in breast milk.
Cosentyx Counseling:  I discussed with the patient the risks of Cosentyx including but not limited to worsening of Crohn's disease, immunosuppression, allergic reactions and infections.  The patient understands that monitoring is required including a PPD at baseline and must alert us or the primary physician if symptoms of infection or other concerning signs are noted.
Rhofade Pregnancy And Lactation Text: This medication has not been assigned a Pregnancy Risk Category. It is unknown if the medication is excreted in breast milk.
Drysol Pregnancy And Lactation Text: This medication is considered safe during pregnancy and breast feeding.
Skyrizi Counseling: I discussed with the patient the risks of risankizumab-rzaa including but not limited to immunosuppression, and serious infections.  The patient understands that monitoring is required including a PPD at baseline and must alert us or the primary physician if symptoms of infection or other concerning signs are noted.
SSKI Counseling:  I discussed with the patient the risks of SSKI including but not limited to thyroid abnormalities, metallic taste, GI upset, fever, headache, acne, arthralgias, paraesthesias, lymphadenopathy, easy bleeding, arrhythmias, and allergic reaction.
Hydroxychloroquine Counseling:  I discussed with the patient that a baseline ophthalmologic exam is needed at the start of therapy and every year thereafter while on therapy. A CBC may also be warranted for monitoring.  The side effects of this medication were discussed with the patient, including but not limited to agranulocytosis, aplastic anemia, seizures, rashes, retinopathy, and liver toxicity. Patient instructed to call the office should any adverse effect occur.  The patient verbalized understanding of the proper use and possible adverse effects of Plaquenil.  All the patient's questions and concerns were addressed.
Opioid Pregnancy And Lactation Text: These medications can lead to premature delivery and should be avoided during pregnancy. These medications are also present in breast milk in small amounts.
Rhofade Counseling: Rhofade is a topical medication which can decrease superficial blood flow where applied. Side effects are uncommon and include stinging, redness and allergic reactions.
Drysol Counseling:  I discussed with the patient the risks of drysol/aluminum chloride including but not limited to skin rash, itching, irritation, burning.
Prednisone Pregnancy And Lactation Text: This medication is Pregnancy Category C and it isn't know if it is safe during pregnancy. This medication is excreted in breast milk.
Azithromycin Pregnancy And Lactation Text: This medication is considered safe during pregnancy and is also secreted in breast milk.
Cimetidine Counseling:  I discussed with the patient the risks of Cimetidine including but not limited to gynecomastia, headache, diarrhea, nausea, drowsiness, arrhythmias, pancreatitis, skin rashes, psychosis, bone marrow suppression and kidney toxicity.
Rifampin Counseling: I discussed with the patient the risks of rifampin including but not limited to liver damage, kidney damage, red-orange body fluids, nausea/vomiting and severe allergy.
Tremfya Counseling: I discussed with the patient the risks of guselkumab including but not limited to immunosuppression, serious infections, worsening of inflammatory bowel disease and drug reactions.  The patient understands that monitoring is required including a PPD at baseline and must alert us or the primary physician if symptoms of infection or other concerning signs are noted.
Spironolactone Pregnancy And Lactation Text: This medication can cause feminization of the male fetus and should be avoided during pregnancy. The active metabolite is also found in breast milk.
Glycopyrrolate Pregnancy And Lactation Text: This medication is Pregnancy Category B and is considered safe during pregnancy. It is unknown if it is excreted breast milk.
Protopic Pregnancy And Lactation Text: This medication is Pregnancy Category C. It is unknown if this medication is excreted in breast milk when applied topically.
Dupixent Counseling: I discussed with the patient the risks of dupilumab including but not limited to eye infection and irritation, cold sores, injection site reactions, worsening of asthma, allergic reactions and increased risk of parasitic infection.  Live vaccines should be avoided while taking dupilumab. Dupilumab will also interact with certain medications such as warfarin and cyclosporine. The patient understands that monitoring is required and they must alert us or the primary physician if symptoms of infection or other concerning signs are noted.
Bactrim Counseling:  I discussed with the patient the risks of sulfa antibiotics including but not limited to GI upset, allergic reaction, drug rash, diarrhea, dizziness, photosensitivity, and yeast infections.  Rarely, more serious reactions can occur including but not limited to aplastic anemia, agranulocytosis, methemoglobinemia, blood dyscrasias, liver or kidney failure, lung infiltrates or desquamative/blistering drug rashes.
Prednisone Counseling:  I discussed with the patient the risks of prolonged use of prednisone including but not limited to weight gain, insomnia, osteoporosis, mood changes, diabetes, susceptibility to infection, glaucoma and high blood pressure.  In cases where prednisone use is prolonged, patients should be monitored with blood pressure checks, serum glucose levels and an eye exam.  Additionally, the patient may need to be placed on GI prophylaxis, PCP prophylaxis, and calcium and vitamin D supplementation and/or a bisphosphonate.  The patient verbalized understanding of the proper use and the possible adverse effects of prednisone.  All of the patient's questions and concerns were addressed.
Rifampin Pregnancy And Lactation Text: This medication is Pregnancy Category C and it isn't know if it is safe during pregnancy. It is also excreted in breast milk and should not be used if you are breast feeding.
Opioid Counseling: I discussed with the patient the potential side effects of opioids including but not limited to addiction, altered mental status, and depression. I stressed avoiding alcohol, benzodiazepines, muscle relaxants and sleep aids unless specifically okayed by a physician. The patient verbalized understanding of the proper use and possible adverse effects of opioids. All of the patient's questions and concerns were addressed. They were instructed to flush the remaining pills down the toilet if they did not need them for pain.
5-Fu Pregnancy And Lactation Text: This medication is Pregnancy Category X and contraindicated in pregnancy and in women who may become pregnant. It is unknown if this medication is excreted in breast milk.
Stelara Counseling:  I discussed with the patient the risks of ustekinumab including but not limited to immunosuppression, malignancy, posterior leukoencephalopathy syndrome, and serious infections.  The patient understands that monitoring is required including a PPD at baseline and must alert us or the primary physician if symptoms of infection or other concerning signs are noted.
Spironolactone Counseling: Patient advised regarding risks of diarrhea, abdominal pain, hyperkalemia, birth defects (for female patients), liver toxicity and renal toxicity. The patient may need blood work to monitor liver and kidney function and potassium levels while on therapy. The patient verbalized understanding of the proper use and possible adverse effects of spironolactone.  All of the patient's questions and concerns were addressed.
Glycopyrrolate Counseling:  I discussed with the patient the risks of glycopyrrolate including but not limited to skin rash, drowsiness, dry mouth, difficulty urinating, and blurred vision.
Dupixent Pregnancy And Lactation Text: This medication likely crosses the placenta but the risk for the fetus is uncertain. This medication is excreted in breast milk.
Protopic Counseling: Patient may experience a mild burning sensation during topical application. Protopic is not approved in children less than 2 years of age. There have been case reports of hematologic and skin malignancies in patients using topical calcineurin inhibitors although causality is questionable.
5-Fu Counseling: 5-Fluorouracil Counseling:  I discussed with the patient the risks of 5-fluorouracil including but not limited to erythema, scaling, itching, weeping, crusting, and pain.
Methotrexate Pregnancy And Lactation Text: This medication is Pregnancy Category X and is known to cause fetal harm. This medication is excreted in breast milk.
Sarecycline Counseling: Patient advised regarding possible photosensitivity and discoloration of the teeth, skin, lips, tongue and gums.  Patient instructed to avoid sunlight, if possible.  When exposed to sunlight, patients should wear protective clothing, sunglasses, and sunscreen.  The patient was instructed to call the office immediately if the following severe adverse effects occur:  hearing changes, easy bruising/bleeding, severe headache, or vision changes.  The patient verbalized understanding of the proper use and possible adverse effects of sarecycline.  All of the patient's questions and concerns were addressed.
Bactrim Pregnancy And Lactation Text: This medication is Pregnancy Category D and is known to cause fetal risk.  It is also excreted in breast milk.
Doxepin Counseling:  Patient advised that the medication is sedating and not to drive a car after taking this medication. Patient informed of potential adverse effects including but not limited to dry mouth, urinary retention, and blurry vision.  The patient verbalized understanding of the proper use and possible adverse effects of doxepin.  All of the patient's questions and concerns were addressed.
Birth Control Pills Pregnancy And Lactation Text: This medication should be avoided if pregnant and for the first 30 days post-partum.
Enbrel Counseling:  I discussed with the patient the risks of etanercept including but not limited to myelosuppression, immunosuppression, autoimmune hepatitis, demyelinating diseases, lymphoma, and infections.  The patient understands that monitoring is required including a PPD at baseline and must alert us or the primary physician if symptoms of infection or other concerning signs are noted.
Taltz Counseling: I discussed with the patient the risks of ixekizumab including but not limited to immunosuppression, serious infections, worsening of inflammatory bowel disease and drug reactions.  The patient understands that monitoring is required including a PPD at baseline and must alert us or the primary physician if symptoms of infection or other concerning signs are noted.
Methotrexate Counseling:  Patient counseled regarding adverse effects of methotrexate including but not limited to nausea, vomiting, abnormalities in liver function tests. Patients may develop mouth sores, rash, diarrhea, and abnormalities in blood counts. The patient understands that monitoring is required including LFT's and blood counts.  There is a rare possibility of scarring of the liver and lung problems that can occur when taking methotrexate. Persistent nausea, loss of appetite, pale stools, dark urine, cough, and shortness of breath should be reported immediately. Patient advised to discontinue methotrexate treatment at least three months before attempting to become pregnant.  I discussed the need for folate supplements while taking methotrexate.  These supplements can decrease side effects during methotrexate treatment. The patient verbalized understanding of the proper use and possible adverse effects of methotrexate.  All of the patient's questions and concerns were addressed.
Cephalexin Counseling: I counseled the patient regarding use of cephalexin as an antibiotic for prophylactic and/or therapeutic purposes. Cephalexin (commonly prescribed under brand name Keflex) is a cephalosporin antibiotic which is active against numerous classes of bacteria, including most skin bacteria. Side effects may include nausea, diarrhea, gastrointestinal upset, rash, hives, yeast infections, and in rare cases, hepatitis, kidney disease, seizures, fever, confusion, neurologic symptoms, and others. Patients with severe allergies to penicillin medications are cautioned that there is about a 10% incidence of cross-reactivity with cephalosporins. When possible, patients with penicillin allergies should use alternatives to cephalosporins for antibiotic therapy.
Doxepin Pregnancy And Lactation Text: This medication is Pregnancy Category C and it isn't known if it is safe during pregnancy. It is also excreted in breast milk and breast feeding isn't recommended.
Birth Control Pills Counseling: Birth Control Pill Counseling: I discussed with the patient the potential side effects of OCPs including but not limited to increased risk of stroke, heart attack, thrombophlebitis, deep venous thrombosis, hepatic adenomas, breast changes, GI upset, headaches, and depression.  The patient verbalized understanding of the proper use and possible adverse effects of OCPs. All of the patient's questions and concerns were addressed.
Gabapentin Counseling: I discussed with the patient the risks of gabapentin including but not limited to dizziness, somnolence, fatigue and ataxia.
Zyclara Counseling:  I discussed with the patient the risks of imiquimod including but not limited to erythema, scaling, itching, weeping, crusting, and pain.  Patient understands that the inflammatory response to imiquimod is variable from person to person and was educated regarded proper titration schedule.  If flu-like symptoms develop, patient knows to discontinue the medication and contact us.
Picato Counseling:  I discussed with the patient the risks of Picato including but not limited to erythema, scaling, itching, weeping, crusting, and pain.
Carac Counseling:  I discussed with the patient the risks of Carac including but not limited to erythema, scaling, itching, weeping, crusting, and pain.
Tetracycline Counseling: Patient counseled regarding possible photosensitivity and increased risk for sunburn.  Patient instructed to avoid sunlight, if possible.  When exposed to sunlight, patients should wear protective clothing, sunglasses, and sunscreen.  The patient was instructed to call the office immediately if the following severe adverse effects occur:  hearing changes, easy bruising/bleeding, severe headache, or vision changes.  The patient verbalized understanding of the proper use and possible adverse effects of tetracycline.  All of the patient's questions and concerns were addressed. Patient understands to avoid pregnancy while on therapy due to potential birth defects.
Hydroxyzine Counseling: Patient advised that the medication is sedating and not to drive a car after taking this medication.  Patient informed of potential adverse effects including but not limited to dry mouth, urinary retention, and blurry vision.  The patient verbalized understanding of the proper use and possible adverse effects of hydroxyzine.  All of the patient's questions and concerns were addressed.
Propranolol Pregnancy And Lactation Text: This medication is Pregnancy Category C and it isn't known if it is safe during pregnancy. It is excreted in breast milk.
Cephalexin Pregnancy And Lactation Text: This medication is Pregnancy Category B and considered safe during pregnancy.  It is also excreted in breast milk but can be used safely for shorter doses.
Finasteride Pregnancy And Lactation Text: This medication is absolutely contraindicated during pregnancy. It is unknown if it is excreted in breast milk.
Humira Counseling:  I discussed with the patient the risks of adalimumab including but not limited to myelosuppression, immunosuppression, autoimmune hepatitis, demyelinating diseases, lymphoma, and serious infections.  The patient understands that monitoring is required including a PPD at baseline and must alert us or the primary physician if symptoms of infection or other concerning signs are noted.
Cyclosporine Counseling:  I discussed with the patient the risks of cyclosporine including but not limited to hypertension, gingival hyperplasia,myelosuppression, immunosuppression, liver damage, kidney damage, neurotoxicity, lymphoma, and serious infections. The patient understands that monitoring is required including baseline blood pressure, CBC, CMP, lipid panel and uric acid, and then 1-2 times monthly CMP and blood pressure.
Benzoyl Peroxide Pregnancy And Lactation Text: This medication is Pregnancy Category C. It is unknown if benzoyl peroxide is excreted in breast milk.
Hydroxyzine Pregnancy And Lactation Text: This medication is not safe during pregnancy and should not be taken. It is also excreted in breast milk and breast feeding isn't recommended.
Clindamycin Counseling: I counseled the patient regarding use of clindamycin as an antibiotic for prophylactic and/or therapeutic purposes. Clindamycin is active against numerous classes of bacteria, including skin bacteria. Side effects may include nausea, diarrhea, gastrointestinal upset, rash, hives, yeast infections, and in rare cases, colitis.
Propranolol Counseling:  I discussed with the patient the risks of propranolol including but not limited to low heart rate, low blood pressure, low blood sugar, restlessness and increased cold sensitivity. They should call the office if they experience any of these side effects.
Finasteride Male Counseling: Finasteride Counseling:  I discussed with the patient the risks of use of finasteride including but not limited to decreased libido, decreased ejaculate volume, gynecomastia, and depression. Women should not handle medication.  All of the patient's questions and concerns were addressed.
Wartpeel Counseling:  I discussed with the patient the risks of Wartpeel including but not limited to erythema, scaling, itching, weeping, crusting, and pain.
Benzoyl Peroxide Counseling: Patient counseled that medicine may cause skin irritation and bleach clothing.  In the event of skin irritation, the patient was advised to reduce the amount of the drug applied or use it less frequently.   The patient verbalized understanding of the proper use and possible adverse effects of benzoyl peroxide.  All of the patient's questions and concerns were addressed.
Mirvaso Counseling: Mirvaso is a topical medication which can decrease superficial blood flow where applied. Side effects are uncommon and include stinging, redness and allergic reactions.
Cyclophosphamide Pregnancy And Lactation Text: This medication is Pregnancy Category D and it isn't considered safe during pregnancy. This medication is excreted in breast milk.
Oxybutynin Pregnancy And Lactation Text: This medication is Pregnancy Category B and is considered safe during pregnancy. It is unknown if it is excreted in breast milk.
Topical Sulfur Applications Pregnancy And Lactation Text: This medication is Pregnancy Category C and has an unknown safety profile during pregnancy. It is unknown if this topical medication is excreted in breast milk.
Ilumya Counseling: I discussed with the patient the risks of tildrakizumab including but not limited to immunosuppression, malignancy, posterior leukoencephalopathy syndrome, and serious infections.  The patient understands that monitoring is required including a PPD at baseline and must alert us or the primary physician if symptoms of infection or other concerning signs are noted.
Clindamycin Pregnancy And Lactation Text: This medication can be used in pregnancy if certain situations. Clindamycin is also present in breast milk.
Fluconazole Counseling:  Patient counseled regarding adverse effects of fluconazole including but not limited to headache, diarrhea, nausea, upset stomach, liver function test abnormalities, taste disturbance, and stomach pain.  There is a rare possibility of liver failure that can occur when taking fluconazole.  The patient understands that monitoring of LFTs and kidney function test may be required, especially at baseline. The patient verbalized understanding of the proper use and possible adverse effects of fluconazole.  All of the patient's questions and concerns were addressed.
Cyclophosphamide Counseling:  I discussed with the patient the risks of cyclophosphamide including but not limited to hair loss, hormonal abnormalities, decreased fertility, abdominal pain, diarrhea, nausea and vomiting, bone marrow suppression and infection. The patient understands that monitoring is required while taking this medication.
Oxybutynin Counseling:  I discussed with the patient the risks of oxybutynin including but not limited to skin rash, drowsiness, dry mouth, difficulty urinating, and blurred vision.
Albendazole Counseling:  I discussed with the patient the risks of albendazole including but not limited to cytopenia, kidney damage, nausea/vomiting and severe allergy.  The patient understands that this medication is being used in an off-label manner.
Doxycycline Counseling:  Patient counseled regarding possible photosensitivity and increased risk for sunburn.  Patient instructed to avoid sunlight, if possible.  When exposed to sunlight, patients should wear protective clothing, sunglasses, and sunscreen.  The patient was instructed to call the office immediately if the following severe adverse effects occur:  hearing changes, easy bruising/bleeding, severe headache, or vision changes.  The patient verbalized understanding of the proper use and possible adverse effects of doxycycline.  All of the patient's questions and concerns were addressed.
Erivedge Counseling- I discussed with the patient the risks of Erivedge including but not limited to nausea, vomiting, diarrhea, constipation, weight loss, changes in the sense of taste, decreased appetite, muscle spasms, and hair loss.  The patient verbalized understanding of the proper use and possible adverse effects of Erivedge.  All of the patient's questions and concerns were addressed.
Topical Sulfur Applications Counseling: Topical Sulfur Counseling: Patient counseled that this medication may cause skin irritation or allergic reactions.  In the event of skin irritation, the patient was advised to reduce the amount of the drug applied or use it less frequently.   The patient verbalized understanding of the proper use and possible adverse effects of topical sulfur application.  All of the patient's questions and concerns were addressed.
Minoxidil Counseling: Minoxidil is a topical medication which can increase blood flow where it is applied. It is uncertain how this medication increases hair growth. Side effects are uncommon and include stinging and allergic reactions.
Doxycycline Pregnancy And Lactation Text: This medication is Pregnancy Category D and not consider safe during pregnancy. It is also excreted in breast milk but is considered safe for shorter treatment courses.
Otezla Pregnancy And Lactation Text: This medication is Pregnancy Category C and it isn't known if it is safe during pregnancy. It is unknown if it is excreted in breast milk.
Dapsone Pregnancy And Lactation Text: This medication is Pregnancy Category C and is not considered safe during pregnancy or breast feeding.
Infliximab Counseling:  I discussed with the patient the risks of infliximab including but not limited to myelosuppression, immunosuppression, autoimmune hepatitis, demyelinating diseases, lymphoma, and serious infections.  The patient understands that monitoring is required including a PPD at baseline and must alert us or the primary physician if symptoms of infection or other concerning signs are noted.
High Dose Vitamin A Pregnancy And Lactation Text: High dose vitamin A therapy is contraindicated during pregnancy and breast feeding.
Griseofulvin Counseling:  I discussed with the patient the risks of griseofulvin including but not limited to photosensitivity, cytopenia, liver damage, nausea/vomiting and severe allergy.  The patient understands that this medication is best absorbed when taken with a fatty meal (e.g., ice cream or french fries).
Cellcept Counseling:  I discussed with the patient the risks of mycophenolate mofetil including but not limited to infection/immunosuppression, GI upset, hypokalemia, hypercholesterolemia, bone marrow suppression, lymphoproliferative disorders, malignancy, GI ulceration/bleed/perforation, colitis, interstitial lung disease, kidney failure, progressive multifocal leukoencephalopathy, and birth defects.  The patient understands that monitoring is required including a baseline creatinine and regular CBC testing. In addition, patient must alert us immediately if symptoms of infection or other concerning signs are noted.
Ivermectin Counseling:  Patient instructed to take medication on an empty stomach with a full glass of water.  Patient informed of potential adverse effects including but not limited to nausea, diarrhea, dizziness, itching, and swelling of the extremities or lymph nodes.  The patient verbalized understanding of the proper use and possible adverse effects of ivermectin.  All of the patient's questions and concerns were addressed.

## 2020-07-02 DIAGNOSIS — E11.8 CONTROLLED TYPE 2 DIABETES MELLITUS WITH COMPLICATION, WITHOUT LONG-TERM CURRENT USE OF INSULIN (HCC): ICD-10-CM

## 2020-07-02 DIAGNOSIS — Z76.0 MEDICATION REFILL: ICD-10-CM

## 2020-07-02 RX ORDER — DULAGLUTIDE 0.75 MG/.5ML
INJECTION, SOLUTION SUBCUTANEOUS
Qty: 6 ML | Refills: 0 | Status: SHIPPED | OUTPATIENT
Start: 2020-07-02 | End: 2020-09-29

## 2020-07-02 RX ORDER — CITALOPRAM 20 MG/1
TABLET ORAL
Qty: 90 TAB | Refills: 0 | Status: SHIPPED | OUTPATIENT
Start: 2020-07-02 | End: 2020-09-25

## 2020-07-02 RX ORDER — TAMSULOSIN HYDROCHLORIDE 0.4 MG/1
CAPSULE ORAL
Qty: 90 CAP | Refills: 0 | Status: SHIPPED | OUTPATIENT
Start: 2020-07-02 | End: 2020-07-09 | Stop reason: SDUPTHER

## 2020-07-06 ENCOUNTER — TELEPHONE (OUTPATIENT)
Dept: CARDIOLOGY | Facility: MEDICAL CENTER | Age: 70
End: 2020-07-06

## 2020-07-06 ENCOUNTER — NON-PROVIDER VISIT (OUTPATIENT)
Dept: CARDIOLOGY | Facility: MEDICAL CENTER | Age: 70
End: 2020-07-06
Payer: MEDICARE

## 2020-07-06 DIAGNOSIS — Z95.0 CARDIAC PACEMAKER IN SITU: ICD-10-CM

## 2020-07-06 DIAGNOSIS — I42.9 CARDIOMYOPATHY, UNSPECIFIED TYPE (HCC): ICD-10-CM

## 2020-07-06 PROCEDURE — 93295 DEV INTERROG REMOTE 1/2/MLT: CPT | Performed by: INTERNAL MEDICINE

## 2020-07-06 NOTE — TELEPHONE ENCOUNTER
Called and LVM to remind pt of remote monitoring appointment. Gave him my direct line for instructions on how to send a manual transmission if he was unaware of how to do so.

## 2020-07-09 ENCOUNTER — OFFICE VISIT (OUTPATIENT)
Dept: MEDICAL GROUP | Age: 70
End: 2020-07-09
Payer: MEDICARE

## 2020-07-09 VITALS
HEIGHT: 71 IN | WEIGHT: 206.4 LBS | SYSTOLIC BLOOD PRESSURE: 104 MMHG | BODY MASS INDEX: 28.9 KG/M2 | TEMPERATURE: 97.2 F | HEART RATE: 68 BPM | DIASTOLIC BLOOD PRESSURE: 66 MMHG | OXYGEN SATURATION: 97 %

## 2020-07-09 DIAGNOSIS — I50.22 CHRONIC SYSTOLIC CONGESTIVE HEART FAILURE (HCC): Chronic | ICD-10-CM

## 2020-07-09 DIAGNOSIS — E55.9 HYPOVITAMINOSIS D: ICD-10-CM

## 2020-07-09 DIAGNOSIS — N40.1 BENIGN PROSTATIC HYPERPLASIA WITH LOWER URINARY TRACT SYMPTOMS, SYMPTOM DETAILS UNSPECIFIED: ICD-10-CM

## 2020-07-09 DIAGNOSIS — I25.10 CORONARY ARTERY DISEASE DUE TO CALCIFIED CORONARY LESION: ICD-10-CM

## 2020-07-09 DIAGNOSIS — Z00.00 HEALTH CARE MAINTENANCE: ICD-10-CM

## 2020-07-09 DIAGNOSIS — I42.8 OTHER CARDIOMYOPATHY (HCC): ICD-10-CM

## 2020-07-09 DIAGNOSIS — E11.8 CONTROLLED TYPE 2 DIABETES MELLITUS WITH COMPLICATION, WITHOUT LONG-TERM CURRENT USE OF INSULIN (HCC): ICD-10-CM

## 2020-07-09 DIAGNOSIS — K21.9 GASTROESOPHAGEAL REFLUX DISEASE WITHOUT ESOPHAGITIS: ICD-10-CM

## 2020-07-09 DIAGNOSIS — I25.84 CORONARY ARTERY DISEASE DUE TO CALCIFIED CORONARY LESION: ICD-10-CM

## 2020-07-09 DIAGNOSIS — D50.9 IRON DEFICIENCY ANEMIA, UNSPECIFIED IRON DEFICIENCY ANEMIA TYPE: ICD-10-CM

## 2020-07-09 DIAGNOSIS — R80.9 MICROALBUMINURIA DUE TO TYPE 2 DIABETES MELLITUS (HCC): ICD-10-CM

## 2020-07-09 DIAGNOSIS — R53.83 FATIGUE, UNSPECIFIED TYPE: ICD-10-CM

## 2020-07-09 DIAGNOSIS — E78.5 DYSLIPIDEMIA: ICD-10-CM

## 2020-07-09 DIAGNOSIS — M10.00 IDIOPATHIC GOUT, UNSPECIFIED CHRONICITY, UNSPECIFIED SITE: ICD-10-CM

## 2020-07-09 DIAGNOSIS — Z23 NEED FOR HEPATITIS B VACCINATION: ICD-10-CM

## 2020-07-09 DIAGNOSIS — N18.30 CKD (CHRONIC KIDNEY DISEASE), STAGE III: ICD-10-CM

## 2020-07-09 DIAGNOSIS — Z95.1 S/P CABG X 3: ICD-10-CM

## 2020-07-09 DIAGNOSIS — N28.1 BILATERAL RENAL CYSTS: ICD-10-CM

## 2020-07-09 DIAGNOSIS — F41.1 GENERALIZED ANXIETY DISORDER: ICD-10-CM

## 2020-07-09 DIAGNOSIS — L57.0 ACTINIC KERATOSIS: ICD-10-CM

## 2020-07-09 DIAGNOSIS — I10 ESSENTIAL HYPERTENSION: Chronic | ICD-10-CM

## 2020-07-09 DIAGNOSIS — E11.29 MICROALBUMINURIA DUE TO TYPE 2 DIABETES MELLITUS (HCC): ICD-10-CM

## 2020-07-09 DIAGNOSIS — Z11.59 NEED FOR HEPATITIS C SCREENING TEST: ICD-10-CM

## 2020-07-09 DIAGNOSIS — Z00.00 MEDICARE ANNUAL WELLNESS VISIT, SUBSEQUENT: ICD-10-CM

## 2020-07-09 PROBLEM — I42.9 CARDIOMYOPATHY (HCC): Status: RESOLVED | Noted: 2020-04-29 | Resolved: 2020-07-09

## 2020-07-09 PROCEDURE — 99214 OFFICE O/P EST MOD 30 MIN: CPT | Mod: 25 | Performed by: INTERNAL MEDICINE

## 2020-07-09 PROCEDURE — G0010 ADMIN HEPATITIS B VACCINE: HCPCS | Performed by: INTERNAL MEDICINE

## 2020-07-09 PROCEDURE — 90746 HEPB VACCINE 3 DOSE ADULT IM: CPT | Performed by: INTERNAL MEDICINE

## 2020-07-09 PROCEDURE — G0439 PPPS, SUBSEQ VISIT: HCPCS | Performed by: INTERNAL MEDICINE

## 2020-07-09 RX ORDER — METFORMIN HYDROCHLORIDE 750 MG/1
750 TABLET, EXTENDED RELEASE ORAL DAILY
Qty: 180 TAB | Refills: 0 | Status: SHIPPED | OUTPATIENT
Start: 2020-07-09 | End: 2021-07-05

## 2020-07-09 RX ORDER — METFORMIN HYDROCHLORIDE 750 MG/1
750 TABLET, EXTENDED RELEASE ORAL DAILY
Qty: 180 TAB | Refills: 0 | Status: SHIPPED | OUTPATIENT
Start: 2020-07-09 | End: 2020-12-02 | Stop reason: SDUPTHER

## 2020-07-09 RX ORDER — TAMSULOSIN HYDROCHLORIDE 0.4 MG/1
0.8 CAPSULE ORAL DAILY
Qty: 180 CAP | Refills: 0 | Status: SHIPPED
Start: 2020-07-09 | End: 2020-07-09

## 2020-07-09 RX ORDER — TAMSULOSIN HYDROCHLORIDE 0.4 MG/1
0.8 CAPSULE ORAL
Qty: 60 CAP | Refills: 0 | Status: SHIPPED | OUTPATIENT
Start: 2020-07-09 | End: 2020-08-31

## 2020-07-09 ASSESSMENT — ACTIVITIES OF DAILY LIVING (ADL): BATHING_REQUIRES_ASSISTANCE: 0

## 2020-07-09 ASSESSMENT — ENCOUNTER SYMPTOMS: GENERAL WELL-BEING: FAIR

## 2020-07-09 ASSESSMENT — PATIENT HEALTH QUESTIONNAIRE - PHQ9: CLINICAL INTERPRETATION OF PHQ2 SCORE: 0

## 2020-07-09 ASSESSMENT — FIBROSIS 4 INDEX: FIB4 SCORE: 1.32

## 2020-07-09 NOTE — PROGRESS NOTES
Chief Complaint   Patient presents with   • Annual Wellness Visit     Medicare   Annual, DM, labs    HPI:  Luis Renner is a 70 y.o. here for Medicare Annual Wellness Visit     DM2 with CKD stage III, microalbuminuria, CKD  Interval course:  HBA1C 7.5 (H) 05/18/2020 11:07 AM   HBA1C 7.0 (H) 01/21/2020 09:32 AM   HBA1C 6.4 (H) 07/15/2019 10:27 AM     Onset:  DM2 since late 50'.       Meds:   - Trulicity 0.75 mg weekly  - Jardiance, 25 mg QD  - Glipizide 5 mg BID  - Metformin 850 mg QD - stopped few mos ago due to GI upset  Used meds as prescribed.    Compliance to meds: yes  Checking feet daily/wear soft socks/shoes: yes     Checking blood sugar: rarely  Hypoglycemia:  one remote episode   Symptoms of hypoglycemia: sweating, fatigue, pale skin, hunger.     ASA: yes  ACE: yes  Statin: yes     Diet: regular  Exercise: at least 4 d/w  BMI: 28     DM complications:   Peripheral neuropathy:            No numbness or tingling in feet.  Retinopathy:                            No retinopathy. Last eye exam: 8/2019  Nephropathy:                          Microalbumin: pos  CVS:                                        Has CAD  GI:                                           No gastropathy.     FH of DM:  none     Hypertension, cardiomyopathy, CAD (St post CABG in 7/16), CHF  Meds: Lisinopril, 5 mg QD, spironolactone, 25 mg QD, carvedilol, 6.25 mg BID, entresto BID; taking as prescribed.    He is not measuring BP at home.  Denies: - headaches, vision problems, tinnitus.  - chest pain/pressure, palpitations, irregular heart beats, exertional, dyspnea, peripheral edema.  Low salt diet: yes  Diet / exercise BMI: as above    FH: multiple  He has been followed up by cardiology.     Dyslipidemia  Meds: Crestor, 10 mg QD, Zetia 10 mg QD. No muscle weakness, cramps, nausea,abdominal discomfort.    Diet / exercise BMI: as above    FH: 2 brothers, father  He has been f/u by cardiology.     Echocardiography, 3/22/2019  Akinetic mid to  distal anterospetal wall and apex.  Hypokinetic basal inferoposterior wall.  Left ventricle is moderately dilated.  Moderately reduced left ventricular systolic function.  Left ventricular ejection fraction is visually estimated to be 35%.  Structurally normal mitral valve without significant stenosis or   regurgitation.  Aortic sclerosis without stenosis.  Moderately dilated left atrium.  Structurally normal tricuspid valve without significant stenosis or   regurgitation.  Unable to estimate pulmonary artery pressure due to an inadequate   tricuspid regurgitant jet.  Normal right ventricular size and systolic function.  Normal inferior vena cava size and inspiratory collapse.  No pericardial effusion seen.  Compared to prior echo done 09/12/16,  there has been no significant   change.      Iron deficiency anemia, fatigue  The patient had slightly lobar hemoglobin;   -He has intermittently abnormal CBC in the past.  He has he had recurrent surgical procedures that might be the cause.      Hypovitaminosis D  The patient had low vitamin D level.  Vitamin D supplement: Multivitamins.    GERD  On omeprazole, 20 mg QD; takes medication as prescribed, that controls sx.   Denies:   - heartburn, epigastric pain.   -  nausea, vomiting, or diarrhea  - dysphagia  - abnormal cough  - blood in the stool or dark tarry stools.  - early satiety  - tobacco use.    BPH  Sx:  · Nocturia: at least 2  · Frequency  · Urgency   · Bladder fullness / incomplete emptying  · Hesitancy  On flomax 0.4 mg QD    Patient Active Problem List    Diagnosis Date Noted   • S/P CABG x 3 04/04/2017     Priority: Medium   • Diabetes mellitus type 2, controlled, with complications (HCC) 09/23/2016     Priority: Medium   • Ischemic cardiomyopathy 07/23/2013     Priority: Medium   • Coronary artery disease due to calcified coronary lesion 03/08/2013     Priority: Medium   • Essential hypertension 03/07/2013     Priority: Medium   • Benign prostatic  hyperplasia 10/18/2017     Priority: Low   • Idiopathic gout 07/27/2017     Priority: Low   • Actinic keratosis 07/27/2017     Priority: Low   • TEMO (generalized anxiety disorder) 04/04/2017     Priority: Low   • Gastroesophageal reflux disease without esophagitis 01/04/2017     Priority: Low   • Bilateral renal cysts 01/14/2015     Priority: Low   • CKD (chronic kidney disease), stage III ([facility]) 04/29/2020   • Dyslipidemia 04/29/2020   • Chronic systolic congestive heart failure ([facility])    • Microalbuminuria due to type 2 diabetes mellitus ([facility]) 12/13/2018       Current Outpatient Medications   Medication Sig Dispense Refill   • tamsulosin (FLOMAX) 0.4 MG capsule Take 1 capsule by mouth every evening. 90 Cap 0   • TRULICITY 0.75 MG/0.5ML Solution Pen-injector Inject 0.5 mL as instructed every 7 days. 6 mL 0   • citalopram (CELEXA) 20 MG Tab TAKE ONE TABLET BY MOUTH EVERY DAY 90 Tab 0   • rosuvastatin (CRESTOR) 20 MG Tab Take 1 Tab by mouth every evening. 90 Tab 2   • ENTRESTO 24-26 MG Tab tablet Take 1 Tab by mouth 2 Times a Day. Indications: Heart Failure 180 Tab 2   • ezetimibe (ZETIA) 10 MG Tab Take 1 Tab by mouth every day. 90 Tab 0   • clopidogrel (PLAVIX) 75 MG Tab Take 1 tablet by mouth daily. 90 Tab 3   • carvedilol (COREG) 6.25 MG Tab Take 1 Tab by mouth 2 times a day, with meals. 180 Tab 1   • allopurinol (ZYLOPRIM) 300 MG Tab TAKE ONE TABLET BY MOUTH EVERY DAY 90 Tab 3   • acetaminophen (TYLENOL) 500 MG Tab Take 1,500 mg by mouth 2 Times a Day.     • WELCHOL 625 MG Tab Take 625 mg by mouth every day.     • spironolactone (ALDACTONE) 25 MG Tab Take 1 tablet by mouth daily. 90 Tab 3   • Iron-Folic Acid-Vit B12 (IRON FORMULA PO) Take  by mouth.     • FOLIC ACID PO Take  by mouth.     • BETA CAROTENE PO Take  by mouth.     • Multiple Vitamin (MULTI-VITAMINS PO) Take  by mouth.     • BD PEN NEEDLE DENIS U/F USE DAILY WITH VICTOZA (100 DAY SUPPLY) 100 Each 11   • Lancets 1 Each by Does not apply route 3 times a  day. Lancets for Freestyle Lite meter. Sig: use TID and prn ssx high or low sugar. 100 Each 11     No current facility-administered medications for this visit.           Current supplements as per medication list.     Allergies: Patient has no known allergies.    Current social contact/activities: hanging out with family     He  reports that he has never smoked. He has never used smokeless tobacco. He reports current alcohol use of about 0.6 oz of alcohol per week. He reports current drug use.  Counseling given: Not Answered      DPA/Advanced Directive:  Patient has Durable Power of , but it is not on file. Instructed to bring in a copy to scan into their chart.    ROS:    Gait: Uses no assistive device  Ostomy: No  Other tubes: No  Amputations: No  Chronic oxygen use: No  Last eye exam: 2018  Wears hearing aids: No   : Reports urinary leakage during the last 6 months that has somewhat interfered with their daily activities or sleep.    Screening:    Depression Screening  Little interest or pleasure in doing things?  0 - not at all  Feeling down, depressed , or hopeless? 0 - not at all  Patient Health Questionnaire Score: 0     Screening for Cognitive Impairment  Three Minute Recall (river, temitope, finger) 3/3    Winston clock face with all 12 numbers and set the hands to show 10 past 11.  Yes    Cognitive concerns identified deferred for follow up unless specifically addressed in assessment and plan.    Fall Risk Assessment  Has the patient had two or more falls in the last year or any fall with injury in the last year?  No    Safety Assessment  Throw rugs on floor.  No  Handrails on all stairs.  Yes  Good lighting in all hallways.  Yes  Difficulty hearing.  No  Patient counseled about all safety risks that were identified.    Functional Assessment ADLs  Are there any barriers preventing you from cooking for yourself or meeting nutritional needs?  No.    Are there any barriers preventing you from driving  safely or obtaining transportation?  No.    Are there any barriers preventing you from using a telephone or calling for help?  No.    Are there any barriers preventing you from shopping?  No.    Are there any barriers preventing you from taking care of your own finances?  No.    Are there any barriers preventing you from managing your medications?  No.    Are there any barriers preventing you from showering, bathing or dressing yourself?  No.    Are you currently engaging in any exercise or physical activity?  Yes.  3-5 miles a day  What is your perception of your health?  Fair.    Health Maintenance Summary                HEPATITIS C SCREENING Overdue 1950     IMM ZOSTER VACCINES Overdue 5/13/2001     IMM HEP B VACCINE Overdue 5/30/2020      Done 1/30/2020 Imm Admin: Hepatitis B Vaccine Recombivax (Adol/Adult)     Patient has more history with this topic...    Annual Wellness Visit Overdue 7/8/2020      Done 7/8/2019 SUBSEQUENT ANNUAL WELLNESS VISIT-INCLUDES PPPS ()     Patient has more history with this topic...    DIABETES MONOFILAMENT / LE EXAM Overdue 7/8/2020      Done 7/8/2019      Patient has more history with this topic...    RETINAL SCREENING Next Due 8/16/2020      Done 8/16/2019 REFERRAL FOR RETINAL SCREENING EXAM     Patient has more history with this topic...    IMM INFLUENZA Next Due 9/1/2020      Done 11/1/2019 Imm Admin: Influenza Vaccine Adult HD     Patient has more history with this topic...    A1C SCREENING Next Due 11/18/2020      Done 5/18/2020 HEMOGLOBIN A1C     Patient has more history with this topic...    URINE ACR / MICROALBUMIN Next Due 1/21/2021      Done 1/21/2020 MICROALBUMIN CREAT RATIO URINE     Patient has more history with this topic...    COLONOSCOPY Next Due 2/16/2021      Done 2/16/2018 REFERRAL TO GI FOR COLONOSCOPY     Patient has more history with this topic...    FASTING LIPID PROFILE Next Due 5/18/2021      Done 5/18/2020 LIPID PROFILE     Patient has more  history with this topic...    SERUM CREATININE Next Due 5/18/2021      Done 8/13/2019 Ext Proc: COMP METABOLIC PANEL     Patient has more history with this topic...    IMM DTaP/Tdap/Td Vaccine Next Due 5/7/2025      Done 5/7/2015 Imm Admin: Tdap Vaccine        Patient Care Team:  Oxana Parkinson M.D. as PCP - General (Family Medicine)  Randy Long M.D. as Consulting Physician (Endocrinology)  Monica Morrison M.D. as Consulting Physician (Cardiology)  Ainsley Moyer R.N. as Registered Nurse (Diabetic Education)  Shun Benoit M.D. as Consulting Physician (Cardiology)    Social History     Tobacco Use   • Smoking status: Never Smoker   • Smokeless tobacco: Never Used   Substance Use Topics   • Alcohol use: Yes     Alcohol/week: 0.6 oz     Types: 1 Glasses of wine per week     Comment: occ   • Drug use: Yes     Comment: CBD     Family History   Problem Relation Age of Onset   • Cancer Mother    • Hypertension Father    • Hyperlipidemia Brother         x 2   • Hypertension Brother    • Heart Disease Brother         x2, afib; cad x 1   • Cancer Paternal Uncle    • Diabetes Neg Hx      He  has a past medical history of Backpain (10/10/2019), Bilateral renal cysts, C. 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   Past Surgical History:   Procedure Laterality Date   • GASTROSCOPY  2/25/2020    Procedure: GASTROSCOPY - POSS BIOPSY, DILATION, POLYPECTOMY, CONTROL OF HEMORRHAGE;  Surgeon: Bin Verduzco D.O.;  Location: SURGERY [facility];  Service: Gastroenterology   • PB IMPLANT NEUROSTIM/  7/16/2019     Procedure: INSERTION, NEUROSTIMULATOR, PERMANENT, SPINAL CORD- CERVICAL AND LUMBAR;  Surgeon: Dileep Wheeler M.D.;  Location: SURGERY [facility];  Service: Pain Management   • MULTIPLE CORONARY ARTERY BYPASS ENDO VEIN HARVEST  7/22/2016    Procedure: MULTIPLE CORONARY ARTERY BYPASS ENDO VEIN HARVEST;  Surgeon: David Flowers M.D.;  Location: SURGERY [facility];  Service:    • LILLY  7/22/2016    Procedure: LILLY;  Surgeon: David Flowers M.D.;  Location: SURGERY [facility];  Service:    • RECOVERY  7/21/2016    Procedure: CATH LAB [facility] WITH POSSIBLE DR. LYN;  Surgeon: Recoveryonly Surgery;  Location: SURGERY PRE-POST PROC UNIT [facility] [place] [place];  Service:    • OTHER NEUROLOGICAL SURG  2014    cervical fusion   • OTHER CARDIAC SURGERY  2013    stent   • CHOLECYSTECTOMY  2004     Exam:   There were no vitals taken for this visit. There is no height or weight on file to calculate BMI.  Hearing good.    Dentition fair  Alert, oriented in no acute distress.  Eye contact is good, speech goal directed, affect calm    Labs  Reviewed and discussed  Lab Results   Component Value Date/Time    CHOLSTRLTOT 117 05/18/2020 11:07 AM    LDL 47 05/18/2020 11:07 AM    HDL 41 05/18/2020 11:07 AM    TRIGLYCERIDE 146 05/18/2020 11:07 AM       Lab Results   Component Value Date/Time    SODIUM 137 05/18/2020 11:07 AM    POTASSIUM 4.8 05/18/2020 11:07 AM    CHLORIDE 100 05/18/2020 11:07 AM    CO2 26 05/18/2020 11:07 AM    GLUCOSE 126 (H) 05/18/2020 11:07 AM    BUN 20 05/18/2020 11:07 AM    CREATININE 1.12 05/18/2020 11:07 AM     Lab Results   Component Value Date/Time    ALKPHOSPHAT 62 05/18/2020 11:07 AM    ASTSGOT 19 05/18/2020 11:07 AM    ALTSGPT 36 05/18/2020 11:07 AM    TBILIRUBIN 0.8 05/18/2020 11:07 AM      Lab Results   Component Value Date/Time    HBA1C 7.5 (H) 05/18/2020 11:07 AM    HBA1C 7.0 (H) 01/21/2020 09:32 AM    HBA1C 6.4 (H) 07/15/2019 10:27 AM     No results found for: TSH  Lab Results   Component Value  Date/Time    FREET4 0.82 03/08/2013 01:00 AM     Lab Results   Component Value Date/Time    WBC 9.0 05/18/2020 11:07 AM    RBC 6.21 (H) 05/18/2020 11:07 AM    HEMOGLOBIN 15.5 05/18/2020 11:07 AM    HEMATOCRIT 50.0 05/18/2020 11:07 AM    MCV 80.5 (L) 05/18/2020 11:07 AM    MCH 25.0 (L) 05/18/2020 11:07 AM    MCHC 31.0 (L) 05/18/2020 11:07 AM    MPV 11.1 05/18/2020 11:07 AM    NEUTSPOLYS 70.30 05/18/2020 11:07 AM    LYMPHOCYTES 21.00 (L) 05/18/2020 11:07 AM    MONOCYTES 6.50 05/18/2020 11:07 AM    EOSINOPHILS 1.20 05/18/2020 11:07 AM    BASOPHILS 0.60 05/18/2020 11:07 AM    HYPOCHROMIA 1+ 09/16/2013 01:13 PM    ANISOCYTOSIS 1+ 07/05/2019 03:18 PM      Imaging  Reviewed and discussed    Assessment and Plan    1. Medicare annual wellness visit, subsequent  Reviewed PMH, PSH, FH, SH, ALL, MEDS, HCM/IMM.   Advised healthy habits, diet, exercise  - Subsequent Annual Wellness Visit - Includes PPPS ()    2. Health care maintenance  Per HPI  - Subsequent Annual Wellness Visit - Includes PPPS ()    3. Need for hepatitis B vaccination  Information was provided to the patient regarding the vaccine, including side effects. Vaccine was given by my medical assistant under my supervision.  - Subsequent Annual Wellness Visit - Includes PPPS ()  - Hepatitis B Vaccine Adult IM    4. Need for hepatitis C screening test  - Subsequent Annual Wellness Visit - Includes PPPS ()  - HEP C VIRUS ANTIBODY; Future    5. Controlled type 2 diabetes mellitus with complication, without long-term current use of insulin (HCC)  Controlled, continue with current treatment, endocrinology follow-up  - Subsequent Annual Wellness Visit - Includes PPPS ()  - Comp Metabolic Panel; Future  - HEMOGLOBIN A1C; Future  - MICROALBUMIN CREAT RATIO URINE; Future  - metFORMIN ER (GLUCOPHAGE XR) 750 MG TABLET SR 24 HR; Take 1 Tab by mouth every day.  Dispense: 180 Tab; Refill: 0  - metFORMIN ER (GLUCOPHAGE XR) 750 MG TABLET SR 24 HR; Take 1 Tab  by mouth every day.  Dispense: 180 Tab; Refill: 0    6. Microalbuminuria due to type 2 diabetes mellitus (HCC)  As above  - Subsequent Annual Wellness Visit - Includes PPPS ()  - MICROALBUMIN CREAT RATIO URINE; Future    7. CKD (chronic kidney disease), stage III (HCC)  Stable, advised to continue good fluid intake, avoid NSAIDs  - Subsequent Annual Wellness Visit - Includes PPPS ()  - Comp Metabolic Panel; Future    8. Essential hypertension  Controlled CVS conditions, continue current treatment and cardiology follow-up  - Subsequent Annual Wellness Visit - Includes PPPS ()  - Comp Metabolic Panel; Future  9. Coronary artery disease due to calcified coronary lesion  - Subsequent Annual Wellness Visit - Includes PPPS ()  10. S/P CABG x 3  - Subsequent Annual Wellness Visit - Includes PPPS ()  11. Other cardiomyopathy (HCC)  - Subsequent Annual Wellness Visit - Includes PPPS ()  12. Chronic systolic congestive heart failure (HCC)  - Subsequent Annual Wellness Visit - Includes PPPS ()    13. Dyslipidemia  Controlled, continue with current treatment.  - Subsequent Annual Wellness Visit - Includes PPPS ()  - Comp Metabolic Panel; Future  - Lipid Profile; Future    14. Iron deficiency anemia, unspecified iron deficiency anemia type  Iron level was low  - REFERRAL TO GASTROENTEROLOGY    15. Fatigue, unspecified type  F/u labs  - Subsequent Annual Wellness Visit - Includes PPPS ()  - CBC WITH DIFFERENTIAL; Future  - TSH WITH REFLEX TO FT4; Future    16. Hypovitaminosis D  -2 8 2000 units of vitamin D daily, OTC  - Subsequent Annual Wellness Visit - Includes PPPS ()  - VITAMIN D,25 HYDROXY; Future    17. Gastroesophageal reflux disease without esophagitis  Controlled, continue with current treatment.  - Subsequent Annual Wellness Visit - Includes PPPS ()    18. Benign prostatic hyperplasia with lower urinary tract symptoms, symptom details unspecified  Uncontrolled,  added 2000 units of vitamin D daily, OTC  - Subsequent Annual Wellness Visit - Includes PPPS ()  - tamsulosin (FLOMAX) 0.4 MG capsule; Take 2 Caps by mouth ONE-HALF HOUR AFTER BREAKFAST.  Dispense: 60 Cap; Refill: 0    19. Idiopathic gout, unspecified chronicity, unspecified site  Controlled, continue current treatment  - Subsequent Annual Wellness Visit - Includes PPPS ()    20. TEMO (generalized anxiety disorder)  Controlled, continue with Celexa 20 mg daily  - Subsequent Annual Wellness Visit - Includes PPPS ()    21. Bilateral renal cysts  Reviewed imaging  - Subsequent Annual Wellness Visit - Includes PPPS ()    22. Actinic keratosis  He has been followed up by endocrinology, advised to decrease sun exposure, use sunscreen  - Subsequent Annual Wellness Visit - Includes PPPS ()    Services suggested: No services needed at this time  Health Care Screening: Age-appropriate preventive services recommended by USPTF and ACIP covered by Medicare were discussed today. Services ordered if indicated and agreed upon by the patient.  Referrals offered: Community-based lifestyle interventions to reduce health risks and promote self-management and wellness, fall prevention, nutrition, physical activity, tobacco-use cessation, weight loss, and mental health services as per orders if indicated.    Discussion today about general wellness and lifestyle habits:    · Prevent falls and reduce trip hazards; Cautioned about securing or removing rugs.  · Have a working fire alarm and carbon monoxide detector;   · Engage in regular physical activity and social activities     Follow-up: in 3 months with labs    I attest that I saw this patient on this date and due to technical issues am not showing as the author of the note.

## 2020-07-29 DIAGNOSIS — I50.22 CHRONIC SYSTOLIC CONGESTIVE HEART FAILURE (HCC): Chronic | ICD-10-CM

## 2020-07-29 DIAGNOSIS — I25.84 CORONARY ARTERY DISEASE DUE TO CALCIFIED CORONARY LESION: ICD-10-CM

## 2020-07-29 DIAGNOSIS — I10 ESSENTIAL HYPERTENSION: Chronic | ICD-10-CM

## 2020-07-29 DIAGNOSIS — I25.5 ISCHEMIC CARDIOMYOPATHY: ICD-10-CM

## 2020-07-29 DIAGNOSIS — Z95.1 S/P CABG X 3: ICD-10-CM

## 2020-07-29 DIAGNOSIS — E78.5 DYSLIPIDEMIA: ICD-10-CM

## 2020-07-29 DIAGNOSIS — I25.10 CORONARY ARTERY DISEASE DUE TO CALCIFIED CORONARY LESION: ICD-10-CM

## 2020-07-29 RX ORDER — EZETIMIBE 10 MG/1
10 TABLET ORAL
Qty: 90 TAB | Refills: 0 | Status: SHIPPED | OUTPATIENT
Start: 2020-07-29 | End: 2020-10-25

## 2020-07-30 RX ORDER — CARVEDILOL 6.25 MG/1
6.25 TABLET ORAL 2 TIMES DAILY WITH MEALS
Qty: 180 TAB | Refills: 0 | Status: SHIPPED | OUTPATIENT
Start: 2020-07-30 | End: 2020-11-09 | Stop reason: SDUPTHER

## 2020-08-31 DIAGNOSIS — N40.1 BENIGN PROSTATIC HYPERPLASIA WITH LOWER URINARY TRACT SYMPTOMS, SYMPTOM DETAILS UNSPECIFIED: ICD-10-CM

## 2020-08-31 RX ORDER — TAMSULOSIN HYDROCHLORIDE 0.4 MG/1
CAPSULE ORAL
Qty: 180 CAP | Refills: 0 | Status: SHIPPED | OUTPATIENT
Start: 2020-08-31 | End: 2020-11-25 | Stop reason: SDUPTHER

## 2020-09-25 DIAGNOSIS — M10.00 IDIOPATHIC GOUT, UNSPECIFIED CHRONICITY, UNSPECIFIED SITE: ICD-10-CM

## 2020-09-25 DIAGNOSIS — Z76.0 MEDICATION REFILL: ICD-10-CM

## 2020-09-25 RX ORDER — ALLOPURINOL 300 MG/1
TABLET ORAL
Qty: 90 TAB | Refills: 3 | Status: SHIPPED | OUTPATIENT
Start: 2020-09-25 | End: 2021-08-22

## 2020-09-25 RX ORDER — CITALOPRAM 20 MG/1
TABLET ORAL
Qty: 90 TAB | Refills: 0 | Status: SHIPPED | OUTPATIENT
Start: 2020-09-25 | End: 2020-12-02 | Stop reason: SDUPTHER

## 2020-09-29 DIAGNOSIS — E11.8 CONTROLLED TYPE 2 DIABETES MELLITUS WITH COMPLICATION, WITHOUT LONG-TERM CURRENT USE OF INSULIN (HCC): ICD-10-CM

## 2020-09-29 RX ORDER — DULAGLUTIDE 0.75 MG/.5ML
INJECTION, SOLUTION SUBCUTANEOUS
Qty: 6 ML | Refills: 0 | Status: SHIPPED | OUTPATIENT
Start: 2020-09-29 | End: 2020-12-28 | Stop reason: SDUPTHER

## 2020-10-06 ENCOUNTER — NON-PROVIDER VISIT (OUTPATIENT)
Dept: CARDIOLOGY | Facility: MEDICAL CENTER | Age: 70
End: 2020-10-06
Payer: MEDICARE

## 2020-10-06 DIAGNOSIS — I25.5 ISCHEMIC CARDIOMYOPATHY: ICD-10-CM

## 2020-10-06 DIAGNOSIS — Z95.810 AICD (AUTOMATIC CARDIOVERTER/DEFIBRILLATOR) PRESENT: ICD-10-CM

## 2020-10-06 PROCEDURE — 93295 DEV INTERROG REMOTE 1/2/MLT: CPT | Performed by: INTERNAL MEDICINE

## 2020-10-25 DIAGNOSIS — E78.5 DYSLIPIDEMIA: ICD-10-CM

## 2020-10-25 RX ORDER — OMEPRAZOLE 20 MG/1
CAPSULE, DELAYED RELEASE ORAL
Qty: 90 CAP | Refills: 3 | Status: SHIPPED | OUTPATIENT
Start: 2020-10-25 | End: 2021-09-21

## 2020-10-25 RX ORDER — EZETIMIBE 10 MG/1
TABLET ORAL
Qty: 90 TAB | Refills: 3 | Status: SHIPPED | OUTPATIENT
Start: 2020-10-25

## 2020-11-02 ENCOUNTER — TELEPHONE (OUTPATIENT)
Dept: CARDIOLOGY | Facility: MEDICAL CENTER | Age: 70
End: 2020-11-02

## 2020-11-02 NOTE — TELEPHONE ENCOUNTER
Remote Transmission 11/2/2020:    1-NSVT episode (19 bts) 10/31/2020@4:33pm lasting 6 seconds.     Scanned into media.

## 2020-11-04 NOTE — TELEPHONE ENCOUNTER
"Returned pt call and reviewed findings.  Pt states, \"I feel fine I didn't noticed anything.\"  Pt states no other concerns or questions at this time and is appreciative of information given.  "

## 2020-11-09 ENCOUNTER — HOSPITAL ENCOUNTER (OUTPATIENT)
Dept: LAB | Facility: MEDICAL CENTER | Age: 70
End: 2020-11-09
Attending: INTERNAL MEDICINE
Payer: MEDICARE

## 2020-11-09 ENCOUNTER — PATIENT MESSAGE (OUTPATIENT)
Dept: CARDIOLOGY | Facility: MEDICAL CENTER | Age: 70
End: 2020-11-09

## 2020-11-09 DIAGNOSIS — I25.10 CORONARY ARTERY DISEASE DUE TO CALCIFIED CORONARY LESION: ICD-10-CM

## 2020-11-09 DIAGNOSIS — I50.22 CHRONIC SYSTOLIC CONGESTIVE HEART FAILURE (HCC): Chronic | ICD-10-CM

## 2020-11-09 DIAGNOSIS — E55.9 HYPOVITAMINOSIS D: ICD-10-CM

## 2020-11-09 DIAGNOSIS — N18.30 CKD (CHRONIC KIDNEY DISEASE), STAGE III: ICD-10-CM

## 2020-11-09 DIAGNOSIS — E11.8 CONTROLLED TYPE 2 DIABETES MELLITUS WITH COMPLICATION, WITHOUT LONG-TERM CURRENT USE OF INSULIN (HCC): ICD-10-CM

## 2020-11-09 DIAGNOSIS — Z95.1 S/P CABG X 3: ICD-10-CM

## 2020-11-09 DIAGNOSIS — I25.5 ISCHEMIC CARDIOMYOPATHY: ICD-10-CM

## 2020-11-09 DIAGNOSIS — R80.9 MICROALBUMINURIA DUE TO TYPE 2 DIABETES MELLITUS (HCC): ICD-10-CM

## 2020-11-09 DIAGNOSIS — I10 ESSENTIAL HYPERTENSION: Chronic | ICD-10-CM

## 2020-11-09 DIAGNOSIS — R53.83 FATIGUE, UNSPECIFIED TYPE: ICD-10-CM

## 2020-11-09 DIAGNOSIS — E11.29 MICROALBUMINURIA DUE TO TYPE 2 DIABETES MELLITUS (HCC): ICD-10-CM

## 2020-11-09 DIAGNOSIS — Z11.59 NEED FOR HEPATITIS C SCREENING TEST: ICD-10-CM

## 2020-11-09 DIAGNOSIS — E78.5 DYSLIPIDEMIA: ICD-10-CM

## 2020-11-09 DIAGNOSIS — I25.84 CORONARY ARTERY DISEASE DUE TO CALCIFIED CORONARY LESION: ICD-10-CM

## 2020-11-09 LAB
25(OH)D3 SERPL-MCNC: 59 NG/ML (ref 30–100)
ALBUMIN SERPL BCP-MCNC: 4.6 G/DL (ref 3.2–4.9)
ALBUMIN/GLOB SERPL: 1.9 G/DL
ALP SERPL-CCNC: 55 U/L (ref 30–99)
ALT SERPL-CCNC: 23 U/L (ref 2–50)
ANION GAP SERPL CALC-SCNC: 9 MMOL/L (ref 7–16)
AST SERPL-CCNC: 23 U/L (ref 12–45)
BASOPHILS # BLD AUTO: 0.9 % (ref 0–1.8)
BASOPHILS # BLD: 0.08 K/UL (ref 0–0.12)
BILIRUB SERPL-MCNC: 0.7 MG/DL (ref 0.1–1.5)
BUN SERPL-MCNC: 18 MG/DL (ref 8–22)
CALCIUM SERPL-MCNC: 10.2 MG/DL (ref 8.5–10.5)
CHLORIDE SERPL-SCNC: 102 MMOL/L (ref 96–112)
CHOLEST SERPL-MCNC: 99 MG/DL (ref 100–199)
CO2 SERPL-SCNC: 26 MMOL/L (ref 20–33)
CREAT SERPL-MCNC: 1.1 MG/DL (ref 0.5–1.4)
CREAT UR-MCNC: 119.02 MG/DL
EOSINOPHIL # BLD AUTO: 0.27 K/UL (ref 0–0.51)
EOSINOPHIL NFR BLD: 3.1 % (ref 0–6.9)
ERYTHROCYTE [DISTWIDTH] IN BLOOD BY AUTOMATED COUNT: 48 FL (ref 35.9–50)
FASTING STATUS PATIENT QL REPORTED: NORMAL
GLOBULIN SER CALC-MCNC: 2.4 G/DL (ref 1.9–3.5)
GLUCOSE SERPL-MCNC: 127 MG/DL (ref 65–99)
HCT VFR BLD AUTO: 52.2 % (ref 42–52)
HCV AB SER QL: NORMAL
HDLC SERPL-MCNC: 35 MG/DL
HGB BLD-MCNC: 16.6 G/DL (ref 14–18)
IMM GRANULOCYTES # BLD AUTO: 0.04 K/UL (ref 0–0.11)
IMM GRANULOCYTES NFR BLD AUTO: 0.5 % (ref 0–0.9)
LDLC SERPL CALC-MCNC: 29 MG/DL
LYMPHOCYTES # BLD AUTO: 1.93 K/UL (ref 1–4.8)
LYMPHOCYTES NFR BLD: 21.9 % (ref 22–41)
MCH RBC QN AUTO: 26.6 PG (ref 27–33)
MCHC RBC AUTO-ENTMCNC: 31.8 G/DL (ref 33.7–35.3)
MCV RBC AUTO: 83.7 FL (ref 81.4–97.8)
MICROALBUMIN UR-MCNC: 18.9 MG/DL
MICROALBUMIN/CREAT UR: 159 MG/G (ref 0–30)
MONOCYTES # BLD AUTO: 0.66 K/UL (ref 0–0.85)
MONOCYTES NFR BLD AUTO: 7.5 % (ref 0–13.4)
NEUTROPHILS # BLD AUTO: 5.83 K/UL (ref 1.82–7.42)
NEUTROPHILS NFR BLD: 66.1 % (ref 44–72)
NRBC # BLD AUTO: 0 K/UL
NRBC BLD-RTO: 0 /100 WBC
PLATELET # BLD AUTO: 172 K/UL (ref 164–446)
PMV BLD AUTO: 11.5 FL (ref 9–12.9)
POTASSIUM SERPL-SCNC: 4.9 MMOL/L (ref 3.6–5.5)
PROT SERPL-MCNC: 7 G/DL (ref 6–8.2)
RBC # BLD AUTO: 6.24 M/UL (ref 4.7–6.1)
SODIUM SERPL-SCNC: 137 MMOL/L (ref 135–145)
TRIGL SERPL-MCNC: 177 MG/DL (ref 0–149)
TSH SERPL DL<=0.005 MIU/L-ACNC: 2.74 UIU/ML (ref 0.38–5.33)
WBC # BLD AUTO: 8.8 K/UL (ref 4.8–10.8)

## 2020-11-09 PROCEDURE — 84443 ASSAY THYROID STIM HORMONE: CPT

## 2020-11-09 PROCEDURE — 82306 VITAMIN D 25 HYDROXY: CPT

## 2020-11-09 PROCEDURE — 82570 ASSAY OF URINE CREATININE: CPT

## 2020-11-09 PROCEDURE — 83036 HEMOGLOBIN GLYCOSYLATED A1C: CPT

## 2020-11-09 PROCEDURE — 82043 UR ALBUMIN QUANTITATIVE: CPT

## 2020-11-09 PROCEDURE — 80061 LIPID PANEL: CPT

## 2020-11-09 PROCEDURE — 36415 COLL VENOUS BLD VENIPUNCTURE: CPT

## 2020-11-09 PROCEDURE — 80053 COMPREHEN METABOLIC PANEL: CPT

## 2020-11-09 PROCEDURE — 86803 HEPATITIS C AB TEST: CPT

## 2020-11-09 PROCEDURE — 85025 COMPLETE CBC W/AUTO DIFF WBC: CPT

## 2020-11-09 RX ORDER — CARVEDILOL 6.25 MG/1
6.25 TABLET ORAL 2 TIMES DAILY WITH MEALS
Qty: 180 TAB | Refills: 3 | Status: SHIPPED | OUTPATIENT
Start: 2020-11-09 | End: 2021-11-18

## 2020-11-10 LAB
EST. AVERAGE GLUCOSE BLD GHB EST-MCNC: 160 MG/DL
HBA1C MFR BLD: 7.2 % (ref 0–5.6)

## 2020-11-16 ENCOUNTER — OFFICE VISIT (OUTPATIENT)
Dept: CARDIOLOGY | Facility: MEDICAL CENTER | Age: 70
End: 2020-11-16
Payer: MEDICARE

## 2020-11-16 VITALS
SYSTOLIC BLOOD PRESSURE: 120 MMHG | DIASTOLIC BLOOD PRESSURE: 78 MMHG | HEIGHT: 71 IN | WEIGHT: 210 LBS | OXYGEN SATURATION: 94 % | HEART RATE: 78 BPM | BODY MASS INDEX: 29.4 KG/M2

## 2020-11-16 DIAGNOSIS — I50.22 CHRONIC SYSTOLIC CONGESTIVE HEART FAILURE (HCC): Chronic | ICD-10-CM

## 2020-11-16 DIAGNOSIS — E78.5 DYSLIPIDEMIA: ICD-10-CM

## 2020-11-16 DIAGNOSIS — I10 ESSENTIAL HYPERTENSION: Chronic | ICD-10-CM

## 2020-11-16 DIAGNOSIS — I25.84 CORONARY ARTERY DISEASE DUE TO CALCIFIED CORONARY LESION: ICD-10-CM

## 2020-11-16 DIAGNOSIS — Z95.1 S/P CABG X 3: ICD-10-CM

## 2020-11-16 DIAGNOSIS — I25.10 CORONARY ARTERY DISEASE DUE TO CALCIFIED CORONARY LESION: ICD-10-CM

## 2020-11-16 DIAGNOSIS — I25.5 ISCHEMIC CARDIOMYOPATHY: ICD-10-CM

## 2020-11-16 PROCEDURE — 99214 OFFICE O/P EST MOD 30 MIN: CPT | Performed by: INTERNAL MEDICINE

## 2020-11-16 RX ORDER — GABAPENTIN 300 MG/1
CAPSULE ORAL
COMMUNITY
Start: 2020-10-22

## 2020-11-16 RX ORDER — EMPAGLIFLOZIN 25 MG/1
TABLET, FILM COATED ORAL
COMMUNITY
Start: 2020-10-22

## 2020-11-16 RX ORDER — GLIPIZIDE 5 MG/1
TABLET ORAL
COMMUNITY
Start: 2020-10-22

## 2020-11-16 ASSESSMENT — ENCOUNTER SYMPTOMS
DIZZINESS: 0
FOCAL WEAKNESS: 0
CHILLS: 0
SORE THROAT: 0
PALPITATIONS: 0
BRUISES/BLEEDS EASILY: 0
FEVER: 0
BLURRED VISION: 0
COUGH: 0
ABDOMINAL PAIN: 0
WEAKNESS: 0
CLAUDICATION: 0
FALLS: 0
PND: 0
NAUSEA: 0
SHORTNESS OF BREATH: 0

## 2020-11-16 ASSESSMENT — FIBROSIS 4 INDEX: FIB4 SCORE: 1.92

## 2020-11-17 NOTE — PROGRESS NOTES
Chief Complaint   Patient presents with   • Chest Pain     1 Year Follow Up       Subjective:   Luis Renner is a 70 y.o. male who presents today for follow-up of his history of ischemic cardiomyopathy with mildly reduced ejection fraction currently the stenting he has been doing well over the past year no major health concerns tolerating his medications well including antiplatelet    We saw brief NSVT on his monitor check    Past Medical History:   Diagnosis Date   • Backpain 10/10/2019    and neck, 0/10   • Bilateral renal cysts    • C. difficile diarrhea     pt with hx of c diff after hospitalization [id number]   • CAD (coronary artery disease) March 2013    ACS. PCI/ANA PAULA x 2 of the LAD (2.5 x 12mm - mid; 2.75 x 16mm - proximal). Distal RCA is occluded; distal circumflex is occluded with good collateralization.   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   • Myocardial infarct (HCC) 3/7/2013   • Psychiatric problem     anxiety   • Snoring     no sleep study     Past Surgical History:   Procedure Laterality Date   • GASTROSCOPY  2/25/2020    Procedure: GASTROSCOPY - POSS BIOPSY, DILATION, POLYPECTOMY, CONTROL OF HEMORRHAGE;  Surgeon: Bin Verduzco D.O.;  Location: [facility];  Service: Gastroenterology   • PB IMPLANT NEUROSTIM/  7/16/2019    Procedure: INSERTION, NEUROSTIMULATOR, PERMANENT, SPINAL CORD- CERVICAL AND LUMBAR;  Surgeon: Dileep Wheeler M.D.;  Location: [facility];  Service: Pain Management   • MULTIPLE CORONARY ARTERY BYPASS ENDO VEIN HARVEST   7/22/2016    Procedure: MULTIPLE CORONARY ARTERY BYPASS ENDO VEIN HARVEST;  Surgeon: David Flowers M.D.;  Location: SURGERY [facility];  Service:    • LILLY  7/22/2016    Procedure: LILLY;  Surgeon: David Flowers M.D.;  Location: SURGERY [facility];  Service:    • RECOVERY  7/21/2016    Procedure: CATH LAB [facility] WITH POSSIBLE DR. LYN;  Surgeon: Recoveryonly Surgery;  Location: SURGERY PRE-POST PROC [facility]IT [facility];  Service:    • OTHER NEUROLOGICAL SURG  2014    cervical fusion   • OTHER CARDIAC SURGERY  2013    stent   • CHOLECYSTECTOMY  2004     Family History   Problem Relation Age of Onset   • Cancer Mother    • Hypertension Father    • Hyperlipidemia Brother         x 2   • Hypertension Brother    • Heart Disease Brother         x2, afib; cad x 1   • Cancer Paternal Uncle    • Diabetes Neg Hx      Social History     Socioeconomic History   • Marital status:      Spouse name: Not on file   • Number of children: Not on file   • Years of education: Not on file   • Highest education level: Not on file   Occupational History   • Not on file   Social Needs   • Financial resource strain: Not hard at all   • Food insecurity     Worry: Never true     Inability: Never true   • Transportation needs     Medical: No     Non-medical: No   Tobacco Use   • Smoking status: Never Smoker   • Smokeless tobacco: Never Used   Substance and Sexual Activity   • Alcohol use: Yes     Alcohol/week: 0.6 oz     Types: 1 Glasses of wine per week     Comment: occ   • Drug use: Yes     Comment: CBD   • Sexual activity: Yes     Partners: Female   Lifestyle   • Physical activity     Days per week: Not on file     Minutes per session: Not on file   • Stress: Not on file   Relationships   • Social connections     Talks on phone: Not on file     Gets together: Not on file     Attends Yazidi service: Not on file     Active member of club or organization: Not on file     Attends meetings of clubs or organizations: Not on file      Relationship status: Not on file   • Intimate partner violence     Fear of current or ex partner: Not on file     Emotionally abused: Not on file     Physically abused: Not on file     Forced sexual activity: Not on file   Other Topics Concern   • Not on file   Social History Narrative   • Not on file     No Known Allergies  Outpatient Encounter Medications as of 11/16/2020   Medication Sig Dispense Refill   • JARDIANCE 25 MG Tab      • gabapentin (NEURONTIN) 300 MG Cap      • glipiZIDE (GLUCOTROL) 5 MG Tab      • carvedilol (COREG) 6.25 MG Tab Take 1 Tab by mouth 2 times a day, with meals. 180 Tab 3   • omeprazole (PRILOSEC) 20 MG delayed-release capsule Take 1 capsule by mouth daily as needed.  90 Cap 3   • ezetimibe (ZETIA) 10 MG Tab Take 1 tablet by mouth daily. 90 Tab 3   • TRULICITY 0.75 MG/0.5ML Solution Pen-injector Inject 0.5 mL as instructed every 7 days. 6 mL 0   • allopurinol (ZYLOPRIM) 300 MG Tab Take 1 tablet by mouth daily. 90 Tab 3   • citalopram (CELEXA) 20 MG Tab TAKE ONE TABLET BY MOUTH EVERY DAY 90 Tab 0   • tamsulosin (FLOMAX) 0.4 MG capsule TAKE TWO CAPSULES BY MOUTH ONE HALF HOUR AFTER BREAKFAST 180 Cap 0   • metFORMIN ER (GLUCOPHAGE XR) 750 MG TABLET SR 24 HR Take 1 Tab by mouth every day. 180 Tab 0   • metFORMIN ER (GLUCOPHAGE XR) 750 MG TABLET SR 24 HR Take 1 Tab by mouth every day. 180 Tab 0   • rosuvastatin (CRESTOR) 20 MG Tab Take 1 Tab by mouth every evening. 90 Tab 2   • ENTRESTO 24-26 MG Tab tablet Take 1 Tab by mouth 2 Times a Day. Indications: Heart Failure 180 Tab 2   • clopidogrel (PLAVIX) 75 MG Tab Take 1 tablet by mouth daily. 90 Tab 3   • spironolactone (ALDACTONE) 25 MG Tab Take 1 tablet by mouth daily. 90 Tab 3   • acetaminophen (TYLENOL) 500 MG Tab Take 1,500 mg by mouth 2 Times a Day.     • WELCHOL 625 MG Tab Take 625 mg by mouth every day.     • Lancets 1 Each by Does not apply route 3 times a day. Lancets for Freestyle Lite meter. Sig: use TID and prn ssx high or low  "sugar. 100 Each 11     No facility-administered encounter medications on file as of 11/16/2020.      Review of Systems   Constitutional: Negative for chills and fever.   HENT: Negative for sore throat.    Eyes: Negative for blurred vision.   Respiratory: Negative for cough and shortness of breath.    Cardiovascular: Negative for chest pain, palpitations, claudication, leg swelling and PND.   Gastrointestinal: Negative for abdominal pain and nausea.   Musculoskeletal: Negative for falls and joint pain.   Skin: Negative for rash.   Neurological: Negative for dizziness, focal weakness and weakness.   Endo/Heme/Allergies: Does not bruise/bleed easily.        Objective:   /78 (BP Location: Left arm, Patient Position: Sitting, BP Cuff Size: Adult)   Pulse 78   Ht 1.803 m (5' 11\")   Wt 95.3 kg (210 lb)   SpO2 94%   BMI 29.29 kg/m²     Physical Exam   Constitutional: No distress.   HENT:   Patient wearing a mask due to COVID precautions   Eyes: No scleral icterus.   Neck: No JVD present.   Cardiovascular: Normal rate and normal heart sounds. Exam reveals no gallop and no friction rub.   No murmur heard.  Pulmonary/Chest: No respiratory distress. He has no wheezes. He has no rales.   Abdominal: Soft. Bowel sounds are normal.   Musculoskeletal:         General: No edema.   Neurological: He is alert.   Skin: No rash noted. He is not diaphoretic.   Psychiatric: He has a normal mood and affect.     We reviewed in person the most recent labs  Recent Results (from the past 4032 hour(s))   HEP C VIRUS ANTIBODY    Collection Time: 11/09/20 10:37 AM   Result Value Ref Range    Hepatitis C Antibody Non-Reactive Non-Reactive   TSH WITH REFLEX TO FT4    Collection Time: 11/09/20 10:37 AM   Result Value Ref Range    TSH 2.740 0.380 - 5.330 uIU/mL   VITAMIN D,25 HYDROXY    Collection Time: 11/09/20 10:37 AM   Result Value Ref Range    25-Hydroxy   Vitamin D 25 59 30 - 100 ng/mL   Lipid Profile    Collection Time: 11/09/20 10:37 " AM   Result Value Ref Range    Cholesterol,Tot 99 (L) 100 - 199 mg/dL    Triglycerides 177 (H) 0 - 149 mg/dL    HDL 35 (A) >=40 mg/dL    LDL 29 <100 mg/dL   HEMOGLOBIN A1C    Collection Time: 11/09/20 10:37 AM   Result Value Ref Range    Glycohemoglobin 7.2 (H) 0.0 - 5.6 %    Est Avg Glucose 160 mg/dL   Comp Metabolic Panel    Collection Time: 11/09/20 10:37 AM   Result Value Ref Range    Sodium 137 135 - 145 mmol/L    Potassium 4.9 3.6 - 5.5 mmol/L    Chloride 102 96 - 112 mmol/L    Co2 26 20 - 33 mmol/L    Anion Gap 9.0 7.0 - 16.0    Glucose 127 (H) 65 - 99 mg/dL    Bun 18 8 - 22 mg/dL    Creatinine 1.10 0.50 - 1.40 mg/dL    Calcium 10.2 8.5 - 10.5 mg/dL    AST(SGOT) 23 12 - 45 U/L    ALT(SGPT) 23 2 - 50 U/L    Alkaline Phosphatase 55 30 - 99 U/L    Total Bilirubin 0.7 0.1 - 1.5 mg/dL    Albumin 4.6 3.2 - 4.9 g/dL    Total Protein 7.0 6.0 - 8.2 g/dL    Globulin 2.4 1.9 - 3.5 g/dL    A-G Ratio 1.9 g/dL   CBC WITH DIFFERENTIAL    Collection Time: 11/09/20 10:37 AM   Result Value Ref Range    WBC 8.8 4.8 - 10.8 K/uL    RBC 6.24 (H) 4.70 - 6.10 M/uL    Hemoglobin 16.6 14.0 - 18.0 g/dL    Hematocrit 52.2 (H) 42.0 - 52.0 %    MCV 83.7 81.4 - 97.8 fL    MCH 26.6 (L) 27.0 - 33.0 pg    MCHC 31.8 (L) 33.7 - 35.3 g/dL    RDW 48.0 35.9 - 50.0 fL    Platelet Count 172 164 - 446 K/uL    MPV 11.5 9.0 - 12.9 fL    Neutrophils-Polys 66.10 44.00 - 72.00 %    Lymphocytes 21.90 (L) 22.00 - 41.00 %    Monocytes 7.50 0.00 - 13.40 %    Eosinophils 3.10 0.00 - 6.90 %    Basophils 0.90 0.00 - 1.80 %    Immature Granulocytes 0.50 0.00 - 0.90 %    Nucleated RBC 0.00 /100 WBC    Neutrophils (Absolute) 5.83 1.82 - 7.42 K/uL    Lymphs (Absolute) 1.93 1.00 - 4.80 K/uL    Monos (Absolute) 0.66 0.00 - 0.85 K/uL    Eos (Absolute) 0.27 0.00 - 0.51 K/uL    Baso (Absolute) 0.08 0.00 - 0.12 K/uL    Immature Granulocytes (abs) 0.04 0.00 - 0.11 K/uL    NRBC (Absolute) 0.00 K/uL   ESTIMATED GFR    Collection Time: 11/09/20 10:37 AM   Result Value Ref  Range    GFR If African American >60 >60 mL/min/1.73 m 2    GFR If Non African American >60 >60 mL/min/1.73 m 2   MICROALBUMIN CREAT RATIO URINE    Collection Time: 11/09/20 10:38 AM   Result Value Ref Range    Creatinine, Urine 119.02 mg/dL    Microalbumin, Urine Random 18.9 mg/dL    Micro Alb Creat Ratio 159 (H) 0 - 30 mg/g   FASTING STATUS    Collection Time: 11/09/20 11:21 AM   Result Value Ref Range    Fasting Status Fasting        Assessment:     1. Dyslipidemia     2. S/P CABG x 3     3. Ischemic cardiomyopathy     4. Essential hypertension     5. Coronary artery disease due to calcified coronary lesion     6. Chronic systolic congestive heart failure (HCC)         Medical Decision Making:  Today's Assessment / Status / Plan:     It was my pleasure to meet with Mr. Renner.    Blood pressure is well controlled.  We specifically assessed the labs on hypertension treatment    He is on appropriate statin.    He is on maximally tolerated CHF regimen including SGLT2 inhibitor    I personally reviewed in person with the patient his most recent pacemaker check it is functioning appropriately.      I personally reviewed in person with the patient his most recent pacemaker check it is functioning appropriately.  I will see Mr. Renner back in 1 year time and encouraged him to follow up with us over the phone or electronically using my MyChart as issues arise.    It is my pleasure to participate in the care of Mr. Renner.  Please do not hesitate to contact me with questions or concerns.    Shun Benoit MD PhD [facility]  Cardiologist [facility]    Please note that this dictation was created using voice recognition software. There may be errors I did not discover before finalizing the note.

## 2020-11-25 DIAGNOSIS — N40.1 BENIGN PROSTATIC HYPERPLASIA WITH LOWER URINARY TRACT SYMPTOMS, SYMPTOM DETAILS UNSPECIFIED: ICD-10-CM

## 2020-11-25 RX ORDER — TAMSULOSIN HYDROCHLORIDE 0.4 MG/1
0.8 CAPSULE ORAL DAILY
Qty: 60 CAP | Refills: 0 | Status: SHIPPED | OUTPATIENT
Start: 2020-11-25 | End: 2020-12-02 | Stop reason: SDUPTHER

## 2020-12-02 ENCOUNTER — TELEMEDICINE (OUTPATIENT)
Dept: MEDICAL GROUP | Age: 70
End: 2020-12-02
Payer: MEDICARE

## 2020-12-02 VITALS — HEIGHT: 71 IN | BODY MASS INDEX: 28.28 KG/M2 | WEIGHT: 202 LBS | TEMPERATURE: 97.8 F

## 2020-12-02 DIAGNOSIS — F41.1 GENERALIZED ANXIETY DISORDER: ICD-10-CM

## 2020-12-02 DIAGNOSIS — E11.9 DIABETES MELLITUS TYPE II, NON INSULIN DEPENDENT (HCC): ICD-10-CM

## 2020-12-02 DIAGNOSIS — I10 ESSENTIAL HYPERTENSION: Chronic | ICD-10-CM

## 2020-12-02 DIAGNOSIS — I25.5 ISCHEMIC CARDIOMYOPATHY: ICD-10-CM

## 2020-12-02 DIAGNOSIS — Z12.5 SCREENING FOR MALIGNANT NEOPLASM OF PROSTATE: ICD-10-CM

## 2020-12-02 DIAGNOSIS — N40.1 BENIGN PROSTATIC HYPERPLASIA WITH LOWER URINARY TRACT SYMPTOMS, SYMPTOM DETAILS UNSPECIFIED: ICD-10-CM

## 2020-12-02 DIAGNOSIS — I25.84 CORONARY ARTERY DISEASE DUE TO CALCIFIED CORONARY LESION: ICD-10-CM

## 2020-12-02 DIAGNOSIS — I25.10 CORONARY ARTERY DISEASE DUE TO CALCIFIED CORONARY LESION: ICD-10-CM

## 2020-12-02 DIAGNOSIS — E78.5 DYSLIPIDEMIA: ICD-10-CM

## 2020-12-02 DIAGNOSIS — N18.30 STAGE 3 CHRONIC KIDNEY DISEASE, UNSPECIFIED WHETHER STAGE 3A OR 3B CKD: ICD-10-CM

## 2020-12-02 DIAGNOSIS — I50.22 CHRONIC SYSTOLIC CONGESTIVE HEART FAILURE (HCC): Chronic | ICD-10-CM

## 2020-12-02 DIAGNOSIS — R80.9 MICROALBUMINURIA DUE TO TYPE 2 DIABETES MELLITUS (HCC): ICD-10-CM

## 2020-12-02 DIAGNOSIS — E11.29 MICROALBUMINURIA DUE TO TYPE 2 DIABETES MELLITUS (HCC): ICD-10-CM

## 2020-12-02 PROCEDURE — 99214 OFFICE O/P EST MOD 30 MIN: CPT | Mod: 95,CR | Performed by: INTERNAL MEDICINE

## 2020-12-02 RX ORDER — TAMSULOSIN HYDROCHLORIDE 0.4 MG/1
0.8 CAPSULE ORAL DAILY
Qty: 180 CAP | Refills: 3 | Status: SHIPPED | OUTPATIENT
Start: 2020-12-02 | End: 2022-05-13 | Stop reason: SDUPTHER

## 2020-12-02 RX ORDER — METFORMIN HYDROCHLORIDE 750 MG/1
750 TABLET, EXTENDED RELEASE ORAL DAILY
Qty: 180 TAB | Refills: 3 | Status: SHIPPED | OUTPATIENT
Start: 2020-12-02 | End: 2021-09-20 | Stop reason: SDUPTHER

## 2020-12-02 RX ORDER — ROSUVASTATIN CALCIUM 20 MG/1
20 TABLET, COATED ORAL EVERY EVENING
Qty: 90 TAB | Refills: 3 | Status: SHIPPED | OUTPATIENT
Start: 2020-12-02 | End: 2021-02-24

## 2020-12-02 RX ORDER — CITALOPRAM 20 MG/1
20 TABLET ORAL
Qty: 90 TAB | Refills: 3 | Status: SHIPPED | OUTPATIENT
Start: 2020-12-02 | End: 2020-12-28 | Stop reason: SDUPTHER

## 2020-12-02 ASSESSMENT — FIBROSIS 4 INDEX: FIB4 SCORE: 1.92

## 2020-12-02 NOTE — PROGRESS NOTES
Telemedicine Visit: Established Patient     This Remote Face to Face encounter was conducted via Zoom. Given the importance of social distancing and other strategies recommended to reduce the risk of COVID-19 transmission, I am providing medical care to this patient via audio/video visit in place of an in person visit at the request of the patient. Verbal consent to telehealth, risks, benefits, and consequences were discussed. Patient retains the right to withdraw at any time. All existing confidentiality protections apply. The patient has access to all transmitted medical information. No dissemination of any patient images or information to other entities without further written consent.    CHIEF COMPLAINT     Chief Complaint   Patient presents with   • Lab Results       HPI  Luis Renner is a 70 y.o. male who presents today for the following     DM2 with CKD stage III, microalbuminuria, CKD  Interval course:  HBA1C 7.2 (H) 11/09/2020 10:37 AM   HBA1C 7.5 (H) 05/18/2020 11:07 AM   HBA1C 7.0 (H) 01/21/2020 09:32 AM     Onset:  DM2 since late 50'.       Meds:   - Trulicity 0.75 mg weekly  - Jardiance, 25 mg QD  - Glipizide 5 mg BID  - Metformin 850 mg QD - stopped few mos ago due to GI upset  Used meds as prescribed.    Compliance to meds: yes  Checking feet daily/wear soft socks/shoes: yes     Checking blood sugar: rarely  Hypoglycemia:  one remote episode   Symptoms of hypoglycemia: sweating, fatigue, pale skin, hunger.     ASA: yes  ACE: yes  Statin: yes     Diet: regular  Exercise: at least 4 d/w  BMI: 28     DM complications:   Peripheral neuropathy:            No numbness or tingling in feet.  Retinopathy:                            No retinopathy. Last eye exam: 8/2019  Nephropathy:                          Microalbumin: pos  CVS:                                        Has CAD  GI:                                           No gastropathy.     FH of DM:  none     Hypertension, cardiomyopathy, CAD (St post  CABG in 7/16), CHF  Meds: Lisinopril, 5 mg QD, spironolactone, 25 mg QD, carvedilol, 6.25 mg BID, entresto BID; taking as prescribed.    He is not measuring BP at home.  Denies: - headaches, vision problems, tinnitus.  - chest pain/pressure, palpitations, irregular heart beats, exertional, dyspnea, peripheral edema.  Low salt diet: yes  Diet / exercise BMI: as above    FH: multiple  He has been followed up by cardiology.     Echocardiography, 3/22/2019  Akinetic mid to distal anterospetal wall and apex.  Hypokinetic basal inferoposterior wall.  Left ventricle is moderately dilated.  Moderately reduced left ventricular systolic function.  Left ventricular ejection fraction is visually estimated to be 35%.  Structurally normal mitral valve without significant stenosis or   regurgitation.  Aortic sclerosis without stenosis.  Moderately dilated left atrium.  Structurally normal tricuspid valve without significant stenosis or   regurgitation.  Unable to estimate pulmonary artery pressure due to an inadequate   tricuspid regurgitant jet.  Normal right ventricular size and systolic function.  Normal inferior vena cava size and inspiratory collapse.  No pericardial effusion seen.  Compared to prior echo done 09/12/16,  there has been no significant   change.     Dyslipidemia  Meds: Crestor, 10 mg QD, Zetia 10 mg QD. No muscle weakness, cramps, nausea,abdominal discomfort.    Diet / exercise BMI: as above    FH: 2 brothers, father  He has been f/u by cardiology.     BPH  Controlled with flomax 0.8 mg QD.    Denies:   · Nocturia.  · Frequency  · Urgency  · Weak stream/dribbling  · Bladder fullness / incomplete emptying  · Hesitancy    Anxiety  Onset:  > 10 yrs  Course: stable  Mood/anxiety currently does not affect: daily activities/sleep.  Current treatment: Celexa 20 mg daily     Risk factors:   · Depression, anxiety  · H/o phobia: no  · H/o panic attacks: no  · H/o hypomanic or manic episode: no  · Substance abuse   (alcohol,  prescription drugs caffeine, tobacco): no  · Family support: yes  · Living alone:  no  · Family history of psych disorders: Unknown  · Stress: no  · PMH of abuse (sexual, physical, emotional abuse; neglect): no    Reviewed PMH, PSH, FH, SH, ALL, HCM/IMM, no changes  Reviewed MEDS, no changes    Patient Active Problem List    Diagnosis Date Noted   • S/P CABG x 3 04/04/2017     Priority: Medium   • Diabetes mellitus type 2, controlled, with complications (HCC) 09/23/2016     Priority: Medium   • Ischemic cardiomyopathy 07/23/2013     Priority: Medium   • Coronary artery disease due to calcified coronary lesion 03/08/2013     Priority: Medium   • Essential hypertension 03/07/2013     Priority: Medium   • Benign prostatic hyperplasia 10/18/2017     Priority: Low   • Idiopathic gout 07/27/2017     Priority: Low   • Actinic keratosis 07/27/2017     Priority: Low   • TEMO (generalized anxiety disorder) 04/04/2017     Priority: Low   • Gastroesophageal reflux disease without esophagitis 01/04/2017     Priority: Low   • Bilateral renal cysts 01/14/2015     Priority: Low   • Medicare annual wellness visit, subsequent 07/09/2020   • CKD (chronic kidney disease), stage III 04/29/2020   • Dyslipidemia 04/29/2020   • Chronic systolic congestive heart failure (HCC)    • Microalbuminuria due to type 2 diabetes mellitus (HCC) 12/13/2018     CURRENT MEDICATIONS  Current Outpatient Medications   Medication Sig Dispense Refill   • tamsulosin (FLOMAX) 0.4 MG capsule Take 2 Caps by mouth every day. Need video for refill 60 Cap 0   • JARDIANCE 25 MG Tab      • gabapentin (NEURONTIN) 300 MG Cap      • glipiZIDE (GLUCOTROL) 5 MG Tab      • carvedilol (COREG) 6.25 MG Tab Take 1 Tab by mouth 2 times a day, with meals. 180 Tab 3   • omeprazole (PRILOSEC) 20 MG delayed-release capsule Take 1 capsule by mouth daily as needed.  90 Cap 3   • ezetimibe (ZETIA) 10 MG Tab Take 1 tablet by mouth daily. 90 Tab 3   • TRULICITY 0.75 MG/0.5ML  Solution Pen-injector Inject 0.5 mL as instructed every 7 days. 6 mL 0   • allopurinol (ZYLOPRIM) 300 MG Tab Take 1 tablet by mouth daily. 90 Tab 3   • citalopram (CELEXA) 20 MG Tab TAKE ONE TABLET BY MOUTH EVERY DAY 90 Tab 0   • metFORMIN ER (GLUCOPHAGE XR) 750 MG TABLET SR 24 HR Take 1 Tab by mouth every day. 180 Tab 0   • metFORMIN ER (GLUCOPHAGE XR) 750 MG TABLET SR 24 HR Take 1 Tab by mouth every day. 180 Tab 0   • rosuvastatin (CRESTOR) 20 MG Tab Take 1 Tab by mouth every evening. 90 Tab 2   • ENTRESTO 24-26 MG Tab tablet Take 1 Tab by mouth 2 Times a Day. Indications: Heart Failure 180 Tab 2   • clopidogrel (PLAVIX) 75 MG Tab Take 1 tablet by mouth daily. 90 Tab 3   • spironolactone (ALDACTONE) 25 MG Tab Take 1 tablet by mouth daily. 90 Tab 3   • acetaminophen (TYLENOL) 500 MG Tab Take 1,500 mg by mouth 2 Times a Day.     • WELCHOL 625 MG Tab Take 625 mg by mouth every day.     • Lancets 1 Each by Does not apply route 3 times a day. Lancets for Freestyle Lite meter. Sig: use TID and prn ssx high or low sugar. 100 Each 11     No current facility-administered medications for this visit.      ALLERGIES  Allergies: Patient has no known allergies.  PAST MEDICAL HISTORY  Past Medical History:   Diagnosis Date   • Backpain 10/10/2019    and neck, 0/10   • Myocardial infarct (HCC) 3/7/2013   • Left ventricular apical thrombus March 2013    Inferoapical clot measuring 1.1cm x 0.8cm, started on anticoagulation.   • CAD (coronary artery disease) March 2013    ACS. PCI/ANA PAULA x 2 of the LAD (2.5 x 12mm - mid; 2.75 x 16mm - proximal). Distal RCA is occluded; distal circumflex is occluded with good collateralization.   • Bilateral renal cysts    • C. difficile diarrhea     pt with hx of c diff after hospitalization [id number]   • Cataract     removed bilat   • Chronic anticoagulation    • Chronic systolic congestive heart failure (HCC)    • DIABETES MELLITUS      oral & diet    • Environmental and seasonal allergies    • Heart  burn    • High cholesterol    • Hyperlipidemia     • Hypertension     well controlled on meds   • Indigestion    • Ischemic cardiomyopathy    • Psychiatric problem     anxiety   • Snoring     no sleep study     SURGICAL HISTORY  He  has a past surgical history that includes other cardiac surgery (); cholecystectomy (); recovery (2016); multiple coronary artery bypass endo vein harvest (2016); miller (2016); pr implant neurostim/ (2019); other neurological surg (); and gastroscopy (2020).  SOCIAL HISTORY  Social History     Tobacco Use   • Smoking status: Never Smoker   • Smokeless tobacco: Never Used   Substance Use Topics   • Alcohol use: Yes     Alcohol/week: 0.6 oz     Types: 1 Glasses of wine per week     Comment: occ   • Drug use: Yes     Comment: CBD     Social History     Social History Narrative   • Not on file     FAMILY HISTORY  Family History   Problem Relation Age of Onset   • Cancer Mother    • Hypertension Father    • Hyperlipidemia Brother         x 2   • Hypertension Brother    • Heart Disease Brother         x2, afib; cad x 1   • Cancer Paternal Uncle    • Diabetes Neg Hx      Family Status   Relation Name Status   • Mo   at age 79         colon ca   • Fa   at age 87   • Bro  Alive   • Bro     • PUnc  (Not Specified)   • Neg Hx  (Not Specified)     ROS   Constitutional: Negative for fever, chills, fatigue.  HENT: Negative for congestion, sore throat.  Eyes: Negative for vision problems.   Respiratory: Negative for cough, shortness of breath.  Cardiovascular: Negative for chest pain, palpitations.   Gastrointestinal: Negative for heartburn, nausea, abdominal pain.   Genitourinary: Negative for dysuria.  Musculoskeletal: Negative for significant myalgia, back and joint pain.   Skin: Negative for rash.   Neuro: Negative for dizziness, weakness and headaches.   Endo/Heme/Allergies: Does not bruise/bleed easily.   Psychiatric/Behavioral:  "Negative for depression.    Objective   Vitals obtained by patient:  Temperature 36.6 °C (97.8 °F) (Temporal)   Height 1.803 m (5' 11\")   Weight 91.6 kg (202 lb)   Body Mass Index 28.17 kg/m²   Physical Exam:  Constitutional: Alert, no distress, well-groomed.  Skin: No rash in visible areas.  Eye: Round. Conjunctiva clear, lids normal.  ENMT: Lips pink without lesions, good dentition. Phonation normal.  Neck: No visible masses or thyromegaly. Moves freely without pain.  CV: no peripheral cyanosis, tachycardia.  Respiratory: Unlabored respiratory effort, no cough or audible wheezing.  Psych: Alert and oriented x3, normal affect and mood.     Labs     Labs are reviewed and discussed with a patient  Lab Results   Component Value Date/Time    CHOLSTRLTOT 99 (L) 11/09/2020 10:37 AM    LDL 29 11/09/2020 10:37 AM    HDL 35 (A) 11/09/2020 10:37 AM    TRIGLYCERIDE 177 (H) 11/09/2020 10:37 AM       Lab Results   Component Value Date/Time    SODIUM 137 11/09/2020 10:37 AM    POTASSIUM 4.9 11/09/2020 10:37 AM    CHLORIDE 102 11/09/2020 10:37 AM    CO2 26 11/09/2020 10:37 AM    GLUCOSE 127 (H) 11/09/2020 10:37 AM    BUN 18 11/09/2020 10:37 AM    CREATININE 1.10 11/09/2020 10:37 AM     Lab Results   Component Value Date/Time    ALKPHOSPHAT 55 11/09/2020 10:37 AM    ASTSGOT 23 11/09/2020 10:37 AM    ALTSGPT 23 11/09/2020 10:37 AM    TBILIRUBIN 0.7 11/09/2020 10:37 AM      Lab Results   Component Value Date/Time    HBA1C 7.2 (H) 11/09/2020 10:37 AM    HBA1C 7.5 (H) 05/18/2020 11:07 AM    HBA1C 7.0 (H) 01/21/2020 09:32 AM     No results found for: TSH  Lab Results   Component Value Date/Time    FREET4 0.82 03/08/2013 01:00 AM       Lab Results   Component Value Date/Time    WBC 8.8 11/09/2020 10:37 AM    RBC 6.24 (H) 11/09/2020 10:37 AM    HEMOGLOBIN 16.6 11/09/2020 10:37 AM    HEMATOCRIT 52.2 (H) 11/09/2020 10:37 AM    MCV 83.7 11/09/2020 10:37 AM    MCH 26.6 (L) 11/09/2020 10:37 AM    MCHC 31.8 (L) 11/09/2020 10:37 AM    MPV " 11.5 11/09/2020 10:37 AM    NEUTSPOLYS 66.10 11/09/2020 10:37 AM    LYMPHOCYTES 21.90 (L) 11/09/2020 10:37 AM    MONOCYTES 7.50 11/09/2020 10:37 AM    EOSINOPHILS 3.10 11/09/2020 10:37 AM    BASOPHILS 0.90 11/09/2020 10:37 AM    HYPOCHROMIA 1+ 09/16/2013 01:13 PM    ANISOCYTOSIS 1+ 07/05/2019 03:18 PM      Imaging      Reviewed and discussed per HPI    Assessment and Plan     Luis Renner is a 70 y.o. male    1. Diabetes mellitus type II, non insulin dependent (HCC)  Controlled, continue with current treatment.  - HEMOGLOBIN A1C; Standing  - MICROALBUMIN CREAT RATIO URINE; Standing  - Comp Metabolic Panel; Standing  - metFORMIN ER (GLUCOPHAGE XR) 750 MG TABLET SR 24 HR; Take 1 Tab by mouth every day.  Dispense: 180 Tab; Refill: 3  2. Microalbuminuria due to type 2 diabetes mellitus (HCC)  - MICROALBUMIN CREAT RATIO URINE; Standing    3. Stage 3 chronic kidney disease, unspecified whether stage 3a or 3b CKD  Stable, advised to continue good fluid intake, avoid NSAIDs  - Comp Metabolic Panel; Standing    4. Essential hypertension  Controlled, continue with current treatment, cardiology follow-up  - Comp Metabolic Panel; Standing  5. Ischemic cardiomyopathy  6. Coronary artery disease due to calcified coronary lesion  7. Chronic systolic congestive heart failure (HCC)  As above    8. Dyslipidemia  Controlled, continue with current treatment.  - Comp Metabolic Panel; Standing  - Lipid Profile; Standing  - rosuvastatin (CRESTOR) 20 MG Tab; Take 1 Tab by mouth every evening.  Dispense: 90 Tab; Refill: 3    9. Benign prostatic hyperplasia with lower urinary tract symptoms, symptom details unspecified  Controlled, continue with current treatment.  - tamsulosin (FLOMAX) 0.4 MG capsule; Take 2 Caps by mouth every day. Need video for refill  Dispense: 180 Cap; Refill: 3    10. TEMO (generalized anxiety disorder)  Controlled, continue with current treatment.  - citalopram (CELEXA) 20 MG Tab; Take 1 Tab by mouth every day.   Dispense: 90 Tab; Refill: 3    11. Screening for malignant neoplasm of prostate  - PROSTATE SPECIFIC AG SCREENING; Future    Follow-up: In 6 months and as needed

## 2020-12-16 ENCOUNTER — APPOINTMENT (RX ONLY)
Dept: URBAN - METROPOLITAN AREA CLINIC 22 | Facility: CLINIC | Age: 70
Setting detail: DERMATOLOGY
End: 2020-12-16

## 2020-12-16 DIAGNOSIS — L57.0 ACTINIC KERATOSIS: ICD-10-CM

## 2020-12-16 DIAGNOSIS — D22 MELANOCYTIC NEVI: ICD-10-CM

## 2020-12-16 DIAGNOSIS — L259 CONTACT DERMATITIS AND OTHER ECZEMA, UNSPECIFIED CAUSE: ICD-10-CM | Status: RESOLVING

## 2020-12-16 DIAGNOSIS — L81.4 OTHER MELANIN HYPERPIGMENTATION: ICD-10-CM

## 2020-12-16 DIAGNOSIS — Z71.89 OTHER SPECIFIED COUNSELING: ICD-10-CM

## 2020-12-16 DIAGNOSIS — D18.0 HEMANGIOMA: ICD-10-CM

## 2020-12-16 DIAGNOSIS — L82.1 OTHER SEBORRHEIC KERATOSIS: ICD-10-CM

## 2020-12-16 PROBLEM — L30.8 OTHER SPECIFIED DERMATITIS: Status: ACTIVE | Noted: 2020-12-16

## 2020-12-16 PROBLEM — D22.5 MELANOCYTIC NEVI OF TRUNK: Status: ACTIVE | Noted: 2020-12-16

## 2020-12-16 PROBLEM — D18.01 HEMANGIOMA OF SKIN AND SUBCUTANEOUS TISSUE: Status: ACTIVE | Noted: 2020-12-16

## 2020-12-16 PROCEDURE — ? TREATMENT REGIMEN

## 2020-12-16 PROCEDURE — 99214 OFFICE O/P EST MOD 30 MIN: CPT | Mod: 25

## 2020-12-16 PROCEDURE — ? COUNSELING

## 2020-12-16 PROCEDURE — ? LIQUID NITROGEN

## 2020-12-16 PROCEDURE — ? ADDITIONAL NOTES

## 2020-12-16 PROCEDURE — ? MEDICATION COUNSELING

## 2020-12-16 PROCEDURE — 17004 DESTROY PREMAL LESIONS 15/>: CPT

## 2020-12-16 ASSESSMENT — LOCATION SIMPLE DESCRIPTION DERM
LOCATION SIMPLE: POSTERIOR SCALP
LOCATION SIMPLE: LEFT EAR
LOCATION SIMPLE: LEFT TEMPLE
LOCATION SIMPLE: RIGHT THUMB
LOCATION SIMPLE: ABDOMEN
LOCATION SIMPLE: LEFT FOREARM
LOCATION SIMPLE: LEFT UPPER BACK
LOCATION SIMPLE: RIGHT FOREARM
LOCATION SIMPLE: SCALP
LOCATION SIMPLE: LEFT THIGH
LOCATION SIMPLE: RIGHT LOWER BACK
LOCATION SIMPLE: RIGHT EAR
LOCATION SIMPLE: LEFT ZYGOMA
LOCATION SIMPLE: LEFT PRETIBIAL REGION
LOCATION SIMPLE: LEFT SCALP
LOCATION SIMPLE: RIGHT HAND
LOCATION SIMPLE: LEFT CHEEK
LOCATION SIMPLE: RIGHT CHEEK
LOCATION SIMPLE: LEFT HAND
LOCATION SIMPLE: RIGHT PRETIBIAL REGION
LOCATION SIMPLE: RIGHT SCALP

## 2020-12-16 ASSESSMENT — LOCATION DETAILED DESCRIPTION DERM
LOCATION DETAILED: LEFT ANTERIOR PROXIMAL THIGH
LOCATION DETAILED: LEFT LATERAL ZYGOMA
LOCATION DETAILED: LEFT CENTRAL FRONTAL SCALP
LOCATION DETAILED: LEFT SUPERIOR PARIETAL SCALP
LOCATION DETAILED: RIGHT SUPERIOR PREAURICULAR CHEEK
LOCATION DETAILED: RIGHT PROXIMAL PRETIBIAL REGION
LOCATION DETAILED: RIGHT PROXIMAL DORSAL FOREARM
LOCATION DETAILED: RIGHT CENTRAL FRONTAL SCALP
LOCATION DETAILED: LEFT CENTRAL MALAR CHEEK
LOCATION DETAILED: RIGHT SUPERIOR CRUS OF ANTIHELIX
LOCATION DETAILED: LEFT CENTRAL PARIETAL SCALP
LOCATION DETAILED: LEFT SUPERIOR HELIX
LOCATION DETAILED: LEFT DISTAL DORSAL FOREARM
LOCATION DETAILED: 1ST WEB SPACE RIGHT HAND
LOCATION DETAILED: EPIGASTRIC SKIN
LOCATION DETAILED: RIGHT DISTAL DORSAL FOREARM
LOCATION DETAILED: LEFT PROXIMAL RADIAL DORSAL FOREARM
LOCATION DETAILED: LEFT MEDIAL FRONTAL SCALP
LOCATION DETAILED: LEFT PROXIMAL PRETIBIAL REGION
LOCATION DETAILED: POSTERIOR MID-PARIETAL SCALP
LOCATION DETAILED: RIGHT PROXIMAL ULNAR DORSAL FOREARM
LOCATION DETAILED: RIGHT SUPERIOR PARIETAL SCALP
LOCATION DETAILED: RIGHT ULNAR DORSAL HAND
LOCATION DETAILED: RIGHT CENTRAL PARIETAL SCALP
LOCATION DETAILED: RIGHT RADIAL DORSAL HAND
LOCATION DETAILED: RIGHT SUPERIOR MEDIAL MIDBACK
LOCATION DETAILED: RIGHT DORSAL MIDDLE METACARPOPHALANGEAL JOINT
LOCATION DETAILED: LEFT ULNAR DORSAL HAND
LOCATION DETAILED: LEFT PROXIMAL DORSAL FOREARM
LOCATION DETAILED: LEFT SCAPHA
LOCATION DETAILED: RIGHT MEDIAL FRONTAL SCALP
LOCATION DETAILED: LEFT SUPERIOR MEDIAL UPPER BACK
LOCATION DETAILED: LEFT LATERAL TEMPLE
LOCATION DETAILED: LEFT RADIAL DORSAL HAND

## 2020-12-16 ASSESSMENT — LOCATION ZONE DERM
LOCATION ZONE: ARM
LOCATION ZONE: FACE
LOCATION ZONE: SCALP
LOCATION ZONE: FINGER
LOCATION ZONE: EAR
LOCATION ZONE: HAND
LOCATION ZONE: LEG
LOCATION ZONE: TRUNK

## 2020-12-16 NOTE — PROCEDURE: MEDICATION COUNSELING
Use Enhanced Medication Counseling?: No
Erythromycin Counseling:  I discussed with the patient the risks of erythromycin including but not limited to GI upset, allergic reaction, drug rash, diarrhea, increase in liver enzymes, and yeast infections.
Otezla Counseling: The side effects of Otezla were discussed with the patient, including but not limited to worsening or new depression, weight loss, diarrhea, nausea, upper respiratory tract infection, and headache. Patient instructed to call the office should any adverse effect occur.  The patient verbalized understanding of the proper use and possible adverse effects of Otezla.  All the patient's questions and concerns were addressed.
Dapsone Counseling: I discussed with the patient the risks of dapsone including but not limited to hemolytic anemia, agranulocytosis, rashes, methemoglobinemia, kidney failure, peripheral neuropathy, headaches, GI upset, and liver toxicity.  Patients who start dapsone require monitoring including baseline LFTs and weekly CBCs for the first month, then every month thereafter.  The patient verbalized understanding of the proper use and possible adverse effects of dapsone.  All of the patient's questions and concerns were addressed.
Topical Clindamycin Counseling: Patient counseled that this medication may cause skin irritation or allergic reactions.  In the event of skin irritation, the patient was advised to reduce the amount of the drug applied or use it less frequently.   The patient verbalized understanding of the proper use and possible adverse effects of clindamycin.  All of the patient's questions and concerns were addressed.
Imiquimod Counseling:  I discussed with the patient the risks of imiquimod including but not limited to erythema, scaling, itching, weeping, crusting, and pain.  Patient understands that the inflammatory response to imiquimod is variable from person to person and was educated regarded proper titration schedule.  If flu-like symptoms develop, patient knows to discontinue the medication and contact us.
Infliximab Pregnancy And Lactation Text: This medication is Pregnancy Category B and is considered safe during pregnancy. It is unknown if this medication is excreted in breast milk.
High Dose Vitamin A Counseling: Side effects reviewed, pt to contact office should one occur.
Griseofulvin Pregnancy And Lactation Text: This medication is Pregnancy Category X and is known to cause serious birth defects. It is unknown if this medication is excreted in breast milk but breast feeding should be avoided.
Azathioprine Pregnancy And Lactation Text: This medication is Pregnancy Category D and isn't considered safe during pregnancy. It is unknown if this medication is excreted in breast milk.
Ivermectin Pregnancy And Lactation Text: This medication is Pregnancy Category C and it isn't known if it is safe during pregnancy. It is also excreted in breast milk.
Valtrex Pregnancy And Lactation Text: this medication is Pregnancy Category B and is considered safe during pregnancy. This medication is not directly found in breast milk but it's metabolite acyclovir is present.
Erythromycin Pregnancy And Lactation Text: This medication is Pregnancy Category B and is considered safe during pregnancy. It is also excreted in breast milk.
Detail Level: Generalized
Odomzo Pregnancy And Lactation Text: This medication is Pregnancy Category X and is absolutely contraindicated during pregnancy. It is unknown if it is excreted in breast milk.
Colchicine Pregnancy And Lactation Text: This medication is Pregnancy Category C and isn't considered safe during pregnancy. It is excreted in breast milk.
Rituxan Counseling:  I discussed with the patient the risks of Rituxan infusions. Side effects can include infusion reactions, severe drug rashes including mucocutaneous reactions, reactivation of latent hepatitis and other infections and rarely progressive multifocal leukoencephalopathy.  All of the patient's questions and concerns were addressed.
Tazorac Pregnancy And Lactation Text: This medication is not safe during pregnancy. It is unknown if this medication is excreted in breast milk.
Hydroquinone Pregnancy And Lactation Text: This medication has not been assigned a Pregnancy Risk Category but animal studies failed to show danger with the topical medication. It is unknown if the medication is excreted in breast milk.
Azathioprine Counseling:  I discussed with the patient the risks of azathioprine including but not limited to myelosuppression, immunosuppression, hepatotoxicity, lymphoma, and infections.  The patient understands that monitoring is required including baseline LFTs, Creatinine, possible TPMP genotyping and weekly CBCs for the first month and then every 2 weeks thereafter.  The patient verbalized understanding of the proper use and possible adverse effects of azathioprine.  All of the patient's questions and concerns were addressed.
Isotretinoin Pregnancy And Lactation Text: This medication is Pregnancy Category X and is considered extremely dangerous during pregnancy. It is unknown if it is excreted in breast milk.
Itraconazole Counseling:  I discussed with the patient the risks of itraconazole including but not limited to liver damage, nausea/vomiting, neuropathy, and severe allergy.  The patient understands that this medication is best absorbed when taken with acidic beverages such as non-diet cola or ginger ale.  The patient understands that monitoring is required including baseline LFTs and repeat LFTs at intervals.  The patient understands that they are to contact us or the primary physician if concerning signs are noted.
Valtrex Counseling: I discussed with the patient the risks of valacyclovir including but not limited to kidney damage, nausea, vomiting and severe allergy.  The patient understands that if the infection seems to be worsening or is not improving, they are to call.
Odomzo Counseling- I discussed with the patient the risks of Odomzo including but not limited to nausea, vomiting, diarrhea, constipation, weight loss, changes in the sense of taste, decreased appetite, muscle spasms, and hair loss.  The patient verbalized understanding of the proper use and possible adverse effects of Odomzo.  All of the patient's questions and concerns were addressed.
Colchicine Counseling:  Patient counseled regarding adverse effects including but not limited to stomach upset (nausea, vomiting, stomach pain, or diarrhea).  Patient instructed to limit alcohol consumption while taking this medication.  Colchicine may reduce blood counts especially with prolonged use.  The patient understands that monitoring of kidney function and blood counts may be required, especially at baseline. The patient verbalized understanding of the proper use and possible adverse effects of colchicine.  All of the patient's questions and concerns were addressed.
Tazorac Counseling:  Patient advised that medication is irritating and drying.  Patient may need to apply sparingly and wash off after an hour before eventually leaving it on overnight.  The patient verbalized understanding of the proper use and possible adverse effects of tazorac.  All of the patient's questions and concerns were addressed.
Rituxan Pregnancy And Lactation Text: This medication is Pregnancy Category C and it isn't know if it is safe during pregnancy. It is unknown if this medication is excreted in breast milk but similar antibodies are known to be excreted.
Isotretinoin Counseling: Patient should get monthly blood tests, not donate blood, not drive at night if vision affected, not share medication, and not undergo elective surgery for 6 months after tx completed. Side effects reviewed, pt to contact office should one occur.
Hydroquinone Counseling:  Patient advised that medication may result in skin irritation, lightening (hypopigmentation), dryness, and burning.  In the event of skin irritation, the patient was advised to reduce the amount of the drug applied or use it less frequently.  Rarely, spots that are treated with hydroquinone can become darker (pseudoochronosis).  Should this occur, patient instructed to stop medication and call the office. The patient verbalized understanding of the proper use and possible adverse effects of hydroquinone.  All of the patient's questions and concerns were addressed.
Metronidazole Counseling:  I discussed with the patient the risks of metronidazole including but not limited to seizures, nausea/vomiting, a metallic taste in the mouth, nausea/vomiting and severe allergy.
Itraconazole Pregnancy And Lactation Text: This medication is Pregnancy Category C and it isn't know if it is safe during pregnancy. It is also excreted in breast milk.
Tranexamic Acid Pregnancy And Lactation Text: It is unknown if this medication is safe during pregnancy or breast feeding.
Nsaids Pregnancy And Lactation Text: These medications are considered safe up to 30 weeks gestation. It is excreted in breast milk.
Topical Retinoid Pregnancy And Lactation Text: This medication is Pregnancy Category C. It is unknown if this medication is excreted in breast milk.
Xolair Pregnancy And Lactation Text: This medication is Pregnancy Category B and is considered safe during pregnancy. This medication is excreted in breast milk.
Metronidazole Pregnancy And Lactation Text: This medication is Pregnancy Category B and considered safe during pregnancy.  It is also excreted in breast milk.
Bexarotene Pregnancy And Lactation Text: This medication is Pregnancy Category X and should not be given to women who are pregnant or may become pregnant. This medication should not be used if you are breast feeding.
Siliq Counseling:  I discussed with the patient the risks of Siliq including but not limited to new or worsening depression, suicidal thoughts and behavior, immunosuppression, malignancy, posterior leukoencephalopathy syndrome, and serious infections.  The patient understands that monitoring is required including a PPD at baseline and must alert us or the primary physician if symptoms of infection or other concerning signs are noted. There is also a special program designed to monitor depression which is required with Siliq.
Tranexamic Acid Counseling:  Patient advised of the small risk of bleeding problems with tranexamic acid. They were also instructed to call if they developed any nausea, vomiting or diarrhea. All of the patient's questions and concerns were addressed.
Ketoconazole Counseling:   Patient counseled regarding improving absorption with orange juice.  Adverse effects include but are not limited to breast enlargement, headache, diarrhea, nausea, upset stomach, liver function test abnormalities, taste disturbance, and stomach pain.  There is a rare possibility of liver failure that can occur when taking ketoconazole. The patient understands that monitoring of LFTs may be required, especially at baseline. The patient verbalized understanding of the proper use and possible adverse effects of ketoconazole.  All of the patient's questions and concerns were addressed.
Nsaids Counseling: NSAID Counseling: I discussed with the patient that NSAIDs should be taken with food. Prolonged use of NSAIDs can result in the development of stomach ulcers.  Patient advised to stop taking NSAIDs if abdominal pain occurs.  The patient verbalized understanding of the proper use and possible adverse effects of NSAIDs.  All of the patient's questions and concerns were addressed.
Minocycline Counseling: Patient advised regarding possible photosensitivity and discoloration of the teeth, skin, lips, tongue and gums.  Patient instructed to avoid sunlight, if possible.  When exposed to sunlight, patients should wear protective clothing, sunglasses, and sunscreen.  The patient was instructed to call the office immediately if the following severe adverse effects occur:  hearing changes, easy bruising/bleeding, severe headache, or vision changes.  The patient verbalized understanding of the proper use and possible adverse effects of minocycline.  All of the patient's questions and concerns were addressed.
Xolair Counseling:  Patient informed of potential adverse effects including but not limited to fever, muscle aches, rash and allergic reactions.  The patient verbalized understanding of the proper use and possible adverse effects of Xolair.  All of the patient's questions and concerns were addressed.
Clofazimine Counseling:  I discussed with the patient the risks of clofazimine including but not limited to skin and eye pigmentation, liver damage, nausea/vomiting, gastrointestinal bleeding and allergy.
Siliq Pregnancy And Lactation Text: The risk during pregnancy and breastfeeding is uncertain with this medication.
Topical Retinoid counseling:  Patient advised to apply a pea-sized amount only at bedtime and wait 30 minutes after washing their face before applying.  If too drying, patient may add a non-comedogenic moisturizer. The patient verbalized understanding of the proper use and possible adverse effects of retinoids.  All of the patient's questions and concerns were addressed.
Eucrisa Counseling: Patient may experience a mild burning sensation during topical application. Eucrisa is not approved in children less than 2 years of age.
Bexarotene Counseling:  I discussed with the patient the risks of bexarotene including but not limited to hair loss, dry lips/skin/eyes, liver abnormalities, hyperlipidemia, pancreatitis, depression/suicidal ideation, photosensitivity, drug rash/allergic reactions, hypothyroidism, anemia, leukopenia, infection, cataracts, and teratogenicity.  Patient understands that they will need regular blood tests to check lipid profile, liver function tests, white blood cell count, thyroid function tests and pregnancy test if applicable.
Cimzia Counseling:  I discussed with the patient the risks of Cimzia including but not limited to immunosuppression, allergic reactions and infections.  The patient understands that monitoring is required including a PPD at baseline and must alert us or the primary physician if symptoms of infection or other concerning signs are noted.
Ketoconazole Pregnancy And Lactation Text: This medication is Pregnancy Category C and it isn't know if it is safe during pregnancy. It is also excreted in breast milk and breast feeding isn't recommended.
Niacinamide Pregnancy And Lactation Text: These medications are considered safe during pregnancy.
Minocycline Pregnancy And Lactation Text: This medication is Pregnancy Category D and not consider safe during pregnancy. It is also excreted in breast milk.
Solaraze Pregnancy And Lactation Text: This medication is Pregnancy Category B and is considered safe. There is some data to suggest avoiding during the third trimester. It is unknown if this medication is excreted in breast milk.
Simponi Counseling:  I discussed with the patient the risks of golimumab including but not limited to myelosuppression, immunosuppression, autoimmune hepatitis, demyelinating diseases, lymphoma, and serious infections.  The patient understands that monitoring is required including a PPD at baseline and must alert us or the primary physician if symptoms of infection or other concerning signs are noted.
Acitretin Pregnancy And Lactation Text: This medication is Pregnancy Category X and should not be given to women who are pregnant or may become pregnant in the future. This medication is excreted in breast milk.
Terbinafine Counseling: Patient counseling regarding adverse effects of terbinafine including but not limited to headache, diarrhea, rash, upset stomach, liver function test abnormalities, itching, taste/smell disturbance, nausea, abdominal pain, and flatulence.  There is a rare possibility of liver failure that can occur when taking terbinafine.  The patient understands that a baseline LFT and kidney function test may be required. The patient verbalized understanding of the proper use and possible adverse effects of terbinafine.  All of the patient's questions and concerns were addressed.
Xelvladimirz Pregnancy And Lactation Text: This medication is Pregnancy Category D and is not considered safe during pregnancy.  The risk during breast feeding is also uncertain.
Cimzia Pregnancy And Lactation Text: This medication crosses the placenta but can be considered safe in certain situations. Cimzia may be excreted in breast milk.
Thalidomide Counseling: I discussed with the patient the risks of thalidomide including but not limited to birth defects, anxiety, weakness, chest pain, dizziness, cough and severe allergy.
Niacinamide Counseling: I recommended taking niacin or niacinamide, also know as vitamin B3, twice daily. Recent evidence suggests that taking vitamin B3 (500 mg twice daily) can reduce the risk of actinic keratoses and non-melanoma skin cancers. Side effects of vitamin B3 include flushing and headache.
Arava Counseling:  Patient counseled regarding adverse effects of Arava including but not limited to nausea, vomiting, abnormalities in liver function tests. Patients may develop mouth sores, rash, diarrhea, and abnormalities in blood counts. The patient understands that monitoring is required including LFTs and blood counts.  There is a rare possibility of scarring of the liver and lung problems that can occur when taking methotrexate. Persistent nausea, loss of appetite, pale stools, dark urine, cough, and shortness of breath should be reported immediately. Patient advised to discontinue Arava treatment and consult with a physician prior to attempting conception. The patient will have to undergo a treatment to eliminate Arava from the body prior to conception.
Solaraze Counseling:  I discussed with the patient the risks of Solaraze including but not limited to erythema, scaling, itching, weeping, crusting, and pain.
Elidel Counseling: Patient may experience a mild burning sensation during topical application. Elidel is not approved in children less than 2 years of age. There have been case reports of hematologic and skin malignancies in patients using topical calcineurin inhibitors although causality is questionable.
Xeljanz Counseling: I discussed with the patient the risks of Xeljanz therapy including increased risk of infection, liver issues, headache, diarrhea, or cold symptoms. Live vaccines should be avoided. They were instructed to call if they have any problems.
Quinolones Counseling:  I discussed with the patient the risks of fluoroquinolones including but not limited to GI upset, allergic reaction, drug rash, diarrhea, dizziness, photosensitivity, yeast infections, liver function test abnormalities, tendonitis/tendon rupture.
Acitretin Counseling:  I discussed with the patient the risks of acitretin including but not limited to hair loss, dry lips/skin/eyes, liver damage, hyperlipidemia, depression/suicidal ideation, photosensitivity.  Serious rare side effects can include but are not limited to pancreatitis, pseudotumor cerebri, bony changes, clot formation/stroke/heart attack.  Patient understands that alcohol is contraindicated since it can result in liver toxicity and significantly prolong the elimination of the drug by many years.
Terbinafine Pregnancy And Lactation Text: This medication is Pregnancy Category B and is considered safe during pregnancy. It is also excreted in breast milk and breast feeding isn't recommended.
Sski Pregnancy And Lactation Text: This medication is Pregnancy Category D and isn't considered safe during pregnancy. It is excreted in breast milk.
Azithromycin Counseling:  I discussed with the patient the risks of azithromycin including but not limited to GI upset, allergic reaction, drug rash, diarrhea, and yeast infections.
Hydroxychloroquine Pregnancy And Lactation Text: This medication has been shown to cause fetal harm but it isn't assigned a Pregnancy Risk Category. There are small amounts excreted in breast milk.
Cosentyx Counseling:  I discussed with the patient the risks of Cosentyx including but not limited to worsening of Crohn's disease, immunosuppression, allergic reactions and infections.  The patient understands that monitoring is required including a PPD at baseline and must alert us or the primary physician if symptoms of infection or other concerning signs are noted.
Rhofade Pregnancy And Lactation Text: This medication has not been assigned a Pregnancy Risk Category. It is unknown if the medication is excreted in breast milk.
Drysol Pregnancy And Lactation Text: This medication is considered safe during pregnancy and breast feeding.
Skyrizi Counseling: I discussed with the patient the risks of risankizumab-rzaa including but not limited to immunosuppression, and serious infections.  The patient understands that monitoring is required including a PPD at baseline and must alert us or the primary physician if symptoms of infection or other concerning signs are noted.
SSKI Counseling:  I discussed with the patient the risks of SSKI including but not limited to thyroid abnormalities, metallic taste, GI upset, fever, headache, acne, arthralgias, paraesthesias, lymphadenopathy, easy bleeding, arrhythmias, and allergic reaction.
Hydroxychloroquine Counseling:  I discussed with the patient that a baseline ophthalmologic exam is needed at the start of therapy and every year thereafter while on therapy. A CBC may also be warranted for monitoring.  The side effects of this medication were discussed with the patient, including but not limited to agranulocytosis, aplastic anemia, seizures, rashes, retinopathy, and liver toxicity. Patient instructed to call the office should any adverse effect occur.  The patient verbalized understanding of the proper use and possible adverse effects of Plaquenil.  All the patient's questions and concerns were addressed.
Opioid Pregnancy And Lactation Text: These medications can lead to premature delivery and should be avoided during pregnancy. These medications are also present in breast milk in small amounts.
Rhofade Counseling: Rhofade is a topical medication which can decrease superficial blood flow where applied. Side effects are uncommon and include stinging, redness and allergic reactions.
Drysol Counseling:  I discussed with the patient the risks of drysol/aluminum chloride including but not limited to skin rash, itching, irritation, burning.
Prednisone Pregnancy And Lactation Text: This medication is Pregnancy Category C and it isn't know if it is safe during pregnancy. This medication is excreted in breast milk.
Azithromycin Pregnancy And Lactation Text: This medication is considered safe during pregnancy and is also secreted in breast milk.
Cimetidine Counseling:  I discussed with the patient the risks of Cimetidine including but not limited to gynecomastia, headache, diarrhea, nausea, drowsiness, arrhythmias, pancreatitis, skin rashes, psychosis, bone marrow suppression and kidney toxicity.
Rifampin Counseling: I discussed with the patient the risks of rifampin including but not limited to liver damage, kidney damage, red-orange body fluids, nausea/vomiting and severe allergy.
Tremfya Counseling: I discussed with the patient the risks of guselkumab including but not limited to immunosuppression, serious infections, worsening of inflammatory bowel disease and drug reactions.  The patient understands that monitoring is required including a PPD at baseline and must alert us or the primary physician if symptoms of infection or other concerning signs are noted.
Spironolactone Pregnancy And Lactation Text: This medication can cause feminization of the male fetus and should be avoided during pregnancy. The active metabolite is also found in breast milk.
Glycopyrrolate Pregnancy And Lactation Text: This medication is Pregnancy Category B and is considered safe during pregnancy. It is unknown if it is excreted breast milk.
Protopic Pregnancy And Lactation Text: This medication is Pregnancy Category C. It is unknown if this medication is excreted in breast milk when applied topically.
Dupixent Counseling: I discussed with the patient the risks of dupilumab including but not limited to eye infection and irritation, cold sores, injection site reactions, worsening of asthma, allergic reactions and increased risk of parasitic infection.  Live vaccines should be avoided while taking dupilumab. Dupilumab will also interact with certain medications such as warfarin and cyclosporine. The patient understands that monitoring is required and they must alert us or the primary physician if symptoms of infection or other concerning signs are noted.
Bactrim Counseling:  I discussed with the patient the risks of sulfa antibiotics including but not limited to GI upset, allergic reaction, drug rash, diarrhea, dizziness, photosensitivity, and yeast infections.  Rarely, more serious reactions can occur including but not limited to aplastic anemia, agranulocytosis, methemoglobinemia, blood dyscrasias, liver or kidney failure, lung infiltrates or desquamative/blistering drug rashes.
Prednisone Counseling:  I discussed with the patient the risks of prolonged use of prednisone including but not limited to weight gain, insomnia, osteoporosis, mood changes, diabetes, susceptibility to infection, glaucoma and high blood pressure.  In cases where prednisone use is prolonged, patients should be monitored with blood pressure checks, serum glucose levels and an eye exam.  Additionally, the patient may need to be placed on GI prophylaxis, PCP prophylaxis, and calcium and vitamin D supplementation and/or a bisphosphonate.  The patient verbalized understanding of the proper use and the possible adverse effects of prednisone.  All of the patient's questions and concerns were addressed.
Rifampin Pregnancy And Lactation Text: This medication is Pregnancy Category C and it isn't know if it is safe during pregnancy. It is also excreted in breast milk and should not be used if you are breast feeding.
Opioid Counseling: I discussed with the patient the potential side effects of opioids including but not limited to addiction, altered mental status, and depression. I stressed avoiding alcohol, benzodiazepines, muscle relaxants and sleep aids unless specifically okayed by a physician. The patient verbalized understanding of the proper use and possible adverse effects of opioids. All of the patient's questions and concerns were addressed. They were instructed to flush the remaining pills down the toilet if they did not need them for pain.
5-Fu Pregnancy And Lactation Text: This medication is Pregnancy Category X and contraindicated in pregnancy and in women who may become pregnant. It is unknown if this medication is excreted in breast milk.
Stelara Counseling:  I discussed with the patient the risks of ustekinumab including but not limited to immunosuppression, malignancy, posterior leukoencephalopathy syndrome, and serious infections.  The patient understands that monitoring is required including a PPD at baseline and must alert us or the primary physician if symptoms of infection or other concerning signs are noted.
Spironolactone Counseling: Patient advised regarding risks of diarrhea, abdominal pain, hyperkalemia, birth defects (for female patients), liver toxicity and renal toxicity. The patient may need blood work to monitor liver and kidney function and potassium levels while on therapy. The patient verbalized understanding of the proper use and possible adverse effects of spironolactone.  All of the patient's questions and concerns were addressed.
Glycopyrrolate Counseling:  I discussed with the patient the risks of glycopyrrolate including but not limited to skin rash, drowsiness, dry mouth, difficulty urinating, and blurred vision.
Dupixent Pregnancy And Lactation Text: This medication likely crosses the placenta but the risk for the fetus is uncertain. This medication is excreted in breast milk.
Protopic Counseling: Patient may experience a mild burning sensation during topical application. Protopic is not approved in children less than 2 years of age. There have been case reports of hematologic and skin malignancies in patients using topical calcineurin inhibitors although causality is questionable.
5-Fu Counseling: 5-Fluorouracil Counseling:  I discussed with the patient the risks of 5-fluorouracil including but not limited to erythema, scaling, itching, weeping, crusting, and pain.
Methotrexate Pregnancy And Lactation Text: This medication is Pregnancy Category X and is known to cause fetal harm. This medication is excreted in breast milk.
Sarecycline Counseling: Patient advised regarding possible photosensitivity and discoloration of the teeth, skin, lips, tongue and gums.  Patient instructed to avoid sunlight, if possible.  When exposed to sunlight, patients should wear protective clothing, sunglasses, and sunscreen.  The patient was instructed to call the office immediately if the following severe adverse effects occur:  hearing changes, easy bruising/bleeding, severe headache, or vision changes.  The patient verbalized understanding of the proper use and possible adverse effects of sarecycline.  All of the patient's questions and concerns were addressed.
Bactrim Pregnancy And Lactation Text: This medication is Pregnancy Category D and is known to cause fetal risk.  It is also excreted in breast milk.
Doxepin Counseling:  Patient advised that the medication is sedating and not to drive a car after taking this medication. Patient informed of potential adverse effects including but not limited to dry mouth, urinary retention, and blurry vision.  The patient verbalized understanding of the proper use and possible adverse effects of doxepin.  All of the patient's questions and concerns were addressed.
Birth Control Pills Pregnancy And Lactation Text: This medication should be avoided if pregnant and for the first 30 days post-partum.
Enbrel Counseling:  I discussed with the patient the risks of etanercept including but not limited to myelosuppression, immunosuppression, autoimmune hepatitis, demyelinating diseases, lymphoma, and infections.  The patient understands that monitoring is required including a PPD at baseline and must alert us or the primary physician if symptoms of infection or other concerning signs are noted.
Taltz Counseling: I discussed with the patient the risks of ixekizumab including but not limited to immunosuppression, serious infections, worsening of inflammatory bowel disease and drug reactions.  The patient understands that monitoring is required including a PPD at baseline and must alert us or the primary physician if symptoms of infection or other concerning signs are noted.
Methotrexate Counseling:  Patient counseled regarding adverse effects of methotrexate including but not limited to nausea, vomiting, abnormalities in liver function tests. Patients may develop mouth sores, rash, diarrhea, and abnormalities in blood counts. The patient understands that monitoring is required including LFT's and blood counts.  There is a rare possibility of scarring of the liver and lung problems that can occur when taking methotrexate. Persistent nausea, loss of appetite, pale stools, dark urine, cough, and shortness of breath should be reported immediately. Patient advised to discontinue methotrexate treatment at least three months before attempting to become pregnant.  I discussed the need for folate supplements while taking methotrexate.  These supplements can decrease side effects during methotrexate treatment. The patient verbalized understanding of the proper use and possible adverse effects of methotrexate.  All of the patient's questions and concerns were addressed.
Cephalexin Counseling: I counseled the patient regarding use of cephalexin as an antibiotic for prophylactic and/or therapeutic purposes. Cephalexin (commonly prescribed under brand name Keflex) is a cephalosporin antibiotic which is active against numerous classes of bacteria, including most skin bacteria. Side effects may include nausea, diarrhea, gastrointestinal upset, rash, hives, yeast infections, and in rare cases, hepatitis, kidney disease, seizures, fever, confusion, neurologic symptoms, and others. Patients with severe allergies to penicillin medications are cautioned that there is about a 10% incidence of cross-reactivity with cephalosporins. When possible, patients with penicillin allergies should use alternatives to cephalosporins for antibiotic therapy.
Doxepin Pregnancy And Lactation Text: This medication is Pregnancy Category C and it isn't known if it is safe during pregnancy. It is also excreted in breast milk and breast feeding isn't recommended.
Birth Control Pills Counseling: Birth Control Pill Counseling: I discussed with the patient the potential side effects of OCPs including but not limited to increased risk of stroke, heart attack, thrombophlebitis, deep venous thrombosis, hepatic adenomas, breast changes, GI upset, headaches, and depression.  The patient verbalized understanding of the proper use and possible adverse effects of OCPs. All of the patient's questions and concerns were addressed.
Gabapentin Counseling: I discussed with the patient the risks of gabapentin including but not limited to dizziness, somnolence, fatigue and ataxia.
Zyclara Counseling:  I discussed with the patient the risks of imiquimod including but not limited to erythema, scaling, itching, weeping, crusting, and pain.  Patient understands that the inflammatory response to imiquimod is variable from person to person and was educated regarded proper titration schedule.  If flu-like symptoms develop, patient knows to discontinue the medication and contact us.
Picato Counseling:  I discussed with the patient the risks of Picato including but not limited to erythema, scaling, itching, weeping, crusting, and pain.
Carac Counseling:  I discussed with the patient the risks of Carac including but not limited to erythema, scaling, itching, weeping, crusting, and pain.
Tetracycline Counseling: Patient counseled regarding possible photosensitivity and increased risk for sunburn.  Patient instructed to avoid sunlight, if possible.  When exposed to sunlight, patients should wear protective clothing, sunglasses, and sunscreen.  The patient was instructed to call the office immediately if the following severe adverse effects occur:  hearing changes, easy bruising/bleeding, severe headache, or vision changes.  The patient verbalized understanding of the proper use and possible adverse effects of tetracycline.  All of the patient's questions and concerns were addressed. Patient understands to avoid pregnancy while on therapy due to potential birth defects.
Hydroxyzine Counseling: Patient advised that the medication is sedating and not to drive a car after taking this medication.  Patient informed of potential adverse effects including but not limited to dry mouth, urinary retention, and blurry vision.  The patient verbalized understanding of the proper use and possible adverse effects of hydroxyzine.  All of the patient's questions and concerns were addressed.
Propranolol Pregnancy And Lactation Text: This medication is Pregnancy Category C and it isn't known if it is safe during pregnancy. It is excreted in breast milk.
Cephalexin Pregnancy And Lactation Text: This medication is Pregnancy Category B and considered safe during pregnancy.  It is also excreted in breast milk but can be used safely for shorter doses.
Finasteride Pregnancy And Lactation Text: This medication is absolutely contraindicated during pregnancy. It is unknown if it is excreted in breast milk.
Humira Counseling:  I discussed with the patient the risks of adalimumab including but not limited to myelosuppression, immunosuppression, autoimmune hepatitis, demyelinating diseases, lymphoma, and serious infections.  The patient understands that monitoring is required including a PPD at baseline and must alert us or the primary physician if symptoms of infection or other concerning signs are noted.
Cyclosporine Counseling:  I discussed with the patient the risks of cyclosporine including but not limited to hypertension, gingival hyperplasia,myelosuppression, immunosuppression, liver damage, kidney damage, neurotoxicity, lymphoma, and serious infections. The patient understands that monitoring is required including baseline blood pressure, CBC, CMP, lipid panel and uric acid, and then 1-2 times monthly CMP and blood pressure.
Benzoyl Peroxide Pregnancy And Lactation Text: This medication is Pregnancy Category C. It is unknown if benzoyl peroxide is excreted in breast milk.
Hydroxyzine Pregnancy And Lactation Text: This medication is not safe during pregnancy and should not be taken. It is also excreted in breast milk and breast feeding isn't recommended.
Clindamycin Counseling: I counseled the patient regarding use of clindamycin as an antibiotic for prophylactic and/or therapeutic purposes. Clindamycin is active against numerous classes of bacteria, including skin bacteria. Side effects may include nausea, diarrhea, gastrointestinal upset, rash, hives, yeast infections, and in rare cases, colitis.
Propranolol Counseling:  I discussed with the patient the risks of propranolol including but not limited to low heart rate, low blood pressure, low blood sugar, restlessness and increased cold sensitivity. They should call the office if they experience any of these side effects.
Finasteride Male Counseling: Finasteride Counseling:  I discussed with the patient the risks of use of finasteride including but not limited to decreased libido, decreased ejaculate volume, gynecomastia, and depression. Women should not handle medication.  All of the patient's questions and concerns were addressed.
Wartpeel Counseling:  I discussed with the patient the risks of Wartpeel including but not limited to erythema, scaling, itching, weeping, crusting, and pain.
Benzoyl Peroxide Counseling: Patient counseled that medicine may cause skin irritation and bleach clothing.  In the event of skin irritation, the patient was advised to reduce the amount of the drug applied or use it less frequently.   The patient verbalized understanding of the proper use and possible adverse effects of benzoyl peroxide.  All of the patient's questions and concerns were addressed.
Mirvaso Counseling: Mirvaso is a topical medication which can decrease superficial blood flow where applied. Side effects are uncommon and include stinging, redness and allergic reactions.
Cyclophosphamide Pregnancy And Lactation Text: This medication is Pregnancy Category D and it isn't considered safe during pregnancy. This medication is excreted in breast milk.
Oxybutynin Pregnancy And Lactation Text: This medication is Pregnancy Category B and is considered safe during pregnancy. It is unknown if it is excreted in breast milk.
Topical Sulfur Applications Pregnancy And Lactation Text: This medication is Pregnancy Category C and has an unknown safety profile during pregnancy. It is unknown if this topical medication is excreted in breast milk.
Ilumya Counseling: I discussed with the patient the risks of tildrakizumab including but not limited to immunosuppression, malignancy, posterior leukoencephalopathy syndrome, and serious infections.  The patient understands that monitoring is required including a PPD at baseline and must alert us or the primary physician if symptoms of infection or other concerning signs are noted.
Clindamycin Pregnancy And Lactation Text: This medication can be used in pregnancy if certain situations. Clindamycin is also present in breast milk.
Fluconazole Counseling:  Patient counseled regarding adverse effects of fluconazole including but not limited to headache, diarrhea, nausea, upset stomach, liver function test abnormalities, taste disturbance, and stomach pain.  There is a rare possibility of liver failure that can occur when taking fluconazole.  The patient understands that monitoring of LFTs and kidney function test may be required, especially at baseline. The patient verbalized understanding of the proper use and possible adverse effects of fluconazole.  All of the patient's questions and concerns were addressed.
Cyclophosphamide Counseling:  I discussed with the patient the risks of cyclophosphamide including but not limited to hair loss, hormonal abnormalities, decreased fertility, abdominal pain, diarrhea, nausea and vomiting, bone marrow suppression and infection. The patient understands that monitoring is required while taking this medication.
Oxybutynin Counseling:  I discussed with the patient the risks of oxybutynin including but not limited to skin rash, drowsiness, dry mouth, difficulty urinating, and blurred vision.
Albendazole Counseling:  I discussed with the patient the risks of albendazole including but not limited to cytopenia, kidney damage, nausea/vomiting and severe allergy.  The patient understands that this medication is being used in an off-label manner.
Doxycycline Counseling:  Patient counseled regarding possible photosensitivity and increased risk for sunburn.  Patient instructed to avoid sunlight, if possible.  When exposed to sunlight, patients should wear protective clothing, sunglasses, and sunscreen.  The patient was instructed to call the office immediately if the following severe adverse effects occur:  hearing changes, easy bruising/bleeding, severe headache, or vision changes.  The patient verbalized understanding of the proper use and possible adverse effects of doxycycline.  All of the patient's questions and concerns were addressed.
Erivedge Counseling- I discussed with the patient the risks of Erivedge including but not limited to nausea, vomiting, diarrhea, constipation, weight loss, changes in the sense of taste, decreased appetite, muscle spasms, and hair loss.  The patient verbalized understanding of the proper use and possible adverse effects of Erivedge.  All of the patient's questions and concerns were addressed.
Topical Sulfur Applications Counseling: Topical Sulfur Counseling: Patient counseled that this medication may cause skin irritation or allergic reactions.  In the event of skin irritation, the patient was advised to reduce the amount of the drug applied or use it less frequently.   The patient verbalized understanding of the proper use and possible adverse effects of topical sulfur application.  All of the patient's questions and concerns were addressed.
Minoxidil Counseling: Minoxidil is a topical medication which can increase blood flow where it is applied. It is uncertain how this medication increases hair growth. Side effects are uncommon and include stinging and allergic reactions.
Doxycycline Pregnancy And Lactation Text: This medication is Pregnancy Category D and not consider safe during pregnancy. It is also excreted in breast milk but is considered safe for shorter treatment courses.
Otezla Pregnancy And Lactation Text: This medication is Pregnancy Category C and it isn't known if it is safe during pregnancy. It is unknown if it is excreted in breast milk.
Dapsone Pregnancy And Lactation Text: This medication is Pregnancy Category C and is not considered safe during pregnancy or breast feeding.
Infliximab Counseling:  I discussed with the patient the risks of infliximab including but not limited to myelosuppression, immunosuppression, autoimmune hepatitis, demyelinating diseases, lymphoma, and serious infections.  The patient understands that monitoring is required including a PPD at baseline and must alert us or the primary physician if symptoms of infection or other concerning signs are noted.
High Dose Vitamin A Pregnancy And Lactation Text: High dose vitamin A therapy is contraindicated during pregnancy and breast feeding.
Griseofulvin Counseling:  I discussed with the patient the risks of griseofulvin including but not limited to photosensitivity, cytopenia, liver damage, nausea/vomiting and severe allergy.  The patient understands that this medication is best absorbed when taken with a fatty meal (e.g., ice cream or french fries).
Cellcept Counseling:  I discussed with the patient the risks of mycophenolate mofetil including but not limited to infection/immunosuppression, GI upset, hypokalemia, hypercholesterolemia, bone marrow suppression, lymphoproliferative disorders, malignancy, GI ulceration/bleed/perforation, colitis, interstitial lung disease, kidney failure, progressive multifocal leukoencephalopathy, and birth defects.  The patient understands that monitoring is required including a baseline creatinine and regular CBC testing. In addition, patient must alert us immediately if symptoms of infection or other concerning signs are noted.
Ivermectin Counseling:  Patient instructed to take medication on an empty stomach with a full glass of water.  Patient informed of potential adverse effects including but not limited to nausea, diarrhea, dizziness, itching, and swelling of the extremities or lymph nodes.  The patient verbalized understanding of the proper use and possible adverse effects of ivermectin.  All of the patient's questions and concerns were addressed.

## 2020-12-28 DIAGNOSIS — E11.8 CONTROLLED TYPE 2 DIABETES MELLITUS WITH COMPLICATION, WITHOUT LONG-TERM CURRENT USE OF INSULIN (HCC): ICD-10-CM

## 2020-12-28 DIAGNOSIS — F41.1 GENERALIZED ANXIETY DISORDER: ICD-10-CM

## 2020-12-28 RX ORDER — DULAGLUTIDE 0.75 MG/.5ML
0.5 INJECTION, SOLUTION SUBCUTANEOUS
Qty: 6 ML | Refills: 0 | Status: SHIPPED | OUTPATIENT
Start: 2020-12-28 | End: 2021-03-02 | Stop reason: SDUPTHER

## 2020-12-28 RX ORDER — CITALOPRAM 20 MG/1
20 TABLET ORAL
Qty: 90 TAB | Refills: 0 | Status: SHIPPED | OUTPATIENT
Start: 2020-12-28 | End: 2021-03-24

## 2021-01-26 ENCOUNTER — PATIENT MESSAGE (OUTPATIENT)
Dept: CARDIOLOGY | Facility: MEDICAL CENTER | Age: 71
End: 2021-01-26

## 2021-01-28 ENCOUNTER — NON-PROVIDER VISIT (OUTPATIENT)
Dept: CARDIOLOGY | Facility: MEDICAL CENTER | Age: 71
End: 2021-01-28
Payer: MEDICARE

## 2021-01-28 DIAGNOSIS — I25.5 ISCHEMIC CARDIOMYOPATHY: ICD-10-CM

## 2021-01-28 DIAGNOSIS — Z95.810 AICD (AUTOMATIC CARDIOVERTER/DEFIBRILLATOR) PRESENT: ICD-10-CM

## 2021-01-28 PROCEDURE — 93295 DEV INTERROG REMOTE 1/2/MLT: CPT | Performed by: INTERNAL MEDICINE

## 2021-01-28 NOTE — PATIENT COMMUNICATION
Called [facility] pharmacy, [phone], and s/w Carolyn Sanchez regarding findings.  She states notification was sent in December regarding Trulicity and it was delivered on the 21st.  All other medications including Plavix and Spironolactone was delivered on the 23rd and states medications should automatically be filled and shipped.  She states cardiac medications still have refill available.      Replied to pt via [employer] regarding findings.

## 2021-02-04 DIAGNOSIS — I25.10 CORONARY ARTERY DISEASE DUE TO CALCIFIED CORONARY LESION: ICD-10-CM

## 2021-02-04 DIAGNOSIS — I10 ESSENTIAL HYPERTENSION: Chronic | ICD-10-CM

## 2021-02-04 DIAGNOSIS — I25.84 CORONARY ARTERY DISEASE DUE TO CALCIFIED CORONARY LESION: ICD-10-CM

## 2021-02-04 RX ORDER — SPIRONOLACTONE 25 MG/1
25 TABLET ORAL DAILY
Qty: 90 TAB | Refills: 3 | Status: SHIPPED | OUTPATIENT
Start: 2021-02-04 | End: 2022-02-11 | Stop reason: SDUPTHER

## 2021-02-04 RX ORDER — CLOPIDOGREL BISULFATE 75 MG/1
75 TABLET ORAL DAILY
Qty: 90 TAB | Refills: 3 | Status: SHIPPED | OUTPATIENT
Start: 2021-02-04 | End: 2022-02-11 | Stop reason: SDUPTHER

## 2021-02-24 RX ORDER — CIPROFLOXACIN 500 MG/1
500 TABLET, FILM COATED ORAL 2 TIMES DAILY
Qty: 10 TABLET | Refills: 0 | Status: SHIPPED
Start: 2021-02-24 | End: 2021-07-26

## 2021-03-02 DIAGNOSIS — E11.8 CONTROLLED TYPE 2 DIABETES MELLITUS WITH COMPLICATION, WITHOUT LONG-TERM CURRENT USE OF INSULIN (HCC): ICD-10-CM

## 2021-03-03 DIAGNOSIS — Z23 NEED FOR VACCINATION: ICD-10-CM

## 2021-03-03 RX ORDER — DULAGLUTIDE 0.75 MG/.5ML
0.5 INJECTION, SOLUTION SUBCUTANEOUS
Qty: 6 ML | Refills: 0 | Status: SHIPPED | OUTPATIENT
Start: 2021-03-03 | End: 2021-08-02 | Stop reason: SDUPTHER

## 2021-03-24 DIAGNOSIS — F41.1 GENERALIZED ANXIETY DISORDER: ICD-10-CM

## 2021-03-24 RX ORDER — CITALOPRAM 20 MG/1
TABLET ORAL
Qty: 90 TABLET | Refills: 1 | Status: SHIPPED | OUTPATIENT
Start: 2021-03-24 | End: 2021-09-20 | Stop reason: SDUPTHER

## 2021-04-14 ENCOUNTER — IMMUNIZATION (OUTPATIENT)
Dept: FAMILY PLANNING/WOMEN'S HEALTH CLINIC | Facility: IMMUNIZATION CENTER | Age: 71
End: 2021-04-14
Attending: INTERNAL MEDICINE
Payer: MEDICARE

## 2021-04-14 DIAGNOSIS — Z23 ENCOUNTER FOR VACCINATION: Primary | ICD-10-CM

## 2021-04-14 DIAGNOSIS — Z23 NEED FOR VACCINATION: ICD-10-CM

## 2021-04-14 PROCEDURE — 91300 PFIZER SARS-COV-2 VACCINE: CPT

## 2021-04-14 PROCEDURE — 0001A PFIZER SARS-COV-2 VACCINE: CPT

## 2021-04-29 ENCOUNTER — NON-PROVIDER VISIT (OUTPATIENT)
Dept: CARDIOLOGY | Facility: MEDICAL CENTER | Age: 71
End: 2021-04-29
Payer: MEDICARE

## 2021-04-29 DIAGNOSIS — Z95.810 AICD (AUTOMATIC CARDIOVERTER/DEFIBRILLATOR) PRESENT: ICD-10-CM

## 2021-04-29 DIAGNOSIS — I25.5 ISCHEMIC CARDIOMYOPATHY: ICD-10-CM

## 2021-04-29 DIAGNOSIS — I50.22 CHRONIC SYSTOLIC CONGESTIVE HEART FAILURE (HCC): Chronic | ICD-10-CM

## 2021-04-29 PROCEDURE — 93295 DEV INTERROG REMOTE 1/2/MLT: CPT | Performed by: INTERNAL MEDICINE

## 2021-05-07 ENCOUNTER — IMMUNIZATION (OUTPATIENT)
Dept: FAMILY PLANNING/WOMEN'S HEALTH CLINIC | Facility: IMMUNIZATION CENTER | Age: 71
End: 2021-05-07
Payer: MEDICARE

## 2021-05-07 DIAGNOSIS — Z23 ENCOUNTER FOR VACCINATION: Primary | ICD-10-CM

## 2021-05-07 PROCEDURE — 91300 PFIZER SARS-COV-2 VACCINE: CPT | Performed by: INTERNAL MEDICINE

## 2021-05-07 PROCEDURE — 0002A PFIZER SARS-COV-2 VACCINE: CPT | Performed by: INTERNAL MEDICINE

## 2021-06-14 ENCOUNTER — APPOINTMENT (RX ONLY)
Dept: URBAN - METROPOLITAN AREA CLINIC 22 | Facility: CLINIC | Age: 71
Setting detail: DERMATOLOGY
End: 2021-06-14

## 2021-06-14 DIAGNOSIS — L57.0 ACTINIC KERATOSIS: ICD-10-CM

## 2021-06-14 DIAGNOSIS — L82.1 OTHER SEBORRHEIC KERATOSIS: ICD-10-CM

## 2021-06-14 DIAGNOSIS — D22 MELANOCYTIC NEVI: ICD-10-CM

## 2021-06-14 DIAGNOSIS — Z71.89 OTHER SPECIFIED COUNSELING: ICD-10-CM

## 2021-06-14 DIAGNOSIS — L82.0 INFLAMED SEBORRHEIC KERATOSIS: ICD-10-CM

## 2021-06-14 DIAGNOSIS — D18.0 HEMANGIOMA: ICD-10-CM

## 2021-06-14 DIAGNOSIS — L81.4 OTHER MELANIN HYPERPIGMENTATION: ICD-10-CM

## 2021-06-14 DIAGNOSIS — L259 CONTACT DERMATITIS AND OTHER ECZEMA, UNSPECIFIED CAUSE: ICD-10-CM | Status: WELL CONTROLLED

## 2021-06-14 PROBLEM — D18.01 HEMANGIOMA OF SKIN AND SUBCUTANEOUS TISSUE: Status: ACTIVE | Noted: 2021-06-14

## 2021-06-14 PROBLEM — D22.5 MELANOCYTIC NEVI OF TRUNK: Status: ACTIVE | Noted: 2021-06-14

## 2021-06-14 PROBLEM — L30.8 OTHER SPECIFIED DERMATITIS: Status: ACTIVE | Noted: 2021-06-14

## 2021-06-14 PROCEDURE — ? LIQUID NITROGEN

## 2021-06-14 PROCEDURE — ? COUNSELING

## 2021-06-14 PROCEDURE — 99213 OFFICE O/P EST LOW 20 MIN: CPT | Mod: 25

## 2021-06-14 PROCEDURE — ? ADDITIONAL NOTES

## 2021-06-14 PROCEDURE — 17004 DESTROY PREMAL LESIONS 15/>: CPT

## 2021-06-14 PROCEDURE — 17110 DESTRUCTION B9 LES UP TO 14: CPT | Mod: 59

## 2021-06-14 PROCEDURE — ? SUNSCREEN RECOMMENDATIONS

## 2021-06-14 ASSESSMENT — LOCATION DETAILED DESCRIPTION DERM
LOCATION DETAILED: MID-OCCIPITAL SCALP
LOCATION DETAILED: POSTERIOR MID-PARIETAL SCALP
LOCATION DETAILED: RIGHT SUPERIOR PARIETAL SCALP
LOCATION DETAILED: LEFT ULNAR DORSAL HAND
LOCATION DETAILED: LEFT DISTAL DORSAL FOREARM
LOCATION DETAILED: LEFT DORSAL MIDDLE METACARPOPHALANGEAL JOINT
LOCATION DETAILED: LEFT SUPERIOR OCCIPITAL SCALP
LOCATION DETAILED: RIGHT PROXIMAL RADIAL DORSAL FOREARM
LOCATION DETAILED: RIGHT ULNAR DORSAL HAND
LOCATION DETAILED: LEFT SUPERIOR HELIX
LOCATION DETAILED: LEFT CENTRAL PARIETAL SCALP
LOCATION DETAILED: RIGHT PROXIMAL PRETIBIAL REGION
LOCATION DETAILED: RIGHT ANTIHELIX
LOCATION DETAILED: RIGHT SUPERIOR MEDIAL MIDBACK
LOCATION DETAILED: LEFT SUPERIOR PARIETAL SCALP
LOCATION DETAILED: LEFT SUPERIOR MEDIAL UPPER BACK
LOCATION DETAILED: LEFT PROXIMAL PRETIBIAL REGION
LOCATION DETAILED: RIGHT DISTAL DORSAL FOREARM
LOCATION DETAILED: RIGHT CENTRAL FRONTAL SCALP
LOCATION DETAILED: LEFT PROXIMAL DORSAL FOREARM
LOCATION DETAILED: LEFT SUPERIOR CRUS OF ANTIHELIX
LOCATION DETAILED: LEFT CLAVICULAR NECK
LOCATION DETAILED: RIGHT RADIAL DORSAL HAND
LOCATION DETAILED: RIGHT SUPERIOR OCCIPITAL SCALP
LOCATION DETAILED: RIGHT CENTRAL PARIETAL SCALP
LOCATION DETAILED: EPIGASTRIC SKIN
LOCATION DETAILED: RIGHT SUPERIOR HELIX

## 2021-06-14 ASSESSMENT — LOCATION SIMPLE DESCRIPTION DERM
LOCATION SIMPLE: LEFT PRETIBIAL REGION
LOCATION SIMPLE: LEFT ANTERIOR NECK
LOCATION SIMPLE: RIGHT EAR
LOCATION SIMPLE: RIGHT PRETIBIAL REGION
LOCATION SIMPLE: RIGHT HAND
LOCATION SIMPLE: LEFT EAR
LOCATION SIMPLE: LEFT FOREARM
LOCATION SIMPLE: LEFT HAND
LOCATION SIMPLE: LEFT OCCIPITAL SCALP
LOCATION SIMPLE: RIGHT SCALP
LOCATION SIMPLE: RIGHT LOWER BACK
LOCATION SIMPLE: SCALP
LOCATION SIMPLE: RIGHT FOREARM
LOCATION SIMPLE: POSTERIOR SCALP
LOCATION SIMPLE: ABDOMEN
LOCATION SIMPLE: RIGHT OCCIPITAL SCALP
LOCATION SIMPLE: LEFT UPPER BACK

## 2021-06-14 ASSESSMENT — LOCATION ZONE DERM
LOCATION ZONE: NECK
LOCATION ZONE: TRUNK
LOCATION ZONE: HAND
LOCATION ZONE: ARM
LOCATION ZONE: LEG
LOCATION ZONE: EAR
LOCATION ZONE: SCALP

## 2021-06-14 NOTE — PROCEDURE: LIQUID NITROGEN
Detail Level: Detailed
Render Post-Care Instructions In Note?: no
Post-Care Instructions: I reviewed with the patient in detail post-care instructions. Patient is to wear sunprotection, and avoid picking at any of the treated lesions. Pt may apply Vaseline to crusted or scabbing areas.
Consent: The patient's consent was obtained including but not limited to risks of crusting, scabbing, blistering, scarring, darker or lighter pigmentary change, recurrence, incomplete removal and infection.
Number Of Freeze-Thaw Cycles: 2 freeze-thaw cycles
Duration Of Freeze Thaw-Cycle (Seconds): 3
Medical Necessity Clause: This procedure was medically necessary because the lesions that were treated were:
Number Of Freeze-Thaw Cycles: 3 freeze-thaw cycles
Medical Necessity Information: It is in your best interest to select a reason for this procedure from the list below. All of these items fulfill various CMS LCD requirements except the new and changing color options.
Render Post-Care Instructions In Note?: yes

## 2021-06-14 NOTE — PROCEDURE: ADDITIONAL NOTES
Detail Level: Detailed
Additional Notes: Will continue as needed use of Triamcinolone cream aaa bid prn; declines a refill today.\\nMoisturize regularly with CeraVe/CetaphilCream once or twice daily.

## 2021-06-18 DIAGNOSIS — Z20.822 EXPOSURE TO COVID-19 VIRUS: ICD-10-CM

## 2021-07-05 DIAGNOSIS — E11.8 CONTROLLED TYPE 2 DIABETES MELLITUS WITH COMPLICATION, WITHOUT LONG-TERM CURRENT USE OF INSULIN (HCC): ICD-10-CM

## 2021-07-05 RX ORDER — METFORMIN HYDROCHLORIDE 750 MG/1
TABLET, EXTENDED RELEASE ORAL
Qty: 60 TABLET | Refills: 0 | Status: SHIPPED | OUTPATIENT
Start: 2021-07-05 | End: 2021-07-26

## 2021-07-13 ENCOUNTER — TELEPHONE (OUTPATIENT)
Dept: MEDICAL GROUP | Age: 71
End: 2021-07-13

## 2021-07-26 ENCOUNTER — OFFICE VISIT (OUTPATIENT)
Dept: CARDIOLOGY | Facility: MEDICAL CENTER | Age: 71
End: 2021-07-26
Payer: MEDICARE

## 2021-07-26 VITALS
DIASTOLIC BLOOD PRESSURE: 82 MMHG | OXYGEN SATURATION: 95 % | SYSTOLIC BLOOD PRESSURE: 120 MMHG | WEIGHT: 213 LBS | HEIGHT: 71 IN | HEART RATE: 76 BPM | BODY MASS INDEX: 29.82 KG/M2 | RESPIRATION RATE: 14 BRPM

## 2021-07-26 DIAGNOSIS — I50.22 CHRONIC SYSTOLIC CONGESTIVE HEART FAILURE (HCC): Chronic | ICD-10-CM

## 2021-07-26 PROCEDURE — 99214 OFFICE O/P EST MOD 30 MIN: CPT | Performed by: PHYSICIAN ASSISTANT

## 2021-07-26 RX ORDER — EZETIMIBE 10 MG/1
10 TABLET ORAL DAILY
Qty: 90 TABLET | Refills: 3 | Status: SHIPPED | OUTPATIENT
Start: 2021-07-26 | End: 2021-07-26

## 2021-07-26 RX ORDER — SPIRONOLACTONE 25 MG/1
25 TABLET ORAL DAILY
Qty: 90 TABLET | Refills: 3 | Status: SHIPPED | OUTPATIENT
Start: 2021-07-26 | End: 2021-07-26

## 2021-07-26 RX ORDER — TIZANIDINE 2 MG/1
TABLET ORAL
COMMUNITY
Start: 2021-06-21 | End: 2021-09-20

## 2021-07-26 RX ORDER — EZETIMIBE 10 MG/1
10 TABLET ORAL DAILY
Qty: 90 TABLET | Refills: 3 | Status: SHIPPED | OUTPATIENT
Start: 2021-07-26 | End: 2021-10-12 | Stop reason: SDUPTHER

## 2021-07-26 RX ORDER — CARVEDILOL 6.25 MG/1
6.25 TABLET ORAL 2 TIMES DAILY WITH MEALS
Qty: 180 TABLET | Refills: 3 | Status: SHIPPED | OUTPATIENT
Start: 2021-07-26 | End: 2021-11-18 | Stop reason: SDUPTHER

## 2021-07-26 RX ORDER — SPIRONOLACTONE 25 MG/1
25 TABLET ORAL DAILY
Qty: 90 TABLET | Refills: 3 | Status: SHIPPED | OUTPATIENT
Start: 2021-07-26 | End: 2022-02-11

## 2021-07-26 RX ORDER — CLOPIDOGREL BISULFATE 75 MG/1
75 TABLET ORAL DAILY
Qty: 90 TABLET | Refills: 3 | Status: SHIPPED | OUTPATIENT
Start: 2021-07-26 | End: 2022-02-11

## 2021-07-26 RX ORDER — SACUBITRIL AND VALSARTAN 24; 26 MG/1; MG/1
1 TABLET, FILM COATED ORAL 2 TIMES DAILY
Qty: 180 TABLET | Refills: 3 | Status: SHIPPED | OUTPATIENT
Start: 2021-07-26 | End: 2021-09-22 | Stop reason: SDUPTHER

## 2021-07-26 RX ORDER — ROSUVASTATIN CALCIUM 20 MG/1
20 TABLET, COATED ORAL EVERY EVENING
Qty: 90 TABLET | Refills: 3 | Status: SHIPPED | OUTPATIENT
Start: 2021-07-26

## 2021-07-26 ASSESSMENT — ENCOUNTER SYMPTOMS
DYSPNEA ON EXERTION: 0
ABDOMINAL PAIN: 0
PALPITATIONS: 0
NAUSEA: 0
MYALGIAS: 0
FEVER: 0
SHORTNESS OF BREATH: 0
NEAR-SYNCOPE: 0
DIAPHORESIS: 0
ORTHOPNEA: 0
SYNCOPE: 0
BRUISES/BLEEDS EASILY: 0
VOMITING: 0
DIZZINESS: 0
DECREASED APPETITE: 0
SNORING: 0
BLURRED VISION: 0
BLOATING: 0
WEAKNESS: 0

## 2021-07-26 ASSESSMENT — FIBROSIS 4 INDEX: FIB4 SCORE: 1.95

## 2021-07-26 NOTE — PROGRESS NOTES
Cardiology Clinic Follow-up Note    Date of note:    7/26/2021  Primary Care Provider: Oxana Parkinson M.D.    Name:             Luis Renner  YOB: 1950  MRN:               [id number]    CC: CHF    Primary Cardiologist: Dr. Benoit     Patient HPI:   Luis Renner is a 71 y.o. male with PMH including HFrEF 35% secondary to CAD s/p stents to the LAD, RCA, hx of  of LCx, Dyslipidemia, Essential hypertension,      Interim History:  Mr. Renner was last seen in this cardiology office by Dr. Benoit 11/2020.    He is overall doing well, exercising - doing Pilates routinely  Denies CP or shortness of breath   Plans to go to [place] and needs paper prescriptions.    Review of Systems   Constitutional: Negative for decreased appetite, diaphoresis, fever and malaise/fatigue.   Eyes: Negative for blurred vision.   Cardiovascular: Negative for chest pain, dyspnea on exertion, leg swelling, near-syncope, orthopnea, palpitations and syncope.   Respiratory: Negative for shortness of breath and snoring.    Hematologic/Lymphatic: Negative for bleeding problem. Does not bruise/bleed easily.   Skin: Negative for rash.   Musculoskeletal: Negative for joint pain and myalgias.   Gastrointestinal: Negative for bloating, abdominal pain, nausea and vomiting.   Genitourinary: Negative for frequency.   Neurological: Negative for dizziness and weakness.        Past medical history, social history and family history reviewed.  No changes other than what is outlined in HPI.    Current Outpatient Medications   Medication Sig Dispense Refill   • tizanidine (ZANAFLEX) 2 MG tablet      • citalopram (CELEXA) 20 MG Tab Take 1 tablet by mouth daily. 90 tablet 1   • Dulaglutide (TRULICITY) 0.75 MG/0.5ML Solution Pen-injector Inject 0.5 mL as directed every 7 days. 6 mL 0   • sacubitril-valsartan (ENTRESTO) 24-26 MG Tab tablet Take 1 tablet by mouth 2 Times a Day. 180 tablet 2   • rosuvastatin (CRESTOR) 20 MG Tab Take 1 tablet  "by mouth every evening. 90 tablet 2   • clopidogrel (PLAVIX) 75 MG Tab Take 1 Tab by mouth every day. 90 Tab 3   • spironolactone (ALDACTONE) 25 MG Tab Take 1 Tab by mouth every day. 90 Tab 3   • tamsulosin (FLOMAX) 0.4 MG capsule Take 2 Caps by mouth every day. Need video for refill 180 Cap 3   • metFORMIN ER (GLUCOPHAGE XR) 750 MG TABLET SR 24 HR Take 1 Tab by mouth every day. 180 Tab 3   • JARDIANCE 25 MG Tab      • gabapentin (NEURONTIN) 300 MG Cap      • glipiZIDE (GLUCOTROL) 5 MG Tab      • carvedilol (COREG) 6.25 MG Tab Take 1 Tab by mouth 2 times a day, with meals. 180 Tab 3   • omeprazole (PRILOSEC) 20 MG delayed-release capsule Take 1 capsule by mouth daily as needed.  90 Cap 3   • ezetimibe (ZETIA) 10 MG Tab Take 1 tablet by mouth daily. 90 Tab 3   • allopurinol (ZYLOPRIM) 300 MG Tab Take 1 tablet by mouth daily. 90 Tab 3   • acetaminophen (TYLENOL) 500 MG Tab Take 1,500 mg by mouth 2 Times a Day.     • WELCHOL 625 MG Tab Take 625 mg by mouth every day.     • Lancets 1 Each by Does not apply route 3 times a day. Lancets for Freestyle Lite meter. Sig: use TID and prn ssx high or low sugar. 100 Each 11   • ciprofloxacin (CIPRO) 500 MG Tab Take 1 tablet by mouth 2 times a day. (Patient not taking: Reported on 7/26/2021) 10 tablet 0     No current facility-administered medications for this visit.       No Known Allergies      Physical Exam:  Ambulatory Vitals  /82 (BP Location: Left arm, Patient Position: Sitting, BP Cuff Size: Adult)   Pulse 76   Resp 14   Ht 1.803 m (5' 11\")   Wt 96.6 kg (213 lb)   SpO2 95%    BP Readings from Last 4 Encounters:   07/26/21 120/82   11/16/20 120/78   07/09/20 104/66   02/25/20 106/68       Weight/BMI: Body mass index is 29.71 kg/m².  Wt Readings from Last 4 Encounters:   07/26/21 96.6 kg (213 lb)   12/02/20 91.6 kg (202 lb)   11/16/20 95.3 kg (210 lb)   07/09/20 93.6 kg (206 lb 6.4 oz)       General: no apparent distress  Lungs: normal effort, without crackles, no " wheezing or rhonchi.  Heart: normal rate, regular rhythm, no murmur, no rub  Ext:  no lower extremity edema.  Abdomen: soft, non tender, non distended,  Neurological: No focal deficits, no facial asymmetry.  Normal speech.  Psychiatric: Appropriate affect, alert and oriented x 3.   Skin: Warm extremities, no rash.      Lab Data Review:  Lab Results   Component Value Date/Time    CHOLSTRLTOT 99 (L) 11/09/2020 10:37 AM    LDL 29 11/09/2020 10:37 AM    HDL 35 (A) 11/09/2020 10:37 AM    TRIGLYCERIDE 177 (H) 11/09/2020 10:37 AM       Lab Results   Component Value Date/Time    SODIUM 137 11/09/2020 10:37 AM    POTASSIUM 4.9 11/09/2020 10:37 AM    CHLORIDE 102 11/09/2020 10:37 AM    CO2 26 11/09/2020 10:37 AM    GLUCOSE 127 (H) 11/09/2020 10:37 AM    BUN 18 11/09/2020 10:37 AM    CREATININE 1.10 11/09/2020 10:37 AM     Lab Results   Component Value Date/Time    ALKPHOSPHAT 55 11/09/2020 10:37 AM    ASTSGOT 23 11/09/2020 10:37 AM    ALTSGPT 23 11/09/2020 10:37 AM    TBILIRUBIN 0.7 11/09/2020 10:37 AM      Lab Results   Component Value Date/Time    WBC 8.8 11/09/2020 10:37 AM         Cardiac Imaging and Procedures Review:      Echo 3/22/2019:  Akinetic mid to distal anterospetal wall and apex.  Hypokinetic basal inferoposterior wall.  Left ventricle is moderately dilated.  Moderately reduced left ventricular systolic function.  Left ventricular ejection fraction is visually estimated to be 35%.  Structurally normal mitral valve without significant stenosis or   regurgitation.  Aortic sclerosis without stenosis.  Moderately dilated left atrium.  Structurally normal tricuspid valve without significant stenosis or   regurgitation.  Unable to estimate pulmonary artery pressure due to an inadequate   tricuspid regurgitant jet.  Normal right ventricular size and systolic function.  Normal inferior vena cava size and inspiratory collapse.  No pericardial effusion seen.  Compared to prior echo done 09/12/16,  there has been no  significant   change.     Echo at [facility] 2020:  Conclusions:   1. Prior apical myocardial infarction with severely reduced left ventricular systolic   function.   2. Moderately dilated left ventricle. Ejection Fraction calculated by Garner`s Biplane   method is 33 %. Left ventricular diastolic parameters were normal.   3. Normal right ventricular size and systolic function. Echo density in right ventricle   suggestive of catheter, pacer lead, or ICD lead.   4. Estimated right atrial pressure is normal.   5. No pulmonary hypertension.       Carotid doppler 16:   CONCLUSIONS   <50% bilateral ICA stenoses   nl subclavians and vertebrals    Stress Test 3/22/19:  Report   NUCLEAR IMAGING INTERPRETATION   Dense anterior infarct from mid ventricle to apex.   Dense inferior infarction from mid ventricle to apex.   No evidence of ischemia.   SRS35, SDS0   Global hypokinesis with regional variability.   Severely reduced left ventricular systolic function, LVEF 10%      Assessment and Clinical Decision Makin Chronic HFrEF 35%, NYHA class II, stage C   -   Doing well, euvolemic  -   Continue Entresto 24/, daisy 25, coreg 6.25 BID  -   Also follows with [facility], [facility] echo 2020 with stable EF.    2 Essential hypertension   -   BP at goal on current regimen as outlined above.     3 CAD s/p stents to LAD, RCA, known  of LCX with collaterals.   -   Continue Plavix, high intensity statin and BB  -    No angina    4 Dyslipidemia   LDL 29 2020  Continue Crestor 20.    5 Diabetes mellitus type II   -   On Jardiance    Patient may follow up with Dr. Benoit annually or as needed.   I printed his prescriptions for heart meds.      Veronique Young PA-C     [facility]

## 2021-07-28 ENCOUNTER — HOSPITAL ENCOUNTER (OUTPATIENT)
Dept: LAB | Facility: MEDICAL CENTER | Age: 71
End: 2021-07-28
Attending: INTERNAL MEDICINE
Payer: MEDICARE

## 2021-07-28 DIAGNOSIS — E78.5 DYSLIPIDEMIA: ICD-10-CM

## 2021-07-28 DIAGNOSIS — I10 ESSENTIAL HYPERTENSION: Chronic | ICD-10-CM

## 2021-07-28 DIAGNOSIS — N18.30 STAGE 3 CHRONIC KIDNEY DISEASE, UNSPECIFIED WHETHER STAGE 3A OR 3B CKD: ICD-10-CM

## 2021-07-28 DIAGNOSIS — E11.9 DIABETES MELLITUS TYPE II, NON INSULIN DEPENDENT (HCC): ICD-10-CM

## 2021-07-28 DIAGNOSIS — Z12.5 SCREENING FOR MALIGNANT NEOPLASM OF PROSTATE: ICD-10-CM

## 2021-07-28 DIAGNOSIS — R80.9 MICROALBUMINURIA DUE TO TYPE 2 DIABETES MELLITUS (HCC): ICD-10-CM

## 2021-07-28 DIAGNOSIS — E11.29 MICROALBUMINURIA DUE TO TYPE 2 DIABETES MELLITUS (HCC): ICD-10-CM

## 2021-07-28 LAB
CREAT UR-MCNC: 102.48 MG/DL
EST. AVERAGE GLUCOSE BLD GHB EST-MCNC: 151 MG/DL
HBA1C MFR BLD: 6.9 % (ref 4–5.6)
MICROALBUMIN UR-MCNC: 28.2 MG/DL
MICROALBUMIN/CREAT UR: 275 MG/G (ref 0–30)
PSA SERPL-MCNC: 2.26 NG/ML (ref 0–4)

## 2021-07-28 PROCEDURE — 84153 ASSAY OF PSA TOTAL: CPT | Mod: GA

## 2021-07-28 PROCEDURE — 82043 UR ALBUMIN QUANTITATIVE: CPT

## 2021-07-28 PROCEDURE — 80061 LIPID PANEL: CPT

## 2021-07-28 PROCEDURE — 83036 HEMOGLOBIN GLYCOSYLATED A1C: CPT | Mod: GA

## 2021-07-28 PROCEDURE — 80053 COMPREHEN METABOLIC PANEL: CPT

## 2021-07-28 PROCEDURE — 82570 ASSAY OF URINE CREATININE: CPT

## 2021-07-28 PROCEDURE — 36415 COLL VENOUS BLD VENIPUNCTURE: CPT | Mod: GA

## 2021-07-29 ENCOUNTER — NON-PROVIDER VISIT (OUTPATIENT)
Dept: CARDIOLOGY | Facility: MEDICAL CENTER | Age: 71
End: 2021-07-29
Payer: MEDICARE

## 2021-07-29 LAB
ALBUMIN SERPL BCP-MCNC: 4.6 G/DL (ref 3.2–4.9)
ALBUMIN/GLOB SERPL: 1.8 G/DL
ALP SERPL-CCNC: 50 U/L (ref 30–99)
ALT SERPL-CCNC: 19 U/L (ref 2–50)
ANION GAP SERPL CALC-SCNC: 8 MMOL/L (ref 7–16)
AST SERPL-CCNC: 22 U/L (ref 12–45)
BILIRUB SERPL-MCNC: 0.5 MG/DL (ref 0.1–1.5)
BUN SERPL-MCNC: 17 MG/DL (ref 8–22)
CALCIUM SERPL-MCNC: 9.9 MG/DL (ref 8.5–10.5)
CHLORIDE SERPL-SCNC: 106 MMOL/L (ref 96–112)
CHOLEST SERPL-MCNC: 115 MG/DL (ref 100–199)
CO2 SERPL-SCNC: 28 MMOL/L (ref 20–33)
CREAT SERPL-MCNC: 1.21 MG/DL (ref 0.5–1.4)
FASTING STATUS PATIENT QL REPORTED: NORMAL
GLOBULIN SER CALC-MCNC: 2.6 G/DL (ref 1.9–3.5)
GLUCOSE SERPL-MCNC: 153 MG/DL (ref 65–99)
HDLC SERPL-MCNC: 38 MG/DL
LDLC SERPL CALC-MCNC: 40 MG/DL
POTASSIUM SERPL-SCNC: 5 MMOL/L (ref 3.6–5.5)
PROT SERPL-MCNC: 7.2 G/DL (ref 6–8.2)
SODIUM SERPL-SCNC: 142 MMOL/L (ref 135–145)
TRIGL SERPL-MCNC: 187 MG/DL (ref 0–149)

## 2021-07-29 PROCEDURE — 99999 PR NO CHARGE: CPT | Performed by: INTERNAL MEDICINE

## 2021-08-02 DIAGNOSIS — E11.8 CONTROLLED TYPE 2 DIABETES MELLITUS WITH COMPLICATION, WITHOUT LONG-TERM CURRENT USE OF INSULIN (HCC): ICD-10-CM

## 2021-08-02 RX ORDER — DULAGLUTIDE 0.75 MG/.5ML
0.5 INJECTION, SOLUTION SUBCUTANEOUS
Qty: 2 ML | Refills: 0 | Status: SHIPPED | OUTPATIENT
Start: 2021-08-02 | End: 2021-09-10 | Stop reason: SDUPTHER

## 2021-08-22 DIAGNOSIS — M10.00 IDIOPATHIC GOUT, UNSPECIFIED CHRONICITY, UNSPECIFIED SITE: ICD-10-CM

## 2021-08-22 RX ORDER — ALLOPURINOL 300 MG/1
TABLET ORAL
Qty: 90 TABLET | Refills: 0 | Status: SHIPPED | OUTPATIENT
Start: 2021-08-22 | End: 2021-11-19

## 2021-09-03 ENCOUNTER — PATIENT MESSAGE (OUTPATIENT)
Dept: MEDICAL GROUP | Age: 71
End: 2021-09-03

## 2021-09-03 DIAGNOSIS — E11.8 CONTROLLED TYPE 2 DIABETES MELLITUS WITH COMPLICATION, WITHOUT LONG-TERM CURRENT USE OF INSULIN (HCC): ICD-10-CM

## 2021-09-03 NOTE — PATIENT COMMUNICATION
Received request via: Patient    Was the patient seen in the last year in this department? Yes    Does the patient have an active prescription (recently filled or refills available) for medication(s) requested? No       Patient is out of town and has scheduled an appointment to be seen once he is back in town. Patient needs a refill of his medication to get him trough to his appointment on 09/20/21

## 2021-09-04 RX ORDER — DULAGLUTIDE 0.75 MG/.5ML
0.5 INJECTION, SOLUTION SUBCUTANEOUS
Qty: 2 ML | Refills: 3 | OUTPATIENT
Start: 2021-09-04

## 2021-09-07 NOTE — TELEPHONE ENCOUNTER
Phone Number Called: [phone] (home) [phone] (work)    Call outcome: Spoke to patient regarding message below.    Message: Patient is out of town and cannot schedule an appointment until 09/20/2021 which he is scheduled for. Patient is requesting a refill to get him through to his appointment on 09/20/21. Please advise.

## 2021-09-10 RX ORDER — DULAGLUTIDE 0.75 MG/.5ML
0.5 INJECTION, SOLUTION SUBCUTANEOUS
Qty: 2 ML | Refills: 0 | Status: SHIPPED | OUTPATIENT
Start: 2021-09-10 | End: 2021-09-20 | Stop reason: SDUPTHER

## 2021-09-20 ENCOUNTER — OFFICE VISIT (OUTPATIENT)
Dept: MEDICAL GROUP | Age: 71
End: 2021-09-20
Payer: MEDICARE

## 2021-09-20 VITALS
BODY MASS INDEX: 30.1 KG/M2 | OXYGEN SATURATION: 95 % | TEMPERATURE: 96.9 F | DIASTOLIC BLOOD PRESSURE: 78 MMHG | HEART RATE: 85 BPM | WEIGHT: 215 LBS | SYSTOLIC BLOOD PRESSURE: 122 MMHG | HEIGHT: 71 IN

## 2021-09-20 DIAGNOSIS — I25.5 ISCHEMIC CARDIOMYOPATHY: ICD-10-CM

## 2021-09-20 DIAGNOSIS — Z12.11 SCREENING FOR COLORECTAL CANCER: ICD-10-CM

## 2021-09-20 DIAGNOSIS — I25.10 CORONARY ARTERY DISEASE DUE TO CALCIFIED CORONARY LESION: ICD-10-CM

## 2021-09-20 DIAGNOSIS — Z95.1 S/P CABG X 3: ICD-10-CM

## 2021-09-20 DIAGNOSIS — F41.1 GENERALIZED ANXIETY DISORDER: ICD-10-CM

## 2021-09-20 DIAGNOSIS — E11.29 MICROALBUMINURIA DUE TO TYPE 2 DIABETES MELLITUS (HCC): ICD-10-CM

## 2021-09-20 DIAGNOSIS — E78.5 DYSLIPIDEMIA: ICD-10-CM

## 2021-09-20 DIAGNOSIS — R09.81 NASAL CONGESTION: ICD-10-CM

## 2021-09-20 DIAGNOSIS — E11.8 CONTROLLED TYPE 2 DIABETES MELLITUS WITH COMPLICATION, WITHOUT LONG-TERM CURRENT USE OF INSULIN (HCC): ICD-10-CM

## 2021-09-20 DIAGNOSIS — I25.84 CORONARY ARTERY DISEASE DUE TO CALCIFIED CORONARY LESION: ICD-10-CM

## 2021-09-20 DIAGNOSIS — R80.9 MICROALBUMINURIA DUE TO TYPE 2 DIABETES MELLITUS (HCC): ICD-10-CM

## 2021-09-20 DIAGNOSIS — I50.22 CHRONIC SYSTOLIC CONGESTIVE HEART FAILURE (HCC): Chronic | ICD-10-CM

## 2021-09-20 DIAGNOSIS — Z12.12 SCREENING FOR COLORECTAL CANCER: ICD-10-CM

## 2021-09-20 DIAGNOSIS — I10 ESSENTIAL HYPERTENSION: Chronic | ICD-10-CM

## 2021-09-20 DIAGNOSIS — Z23 FLU VACCINE NEED: ICD-10-CM

## 2021-09-20 PROBLEM — N18.30 CKD (CHRONIC KIDNEY DISEASE), STAGE III: Status: RESOLVED | Noted: 2020-04-29 | Resolved: 2021-09-20

## 2021-09-20 PROCEDURE — 99214 OFFICE O/P EST MOD 30 MIN: CPT | Mod: 25 | Performed by: INTERNAL MEDICINE

## 2021-09-20 PROCEDURE — G0008 ADMIN INFLUENZA VIRUS VAC: HCPCS | Performed by: INTERNAL MEDICINE

## 2021-09-20 PROCEDURE — 90662 IIV NO PRSV INCREASED AG IM: CPT | Performed by: INTERNAL MEDICINE

## 2021-09-20 RX ORDER — CITALOPRAM 20 MG/1
TABLET ORAL
Qty: 90 TABLET | Refills: 1 | Status: SHIPPED | OUTPATIENT
Start: 2021-09-20 | End: 2021-10-12 | Stop reason: SDUPTHER

## 2021-09-20 RX ORDER — DULAGLUTIDE 0.75 MG/.5ML
0.5 INJECTION, SOLUTION SUBCUTANEOUS
Qty: 6 ML | Refills: 1 | Status: SHIPPED | OUTPATIENT
Start: 2021-09-20 | End: 2021-10-04 | Stop reason: SDUPTHER

## 2021-09-20 RX ORDER — METFORMIN HYDROCHLORIDE 750 MG/1
750 TABLET, EXTENDED RELEASE ORAL DAILY
Qty: 180 TABLET | Refills: 1 | Status: SHIPPED | OUTPATIENT
Start: 2021-09-20 | End: 2022-03-29 | Stop reason: SDUPTHER

## 2021-09-20 RX ORDER — ROSUVASTATIN CALCIUM 20 MG/1
20 TABLET, COATED ORAL EVERY EVENING
Qty: 90 TABLET | Refills: 1 | Status: SHIPPED | OUTPATIENT
Start: 2021-09-20 | End: 2021-12-13 | Stop reason: SDUPTHER

## 2021-09-20 ASSESSMENT — PATIENT HEALTH QUESTIONNAIRE - PHQ9: CLINICAL INTERPRETATION OF PHQ2 SCORE: 0

## 2021-09-20 ASSESSMENT — FIBROSIS 4 INDEX: FIB4 SCORE: 2.05

## 2021-09-20 NOTE — PROGRESS NOTES
CHIEF COMPLAINT  DM2    HPI  Luis Renner is a 71 y.o. male who presents today for the following     DM2 controlled, microalbuminuria  Interval course:  HBA1C 6.9 (H) 07/28/2021 09:30 AM   HBA1C 7.2 (H) 11/09/2020 10:37 AM   HBA1C 7.5 (H) 05/18/2020 11:07 AM     Onset:  DM2 since late 50'.       Meds:   - Trulicity 0.75 mg weekly  - Jardiance, 25 mg QD  - Glipizide 5 mg BID  - Metformin 850 mg QD - stopped few mos ago due to GI upset  Used meds as prescribed.    Compliance to meds: yes  Checking feet daily/wear soft socks/shoes: yes     Checking blood sugar: rarely  Hypoglycemia:  one remote episode   Symptoms of hypoglycemia: sweating, fatigue, pale skin, hunger.     ASA: yes  ACE: yes  Statin: yes     Diet: regular  Exercise: at least 4 d/w  BMI: 28     DM complications:   Peripheral neuropathy:            No numbness or tingling in feet.  Retinopathy:                            No retinopathy. Last eye exam: 8/2021  Nephropathy:                          Microalbumin: pos  CVS:                                        Has CAD  GI:                                           No gastropathy.     FH of DM:  none     Hypertension, cardiomyopathy, CAD (St post CABG in 7/16), CHF  Meds: Lisinopril, 5 mg QD, spironolactone, 25 mg QD, carvedilol, 6.25 mg BID, entresto BID; taking as prescribed.    He is not measuring BP at home.  Denies: - headaches, vision problems, tinnitus.  - chest pain/pressure, palpitations, irregular heart beats, exertional, dyspnea, peripheral edema.  Low salt diet: yes  Diet / exercise BMI: as above    FH: multiple  He has been followed up by cardiology.     Echocardiography, 3/22/2019  Akinetic mid to distal anterospetal wall and apex.  Hypokinetic basal inferoposterior wall.  Left ventricle is moderately dilated.  Moderately reduced left ventricular systolic function.  Left ventricular ejection fraction is visually estimated to be 35%.  Structurally normal mitral valve without significant  stenosis or   regurgitation.  Aortic sclerosis without stenosis.  Moderately dilated left atrium.  Structurally normal tricuspid valve without significant stenosis or   regurgitation.  Unable to estimate pulmonary artery pressure due to an inadequate   tricuspid regurgitant jet.  Normal right ventricular size and systolic function.  Normal inferior vena cava size and inspiratory collapse.  No pericardial effusion seen.  Compared to prior echo done 09/12/16,  there has been no significant   change.      Dyslipidemia  Meds: Crestor, 10 mg QD, Zetia 10 mg QD. No muscle weakness, cramps, nausea,abdominal discomfort.    Diet / exercise BMI: as above    FH: 2 brothers, father  He has been f/u by cardiology.    Anxiety  Onset:  > 10 yrs  Course: stable  Mood/anxiety currently does not affect: daily activities/sleep.  Current treatment: Celexa 20 mg daily     Risk factors:   · Depression, anxiety  · H/o phobia: no  · H/o panic attacks: no  · H/o hypomanic or manic episode: no  · Substance abuse  (alcohol,  prescription drugs caffeine, tobacco): no  · Family support: yes  · Living alone:  no  · Family history of psych disorders: Unknown  · Stress: no    Nasal congestion  The patient has have a longer history of nasal congestion, with a negative previous allergy evaluation/prick test.  He also was evaluated by ENT, recommended possible sinus exploration.    Reviewed PMH, PSH, FH, SH, ALL, HCM/IMM, no changes  Reviewed MEDS, no changes    Patient Active Problem List    Diagnosis Date Noted   • Medicare annual wellness visit, subsequent 07/09/2020   • Dyslipidemia 04/29/2020   • Chronic systolic congestive heart failure (HCC)    • Microalbuminuria due to type 2 diabetes mellitus (HCC) 12/13/2018   • Benign prostatic hyperplasia 10/18/2017   • Idiopathic gout 07/27/2017   • Actinic keratosis 07/27/2017   • S/P CABG x 3 04/04/2017   • TEMO (generalized anxiety disorder) 04/04/2017   • Gastroesophageal reflux disease without  esophagitis 01/04/2017   • Diabetes mellitus type 2, controlled, with complications (HCC) 09/23/2016   • Bilateral renal cysts 01/14/2015   • Ischemic cardiomyopathy 07/23/2013   • Coronary artery disease due to calcified coronary lesion 03/08/2013   • Essential hypertension 03/07/2013     CURRENT MEDICATIONS  Current Outpatient Medications   Medication Sig Dispense Refill   • Dulaglutide (TRULICITY) 0.75 MG/0.5ML Solution Pen-injector Inject 0.5 mL as directed every 7 days. 6 mL 1   • rosuvastatin (CRESTOR) 20 MG Tab Take 1 Tablet by mouth every evening. 90 Tablet 1   • citalopram (CELEXA) 20 MG Tab Take 1 tablet by mouth daily. 90 Tablet 1   • metFORMIN ER (GLUCOPHAGE XR) 750 MG TABLET SR 24 HR Take 1 Tablet by mouth every day. 180 Tablet 1   • allopurinol (ZYLOPRIM) 300 MG Tab Take 1 tablet by mouth daily. 90 Tablet 0   • sacubitril-valsartan (ENTRESTO) 24-26 MG Tab tablet Take 1 tablet by mouth 2 times a day. 180 tablet 3   • rosuvastatin (CRESTOR) 20 MG Tab Take 1 tablet by mouth every evening. 90 tablet 3   • carvedilol (COREG) 6.25 MG Tab Take 1 tablet by mouth 2 times a day with meals. 180 tablet 3   • clopidogrel (PLAVIX) 75 MG Tab Take 1 tablet by mouth every day. 90 tablet 3   • spironolactone (ALDACTONE) 25 MG Tab Take 1 tablet by mouth every day. 90 tablet 3   • ezetimibe (ZETIA) 10 MG Tab Take 1 tablet by mouth every day. 90 tablet 3   • clopidogrel (PLAVIX) 75 MG Tab Take 1 Tab by mouth every day. 90 Tab 3   • spironolactone (ALDACTONE) 25 MG Tab Take 1 Tab by mouth every day. 90 Tab 3   • tamsulosin (FLOMAX) 0.4 MG capsule Take 2 Caps by mouth every day. Need video for refill 180 Cap 3   • JARDIANCE 25 MG Tab      • gabapentin (NEURONTIN) 300 MG Cap      • glipiZIDE (GLUCOTROL) 5 MG Tab      • carvedilol (COREG) 6.25 MG Tab Take 1 Tab by mouth 2 times a day, with meals. 180 Tab 3   • omeprazole (PRILOSEC) 20 MG delayed-release capsule Take 1 capsule by mouth daily as needed.  90 Cap 3   • ezetimibe  (ZETIA) 10 MG Tab Take 1 tablet by mouth daily. 90 Tab 3   • acetaminophen (TYLENOL) 500 MG Tab Take 1,500 mg by mouth 2 Times a Day.     • WELCHOL 625 MG Tab Take 625 mg by mouth every day.     • Lancets 1 Each by Does not apply route 3 times a day. Lancets for Freestyle Lite meter. Sig: use TID and prn ssx high or low sugar. 100 Each 11     No current facility-administered medications for this visit.     ALLERGIES  Allergies: Patient has no known allergies.  PAST MEDICAL HISTORY  Past Medical History:   Diagnosis Date   • Backpain 10/10/2019    and neck, 0/10   • Myocardial infarct (HCC) 3/7/2013   • Left ventricular apical thrombus March 2013    Inferoapical clot measuring 1.1cm x 0.8cm, started on anticoagulation.   • CAD (coronary artery disease) March 2013    ACS. PCI/ANA PAULA x 2 of the LAD (2.5 x 12mm - mid; 2.75 x 16mm - proximal). Distal RCA is occluded; distal circumflex is occluded with good collateralization.   • Bilateral renal cysts    • C. difficile diarrhea     pt with hx of c diff after hospitalization [id number]   • Cataract     removed bilat   • Chronic anticoagulation    • Chronic systolic congestive heart failure (HCC)    • DIABETES MELLITUS      oral & diet    • Environmental and seasonal allergies    • Heart burn    • High cholesterol    • Hyperlipidemia     • Hypertension     well controlled on meds   • Indigestion    • Ischemic cardiomyopathy    • Psychiatric problem     anxiety   • Snoring     no sleep study     SURGICAL HISTORY  He  has a past surgical history that includes other cardiac surgery (2013); cholecystectomy (2004); recovery (7/21/2016); multiple coronary artery bypass endo vein harvest (7/22/2016); miller (7/22/2016); pr implant neurostim/ (7/16/2019); other neurological surg (2014); and gastroscopy (2/25/2020).  SOCIAL HISTORY  Social History     Tobacco Use   • Smoking status: Never Smoker   • Smokeless tobacco: Never Used   Vaping Use   • Vaping Use: Never used   Substance Use  Topics   • Alcohol use: Yes     Alcohol/week: 0.6 oz     Types: 1 Glasses of wine per week     Comment: occ   • Drug use: Yes     Comment: CBD     Social History     Social History Narrative   • Not on file     FAMILY HISTORY  Family History   Problem Relation Age of Onset   • Cancer Mother    • Hypertension Father    • Hyperlipidemia Brother         x 2   • Hypertension Brother    • Heart Disease Brother         x2, afib; cad x 1   • Cancer Paternal Uncle    • Diabetes Neg Hx      Family Status   Relation Name Status   • Mo   at age 79         colon ca   • Fa   at age 87   • Bro  Alive   • Bro     • PUnc  (Not Specified)   • Neg Hx  (Not Specified)     ROS   Constitutional: Negative for fever, chills, fatigue.  HENT: Per HPI.  Eyes: Negative for vision problems.   Respiratory: Negative for cough, shortness of breath.  Cardiovascular: Negative for chest pain, palpitations.   Gastrointestinal: Negative for heartburn, nausea, abdominal pain.   Genitourinary: Negative for dysuria.   Skin: Negative for rash.   Neuro: Negative for dizziness, weakness and headaches.   Endo/Heme/Allergies: Does not bruise/bleed easily.   Psychiatric/Behavioral: Negative for depression.    PHYSICAL EXAM   Blood Pressure 122/78 (BP Location: Right arm)   Pulse 85   Temperature 36.1 °C (96.9 °F)   Weight 97.5 kg (215 lb)   Oxygen Saturation 95%   Body Mass Index 29.99 kg/m²   General:  NAD, well appearing  HEENT:   NC/AT, PERRLA, EOMI.  Cardiovascular: unlabored breathing, no peripheral cyanosis or swelling.  Lungs:   no respiratory distress.  Abdomen: non- distended.  Extremities:  No LE swelling.  Skin:  Warm, dry.  No erythema. No rash.   Neurologic: Alert & oriented x 3. CN II-XII grossly intact. No focal deficits.  Psychiatric:  Affect normal, mood normal, judgment normal.    Labs     Labs are reviewed and discussed with a patient  Lab Results   Component Value Date/Time    CHOLAZARLTOT 115 2021 09:30 AM     LDL 40 07/28/2021 09:30 AM    HDL 38 (A) 07/28/2021 09:30 AM    TRIGLYCERIDE 187 (H) 07/28/2021 09:30 AM       Lab Results   Component Value Date/Time    SODIUM 142 07/28/2021 09:30 AM    POTASSIUM 5.0 07/28/2021 09:30 AM    CHLORIDE 106 07/28/2021 09:30 AM    CO2 28 07/28/2021 09:30 AM    GLUCOSE 153 (H) 07/28/2021 09:30 AM    BUN 17 07/28/2021 09:30 AM    CREATININE 1.21 07/28/2021 09:30 AM     Lab Results   Component Value Date/Time    ALKPHOSPHAT 50 07/28/2021 09:30 AM    ASTSGOT 22 07/28/2021 09:30 AM    ALTSGPT 19 07/28/2021 09:30 AM    TBILIRUBIN 0.5 07/28/2021 09:30 AM      Lab Results   Component Value Date/Time    HBA1C 6.9 (H) 07/28/2021 09:30 AM    HBA1C 7.2 (H) 11/09/2020 10:37 AM    HBA1C 7.5 (H) 05/18/2020 11:07 AM     No results found for: TSH  Lab Results   Component Value Date/Time    FREET4 0.82 03/08/2013 01:00 AM       Lab Results   Component Value Date/Time    WBC 8.8 11/09/2020 10:37 AM    RBC 6.24 (H) 11/09/2020 10:37 AM    HEMOGLOBIN 16.6 11/09/2020 10:37 AM    HEMATOCRIT 52.2 (H) 11/09/2020 10:37 AM    MCV 83.7 11/09/2020 10:37 AM    MCH 26.6 (L) 11/09/2020 10:37 AM    MCHC 31.8 (L) 11/09/2020 10:37 AM    MPV 11.5 11/09/2020 10:37 AM    NEUTSPOLYS 66.10 11/09/2020 10:37 AM    LYMPHOCYTES 21.90 (L) 11/09/2020 10:37 AM    MONOCYTES 7.50 11/09/2020 10:37 AM    EOSINOPHILS 3.10 11/09/2020 10:37 AM    BASOPHILS 0.90 11/09/2020 10:37 AM    HYPOCHROMIA 1+ 09/16/2013 01:13 PM    ANISOCYTOSIS 1+ 07/05/2019 03:18 PM        Imaging     None    Assessment and Plan     Luis Renner is a 71 y.o. male    1. Controlled type 2 diabetes mellitus with complication, without long-term current use of insulin (HCC)  - Controlled, continue with current management.  - Dulaglutide (TRULICITY) 0.75 MG/0.5ML Solution Pen-injector; Inject 0.5 mL as directed every 7 days.  Dispense: 6 mL; Refill: 1  - metFORMIN ER (GLUCOPHAGE XR) 750 MG TABLET SR 24 HR; Take 1 Tablet by mouth every day.  Dispense: 180 Tablet;  Refill: 1  2. Microalbuminuria due to type 2 diabetes mellitus (HCC)  As above    3. Essential hypertension  4. Ischemic cardiomyopathy  5. Coronary artery disease due to calcified coronary lesion  6. S/P CABG x 3  7. Chronic systolic congestive heart failure (HCC)  - Controlled, continue with current management, cardiology follow-up    8. Dyslipidemia  - Controlled, continue with current management.  - rosuvastatin (CRESTOR) 20 MG Tab; Take 1 Tablet by mouth every evening.  Dispense: 90 Tablet; Refill: 1    9. TEMO (generalized anxiety disorder)  - Controlled, continue with current management.  - citalopram (CELEXA) 20 MG Tab; Take 1 tablet by mouth daily.  Dispense: 90 Tablet; Refill: 1    10. Nasal congestion  Need possible ENT procedure  - REFERRAL TO ENT    11. Screening for colorectal cancer  - COLOGUARD (FIT DNA)    12. Flu vaccine need  Information was provided to the patient regarding the vaccine, including side effects. Vaccine was given by my medical assistant under my supervision.  - Influenza Vaccine, High Dose (65+ Only)    Counseling:   - Smoking:  Nonsmoker    Followup: Return in about 6 months (around 3/20/2022) for ANNUAL, LABS.    All questions are answered.    Please note that this dictation was created using voice recognition software, and that there might be errors of mary and possibly content.

## 2021-09-21 DIAGNOSIS — F41.1 GENERALIZED ANXIETY DISORDER: ICD-10-CM

## 2021-09-21 DIAGNOSIS — E78.5 DYSLIPIDEMIA: ICD-10-CM

## 2021-09-21 RX ORDER — EZETIMIBE 10 MG/1
TABLET ORAL
Qty: 90 TABLET | Refills: 2 | OUTPATIENT
Start: 2021-09-21

## 2021-09-21 RX ORDER — OMEPRAZOLE 20 MG/1
CAPSULE, DELAYED RELEASE ORAL
Qty: 90 CAPSULE | Refills: 4 | Status: SHIPPED | OUTPATIENT
Start: 2021-09-21 | End: 2022-08-16

## 2021-09-21 RX ORDER — CITALOPRAM 20 MG/1
TABLET ORAL
Qty: 90 TABLET | Refills: 0 | OUTPATIENT
Start: 2021-09-21

## 2021-09-22 ENCOUNTER — TELEPHONE (OUTPATIENT)
Dept: SCHEDULING | Facility: IMAGING CENTER | Age: 71
End: 2021-09-22

## 2021-09-22 DIAGNOSIS — I50.22 CHRONIC SYSTOLIC CONGESTIVE HEART FAILURE (HCC): ICD-10-CM

## 2021-09-22 RX ORDER — SACUBITRIL AND VALSARTAN 24; 26 MG/1; MG/1
1 TABLET, FILM COATED ORAL 2 TIMES DAILY
Qty: 180 TABLET | Refills: 3 | Status: SHIPPED | OUTPATIENT
Start: 2021-09-22 | End: 2022-08-18

## 2021-09-22 NOTE — TELEPHONE ENCOUNTER
AH      Pt is needing a refill on the medication sacubitril-valsartan (ENTRESTO) 24-26 MG Tab tablet. He has a couple weeks left of the pills. He uses [facility] pill pack for his pharmacy.        Thank you,  Cindy ANN

## 2021-09-22 NOTE — TELEPHONE ENCOUNTER
Returned pt call and reviewed findings.  He states paper prescription was left in his luggage when he was out of town and was not able to fill prescription at that time.  Reassurance given prescription will be sent to preferred pharmacy as requested.  He verbalizes understanding and states no other concerns or questions at this time.  Pt is appreciative of information given.    See refill encounter.

## 2021-10-03 ENCOUNTER — PATIENT MESSAGE (OUTPATIENT)
Dept: MEDICAL GROUP | Age: 71
End: 2021-10-03

## 2021-10-03 DIAGNOSIS — E11.8 CONTROLLED TYPE 2 DIABETES MELLITUS WITH COMPLICATION, WITHOUT LONG-TERM CURRENT USE OF INSULIN (HCC): ICD-10-CM

## 2021-10-04 RX ORDER — DULAGLUTIDE 0.75 MG/.5ML
0.5 INJECTION, SOLUTION SUBCUTANEOUS
Qty: 6 ML | Refills: 1 | Status: SHIPPED | OUTPATIENT
Start: 2021-10-04 | End: 2022-02-14 | Stop reason: SDUPTHER

## 2021-10-04 NOTE — TELEPHONE ENCOUNTER
From: Luis Renner  To: Physician Oxana Parkinson  Sent: 10/3/2021 4:10 PM PDT  Subject: Trulicity prescription     I need to have this prescription sent to another [facility] pharmacy in [place]. The one you sent it to seems to not have it entered into their database even though the filled it about a month ago.  I need a new one sent to:  [id number]  [address] [place] [id number]  Tel: [phone]  Fax: [phone]    Thank you

## 2021-10-04 NOTE — PATIENT COMMUNICATION
Received request via: Patient    Was the patient seen in the last year in this department? Yes    Does the patient have an active prescription (recently filled or refills available) for medication(s) requested? NEEDS SENT TO DIFFERENT PHARAMCY

## 2021-10-12 DIAGNOSIS — F41.1 GENERALIZED ANXIETY DISORDER: ICD-10-CM

## 2021-10-12 RX ORDER — CITALOPRAM 20 MG/1
TABLET ORAL
Qty: 90 TABLET | Refills: 1 | Status: SHIPPED | OUTPATIENT
Start: 2021-10-12 | End: 2022-03-19

## 2021-10-12 RX ORDER — EZETIMIBE 10 MG/1
10 TABLET ORAL DAILY
Qty: 90 TABLET | Refills: 3 | Status: SHIPPED | OUTPATIENT
Start: 2021-10-12

## 2021-10-12 NOTE — TELEPHONE ENCOUNTER
Received request via: Patient    Was the patient seen in the last year in this department? Yes    Does the patient have an active prescription (recently filled or refills available) for medication(s) requested? WANTS TO GO TO [facility]

## 2021-10-28 ENCOUNTER — NON-PROVIDER VISIT (OUTPATIENT)
Dept: CARDIOLOGY | Facility: MEDICAL CENTER | Age: 71
End: 2021-10-28
Payer: MEDICARE

## 2021-10-28 PROCEDURE — 99999 PR NO CHARGE: CPT | Performed by: INTERNAL MEDICINE

## 2021-11-16 ENCOUNTER — TELEPHONE (OUTPATIENT)
Dept: SCHEDULING | Facility: IMAGING CENTER | Age: 71
End: 2021-11-16

## 2021-11-16 NOTE — TELEPHONE ENCOUNTER
MK Rivas from [facility] called for this pt and they said they called the office where his prescription was sent in July and they stated they got no request and wanted the prescription for carvedilol (COREG) 6.25 MG Tab refilled and wanted a call back to clarify this further    # [phone]

## 2021-11-18 DIAGNOSIS — I10 ESSENTIAL HYPERTENSION: ICD-10-CM

## 2021-11-18 RX ORDER — CARVEDILOL 6.25 MG/1
6.25 TABLET ORAL 2 TIMES DAILY WITH MEALS
Qty: 180 TABLET | Refills: 2 | Status: SHIPPED | OUTPATIENT
Start: 2021-11-18 | End: 2022-05-13 | Stop reason: SDUPTHER

## 2021-11-18 RX ORDER — CARVEDILOL 6.25 MG/1
6.25 TABLET ORAL 2 TIMES DAILY WITH MEALS
Qty: 180 TABLET | Refills: 2 | Status: SHIPPED | OUTPATIENT
Start: 2021-11-18 | End: 2021-11-18 | Stop reason: SDUPTHER

## 2021-11-18 NOTE — TELEPHONE ENCOUNTER
Upon chart review:      [facility] as requested, see refill encounter.

## 2021-11-19 DIAGNOSIS — M10.00 IDIOPATHIC GOUT, UNSPECIFIED CHRONICITY, UNSPECIFIED SITE: ICD-10-CM

## 2021-11-19 DIAGNOSIS — E78.5 DYSLIPIDEMIA: ICD-10-CM

## 2021-11-19 RX ORDER — ALLOPURINOL 300 MG/1
TABLET ORAL
Qty: 90 TABLET | Refills: 3 | Status: SHIPPED | OUTPATIENT
Start: 2021-11-19

## 2021-11-22 RX ORDER — ROSUVASTATIN CALCIUM 20 MG/1
20 TABLET, COATED ORAL EVERY EVENING
Qty: 90 TABLET | Refills: 1 | OUTPATIENT
Start: 2021-11-22

## 2021-12-06 ENCOUNTER — TELEPHONE (OUTPATIENT)
Dept: CARDIOLOGY | Facility: MEDICAL CENTER | Age: 71
End: 2021-12-06

## 2021-12-06 DIAGNOSIS — I25.84 CORONARY ARTERY DISEASE DUE TO CALCIFIED CORONARY LESION: ICD-10-CM

## 2021-12-06 DIAGNOSIS — I10 ESSENTIAL HYPERTENSION: Chronic | ICD-10-CM

## 2021-12-06 DIAGNOSIS — I25.10 CORONARY ARTERY DISEASE DUE TO CALCIFIED CORONARY LESION: ICD-10-CM

## 2021-12-07 ENCOUNTER — PATIENT MESSAGE (OUTPATIENT)
Dept: CARDIOLOGY | Facility: MEDICAL CENTER | Age: 71
End: 2021-12-07

## 2021-12-07 NOTE — TELEPHONE ENCOUNTER
Refill Request  Jordon Luu Ass't  You 7 hours ago (8:31 AM)       PP of . Rx prepped under ADD. No follow up scheduled Thank you     Routing comment      Upon chart review, there are two spironolactone prescriptions that are active:            Attempted to call pt, unable to reach.  Left detailed voicemail regarding findings and to return this call at their earliest convenience to discuss where pt is receiving medications from.

## 2021-12-09 RX ORDER — CLOPIDOGREL BISULFATE 75 MG/1
75 TABLET ORAL DAILY
Qty: 90 TABLET | Refills: 2 | OUTPATIENT
Start: 2021-12-09

## 2021-12-09 RX ORDER — SPIRONOLACTONE 25 MG/1
25 TABLET ORAL DAILY
Qty: 90 TABLET | Refills: 2 | OUTPATIENT
Start: 2021-12-09

## 2021-12-13 DIAGNOSIS — E78.5 DYSLIPIDEMIA: ICD-10-CM

## 2021-12-13 RX ORDER — ROSUVASTATIN CALCIUM 20 MG/1
20 TABLET, COATED ORAL EVERY EVENING
Qty: 90 TABLET | Refills: 1 | Status: SHIPPED | OUTPATIENT
Start: 2021-12-13 | End: 2022-05-18

## 2022-02-09 ENCOUNTER — TELEPHONE (OUTPATIENT)
Dept: CARDIOLOGY | Facility: MEDICAL CENTER | Age: 72
End: 2022-02-09

## 2022-02-09 NOTE — TELEPHONE ENCOUNTER
NARCISA Gonzalez from [facility] is calling to get refills on  spironolactone (ALDACTONE) 25 MG Tab     clopidogrel (PLAVIX) 75 MG Tab      Ph # [phone]  Fax # [phone]    Thank you,  Cleo PEÑA

## 2022-02-10 ENCOUNTER — PATIENT MESSAGE (OUTPATIENT)
Dept: CARDIOLOGY | Facility: MEDICAL CENTER | Age: 72
End: 2022-02-10

## 2022-02-10 DIAGNOSIS — I25.10 CORONARY ARTERY DISEASE DUE TO CALCIFIED CORONARY LESION: ICD-10-CM

## 2022-02-10 DIAGNOSIS — I10 ESSENTIAL HYPERTENSION: Chronic | ICD-10-CM

## 2022-02-10 DIAGNOSIS — I25.84 CORONARY ARTERY DISEASE DUE TO CALCIFIED CORONARY LESION: ICD-10-CM

## 2022-02-11 RX ORDER — SPIRONOLACTONE 25 MG/1
25 TABLET ORAL DAILY
Qty: 90 TABLET | Refills: 1 | Status: SHIPPED | OUTPATIENT
Start: 2022-02-11 | End: 2022-07-18

## 2022-02-11 RX ORDER — CLOPIDOGREL BISULFATE 75 MG/1
75 TABLET ORAL DAILY
Qty: 90 TABLET | Refills: 1 | Status: SHIPPED | OUTPATIENT
Start: 2022-02-11 | End: 2022-07-18

## 2022-02-11 NOTE — PATIENT COMMUNICATION
Medication sent to preferred pharmacy as requested.    Replied to pt via [employer] regarding findings.

## 2022-02-11 NOTE — TELEPHONE ENCOUNTER
Duplicate medications seen in medication list.    Attempted to call pt, unable to reach.  Left voicemail to return this call at their earliest convenience.    Get Int message sent to pt regarding findings, awaiting pt response.

## 2022-02-14 DIAGNOSIS — E11.8 CONTROLLED TYPE 2 DIABETES MELLITUS WITH COMPLICATION, WITHOUT LONG-TERM CURRENT USE OF INSULIN (HCC): ICD-10-CM

## 2022-02-14 RX ORDER — DULAGLUTIDE 0.75 MG/.5ML
0.5 INJECTION, SOLUTION SUBCUTANEOUS
Qty: 6 ML | Refills: 0 | Status: SHIPPED | OUTPATIENT
Start: 2022-02-14 | End: 2022-05-13 | Stop reason: SDUPTHER

## 2022-03-19 DIAGNOSIS — F41.1 GENERALIZED ANXIETY DISORDER: ICD-10-CM

## 2022-03-19 RX ORDER — CITALOPRAM 20 MG/1
TABLET ORAL
Qty: 90 TABLET | Refills: 0 | Status: SHIPPED | OUTPATIENT
Start: 2022-03-19

## 2022-03-28 ENCOUNTER — PATIENT MESSAGE (OUTPATIENT)
Dept: MEDICAL GROUP | Age: 72
End: 2022-03-28
Payer: MEDICARE

## 2022-03-28 DIAGNOSIS — E11.8 CONTROLLED TYPE 2 DIABETES MELLITUS WITH COMPLICATION, WITHOUT LONG-TERM CURRENT USE OF INSULIN (HCC): ICD-10-CM

## 2022-03-29 RX ORDER — METFORMIN HYDROCHLORIDE 750 MG/1
750 TABLET, EXTENDED RELEASE ORAL DAILY
Qty: 60 TABLET | Refills: 0 | Status: SHIPPED | OUTPATIENT
Start: 2022-03-29 | End: 2022-05-13 | Stop reason: SDUPTHER

## 2022-03-29 NOTE — PATIENT COMMUNICATION
Received request via: Patient    Was the patient seen in the last year in this department? Yes 9/20/21    Does the patient have an active prescription (recently filled or refills available) for medication(s) requested? No

## 2022-03-29 NOTE — TELEPHONE ENCOUNTER
From: Luis Renner  To: Physician Oxana Parkinson  Sent: 3/28/2022 9:18 AM PDT  Subject: Metformin renewal    Can I refill my metformin 750er  I am in [place] for the winter  [facility]   [phone]  [place] location  [address], [place] [address]  Thank you

## 2022-03-31 ENCOUNTER — TELEPHONE (OUTPATIENT)
Dept: CARDIOLOGY | Facility: MEDICAL CENTER | Age: 72
End: 2022-03-31
Payer: MEDICARE

## 2022-03-31 NOTE — TELEPHONE ENCOUNTER
He can proceed with the proposed procedure or surgery, no modifiable cardiovascular risk HE IS AT INCREASED RISK DUE TO THE SeVERITY OF HIS CAD AND CHF BUT HAS BEEN OPTOMIZED ON ALL AVAILABLE TREATMENTS, no further cardiac testing required, hold antiplatelet as necessary.    It is my pleasure to participate in the care of Mr. Renner.  Please do not hesitate to contact me with questions or concerns. [facility] is available 24/7 for consultative services at [phone] in the perioperative period.    Electronically Signed    Shun Benoit MD PhD FACC  Cardiologist [facility]    Please note that this dictation was created using voice recognition software. There may be errors I did not discover before finalizing the note.

## 2022-03-31 NOTE — TELEPHONE ENCOUNTER
Received fax from [facility] (P: [phone] F: [phone]) requesting:    - cardiac clearance for upcoming B/L CDMBB scheduled 4/8/2022.  - Plavix hold 7 days prior to procedure.    Clearance request relayed to MD for advise.  Fax sent to scanning for reference via [employer], completed status.

## 2022-04-01 NOTE — TELEPHONE ENCOUNTER
Telephone note signed by MD printed and faxed to [phone], completed status.    Fax confirmation sent to scanning for reference via [employer], completed status received.

## 2022-04-26 NOTE — TELEPHONE ENCOUNTER
Received fax electronically from NEENA, , via outlook see below.  Clearance request relayed to MD for advise.

## 2022-04-26 NOTE — TELEPHONE ENCOUNTER
EMMANUEL Nieto from [facility] in [place] is calling regarding the Medical Clearance. Faxed over on 4/22 - their are 2 clearances in this fax.  Needs by tomorrow.    Emilia -  [phone]  Fax -     [phone]    Thank you,   Martita MORAN    Abdomen soft, nontender, nondistended, bowel sounds present in all 4 quadrants.

## 2022-04-26 NOTE — TELEPHONE ENCOUNTER
He can proceed with the proposed procedure or surgery, no modifiable cardiovascular risk, no further cardiac testing required, hold antiplatelet as necessary.    It is my pleasure to participate in the care of Mr. Renner.  Please do not hesitate to contact me with questions or concerns. [facility] is available 24/7 for consultative services at [phone] in the perioperative period.    Electronically Signed    Shun Benoit MD PhD [facility]  Cardiologist [facility]    Please note that this dictation was created using voice recognition software. There may be errors I did not discover before finalizing the note.

## 2022-04-27 NOTE — TELEPHONE ENCOUNTER
"Clearance form states \"clearance for RFA of cervical spine in presence of a defibrillator?\". CW reviewed this form in addition to clearance request for plavix hold and the procedure itself and said pt may proceed.     LVM for [facility] asking them to fax us the additional information they need.   "

## 2022-04-27 NOTE — TELEPHONE ENCOUNTER
Letter drafted with MD recommendations and electronically faxed to [phone] and sent to pt via [employer].  Received status of communication sent electronically.

## 2022-04-27 NOTE — TELEPHONE ENCOUNTER
Emilia Barone from [facility] needs some more information regarding the medical clearance on [place]'s Defibrillator, calling from [place] (time change)    Emilia - [phone]    Thank you,   Martita MORAN

## 2022-05-05 NOTE — TELEPHONE ENCOUNTER
Caller: Emilia  Office Name, [facility], phone number,  [phone]  Fax: [phone]  Procedure Name: Radio frequency ablation  Procedure Scheduled Date: 05-  Callback Number: [phone]    Emilia is needing details on the defibrillator in order to proceed with surgery. They need to know if it has to be shut off?      Thank You  Yeimy SALINAS

## 2022-05-10 DIAGNOSIS — N40.1 BENIGN PROSTATIC HYPERPLASIA WITH LOWER URINARY TRACT SYMPTOMS, SYMPTOM DETAILS UNSPECIFIED: ICD-10-CM

## 2022-05-10 DIAGNOSIS — E11.8 CONTROLLED TYPE 2 DIABETES MELLITUS WITH COMPLICATION, WITHOUT LONG-TERM CURRENT USE OF INSULIN (HCC): ICD-10-CM

## 2022-05-10 NOTE — TELEPHONE ENCOUNTER
Received request via: Pharmacy    Was the patient seen in the last year in this department? Yes    Does the patient have an active prescription (recently filled or refills available) for medication(s) requested? No    Patient in [place] for the winter.

## 2022-05-10 NOTE — OR NURSING
"Preadmit appointment: \" Preparing for your Procedure information\" sheet given to patient with verbal and written instructions. Patient instructed to continue prescribed medications through the day before surgery, instructed to take the following medications the day of surgery per anesthesia protocol:   Carvedilol, gabapentin, prilosec, flomax.    email to Dr. Montano:    Santos Montano,    Patient is having Neurostimulator placed tomorrow with Dr. Wheeler. The patient has a medical history including Cardiac comorbidities, diabetes, anemia, neuropathy, decreased GFR…Patient does have a recent cardiac clearance from Dr. Benoit (cardiologist) on 7/9/19. Will we need to consider anything else?        Reply from DR MISAEL garcia.  Were instructions given to this patient regarding discontinuation and restart of plavix?  Otherwise nothing to add.  Thank you.      Instructions were given and Dr Montano was informed that they where given.      "
1058 PT TO PRE OP TO ASSUME CARE.  1212 Patient allergies and NPO status verified, home medication reconciliation completed and belongings secured. Patient verbalizes understanding of pain scale, expected course of stay and plan of care. Surgical site verified with patient. IV access established. Sequentials placed on legs    
1526- To PACU from OR via gurney. Respirations spontaneous and non-labored via OPA. OPA dc'd by Anesthesia. O2 increased to 4L via NC. HOB elevated. SCDs on. LR infusing via PIV.  1534- O2 decreased to 2L via NC. No pain or nausea reported at this time.  1545- O2 dc'd; on room air. Rep at bedside to program device.  1555- Family updated via telephone.  1615- Patient meets criteria for dc to stage 2. Report given to OLIMPIA Long.    
Respirations easy.  Dermabond and abdominal binder clean and dry.  Patient denies pain and nausea. OOB to recliner tolerated well.  
(1) body pink, extremities blue

## 2022-05-13 ENCOUNTER — PATIENT MESSAGE (OUTPATIENT)
Dept: MEDICAL GROUP | Age: 72
End: 2022-05-13
Payer: MEDICARE

## 2022-05-13 DIAGNOSIS — I10 ESSENTIAL HYPERTENSION: ICD-10-CM

## 2022-05-13 DIAGNOSIS — N40.1 BENIGN PROSTATIC HYPERPLASIA WITH LOWER URINARY TRACT SYMPTOMS, SYMPTOM DETAILS UNSPECIFIED: ICD-10-CM

## 2022-05-13 DIAGNOSIS — E11.8 CONTROLLED TYPE 2 DIABETES MELLITUS WITH COMPLICATION, WITHOUT LONG-TERM CURRENT USE OF INSULIN (HCC): ICD-10-CM

## 2022-05-14 RX ORDER — CARVEDILOL 6.25 MG/1
6.25 TABLET ORAL 2 TIMES DAILY WITH MEALS
Qty: 60 TABLET | Refills: 0 | Status: SHIPPED | OUTPATIENT
Start: 2022-05-14

## 2022-05-14 RX ORDER — METFORMIN HYDROCHLORIDE 750 MG/1
750 TABLET, EXTENDED RELEASE ORAL DAILY
Qty: 60 TABLET | Refills: 0 | Status: SHIPPED | OUTPATIENT
Start: 2022-05-14

## 2022-05-14 RX ORDER — TAMSULOSIN HYDROCHLORIDE 0.4 MG/1
0.8 CAPSULE ORAL DAILY
Qty: 180 CAPSULE | Refills: 0 | OUTPATIENT
Start: 2022-05-14

## 2022-05-14 RX ORDER — DULAGLUTIDE 0.75 MG/.5ML
0.5 INJECTION, SOLUTION SUBCUTANEOUS
Qty: 6 ML | Refills: 0 | OUTPATIENT
Start: 2022-05-14

## 2022-05-14 RX ORDER — DULAGLUTIDE 0.75 MG/.5ML
0.5 INJECTION, SOLUTION SUBCUTANEOUS
Qty: 2 ML | Refills: 0 | Status: SHIPPED | OUTPATIENT
Start: 2022-05-14

## 2022-05-14 RX ORDER — METFORMIN HYDROCHLORIDE 750 MG/1
750 TABLET, EXTENDED RELEASE ORAL DAILY
Qty: 60 TABLET | Refills: 0 | OUTPATIENT
Start: 2022-05-14

## 2022-05-14 RX ORDER — TAMSULOSIN HYDROCHLORIDE 0.4 MG/1
0.8 CAPSULE ORAL DAILY
Qty: 180 CAPSULE | Refills: 3 | Status: SHIPPED | OUTPATIENT
Start: 2022-05-14

## 2022-05-18 DIAGNOSIS — E78.5 DYSLIPIDEMIA: ICD-10-CM

## 2022-05-18 RX ORDER — ROSUVASTATIN CALCIUM 20 MG/1
20 TABLET, COATED ORAL EVERY EVENING
Qty: 90 TABLET | Refills: 0 | Status: SHIPPED | OUTPATIENT
Start: 2022-05-18

## 2022-07-17 DIAGNOSIS — I25.84 CORONARY ARTERY DISEASE DUE TO CALCIFIED CORONARY LESION: ICD-10-CM

## 2022-07-17 DIAGNOSIS — I25.10 CORONARY ARTERY DISEASE DUE TO CALCIFIED CORONARY LESION: ICD-10-CM

## 2022-07-17 DIAGNOSIS — I10 ESSENTIAL HYPERTENSION: Chronic | ICD-10-CM

## 2022-07-18 RX ORDER — CLOPIDOGREL BISULFATE 75 MG/1
75 TABLET ORAL DAILY
Qty: 90 TABLET | Refills: 0 | Status: SHIPPED | OUTPATIENT
Start: 2022-07-18

## 2022-07-18 RX ORDER — SPIRONOLACTONE 25 MG/1
25 TABLET ORAL DAILY
Qty: 90 TABLET | Refills: 0 | Status: SHIPPED | OUTPATIENT
Start: 2022-07-18

## 2022-07-18 NOTE — TELEPHONE ENCOUNTER
Is the patient due for a refill? Yes    Was the patient seen the past year? Yes    Date of last office visit: 7/26/21    Does the patient have an upcoming appointment?  No       Provider to refill: ADD      Does the patients insurance require a 100 day supply? No

## 2022-07-29 ENCOUNTER — APPOINTMENT (RX ONLY)
Dept: URBAN - METROPOLITAN AREA CLINIC 57 | Facility: CLINIC | Age: 72
Setting detail: DERMATOLOGY
End: 2022-07-29

## 2022-07-29 DIAGNOSIS — L82.1 OTHER SEBORRHEIC KERATOSIS: ICD-10-CM | Status: STABLE

## 2022-07-29 DIAGNOSIS — L57.0 ACTINIC KERATOSIS: ICD-10-CM

## 2022-07-29 DIAGNOSIS — L57.8 OTHER SKIN CHANGES DUE TO CHRONIC EXPOSURE TO NONIONIZING RADIATION: ICD-10-CM | Status: STABLE

## 2022-07-29 DIAGNOSIS — D22 MELANOCYTIC NEVI: ICD-10-CM | Status: STABLE

## 2022-07-29 DIAGNOSIS — D18.0 HEMANGIOMA: ICD-10-CM | Status: STABLE

## 2022-07-29 DIAGNOSIS — L81.4 OTHER MELANIN HYPERPIGMENTATION: ICD-10-CM | Status: STABLE

## 2022-07-29 PROBLEM — D48.5 NEOPLASM OF UNCERTAIN BEHAVIOR OF SKIN: Status: ACTIVE | Noted: 2022-07-29

## 2022-07-29 PROBLEM — D22.5 MELANOCYTIC NEVI OF TRUNK: Status: ACTIVE | Noted: 2022-07-29

## 2022-07-29 PROBLEM — D18.01 HEMANGIOMA OF SKIN AND SUBCUTANEOUS TISSUE: Status: ACTIVE | Noted: 2022-07-29

## 2022-07-29 PROCEDURE — ? PRESCRIPTION

## 2022-07-29 PROCEDURE — 11102 TANGNTL BX SKIN SINGLE LES: CPT

## 2022-07-29 PROCEDURE — ? BIOPSY BY SHAVE METHOD

## 2022-07-29 PROCEDURE — 99203 OFFICE O/P NEW LOW 30 MIN: CPT | Mod: 25

## 2022-07-29 PROCEDURE — ? FULL BODY SKIN EXAM

## 2022-07-29 PROCEDURE — 17000 DESTRUCT PREMALG LESION: CPT | Mod: 59

## 2022-07-29 PROCEDURE — ? ADDITIONAL NOTES

## 2022-07-29 PROCEDURE — ? COUNSELING

## 2022-07-29 PROCEDURE — 17003 DESTRUCT PREMALG LES 2-14: CPT

## 2022-07-29 PROCEDURE — ? LIQUID NITROGEN

## 2022-07-29 PROCEDURE — ? TREATMENT REGIMEN

## 2022-07-29 RX ORDER — PHARMACY COMPOUNDING ACCESSORY
EACH MISCELLANEOUS BID
Qty: 40 | Refills: 1 | Status: ERX | COMMUNITY
Start: 2022-07-29

## 2022-07-29 RX ADMIN — Medication: at 00:00

## 2022-07-29 ASSESSMENT — LOCATION SIMPLE DESCRIPTION DERM
LOCATION SIMPLE: LEFT CHEEK
LOCATION SIMPLE: RIGHT FOREHEAD
LOCATION SIMPLE: RIGHT UPPER BACK
LOCATION SIMPLE: LEFT FOREHEAD
LOCATION SIMPLE: UPPER BACK
LOCATION SIMPLE: RIGHT FOREARM
LOCATION SIMPLE: RIGHT HAND
LOCATION SIMPLE: SCALP
LOCATION SIMPLE: POSTERIOR SCALP
LOCATION SIMPLE: LEFT SCALP
LOCATION SIMPLE: CHEST
LOCATION SIMPLE: RIGHT SCALP
LOCATION SIMPLE: RIGHT UPPER ARM
LOCATION SIMPLE: LEFT UPPER ARM
LOCATION SIMPLE: LEFT FOREARM
LOCATION SIMPLE: RIGHT THUMB
LOCATION SIMPLE: INFERIOR FOREHEAD

## 2022-07-29 ASSESSMENT — LOCATION DETAILED DESCRIPTION DERM
LOCATION DETAILED: RIGHT SUPERIOR PARIETAL SCALP
LOCATION DETAILED: LEFT MEDIAL SUPERIOR CHEST
LOCATION DETAILED: RIGHT VENTRAL PROXIMAL FOREARM
LOCATION DETAILED: 1ST WEB SPACE RIGHT HAND
LOCATION DETAILED: RIGHT MEDIAL SUPERIOR CHEST
LOCATION DETAILED: RIGHT SUPERIOR MEDIAL UPPER BACK
LOCATION DETAILED: LEFT INFERIOR CENTRAL MALAR CHEEK
LOCATION DETAILED: SUPERIOR THORACIC SPINE
LOCATION DETAILED: RIGHT POSTERIOR PARIETAL SCALP
LOCATION DETAILED: INFERIOR THORACIC SPINE
LOCATION DETAILED: LEFT ANTERIOR PROXIMAL UPPER ARM
LOCATION DETAILED: LEFT VENTRAL PROXIMAL FOREARM
LOCATION DETAILED: RIGHT ANTERIOR PROXIMAL UPPER ARM
LOCATION DETAILED: RIGHT SUPERIOR LATERAL FOREHEAD
LOCATION DETAILED: INFERIOR MID FOREHEAD
LOCATION DETAILED: LEFT CENTRAL FRONTAL SCALP
LOCATION DETAILED: LEFT FOREHEAD
LOCATION DETAILED: RIGHT THENAR EMINENCE
LOCATION DETAILED: RIGHT MEDIAL FRONTAL SCALP
LOCATION DETAILED: LEFT SUPERIOR FOREHEAD
LOCATION DETAILED: LOWER STERNUM

## 2022-07-29 ASSESSMENT — LOCATION ZONE DERM
LOCATION ZONE: HAND
LOCATION ZONE: ARM
LOCATION ZONE: SCALP
LOCATION ZONE: FINGER
LOCATION ZONE: FACE
LOCATION ZONE: TRUNK

## 2022-07-29 NOTE — PROCEDURE: LIQUID NITROGEN
Post-Care Instructions: I reviewed with the patient in detail post-care instructions. Patient is to wear sunprotection, and avoid picking at any of the treated lesions. Pt may apply Vaseline to crusted or scabbing areas.
Duration Of Freeze Thaw-Cycle (Seconds): 3
Render Note In Bullet Format When Appropriate: No
Number Of Freeze-Thaw Cycles: 1 freeze-thaw cycle
Show Applicator Variable?: Yes
Consent: The patient's consent was obtained including but not limited to risks of crusting, scabbing, blistering, scarring, darker or lighter pigmentary change, recurrence, incomplete removal and infection.
Detail Level: Detailed

## 2022-08-11 ENCOUNTER — PATIENT MESSAGE (OUTPATIENT)
Dept: CARDIOLOGY | Facility: MEDICAL CENTER | Age: 72
End: 2022-08-11
Payer: MEDICARE

## 2022-08-16 RX ORDER — OMEPRAZOLE 20 MG/1
CAPSULE, DELAYED RELEASE ORAL
Qty: 90 CAPSULE | Refills: 0 | Status: SHIPPED | OUTPATIENT
Start: 2022-08-16

## 2022-08-18 DIAGNOSIS — E78.5 DYSLIPIDEMIA: ICD-10-CM

## 2022-08-18 DIAGNOSIS — I50.22 CHRONIC SYSTOLIC CONGESTIVE HEART FAILURE (HCC): ICD-10-CM

## 2022-08-18 RX ORDER — ROSUVASTATIN CALCIUM 20 MG/1
20 TABLET, COATED ORAL EVERY EVENING
Qty: 90 TABLET | Refills: 0 | OUTPATIENT
Start: 2022-08-18

## 2022-08-18 RX ORDER — SACUBITRIL AND VALSARTAN 24; 26 MG/1; MG/1
TABLET, FILM COATED ORAL
Qty: 180 TABLET | Refills: 0 | Status: SHIPPED | OUTPATIENT
Start: 2022-08-18 | End: 2022-11-21

## 2022-08-18 NOTE — TELEPHONE ENCOUNTER
Is the patient due for a refill? Yes    Was the patient seen the past year? No    Date of last office visit: 07/26/2021    Does the patient have an upcoming appointment?  Yes   If yes, When? 09/15/2022    Provider to refill:VALENTE (ADD)    Does the patients insurance require a 100 day supply?  No

## 2022-08-19 ENCOUNTER — APPOINTMENT (RX ONLY)
Dept: URBAN - METROPOLITAN AREA CLINIC 57 | Facility: CLINIC | Age: 72
Setting detail: DERMATOLOGY
End: 2022-08-19

## 2022-08-19 DIAGNOSIS — B35.6 TINEA CRURIS: ICD-10-CM

## 2022-08-19 PROBLEM — C44.719 BASAL CELL CARCINOMA OF SKIN OF LEFT LOWER LIMB, INCLUDING HIP: Status: ACTIVE | Noted: 2022-08-19

## 2022-08-19 PROCEDURE — ? PRESCRIPTION

## 2022-08-19 PROCEDURE — 99213 OFFICE O/P EST LOW 20 MIN: CPT | Mod: 25

## 2022-08-19 PROCEDURE — 17260 DSTRJ MAL LES T/A/L 0.5 CM/<: CPT

## 2022-08-19 PROCEDURE — ? CRYOSURGICAL DESTRUCTION

## 2022-08-19 PROCEDURE — ? COUNSELING

## 2022-08-19 RX ORDER — KETOCONAZOLE 20 MG/G
CREAM TOPICAL
Qty: 60 | Refills: 2 | Status: ERX | COMMUNITY
Start: 2022-08-19

## 2022-08-19 RX ADMIN — KETOCONAZOLE: 20 CREAM TOPICAL at 00:00

## 2022-08-19 ASSESSMENT — LOCATION ZONE DERM
LOCATION ZONE: LEG
LOCATION ZONE: TRUNK

## 2022-08-19 ASSESSMENT — LOCATION SIMPLE DESCRIPTION DERM
LOCATION SIMPLE: GROIN
LOCATION SIMPLE: LEFT THIGH

## 2022-08-19 ASSESSMENT — LOCATION DETAILED DESCRIPTION DERM
LOCATION DETAILED: SUPRAPUBIC SKIN
LOCATION DETAILED: LEFT ANTERIOR PROXIMAL THIGH

## 2022-08-19 NOTE — PROCEDURE: CRYOSURGICAL DESTRUCTION
Detail Level: Detailed
Add Intralesional Injection: No
Medication Injected: 5-Fluorouracil
Concentration (Mg/Ml Or Millions Of Plaque Forming Units/Cc): 0.01
Total Volume (Ccs): 1
Anesthesia Volume In Cc: 0
Number Of Freeze-Thaw Cycles: 2 freeze-thaw cycles
Total Time In Minutes: 2 minutes
Additional Information: (Optional): The wound was cleaned, and a dressing was applied.  The patient received detailed post-op instructions.
Pre-Procedure: The surgical site was antiseptically prepared.
Consent was obtained from the patient. The risks and benefits to therapy were discussed in detail. Specifically, the risks of infection, scarring, bleeding, prolonged wound healing, incomplete removal, allergy to anesthesia, nerve injury and recurrence were addressed. Alternatives to liquid nitrogen, such as ED&C, surgical removal, XRT were also discussed.  Prior to the procedure, the treatment site was clearly identified and confirmed by the patient. All components of Universal Protocol/PAUSE Rule completed.
Render Post-Care In The Note: Yes
Post-Care Instructions: I reviewed with the patient in detail post-care instructions. Patient is to keep the area dry for 48 hours, and not to engage in any heavy lifting, exercise, or swimming for the next 14 days. Should the patient develop any fevers, chills, bleeding, severe pain patient will contact the office immediately.
Bill As A Line Item Or As Units: Line Item

## 2022-10-13 ENCOUNTER — TELEPHONE (OUTPATIENT)
Dept: HEALTH INFORMATION MANAGEMENT | Facility: OTHER | Age: 72
End: 2022-10-13

## 2022-10-13 NOTE — TELEPHONE ENCOUNTER
Outcome: Left Message for patient to schedule an AWV     Please transfer to [facility] at [phone] when patient returns call.     Attempt # 1  - KS

## 2022-11-19 DIAGNOSIS — I50.22 CHRONIC SYSTOLIC CONGESTIVE HEART FAILURE (HCC): ICD-10-CM

## 2022-11-21 RX ORDER — SACUBITRIL AND VALSARTAN 24; 26 MG/1; MG/1
TABLET, FILM COATED ORAL
Qty: 60 TABLET | Refills: 0 | Status: SHIPPED | OUTPATIENT
Start: 2022-11-21 | End: 2022-12-23

## 2022-11-21 NOTE — TELEPHONE ENCOUNTER
Is the patient due for a refill? Yes    Was the patient seen the past year? No    Date of last office visit: 7/26/21    Does the patient have an upcoming appointment?  No       Provider to refill: ADD RO     Does the patients insurance require a 100 day supply?  No

## 2022-12-19 DIAGNOSIS — I10 ESSENTIAL HYPERTENSION: ICD-10-CM

## 2022-12-21 RX ORDER — CARVEDILOL 6.25 MG/1
TABLET ORAL
Qty: 180 TABLET | Refills: 0 | OUTPATIENT
Start: 2022-12-21

## 2022-12-21 NOTE — TELEPHONE ENCOUNTER
Is the patient due for a refill? Yes    Was the patient seen the past year? No    Date of last office visit: 11/16/2020    Does the patient have an upcoming appointment?  No      Provider to refill:CW    Does the patients insurance require a 100 day supply?  No

## 2022-12-23 DIAGNOSIS — I50.22 CHRONIC SYSTOLIC CONGESTIVE HEART FAILURE (HCC): ICD-10-CM

## 2022-12-23 RX ORDER — SACUBITRIL AND VALSARTAN 24; 26 MG/1; MG/1
1 TABLET, FILM COATED ORAL 2 TIMES DAILY
Qty: 60 TABLET | Refills: 0 | Status: SHIPPED | OUTPATIENT
Start: 2022-12-23

## 2022-12-23 NOTE — TELEPHONE ENCOUNTER
Is the patient due for a refill? Yes    Was the patient seen the past year? No    Date of last office visit: 7/26/21    Does the patient have an upcoming appointment?  No   If yes, When?     Provider to refill:EDISON BENITEZ BE ADD    Does the patients insurance require a 100 day supply?  No

## 2022-12-24 NOTE — TELEPHONE ENCOUNTER
30 courtesy refill sent to pharmacy.    To schedulers: please reach out to patient to schedule follow up, thank you!

## 2023-01-01 NOTE — PROGRESS NOTES
Tele strip at 0300 shows ST w/ HR of 105    NM 0.16  QRS 0.08  QT 0.36       Telemetry Summary    Rhythm Asymptomatic ST  Rate 100s  Ectopy None, per CCT [place]       Telemetry monitoring strips placed in patient's chart.         Thank you! Thank you for allowing me to care for you in the emergency department. It is my goal to provide you with excellent care. If you have not received excellent quality care, please ask to speak to the nurse manager. Please fill out the survey that will come to you by mail or email since we listen to your feedback! Below you will find a list of your tests from today's visit. Should you have any questions, please do not hesitate to call the emergency department. Labs  No results found for this or any previous visit (from the past 12 hour(s)). Radiologic Studies  No orders to display     CT Results  (Last 48 hours)      None          CXR Results  (Last 48 hours)      None          ------------------------------------------------------------------------------------------------------------  The exam and treatment you received in the Emergency Department were for an urgent problem and are not intended as complete care. It is important that you follow-up with a doctor, nurse practitioner, or physician assistant to:  (1) confirm your diagnosis,  (2) re-evaluation of changes in your illness and treatment, and  (3) for ongoing care. Please take your discharge instructions with you when you go to your follow-up appointment. If you have any problem arranging a follow-up appointment, contact the Emergency Department. If your symptoms become worse or you do not improve as expected and you are unable to reach your health care provider, please return to the Emergency Department. We are available 24 hours a day. If a prescription has been provided, please have it filled as soon as possible to prevent a delay in treatment. If you have any questions or reservations about taking the medication due to side effects or interactions with other medications, please call your primary care provider or contact the ER.

## 2023-01-05 NOTE — ADDENDUM NOTE
Addended by: ARIANA WATT on: 7/15/2019 08:37 AM     Modules accepted: Orders     Identifies reasons for living Identifies reasons for living/Supportive social network of family or friends

## 2023-01-27 ENCOUNTER — APPOINTMENT (RX ONLY)
Dept: URBAN - METROPOLITAN AREA CLINIC 57 | Facility: CLINIC | Age: 73
Setting detail: DERMATOLOGY
End: 2023-01-27

## 2023-01-27 DIAGNOSIS — L57.0 ACTINIC KERATOSIS: ICD-10-CM

## 2023-01-27 DIAGNOSIS — D18.0 HEMANGIOMA: ICD-10-CM

## 2023-01-27 DIAGNOSIS — L82.1 OTHER SEBORRHEIC KERATOSIS: ICD-10-CM | Status: STABLE

## 2023-01-27 DIAGNOSIS — L57.8 OTHER SKIN CHANGES DUE TO CHRONIC EXPOSURE TO NONIONIZING RADIATION: ICD-10-CM

## 2023-01-27 DIAGNOSIS — L81.4 OTHER MELANIN HYPERPIGMENTATION: ICD-10-CM

## 2023-01-27 DIAGNOSIS — D22 MELANOCYTIC NEVI: ICD-10-CM | Status: STABLE

## 2023-01-27 PROBLEM — D22.5 MELANOCYTIC NEVI OF TRUNK: Status: ACTIVE | Noted: 2023-01-27

## 2023-01-27 PROBLEM — D18.01 HEMANGIOMA OF SKIN AND SUBCUTANEOUS TISSUE: Status: ACTIVE | Noted: 2023-01-27

## 2023-01-27 PROCEDURE — 99213 OFFICE O/P EST LOW 20 MIN: CPT | Mod: 25

## 2023-01-27 PROCEDURE — ? TREATMENT REGIMEN

## 2023-01-27 PROCEDURE — ? COUNSELING

## 2023-01-27 PROCEDURE — ? ADDITIONAL NOTES

## 2023-01-27 PROCEDURE — ? FULL BODY SKIN EXAM

## 2023-01-27 PROCEDURE — ? LIQUID NITROGEN

## 2023-01-27 PROCEDURE — 17004 DESTROY PREMAL LESIONS 15/>: CPT

## 2023-01-27 PROCEDURE — ? PRESCRIPTION

## 2023-01-27 RX ORDER — PHARMACY COMPOUNDING ACCESSORY
EACH MISCELLANEOUS
Qty: 45 | Refills: 2 | Status: ERX

## 2023-01-27 ASSESSMENT — LOCATION DETAILED DESCRIPTION DERM
LOCATION DETAILED: RIGHT SUPERIOR HELIX
LOCATION DETAILED: INFERIOR MID FOREHEAD
LOCATION DETAILED: RIGHT INFERIOR HELIX
LOCATION DETAILED: LEFT ANTERIOR PROXIMAL UPPER ARM
LOCATION DETAILED: LEFT CENTRAL PARIETAL SCALP
LOCATION DETAILED: RIGHT MEDIAL FRONTAL SCALP
LOCATION DETAILED: LEFT SCAPHA
LOCATION DETAILED: LEFT MID PREAURICULAR CHEEK
LOCATION DETAILED: INFERIOR THORACIC SPINE
LOCATION DETAILED: RIGHT CENTRAL PARIETAL SCALP
LOCATION DETAILED: RIGHT CENTRAL FRONTAL SCALP
LOCATION DETAILED: LEFT INFERIOR CENTRAL MALAR CHEEK
LOCATION DETAILED: SUPERIOR THORACIC SPINE
LOCATION DETAILED: LEFT VENTRAL PROXIMAL FOREARM
LOCATION DETAILED: LEFT MEDIAL SUPERIOR CHEST
LOCATION DETAILED: RIGHT ANTERIOR PROXIMAL UPPER ARM
LOCATION DETAILED: LEFT MEDIAL FRONTAL SCALP
LOCATION DETAILED: RIGHT VENTRAL PROXIMAL FOREARM
LOCATION DETAILED: LEFT SUPERIOR HELIX
LOCATION DETAILED: RIGHT SUPERIOR LATERAL MALAR CHEEK
LOCATION DETAILED: RIGHT PROXIMAL RADIAL DORSAL FOREARM
LOCATION DETAILED: POSTERIOR MID-PARIETAL SCALP
LOCATION DETAILED: RIGHT RADIAL DORSAL HAND
LOCATION DETAILED: LOWER STERNUM
LOCATION DETAILED: RIGHT SUPERIOR MEDIAL UPPER BACK
LOCATION DETAILED: LEFT SUPERIOR PARIETAL SCALP
LOCATION DETAILED: RIGHT MEDIAL SUPERIOR CHEST
LOCATION DETAILED: LEFT TRAGUS

## 2023-01-27 ASSESSMENT — LOCATION SIMPLE DESCRIPTION DERM
LOCATION SIMPLE: POSTERIOR SCALP
LOCATION SIMPLE: SCALP
LOCATION SIMPLE: RIGHT CHEEK
LOCATION SIMPLE: LEFT CHEEK
LOCATION SIMPLE: UPPER BACK
LOCATION SIMPLE: RIGHT EAR
LOCATION SIMPLE: LEFT SCALP
LOCATION SIMPLE: RIGHT FOREARM
LOCATION SIMPLE: LEFT UPPER ARM
LOCATION SIMPLE: RIGHT HAND
LOCATION SIMPLE: RIGHT UPPER ARM
LOCATION SIMPLE: INFERIOR FOREHEAD
LOCATION SIMPLE: RIGHT UPPER BACK
LOCATION SIMPLE: CHEST
LOCATION SIMPLE: RIGHT SCALP
LOCATION SIMPLE: LEFT EAR
LOCATION SIMPLE: LEFT FOREARM

## 2023-01-27 ASSESSMENT — LOCATION ZONE DERM
LOCATION ZONE: SCALP
LOCATION ZONE: ARM
LOCATION ZONE: EAR
LOCATION ZONE: FACE
LOCATION ZONE: HAND
LOCATION ZONE: TRUNK

## 2023-01-27 NOTE — PROCEDURE: LIQUID NITROGEN
Show Aperture Variable?: Yes
Consent: The patient's consent was obtained including but not limited to risks of crusting, scabbing, blistering, scarring, darker or lighter pigmentary change, recurrence, incomplete removal and infection.
Render Post-Care Instructions In Note?: no
Detail Level: Detailed
Post-Care Instructions: I reviewed with the patient in detail post-care instructions. Patient is to wear sunprotection, and avoid picking at any of the treated lesions. Pt may apply Vaseline to crusted or scabbing areas.
Duration Of Freeze Thaw-Cycle (Seconds): 3
Number Of Freeze-Thaw Cycles: 1 freeze-thaw cycle

## 2023-07-28 ENCOUNTER — RX ONLY (OUTPATIENT)
Age: 73
Setting detail: RX ONLY
End: 2023-07-28

## 2023-07-28 ENCOUNTER — APPOINTMENT (RX ONLY)
Dept: URBAN - METROPOLITAN AREA CLINIC 57 | Facility: CLINIC | Age: 73
Setting detail: DERMATOLOGY
End: 2023-07-28

## 2023-07-28 DIAGNOSIS — L57.8 OTHER SKIN CHANGES DUE TO CHRONIC EXPOSURE TO NONIONIZING RADIATION: ICD-10-CM

## 2023-07-28 DIAGNOSIS — L81.4 OTHER MELANIN HYPERPIGMENTATION: ICD-10-CM

## 2023-07-28 DIAGNOSIS — L82.1 OTHER SEBORRHEIC KERATOSIS: ICD-10-CM | Status: STABLE

## 2023-07-28 DIAGNOSIS — L28.1 PRURIGO NODULARIS: ICD-10-CM

## 2023-07-28 DIAGNOSIS — D18.0 HEMANGIOMA: ICD-10-CM

## 2023-07-28 DIAGNOSIS — D22 MELANOCYTIC NEVI: ICD-10-CM | Status: STABLE

## 2023-07-28 DIAGNOSIS — L57.0 ACTINIC KERATOSIS: ICD-10-CM

## 2023-07-28 PROBLEM — D48.5 NEOPLASM OF UNCERTAIN BEHAVIOR OF SKIN: Status: ACTIVE | Noted: 2023-07-28

## 2023-07-28 PROBLEM — D22.5 MELANOCYTIC NEVI OF TRUNK: Status: ACTIVE | Noted: 2023-07-28

## 2023-07-28 PROBLEM — D18.01 HEMANGIOMA OF SKIN AND SUBCUTANEOUS TISSUE: Status: ACTIVE | Noted: 2023-07-28

## 2023-07-28 PROCEDURE — ? COUNSELING

## 2023-07-28 PROCEDURE — ? PRESCRIPTION MEDICATION MANAGEMENT

## 2023-07-28 PROCEDURE — 17000 DESTRUCT PREMALG LESION: CPT | Mod: 59

## 2023-07-28 PROCEDURE — ? TREATMENT REGIMEN

## 2023-07-28 PROCEDURE — ? ADDITIONAL NOTES

## 2023-07-28 PROCEDURE — 99214 OFFICE O/P EST MOD 30 MIN: CPT | Mod: 25

## 2023-07-28 PROCEDURE — ? BIOPSY BY SHAVE METHOD

## 2023-07-28 PROCEDURE — 11102 TANGNTL BX SKIN SINGLE LES: CPT

## 2023-07-28 PROCEDURE — 17003 DESTRUCT PREMALG LES 2-14: CPT

## 2023-07-28 PROCEDURE — ? PRESCRIPTION

## 2023-07-28 PROCEDURE — ? LIQUID NITROGEN

## 2023-07-28 PROCEDURE — ? FULL BODY SKIN EXAM

## 2023-07-28 RX ORDER — PHARMACY COMPOUNDING ACCESSORY
EACH MISCELLANEOUS
Qty: 45 | Refills: 2 | Status: ERX | COMMUNITY
Start: 2023-07-28

## 2023-07-28 RX ORDER — CLOBETASOL PROPIONATE 0.5 MG/G
OINTMENT TOPICAL
Qty: 60 | Refills: 4 | Status: ERX | COMMUNITY
Start: 2023-07-28

## 2023-07-28 RX ADMIN — CLOBETASOL PROPIONATE: 0.5 OINTMENT TOPICAL at 00:00

## 2023-07-28 ASSESSMENT — LOCATION SIMPLE DESCRIPTION DERM
LOCATION SIMPLE: LEFT FOREARM
LOCATION SIMPLE: RIGHT FOREARM
LOCATION SIMPLE: LEFT SCALP
LOCATION SIMPLE: CHEST
LOCATION SIMPLE: RIGHT MIDDLE FINGER
LOCATION SIMPLE: RIGHT UPPER ARM
LOCATION SIMPLE: POSTERIOR SCALP
LOCATION SIMPLE: RIGHT SCALP
LOCATION SIMPLE: UPPER BACK
LOCATION SIMPLE: LEFT UPPER ARM
LOCATION SIMPLE: SCALP
LOCATION SIMPLE: LEFT CHEEK
LOCATION SIMPLE: INFERIOR FOREHEAD
LOCATION SIMPLE: RIGHT UPPER BACK

## 2023-07-28 ASSESSMENT — LOCATION DETAILED DESCRIPTION DERM
LOCATION DETAILED: RIGHT ANTERIOR PROXIMAL UPPER ARM
LOCATION DETAILED: LOWER STERNUM
LOCATION DETAILED: RIGHT PROXIMAL RADIAL DORSAL MIDDLE FINGER
LOCATION DETAILED: INFERIOR MID FOREHEAD
LOCATION DETAILED: LEFT MEDIAL FRONTAL SCALP
LOCATION DETAILED: LEFT CENTRAL FRONTAL SCALP
LOCATION DETAILED: LEFT CENTRAL PARIETAL SCALP
LOCATION DETAILED: LEFT ANTERIOR PROXIMAL UPPER ARM
LOCATION DETAILED: LEFT MEDIAL SUPERIOR CHEST
LOCATION DETAILED: POSTERIOR MID-PARIETAL SCALP
LOCATION DETAILED: RIGHT CENTRAL PARIETAL SCALP
LOCATION DETAILED: INFERIOR THORACIC SPINE
LOCATION DETAILED: SUPERIOR THORACIC SPINE
LOCATION DETAILED: LEFT INFERIOR CENTRAL MALAR CHEEK
LOCATION DETAILED: RIGHT VENTRAL PROXIMAL FOREARM
LOCATION DETAILED: RIGHT CENTRAL FRONTAL SCALP
LOCATION DETAILED: RIGHT SUPERIOR MEDIAL UPPER BACK
LOCATION DETAILED: RIGHT MEDIAL SUPERIOR CHEST
LOCATION DETAILED: LEFT VENTRAL PROXIMAL FOREARM

## 2023-07-28 ASSESSMENT — LOCATION ZONE DERM
LOCATION ZONE: FACE
LOCATION ZONE: ARM
LOCATION ZONE: FINGER
LOCATION ZONE: TRUNK
LOCATION ZONE: SCALP

## 2023-07-28 NOTE — PROCEDURE: LIQUID NITROGEN
Post-Care Instructions: I reviewed with the patient in detail post-care instructions. Patient is to wear sunprotection, and avoid picking at any of the treated lesions. Pt may apply Vaseline to crusted or scabbing areas.
Duration Of Freeze Thaw-Cycle (Seconds): 3
Render Note In Bullet Format When Appropriate: No
Show Aperture Variable?: Yes
Consent: The patient's consent was obtained including but not limited to risks of crusting, scabbing, blistering, scarring, darker or lighter pigmentary change, recurrence, incomplete removal and infection.
Number Of Freeze-Thaw Cycles: 1 freeze-thaw cycle
Detail Level: Detailed

## 2023-09-13 ENCOUNTER — APPOINTMENT (RX ONLY)
Dept: URBAN - METROPOLITAN AREA CLINIC 56 | Facility: CLINIC | Age: 73
Setting detail: DERMATOLOGY
End: 2023-09-13

## 2023-09-13 DIAGNOSIS — L82.0 INFLAMED SEBORRHEIC KERATOSIS: ICD-10-CM

## 2023-09-13 PROBLEM — C44.519 BASAL CELL CARCINOMA OF SKIN OF OTHER PART OF TRUNK: Status: ACTIVE | Noted: 2023-09-13

## 2023-09-13 PROCEDURE — 11602 EXC TR-EXT MAL+MARG 1.1-2 CM: CPT

## 2023-09-13 PROCEDURE — ? COUNSELING

## 2023-09-13 PROCEDURE — 17110 DESTRUCTION B9 LES UP TO 14: CPT

## 2023-09-13 PROCEDURE — ? LIQUID NITROGEN

## 2023-09-13 PROCEDURE — 13101 CMPLX RPR TRUNK 2.6-7.5 CM: CPT | Mod: 59

## 2023-09-13 PROCEDURE — ? EXCISION

## 2023-09-13 ASSESSMENT — LOCATION DETAILED DESCRIPTION DERM: LOCATION DETAILED: LEFT CENTRAL LATERAL NECK

## 2023-09-13 ASSESSMENT — LOCATION SIMPLE DESCRIPTION DERM: LOCATION SIMPLE: NECK

## 2023-09-13 ASSESSMENT — LOCATION ZONE DERM: LOCATION ZONE: NECK

## 2023-09-13 NOTE — PROCEDURE: LIQUID NITROGEN
Spray Paint Text: The liquid nitrogen was applied to the skin utilizing a spray paint frosting technique.
Render Note In Bullet Format When Appropriate: No
Post-Care Instructions: I reviewed with the patient in detail post-care instructions. Patient is to wear sunprotection, and avoid picking at any of the treated lesions. Pt may apply Vaseline to crusted or scabbing areas.
Medical Necessity Information: It is in your best interest to select a reason for this procedure from the list below. All of these items fulfill various CMS LCD requirements except the new and changing color options.
Duration Of Freeze Thaw-Cycle (Seconds): 3
Number Of Freeze-Thaw Cycles: 1 freeze-thaw cycle
Medical Necessity Clause: This procedure was medically necessary because the lesions that were treated were:
Consent: The patient's consent was obtained including but not limited to risks of crusting, scabbing, blistering, scarring, darker or lighter pigmentary change, recurrence, incomplete removal and infection.
Show Topical Anesthesia Variable?: Yes
Detail Level: Detailed

## 2023-09-13 NOTE — PROCEDURE: EXCISION
Surgeon Performing The Repair (Optional): Dr. Scott Osorio
Biopsy Photograph Reviewed: Yes
Size Of Lesion In Cm: 0.6
X Size Of Lesion In Cm (Optional): 0
Size Of Margin In Cm: 0.4
Anesthesia Volume In Cc: 9
Was An Eye Clamp Used?: No
Eye Clamp Note Details: An eye clamp was used during the procedure.
Excision Method: Elliptical
Saucerization Depth: dermis and superficial adipose tissue
Repair Type: Complex
Suturegard Retention Suture: 2-0 Nylon
Retention Suture Bite Size: 3 mm
Length To Time In Minutes Device Was In Place: 10
Number Of Hemigard Strips Per Side: 1
Intermediate / Complex Repair - Final Wound Length In Cm: 3.3
Width Of Defect Perpendicular To Closure In Cm (Required): 1.3
Distance Of Undermining In Cm (Required): 1.5
Undermining Type: Entire Wound
Debridement Text: The wound edges were debrided prior to proceeding with the closure to facilitate wound healing.
Helical Rim Text: The closure involved the helical rim.
Vermilion Border Text: The closure involved the vermilion border.
Nostril Rim Text: The closure involved the nostril rim.
Retention Suture Text: Retention sutures were placed to support the closure and prevent dehiscence.
Lab: [id number]
Lab Facility: [id number]
Graft Donor Site Bandage (Optional-Leave Blank If You Don't Want In Note): Steri-strips and a pressure bandage were applied to the donor site.
Epidermal Closure Graft Donor Site (Optional): simple interrupted
Billing Type: Third-Party Bill
Excision Depth: adipose tissue
Scalpel Size: 15 blade
Anesthesia Type: 1% lidocaine with epinephrine
Hemostasis: Electrocautery
Estimated Blood Loss (Cc): minimal
Detail Level: Detailed
Deep Sutures: 4-0 Monocryl
Epidermal Sutures: 5-0 Fast Absorbing Gut
Epidermal Closure: running
Wound Care: Petrolatum
Dressing: dry sterile dressing
Suturegard Intro: Intraoperative tissue expansion was performed, utilizing the SUTUREGARD device, in order to reduce wound tension.
Suturegard Body: The suture ends were repeatedly re-tightened and re-clamped to achieve the desired tissue expansion.
Hemigard Intro: Due to skin fragility and wound tension, it was decided to use HEMIGARD adhesive retention suture devices to permit a linear closure. The skin was cleaned and dried for a 6cm distance away from the wound. Excessive hair, if present, was removed to allow for adhesion.
Hemigard Postcare Instructions: The HEMIGARD strips are to remain completely dry for at least 5-7 days.
Positioning (Leave Blank If You Do Not Want): The patient was placed in a comfortable position exposing the surgical site.
Pre-Excision Curettage Text (Leave Blank If You Do Not Want): Prior to drawing the surgical margin the visible lesion was removed with electrodesiccation and curettage to clearly define the lesion size.
Complex Repair Preamble Text (Leave Blank If You Do Not Want): Extensive wide undermining was performed.
Intermediate Repair Preamble Text (Leave Blank If You Do Not Want): Undermining was performed with blunt dissection.
Curvilinear Excision Additional Text (Leave Blank If You Do Not Want): The margin was drawn around the clinically apparent lesion.  A curvilinear shape was then drawn on the skin incorporating the lesion and margins.  Incisions were then made along these lines to the appropriate tissue plane and the lesion was extirpated.
Fusiform Excision Additional Text (Leave Blank If You Do Not Want): The margin was drawn around the clinically apparent lesion.  A fusiform shape was then drawn on the skin incorporating the lesion and margins.  Incisions were then made along these lines to the appropriate tissue plane and the lesion was extirpated.
Elliptical Excision Additional Text (Leave Blank If You Do Not Want): The margin was drawn around the clinically apparent lesion.  An elliptical shape was then drawn on the skin incorporating the lesion and margins.  Incisions were then made along these lines to the appropriate tissue plane and the lesion was extirpated.
Saucerization Excision Additional Text (Leave Blank If You Do Not Want): The margin was drawn around the clinically apparent lesion.  Incisions were then made along these lines, in a tangential fashion, to the appropriate tissue plane and the lesion was extirpated.
Slit Excision Additional Text (Leave Blank If You Do Not Want): A linear line was drawn on the skin overlying the lesion. An incision was made slowly until the lesion was visualized.  Once visualized, the lesion was removed with blunt dissection.
Excisional Biopsy Additional Text (Leave Blank If You Do Not Want): The margin was drawn around the clinically apparent lesion. An elliptical shape was then drawn on the skin incorporating the lesion and margins.  Incisions were then made along these lines to the appropriate tissue plane and the lesion was extirpated.
Perilesional Excision Additional Text (Leave Blank If You Do Not Want): The margin was drawn around the clinically apparent lesion. Incisions were then made along these lines to the appropriate tissue plane and the lesion was extirpated.
Repair Performed By Another Provider Text (Leave Blank If You Do Not Want): After the tissue was excised the defect was repaired by another provider.
No Repair - Repaired With Adjacent Surgical Defect Text (Leave Blank If You Do Not Want): After the excision the defect was repaired concurrently with another surgical defect which was in close approximation.
Adjacent Tissue Transfer Text: The defect edges were debeveled with a #15 scalpel blade.  Given the location of the defect and the proximity to free margins an adjacent tissue transfer was deemed most appropriate.  Using a sterile surgical marker, an appropriate flap was drawn incorporating the defect and placing the expected incisions within the relaxed skin tension lines where possible.    The area thus outlined was incised deep to adipose tissue with a #15 scalpel blade.  The skin margins were undermined to an appropriate distance in all directions utilizing iris scissors.
Advancement Flap (Single) Text: The defect edges were debeveled with a #15 scalpel blade.  Given the location of the defect and the proximity to free margins a single advancement flap was deemed most appropriate.  Using a sterile surgical marker, an appropriate advancement flap was drawn incorporating the defect and placing the expected incisions within the relaxed skin tension lines where possible.    The area thus outlined was incised deep to adipose tissue with a #15 scalpel blade.  The skin margins were undermined to an appropriate distance in all directions utilizing iris scissors.
Advancement Flap (Double) Text: The defect edges were debeveled with a #15 scalpel blade.  Given the location of the defect and the proximity to free margins a double advancement flap was deemed most appropriate.  Using a sterile surgical marker, the appropriate advancement flaps were drawn incorporating the defect and placing the expected incisions within the relaxed skin tension lines where possible.    The area thus outlined was incised deep to adipose tissue with a #15 scalpel blade.  The skin margins were undermined to an appropriate distance in all directions utilizing iris scissors.
Burow's Advancement Flap Text: The defect edges were debeveled with a #15 scalpel blade.  Given the location of the defect and the proximity to free margins a Burow's advancement flap was deemed most appropriate.  Using a sterile surgical marker, the appropriate advancement flap was drawn incorporating the defect and placing the expected incisions within the relaxed skin tension lines where possible.    The area thus outlined was incised deep to adipose tissue with a #15 scalpel blade.  The skin margins were undermined to an appropriate distance in all directions utilizing iris scissors.
Chonodrocutaneous Helical Advancement Flap Text: The defect edges were debeveled with a #15 scalpel blade.  Given the location of the defect and the proximity to free margins a chondrocutaneous helical advancement flap was deemed most appropriate.  Using a sterile surgical marker, the appropriate advancement flap was drawn incorporating the defect and placing the expected incisions within the relaxed skin tension lines where possible.    The area thus outlined was incised deep to adipose tissue with a #15 scalpel blade.  The skin margins were undermined to an appropriate distance in all directions utilizing iris scissors.
Crescentic Advancement Flap Text: The defect edges were debeveled with a #15 scalpel blade.  Given the location of the defect and the proximity to free margins a crescentic advancement flap was deemed most appropriate.  Using a sterile surgical marker, the appropriate advancement flap was drawn incorporating the defect and placing the expected incisions within the relaxed skin tension lines where possible.    The area thus outlined was incised deep to adipose tissue with a #15 scalpel blade.  The skin margins were undermined to an appropriate distance in all directions utilizing iris scissors.
A-T Advancement Flap Text: The defect edges were debeveled with a #15 scalpel blade.  Given the location of the defect, shape of the defect and the proximity to free margins an A-T advancement flap was deemed most appropriate.  Using a sterile surgical marker, an appropriate advancement flap was drawn incorporating the defect and placing the expected incisions within the relaxed skin tension lines where possible.    The area thus outlined was incised deep to adipose tissue with a #15 scalpel blade.  The skin margins were undermined to an appropriate distance in all directions utilizing iris scissors.
O-T Advancement Flap Text: The defect edges were debeveled with a #15 scalpel blade.  Given the location of the defect, shape of the defect and the proximity to free margins an O-T advancement flap was deemed most appropriate.  Using a sterile surgical marker, an appropriate advancement flap was drawn incorporating the defect and placing the expected incisions within the relaxed skin tension lines where possible.    The area thus outlined was incised deep to adipose tissue with a #15 scalpel blade.  The skin margins were undermined to an appropriate distance in all directions utilizing iris scissors.
O-L Flap Text: The defect edges were debeveled with a #15 scalpel blade.  Given the location of the defect, shape of the defect and the proximity to free margins an O-L flap was deemed most appropriate.  Using a sterile surgical marker, an appropriate advancement flap was drawn incorporating the defect and placing the expected incisions within the relaxed skin tension lines where possible.    The area thus outlined was incised deep to adipose tissue with a #15 scalpel blade.  The skin margins were undermined to an appropriate distance in all directions utilizing iris scissors.
O-Z Flap Text: The defect edges were debeveled with a #15 scalpel blade.  Given the location of the defect, shape of the defect and the proximity to free margins an O-Z flap was deemed most appropriate.  Using a sterile surgical marker, an appropriate transposition flap was drawn incorporating the defect and placing the expected incisions within the relaxed skin tension lines where possible. The area thus outlined was incised deep to adipose tissue with a #15 scalpel blade.  The skin margins were undermined to an appropriate distance in all directions utilizing iris scissors.
Double O-Z Flap Text: The defect edges were debeveled with a #15 scalpel blade.  Given the location of the defect, shape of the defect and the proximity to free margins a Double O-Z flap was deemed most appropriate.  Using a sterile surgical marker, an appropriate transposition flap was drawn incorporating the defect and placing the expected incisions within the relaxed skin tension lines where possible. The area thus outlined was incised deep to adipose tissue with a #15 scalpel blade.  The skin margins were undermined to an appropriate distance in all directions utilizing iris scissors.
V-Y Flap Text: The defect edges were debeveled with a #15 scalpel blade.  Given the location of the defect, shape of the defect and the proximity to free margins a V-Y flap was deemed most appropriate.  Using a sterile surgical marker, an appropriate advancement flap was drawn incorporating the defect and placing the expected incisions within the relaxed skin tension lines where possible.    The area thus outlined was incised deep to adipose tissue with a #15 scalpel blade.  The skin margins were undermined to an appropriate distance in all directions utilizing iris scissors.
Advancement-Rotation Flap Text: The defect edges were debeveled with a #15 scalpel blade.  Given the location of the defect, shape of the defect and the proximity to free margins an advancement-rotation flap was deemed most appropriate.  Using a sterile surgical marker, an appropriate flap was drawn incorporating the defect and placing the expected incisions within the relaxed skin tension lines where possible. The area thus outlined was incised deep to adipose tissue with a #15 scalpel blade.  The skin margins were undermined to an appropriate distance in all directions utilizing iris scissors.
Mercedes Flap Text: The defect edges were debeveled with a #15 scalpel blade.  Given the location of the defect, shape of the defect and the proximity to free margins a Mercedes flap was deemed most appropriate.  Using a sterile surgical marker, an appropriate advancement flap was drawn incorporating the defect and placing the expected incisions within the relaxed skin tension lines where possible. The area thus outlined was incised deep to adipose tissue with a #15 scalpel blade.  The skin margins were undermined to an appropriate distance in all directions utilizing iris scissors.
Modified Advancement Flap Text: The defect edges were debeveled with a #15 scalpel blade.  Given the location of the defect, shape of the defect and the proximity to free margins a modified advancement flap was deemed most appropriate.  Using a sterile surgical marker, an appropriate advancement flap was drawn incorporating the defect and placing the expected incisions within the relaxed skin tension lines where possible.    The area thus outlined was incised deep to adipose tissue with a #15 scalpel blade.  The skin margins were undermined to an appropriate distance in all directions utilizing iris scissors.
Mucosal Advancement Flap Text: Given the location of the defect, shape of the defect and the proximity to free margins a mucosal advancement flap was deemed most appropriate. Incisions were made with a 15 blade scalpel in the appropriate fashion along the cutaneous vermilion border and the mucosal lip. The remaining actinically damaged mucosal tissue was excised.  The mucosal advancement flap was then elevated to the gingival sulcus with care taken to preserve the neurovascular structures and advanced into the primary defect. Care was taken to ensure that precise realignment of the vermilion border was achieved.
Peng Advancement Flap Text: The defect edges were debeveled with a #15 scalpel blade.  Given the location of the defect, shape of the defect and the proximity to free margins a Peng advancement flap was deemed most appropriate.  Using a sterile surgical marker, an appropriate advancement flap was drawn incorporating the defect and placing the expected incisions within the relaxed skin tension lines where possible. The area thus outlined was incised deep to adipose tissue with a #15 scalpel blade.  The skin margins were undermined to an appropriate distance in all directions utilizing iris scissors.
Hatchet Flap Text: The defect edges were debeveled with a #15 scalpel blade.  Given the location of the defect, shape of the defect and the proximity to free margins a hatchet flap was deemed most appropriate.  Using a sterile surgical marker, an appropriate hatchet flap was drawn incorporating the defect and placing the expected incisions within the relaxed skin tension lines where possible.    The area thus outlined was incised deep to adipose tissue with a #15 scalpel blade.  The skin margins were undermined to an appropriate distance in all directions utilizing iris scissors.
Rotation Flap Text: The defect edges were debeveled with a #15 scalpel blade.  Given the location of the defect, shape of the defect and the proximity to free margins a rotation flap was deemed most appropriate.  Using a sterile surgical marker, an appropriate rotation flap was drawn incorporating the defect and placing the expected incisions within the relaxed skin tension lines where possible.    The area thus outlined was incised deep to adipose tissue with a #15 scalpel blade.  The skin margins were undermined to an appropriate distance in all directions utilizing iris scissors.
Bilateral Rotation Flap Text: The defect edges were debeveled with a #15 scalpel blade. Given the location of the defect, shape of the defect and the proximity to free margins a bilateral rotation flap was deemed most appropriate. Using a sterile surgical marker, an appropriate rotation flap was drawn incorporating the defect and placing the expected incisions within the relaxed skin tension lines where possible. The area thus outlined was incised deep to adipose tissue with a #15 scalpel blade. The skin margins were undermined to an appropriate distance in all directions utilizing iris scissors. Following this, the designed flap was carried over into the primary defect and sutured into place.
Spiral Flap Text: The defect edges were debeveled with a #15 scalpel blade.  Given the location of the defect, shape of the defect and the proximity to free margins a spiral flap was deemed most appropriate.  Using a sterile surgical marker, an appropriate rotation flap was drawn incorporating the defect and placing the expected incisions within the relaxed skin tension lines where possible. The area thus outlined was incised deep to adipose tissue with a #15 scalpel blade.  The skin margins were undermined to an appropriate distance in all directions utilizing iris scissors.
Staged Advancement Flap Text: The defect edges were debeveled with a #15 scalpel blade.  Given the location of the defect, shape of the defect and the proximity to free margins a staged advancement flap was deemed most appropriate.  Using a sterile surgical marker, an appropriate advancement flap was drawn incorporating the defect and placing the expected incisions within the relaxed skin tension lines where possible. The area thus outlined was incised deep to adipose tissue with a #15 scalpel blade.  The skin margins were undermined to an appropriate distance in all directions utilizing iris scissors.
Star Wedge Flap Text: The defect edges were debeveled with a #15 scalpel blade.  Given the location of the defect, shape of the defect and the proximity to free margins a star wedge flap was deemed most appropriate.  Using a sterile surgical marker, an appropriate rotation flap was drawn incorporating the defect and placing the expected incisions within the relaxed skin tension lines where possible. The area thus outlined was incised deep to adipose tissue with a #15 scalpel blade.  The skin margins were undermined to an appropriate distance in all directions utilizing iris scissors.
Transposition Flap Text: The defect edges were debeveled with a #15 scalpel blade.  Given the location of the defect and the proximity to free margins a transposition flap was deemed most appropriate.  Using a sterile surgical marker, an appropriate transposition flap was drawn incorporating the defect.    The area thus outlined was incised deep to adipose tissue with a #15 scalpel blade.  The skin margins were undermined to an appropriate distance in all directions utilizing iris scissors.
Muscle Hinge Flap Text: The defect edges were debeveled with a #15 scalpel blade.  Given the size, depth and location of the defect and the proximity to free margins a muscle hinge flap was deemed most appropriate.  Using a sterile surgical marker, an appropriate hinge flap was drawn incorporating the defect. The area thus outlined was incised with a #15 scalpel blade.  The skin margins were undermined to an appropriate distance in all directions utilizing iris scissors.
Mustarde Flap Text: The defect edges were debeveled with a #15 scalpel blade.  Given the size, depth and location of the defect and the proximity to free margins a Mustarde flap was deemed most appropriate.  Using a sterile surgical marker, an appropriate flap was drawn incorporating the defect. The area thus outlined was incised with a #15 scalpel blade.  The skin margins were undermined to an appropriate distance in all directions utilizing iris scissors.
Nasal Turnover Hinge Flap Text: The defect edges were debeveled with a #15 scalpel blade.  Given the size, depth, location of the defect and the defect being full thickness a nasal turnover hinge flap was deemed most appropriate.  Using a sterile surgical marker, an appropriate hinge flap was drawn incorporating the defect. The area thus outlined was incised with a #15 scalpel blade. The flap was designed to recreate the nasal mucosal lining and the alar rim. The skin margins were undermined to an appropriate distance in all directions utilizing iris scissors.
Nasalis-Muscle-Based Myocutaneous Island Pedicle Flap Text: Using a #15 blade, an incision was made around the donor flap to the level of the nasalis muscle. Wide lateral undermining was then performed in both the subcutaneous plane above the nasalis muscle, and in a submuscular plane just above periosteum. This allowed the formation of a free nasalis muscle axial pedicle (based on the angular artery) which was still attached to the actual cutaneous flap, increasing its mobility and vascular viability. Hemostasis was obtained with pinpoint electrocoagulation. The flap was mobilized into position and the pivotal anchor points positioned and stabilized with buried interrupted sutures. Subcutaneous and dermal tissues were closed in a multilayered fashion with sutures. Tissue redundancies were excised, and the epidermal edges were apposed without significant tension and sutured with sutures.
Orbicularis Oris Muscle Flap Text: The defect edges were debeveled with a #15 scalpel blade.  Given that the defect affected the competency of the oral sphincter an obicularis oris muscle flap was deemed most appropriate to restore this competency and normal muscle function.  Using a sterile surgical marker, an appropriate flap was drawn incorporating the defect. The area thus outlined was incised with a #15 scalpel blade.
Melolabial Transposition Flap Text: The defect edges were debeveled with a #15 scalpel blade.  Given the location of the defect and the proximity to free margins a melolabial flap was deemed most appropriate.  Using a sterile surgical marker, an appropriate melolabial transposition flap was drawn incorporating the defect.    The area thus outlined was incised deep to adipose tissue with a #15 scalpel blade.  The skin margins were undermined to an appropriate distance in all directions utilizing iris scissors.
Rhombic Flap Text: The defect edges were debeveled with a #15 scalpel blade.  Given the location of the defect and the proximity to free margins a rhombic flap was deemed most appropriate.  Using a sterile surgical marker, an appropriate rhombic flap was drawn incorporating the defect.    The area thus outlined was incised deep to adipose tissue with a #15 scalpel blade.  The skin margins were undermined to an appropriate distance in all directions utilizing iris scissors.
Rhomboid Transposition Flap Text: The defect edges were debeveled with a #15 scalpel blade.  Given the location of the defect and the proximity to free margins a rhomboid transposition flap was deemed most appropriate.  Using a sterile surgical marker, an appropriate rhomboid flap was drawn incorporating the defect.    The area thus outlined was incised deep to adipose tissue with a #15 scalpel blade.  The skin margins were undermined to an appropriate distance in all directions utilizing iris scissors.
Bi-Rhombic Flap Text: The defect edges were debeveled with a #15 scalpel blade.  Given the location of the defect and the proximity to free margins a bi-rhombic flap was deemed most appropriate.  Using a sterile surgical marker, an appropriate rhombic flap was drawn incorporating the defect. The area thus outlined was incised deep to adipose tissue with a #15 scalpel blade.  The skin margins were undermined to an appropriate distance in all directions utilizing iris scissors.
Helical Rim Advancement Flap Text: The defect edges were debeveled with a #15 blade scalpel.  Given the location of the defect and the proximity to free margins (helical rim) a double helical rim advancement flap was deemed most appropriate.  Using a sterile surgical marker, the appropriate advancement flaps were drawn incorporating the defect and placing the expected incisions between the helical rim and antihelix where possible.  The area thus outlined was incised through and through with a #15 scalpel blade.  With a skin hook and iris scissors, the flaps were gently and sharply undermined and freed up.
Bilateral Helical Rim Advancement Flap Text: The defect edges were debeveled with a #15 blade scalpel.  Given the location of the defect and the proximity to free margins (helical rim) a bilateral helical rim advancement flap was deemed most appropriate.  Using a sterile surgical marker, the appropriate advancement flaps were drawn incorporating the defect and placing the expected incisions between the helical rim and antihelix where possible.  The area thus outlined was incised through and through with a #15 scalpel blade.  With a skin hook and iris scissors, the flaps were gently and sharply undermined and freed up.
Ear Star Wedge Flap Text: The defect edges were debeveled with a #15 blade scalpel.  Given the location of the defect and the proximity to free margins (helical rim) an ear star wedge flap was deemed most appropriate.  Using a sterile surgical marker, the appropriate flap was drawn incorporating the defect and placing the expected incisions between the helical rim and antihelix where possible.  The area thus outlined was incised through and through with a #15 scalpel blade.
Banner Transposition Flap Text: The defect edges were debeveled with a #15 scalpel blade.  Given the location of the defect and the proximity to free margins a Banner transposition flap was deemed most appropriate.  Using a sterile surgical marker, an appropriate flap drawn around the defect. The area thus outlined was incised deep to adipose tissue with a #15 scalpel blade.  The skin margins were undermined to an appropriate distance in all directions utilizing iris scissors.
Bilobed Flap Text: The defect edges were debeveled with a #15 scalpel blade.  Given the location of the defect and the proximity to free margins a bilobe flap was deemed most appropriate.  Using a sterile surgical marker, an appropriate bilobe flap drawn around the defect.    The area thus outlined was incised deep to adipose tissue with a #15 scalpel blade.  The skin margins were undermined to an appropriate distance in all directions utilizing iris scissors.
Bilobed Transposition Flap Text: The defect edges were debeveled with a #15 scalpel blade.  Given the location of the defect and the proximity to free margins a bilobed transposition flap was deemed most appropriate.  Using a sterile surgical marker, an appropriate bilobe flap drawn around the defect.    The area thus outlined was incised deep to adipose tissue with a #15 scalpel blade.  The skin margins were undermined to an appropriate distance in all directions utilizing iris scissors.
Trilobed Flap Text: The defect edges were debeveled with a #15 scalpel blade.  Given the location of the defect and the proximity to free margins a trilobed flap was deemed most appropriate.  Using a sterile surgical marker, an appropriate trilobed flap drawn around the defect.    The area thus outlined was incised deep to adipose tissue with a #15 scalpel blade.  The skin margins were undermined to an appropriate distance in all directions utilizing iris scissors.
Dorsal Nasal Flap Text: The defect edges were debeveled with a #15 scalpel blade.  Given the location of the defect and the proximity to free margins a dorsal nasal flap was deemed most appropriate.  Using a sterile surgical marker, an appropriate dorsal nasal flap was drawn around the defect.    The area thus outlined was incised deep to adipose tissue with a #15 scalpel blade.  The skin margins were undermined to an appropriate distance in all directions utilizing iris scissors.
Island Pedicle Flap Text: The defect edges were debeveled with a #15 scalpel blade.  Given the location of the defect, shape of the defect and the proximity to free margins an island pedicle advancement flap was deemed most appropriate.  Using a sterile surgical marker, an appropriate advancement flap was drawn incorporating the defect, outlining the appropriate donor tissue and placing the expected incisions within the relaxed skin tension lines where possible.    The area thus outlined was incised deep to adipose tissue with a #15 scalpel blade.  The skin margins were undermined to an appropriate distance in all directions around the primary defect and laterally outward around the island pedicle utilizing iris scissors.  There was minimal undermining beneath the pedicle flap.
Island Pedicle Flap With Canthal Suspension Text: The defect edges were debeveled with a #15 scalpel blade.  Given the location of the defect, shape of the defect and the proximity to free margins an island pedicle advancement flap was deemed most appropriate.  Using a sterile surgical marker, an appropriate advancement flap was drawn incorporating the defect, outlining the appropriate donor tissue and placing the expected incisions within the relaxed skin tension lines where possible. The area thus outlined was incised deep to adipose tissue with a #15 scalpel blade.  The skin margins were undermined to an appropriate distance in all directions around the primary defect and laterally outward around the island pedicle utilizing iris scissors.  There was minimal undermining beneath the pedicle flap. A suspension suture was placed in the canthal tendon to prevent tension and prevent ectropion.
Alar Island Pedicle Flap Text: The defect edges were debeveled with a #15 scalpel blade.  Given the location of the defect, shape of the defect and the proximity to the alar rim an island pedicle advancement flap was deemed most appropriate.  Using a sterile surgical marker, an appropriate advancement flap was drawn incorporating the defect, outlining the appropriate donor tissue and placing the expected incisions within the nasal ala running parallel to the alar rim. The area thus outlined was incised with a #15 scalpel blade.  The skin margins were undermined minimally to an appropriate distance in all directions around the primary defect and laterally outward around the island pedicle utilizing iris scissors.  There was minimal undermining beneath the pedicle flap.
Double Island Pedicle Flap Text: The defect edges were debeveled with a #15 scalpel blade.  Given the location of the defect, shape of the defect and the proximity to free margins a double island pedicle advancement flap was deemed most appropriate.  Using a sterile surgical marker, an appropriate advancement flap was drawn incorporating the defect, outlining the appropriate donor tissue and placing the expected incisions within the relaxed skin tension lines where possible.    The area thus outlined was incised deep to adipose tissue with a #15 scalpel blade.  The skin margins were undermined to an appropriate distance in all directions around the primary defect and laterally outward around the island pedicle utilizing iris scissors.  There was minimal undermining beneath the pedicle flap.
Island Pedicle Flap-Requiring Vessel Identification Text: The defect edges were debeveled with a #15 scalpel blade.  Given the location of the defect, shape of the defect and the proximity to free margins an island pedicle advancement flap was deemed most appropriate.  Using a sterile surgical marker, an appropriate advancement flap was drawn, based on the axial vessel mentioned above, incorporating the defect, outlining the appropriate donor tissue and placing the expected incisions within the relaxed skin tension lines where possible.    The area thus outlined was incised deep to adipose tissue with a #15 scalpel blade.  The skin margins were undermined to an appropriate distance in all directions around the primary defect and laterally outward around the island pedicle utilizing iris scissors.  There was minimal undermining beneath the pedicle flap.
Keystone Flap Text: The defect edges were debeveled with a #15 scalpel blade.  Given the location of the defect, shape of the defect a keystone flap was deemed most appropriate.  Using a sterile surgical marker, an appropriate keystone flap was drawn incorporating the defect, outlining the appropriate donor tissue and placing the expected incisions within the relaxed skin tension lines where possible. The area thus outlined was incised deep to adipose tissue with a #15 scalpel blade.  The skin margins were undermined to an appropriate distance in all directions around the primary defect and laterally outward around the flap utilizing iris scissors.
O-T Plasty Text: The defect edges were debeveled with a #15 scalpel blade.  Given the location of the defect, shape of the defect and the proximity to free margins an O-T plasty was deemed most appropriate.  Using a sterile surgical marker, an appropriate O-T plasty was drawn incorporating the defect and placing the expected incisions within the relaxed skin tension lines where possible.    The area thus outlined was incised deep to adipose tissue with a #15 scalpel blade.  The skin margins were undermined to an appropriate distance in all directions utilizing iris scissors.
O-Z Plasty Text: The defect edges were debeveled with a #15 scalpel blade.  Given the location of the defect, shape of the defect and the proximity to free margins an O-Z plasty (double transposition flap) was deemed most appropriate.  Using a sterile surgical marker, the appropriate transposition flaps were drawn incorporating the defect and placing the expected incisions within the relaxed skin tension lines where possible.    The area thus outlined was incised deep to adipose tissue with a #15 scalpel blade.  The skin margins were undermined to an appropriate distance in all directions utilizing iris scissors.  Hemostasis was achieved with electrocautery.  The flaps were then transposed into place, one clockwise and the other counterclockwise, and anchored with interrupted buried subcutaneous sutures.
Double O-Z Plasty Text: The defect edges were debeveled with a #15 scalpel blade.  Given the location of the defect, shape of the defect and the proximity to free margins a Double O-Z plasty (double transposition flap) was deemed most appropriate.  Using a sterile surgical marker, the appropriate transposition flaps were drawn incorporating the defect and placing the expected incisions within the relaxed skin tension lines where possible. The area thus outlined was incised deep to adipose tissue with a #15 scalpel blade.  The skin margins were undermined to an appropriate distance in all directions utilizing iris scissors.  Hemostasis was achieved with electrocautery.  The flaps were then transposed into place, one clockwise and the other counterclockwise, and anchored with interrupted buried subcutaneous sutures.
V-Y Plasty Text: The defect edges were debeveled with a #15 scalpel blade.  Given the location of the defect, shape of the defect and the proximity to free margins an V-Y advancement flap was deemed most appropriate.  Using a sterile surgical marker, an appropriate advancement flap was drawn incorporating the defect and placing the expected incisions within the relaxed skin tension lines where possible.    The area thus outlined was incised deep to adipose tissue with a #15 scalpel blade.  The skin margins were undermined to an appropriate distance in all directions utilizing iris scissors.
H Plasty Text: Given the location of the defect, shape of the defect and the proximity to free margins a H-plasty was deemed most appropriate for repair.  Using a sterile surgical marker, the appropriate advancement arms of the H-plasty were drawn incorporating the defect and placing the expected incisions within the relaxed skin tension lines where possible. The area thus outlined was incised deep to adipose tissue with a #15 scalpel blade. The skin margins were undermined to an appropriate distance in all directions utilizing iris scissors.  The opposing advancement arms were then advanced into place in opposite direction and anchored with interrupted buried subcutaneous sutures.
W Plasty Text: The lesion was extirpated to the level of the fat with a #15 scalpel blade.  Given the location of the defect, shape of the defect and the proximity to free margins a W-plasty was deemed most appropriate for repair.  Using a sterile surgical marker, the appropriate transposition arms of the W-plasty were drawn incorporating the defect and placing the expected incisions within the relaxed skin tension lines where possible.    The area thus outlined was incised deep to adipose tissue with a #15 scalpel blade.  The skin margins were undermined to an appropriate distance in all directions utilizing iris scissors.  The opposing transposition arms were then transposed into place in opposite direction and anchored with interrupted buried subcutaneous sutures.
Z Plasty Text: The lesion was extirpated to the level of the fat with a #15 scalpel blade.  Given the location of the defect, shape of the defect and the proximity to free margins a Z-plasty was deemed most appropriate for repair.  Using a sterile surgical marker, the appropriate transposition arms of the Z-plasty were drawn incorporating the defect and placing the expected incisions within the relaxed skin tension lines where possible.    The area thus outlined was incised deep to adipose tissue with a #15 scalpel blade.  The skin margins were undermined to an appropriate distance in all directions utilizing iris scissors.  The opposing transposition arms were then transposed into place in opposite direction and anchored with interrupted buried subcutaneous sutures.
Double Z Plasty Text: The lesion was extirpated to the level of the fat with a #15 scalpel blade. Given the location of the defect, shape of the defect and the proximity to free margins a double Z-plasty was deemed most appropriate for repair. Using a sterile surgical marker, the appropriate transposition arms of the double Z-plasty were drawn incorporating the defect and placing the expected incisions within the relaxed skin tension lines where possible. The area thus outlined was incised deep to adipose tissue with a #15 scalpel blade. The skin margins were undermined to an appropriate distance in all directions utilizing iris scissors. The opposing transposition arms were then transposed and carried over into place in opposite direction and anchored with interrupted buried subcutaneous sutures.
Zygomaticofacial Flap Text: Given the location of the defect, shape of the defect and the proximity to free margins a zygomaticofacial flap was deemed most appropriate for repair.  Using a sterile surgical marker, the appropriate flap was drawn incorporating the defect and placing the expected incisions within the relaxed skin tension lines where possible. The area thus outlined was incised deep to adipose tissue with a #15 scalpel blade with preservation of a vascular pedicle.  The skin margins were undermined to an appropriate distance in all directions utilizing iris scissors.  The flap was then placed into the defect and anchored with interrupted buried subcutaneous sutures.
Cheek Interpolation Flap Text: A decision was made to reconstruct the defect utilizing an interpolation axial flap and a staged reconstruction.  A telfa template was made of the defect.  This telfa template was then used to outline the Cheek Interpolation flap.  The donor area for the pedicle flap was then injected with anesthesia.  The flap was excised through the skin and subcutaneous tissue down to the layer of the underlying musculature.  The interpolation flap was carefully excised within this deep plane to maintain its blood supply.  The edges of the donor site were undermined.   The donor site was closed in a primary fashion.  The pedicle was then rotated into position and sutured.  Once the tube was sutured into place, adequate blood supply was confirmed with blanching and refill.  The pedicle was then wrapped with xeroform gauze and dressed appropriately with a telfa and gauze bandage to ensure continued blood supply and protect the attached pedicle.
Cheek-To-Nose Interpolation Flap Text: A decision was made to reconstruct the defect utilizing an interpolation axial flap and a staged reconstruction.  A telfa template was made of the defect.  This telfa template was then used to outline the Cheek-To-Nose Interpolation flap.  The donor area for the pedicle flap was then injected with anesthesia.  The flap was excised through the skin and subcutaneous tissue down to the layer of the underlying musculature.  The interpolation flap was carefully excised within this deep plane to maintain its blood supply.  The edges of the donor site were undermined.   The donor site was closed in a primary fashion.  The pedicle was then rotated into position and sutured.  Once the tube was sutured into place, adequate blood supply was confirmed with blanching and refill.  The pedicle was then wrapped with xeroform gauze and dressed appropriately with a telfa and gauze bandage to ensure continued blood supply and protect the attached pedicle.
Interpolation Flap Text: A decision was made to reconstruct the defect utilizing an interpolation axial flap and a staged reconstruction.  A telfa template was made of the defect.  This telfa template was then used to outline the interpolation flap.  The donor area for the pedicle flap was then injected with anesthesia.  The flap was excised through the skin and subcutaneous tissue down to the layer of the underlying musculature.  The interpolation flap was carefully excised within this deep plane to maintain its blood supply.  The edges of the donor site were undermined.   The donor site was closed in a primary fashion.  The pedicle was then rotated into position and sutured.  Once the tube was sutured into place, adequate blood supply was confirmed with blanching and refill.  The pedicle was then wrapped with xeroform gauze and dressed appropriately with a telfa and gauze bandage to ensure continued blood supply and protect the attached pedicle.
Melolabial Interpolation Flap Text: A decision was made to reconstruct the defect utilizing an interpolation axial flap and a staged reconstruction.  A telfa template was made of the defect.  This telfa template was then used to outline the melolabial interpolation flap.  The donor area for the pedicle flap was then injected with anesthesia.  The flap was excised through the skin and subcutaneous tissue down to the layer of the underlying musculature.  The pedicle flap was carefully excised within this deep plane to maintain its blood supply.  The edges of the donor site were undermined.   The donor site was closed in a primary fashion.  The pedicle was then rotated into position and sutured.  Once the tube was sutured into place, adequate blood supply was confirmed with blanching and refill.  The pedicle was then wrapped with xeroform gauze and dressed appropriately with a telfa and gauze bandage to ensure continued blood supply and protect the attached pedicle.
Mastoid Interpolation Flap Text: A decision was made to reconstruct the defect utilizing an interpolation axial flap and a staged reconstruction.  A telfa template was made of the defect.  This telfa template was then used to outline the mastoid interpolation flap.  The donor area for the pedicle flap was then injected with anesthesia.  The flap was excised through the skin and subcutaneous tissue down to the layer of the underlying musculature.  The pedicle flap was carefully excised within this deep plane to maintain its blood supply.  The edges of the donor site were undermined.   The donor site was closed in a primary fashion.  The pedicle was then rotated into position and sutured.  Once the tube was sutured into place, adequate blood supply was confirmed with blanching and refill.  The pedicle was then wrapped with xeroform gauze and dressed appropriately with a telfa and gauze bandage to ensure continued blood supply and protect the attached pedicle.
Posterior Auricular Interpolation Flap Text: A decision was made to reconstruct the defect utilizing an interpolation axial flap and a staged reconstruction.  A telfa template was made of the defect.  This telfa template was then used to outline the posterior auricular interpolation flap.  The donor area for the pedicle flap was then injected with anesthesia.  The flap was excised through the skin and subcutaneous tissue down to the layer of the underlying musculature.  The pedicle flap was carefully excised within this deep plane to maintain its blood supply.  The edges of the donor site were undermined.   The donor site was closed in a primary fashion.  The pedicle was then rotated into position and sutured.  Once the tube was sutured into place, adequate blood supply was confirmed with blanching and refill.  The pedicle was then wrapped with xeroform gauze and dressed appropriately with a telfa and gauze bandage to ensure continued blood supply and protect the attached pedicle.
Paramedian Forehead Flap Text: A decision was made to reconstruct the defect utilizing an interpolation axial flap and a staged reconstruction.  A telfa template was made of the defect.  This telfa template was then used to outline the paramedian forehead pedicle flap.  The donor area for the pedicle flap was then injected with anesthesia.  The flap was excised through the skin and subcutaneous tissue down to the layer of the underlying musculature.  The pedicle flap was carefully excised within this deep plane to maintain its blood supply.  The edges of the donor site were undermined.   The donor site was closed in a primary fashion.  The pedicle was then rotated into position and sutured.  Once the tube was sutured into place, adequate blood supply was confirmed with blanching and refill.  The pedicle was then wrapped with xeroform gauze and dressed appropriately with a telfa and gauze bandage to ensure continued blood supply and protect the attached pedicle.
Abbe Flap (Upper To Lower Lip) Text: The defect of the lower lip was assessed and measured.  Given the location and size of the defect, an Abbe flap was deemed most appropriate.  Using a sterile surgical marker, an appropriate Abbe flap was measured and drawn on the upper lip. Local anesthesia was then infiltrated.  A scalpel was then used to incise the upper lip through and through the skin, vermilion, muscle and mucosa, leaving the flap pedicled on the opposite side.  The flap was then rotated and transferred to the lower lip defect.  The flap was then sutured into place with a three layer technique, closing the orbicularis oris muscle layer with subcutaneous buried sutures, followed by a mucosal layer and an epidermal layer.
Abbe Flap (Lower To Upper Lip) Text: The defect of the upper lip was assessed and measured.  Given the location and size of the defect, an Abbe flap was deemed most appropriate.  Using a sterile surgical marker, an appropriate Abbe flap was measured and drawn on the lower lip. Local anesthesia was then infiltrated. A scalpel was then used to incise the upper lip through and through the skin, vermilion, muscle and mucosa, leaving the flap pedicled on the opposite side.  The flap was then rotated and transferred to the lower lip defect.  The flap was then sutured into place with a three layer technique, closing the orbicularis oris muscle layer with subcutaneous buried sutures, followed by a mucosal layer and an epidermal layer.
Estlander Flap (Upper To Lower Lip) Text: The defect of the lower lip was assessed and measured.  Given the location and size of the defect, an Estlander flap was deemed most appropriate.  Using a sterile surgical marker, an appropriate Estlander flap was measured and drawn on the upper lip. Local anesthesia was then infiltrated. A scalpel was then used to incise the lateral aspect of the flap, through skin, muscle and mucosa, leaving the flap pedicled medially.  The flap was then rotated and positioned to fill the lower lip defect.  The flap was then sutured into place with a three layer technique, closing the orbicularis oris muscle layer with subcutaneous buried sutures, followed by a mucosal layer and an epidermal layer.
Lip Wedge Excision Repair Text: Given the location of the defect and the proximity to free margins a full thickness wedge repair was deemed most appropriate.  Using a sterile surgical marker, the appropriate repair was drawn incorporating the defect and placing the expected incisions perpendicular to the vermilion border.  The vermilion border was also meticulously outlined to ensure appropriate reapproximation during the repair.  The area thus outlined was incised through and through with a #15 scalpel blade.  The muscularis and dermis were reaproximated with deep sutures following hemostasis. Care was taken to realign the vermilion border before proceeding with the superficial closure.  Once the vermilion was realigned the superfical and mucosal closure was finished.
Ftsg Text: The defect edges were debeveled with a #15 scalpel blade.  Given the location of the defect, shape of the defect and the proximity to free margins a full thickness skin graft was deemed most appropriate.  Using a sterile surgical marker, the primary defect shape was transferred to the donor site. The area thus outlined was incised deep to adipose tissue with a #15 scalpel blade.  The harvested graft was then trimmed of adipose tissue until only dermis and epidermis was left.  The skin margins of the secondary defect were undermined to an appropriate distance in all directions utilizing iris scissors.  The secondary defect was closed with interrupted buried subcutaneous sutures.  The skin edges were then re-apposed with running  sutures.  The skin graft was then placed in the primary defect and oriented appropriately.
Split-Thickness Skin Graft Text: The defect edges were debeveled with a #15 scalpel blade.  Given the location of the defect, shape of the defect and the proximity to free margins a split thickness skin graft was deemed most appropriate.  Using a sterile surgical marker, the primary defect shape was transferred to the donor site. The split thickness graft was then harvested.  The skin graft was then placed in the primary defect and oriented appropriately.
Pinch Graft Text: The defect edges were debeveled with a #15 scalpel blade. Given the location of the defect, shape of the defect and the proximity to free margins a pinch graft was deemed most appropriate. Using a sterile surgical marker, the primary defect shape was transferred to the donor site. The area thus outlined was incised deep to adipose tissue with a #15 scalpel blade.  The harvested graft was then trimmed of adipose tissue until only dermis and epidermis was left. The skin margins of the secondary defect were undermined to an appropriate distance in all directions utilizing iris scissors.  The secondary defect was closed with interrupted buried subcutaneous sutures.  The skin edges were then re-apposed with running  sutures.  The skin graft was then placed in the primary defect and oriented appropriately.
Burow's Graft Text: The defect edges were debeveled with a #15 scalpel blade.  Given the location of the defect, shape of the defect, the proximity to free margins and the presence of a standing cone deformity a Burow's skin graft was deemed most appropriate. The standing cone was removed and this tissue was then trimmed to the shape of the primary defect. The adipose tissue was also removed until only dermis and epidermis were left.  The skin margins of the secondary defect were undermined to an appropriate distance in all directions utilizing iris scissors.  The secondary defect was closed with interrupted buried subcutaneous sutures.  The skin edges were then re-apposed with running  sutures.  The skin graft was then placed in the primary defect and oriented appropriately.
Cartilage Graft Text: The defect edges were debeveled with a #15 scalpel blade.  Given the location of the defect, shape of the defect, the fact the defect involved a full thickness cartilage defect a cartilage graft was deemed most appropriate.  An appropriate donor site was identified, cleansed, and anesthetized. The cartilage graft was then harvested and transferred to the recipient site, oriented appropriately and then sutured into place.  The secondary defect was then repaired using a primary closure.
Composite Graft Text: The defect edges were debeveled with a #15 scalpel blade.  Given the location of the defect, shape of the defect, the proximity to free margins and the fact the defect was full thickness a composite graft was deemed most appropriate.  The defect was outline and then transferred to the donor site.  A full thickness graft was then excised from the donor site. The graft was then placed in the primary defect, oriented appropriately and then sutured into place.  The secondary defect was then repaired using a primary closure.
Epidermal Autograft Text: The defect edges were debeveled with a #15 scalpel blade.  Given the location of the defect, shape of the defect and the proximity to free margins an epidermal autograft was deemed most appropriate.  Using a sterile surgical marker, the primary defect shape was transferred to the donor site. The epidermal graft was then harvested.  The skin graft was then placed in the primary defect and oriented appropriately.
Dermal Autograft Text: The defect edges were debeveled with a #15 scalpel blade.  Given the location of the defect, shape of the defect and the proximity to free margins a dermal autograft was deemed most appropriate.  Using a sterile surgical marker, the primary defect shape was transferred to the donor site. The area thus outlined was incised deep to adipose tissue with a #15 scalpel blade.  The harvested graft was then trimmed of adipose and epidermal tissue until only dermis was left.  The skin graft was then placed in the primary defect and oriented appropriately.
Skin Substitute Text: The defect edges were debeveled with a #15 scalpel blade.  Given the location of the defect, shape of the defect and the proximity to free margins a skin substitute graft was deemed most appropriate.  The graft material was trimmed to fit the size of the defect. The graft was then placed in the primary defect and oriented appropriately.
Tissue Cultured Epidermal Autograft Text: The defect edges were debeveled with a #15 scalpel blade.  Given the location of the defect, shape of the defect and the proximity to free margins a tissue cultured epidermal autograft was deemed most appropriate.  The graft was then trimmed to fit the size of the defect.  The graft was then placed in the primary defect and oriented appropriately.
Xenograft Text: The defect edges were debeveled with a #15 scalpel blade.  Given the location of the defect, shape of the defect and the proximity to free margins a xenograft was deemed most appropriate.  The graft was then trimmed to fit the size of the defect.  The graft was then placed in the primary defect and oriented appropriately.
Purse String (Intermediate) Text: Given the location of the defect and the characteristics of the surrounding skin a purse string intermediate closure was deemed most appropriate.  Undermining was performed circumfirentially around the surgical defect.  A purse string suture was then placed and tightened.
Purse String (Simple) Text: Given the location of the defect and the characteristics of the surrounding skin a purse string simple closure was deemed most appropriate.  Undermining was performed circumferentially around the surgical defect.  A purse string suture was then placed and tightened.
Partial Purse String (Intermediate) Text: Given the location of the defect and the characteristics of the surrounding skin an intermediate purse string closure was deemed most appropriate.  Undermining was performed circumferentially around the surgical defect.  A purse string suture was then placed and tightened. Wound tension of the circular defect prevented complete closure of the wound.
Partial Purse String (Simple) Text: Given the location of the defect and the characteristics of the surrounding skin a simple purse string closure was deemed most appropriate.  Undermining was performed circumferentially around the surgical defect.  A purse string suture was then placed and tightened. Wound tension of the circular defect prevented complete closure of the wound.
Complex Repair And Single Advancement Flap Text: The defect edges were debeveled with a #15 scalpel blade.  The primary defect was closed partially with a complex linear closure.  Given the location of the remaining defect, shape of the defect and the proximity to free margins a single advancement flap was deemed most appropriate for complete closure of the defect.  Using a sterile surgical marker, an appropriate advancement flap was drawn incorporating the defect and placing the expected incisions within the relaxed skin tension lines where possible.    The area thus outlined was incised deep to adipose tissue with a #15 scalpel blade.  The skin margins were undermined to an appropriate distance in all directions utilizing iris scissors.
Complex Repair And Double Advancement Flap Text: The defect edges were debeveled with a #15 scalpel blade.  The primary defect was closed partially with a complex linear closure.  Given the location of the remaining defect, shape of the defect and the proximity to free margins a double advancement flap was deemed most appropriate for complete closure of the defect.  Using a sterile surgical marker, an appropriate advancement flap was drawn incorporating the defect and placing the expected incisions within the relaxed skin tension lines where possible.    The area thus outlined was incised deep to adipose tissue with a #15 scalpel blade.  The skin margins were undermined to an appropriate distance in all directions utilizing iris scissors.
Complex Repair And Modified Advancement Flap Text: The defect edges were debeveled with a #15 scalpel blade.  The primary defect was closed partially with a complex linear closure.  Given the location of the remaining defect, shape of the defect and the proximity to free margins a modified advancement flap was deemed most appropriate for complete closure of the defect.  Using a sterile surgical marker, an appropriate advancement flap was drawn incorporating the defect and placing the expected incisions within the relaxed skin tension lines where possible.    The area thus outlined was incised deep to adipose tissue with a #15 scalpel blade.  The skin margins were undermined to an appropriate distance in all directions utilizing iris scissors.
Complex Repair And A-T Advancement Flap Text: The defect edges were debeveled with a #15 scalpel blade.  The primary defect was closed partially with a complex linear closure.  Given the location of the remaining defect, shape of the defect and the proximity to free margins an A-T advancement flap was deemed most appropriate for complete closure of the defect.  Using a sterile surgical marker, an appropriate advancement flap was drawn incorporating the defect and placing the expected incisions within the relaxed skin tension lines where possible.    The area thus outlined was incised deep to adipose tissue with a #15 scalpel blade.  The skin margins were undermined to an appropriate distance in all directions utilizing iris scissors.
Complex Repair And O-T Advancement Flap Text: The defect edges were debeveled with a #15 scalpel blade.  The primary defect was closed partially with a complex linear closure.  Given the location of the remaining defect, shape of the defect and the proximity to free margins an O-T advancement flap was deemed most appropriate for complete closure of the defect.  Using a sterile surgical marker, an appropriate advancement flap was drawn incorporating the defect and placing the expected incisions within the relaxed skin tension lines where possible.    The area thus outlined was incised deep to adipose tissue with a #15 scalpel blade.  The skin margins were undermined to an appropriate distance in all directions utilizing iris scissors.
Complex Repair And O-L Flap Text: The defect edges were debeveled with a #15 scalpel blade.  The primary defect was closed partially with a complex linear closure.  Given the location of the remaining defect, shape of the defect and the proximity to free margins an O-L flap was deemed most appropriate for complete closure of the defect.  Using a sterile surgical marker, an appropriate flap was drawn incorporating the defect and placing the expected incisions within the relaxed skin tension lines where possible.    The area thus outlined was incised deep to adipose tissue with a #15 scalpel blade.  The skin margins were undermined to an appropriate distance in all directions utilizing iris scissors.
Complex Repair And Bilobe Flap Text: The defect edges were debeveled with a #15 scalpel blade.  The primary defect was closed partially with a complex linear closure.  Given the location of the remaining defect, shape of the defect and the proximity to free margins a bilobe flap was deemed most appropriate for complete closure of the defect.  Using a sterile surgical marker, an appropriate advancement flap was drawn incorporating the defect and placing the expected incisions within the relaxed skin tension lines where possible.    The area thus outlined was incised deep to adipose tissue with a #15 scalpel blade.  The skin margins were undermined to an appropriate distance in all directions utilizing iris scissors.
Complex Repair And Melolabial Flap Text: The defect edges were debeveled with a #15 scalpel blade.  The primary defect was closed partially with a complex linear closure.  Given the location of the remaining defect, shape of the defect and the proximity to free margins a melolabial flap was deemed most appropriate for complete closure of the defect.  Using a sterile surgical marker, an appropriate advancement flap was drawn incorporating the defect and placing the expected incisions within the relaxed skin tension lines where possible.    The area thus outlined was incised deep to adipose tissue with a #15 scalpel blade.  The skin margins were undermined to an appropriate distance in all directions utilizing iris scissors.
Complex Repair And Rotation Flap Text: The defect edges were debeveled with a #15 scalpel blade.  The primary defect was closed partially with a complex linear closure.  Given the location of the remaining defect, shape of the defect and the proximity to free margins a rotation flap was deemed most appropriate for complete closure of the defect.  Using a sterile surgical marker, an appropriate advancement flap was drawn incorporating the defect and placing the expected incisions within the relaxed skin tension lines where possible.    The area thus outlined was incised deep to adipose tissue with a #15 scalpel blade.  The skin margins were undermined to an appropriate distance in all directions utilizing iris scissors.
Complex Repair And Rhombic Flap Text: The defect edges were debeveled with a #15 scalpel blade.  The primary defect was closed partially with a complex linear closure.  Given the location of the remaining defect, shape of the defect and the proximity to free margins a rhombic flap was deemed most appropriate for complete closure of the defect.  Using a sterile surgical marker, an appropriate advancement flap was drawn incorporating the defect and placing the expected incisions within the relaxed skin tension lines where possible.    The area thus outlined was incised deep to adipose tissue with a #15 scalpel blade.  The skin margins were undermined to an appropriate distance in all directions utilizing iris scissors.
Complex Repair And Transposition Flap Text: The defect edges were debeveled with a #15 scalpel blade.  The primary defect was closed partially with a complex linear closure.  Given the location of the remaining defect, shape of the defect and the proximity to free margins a transposition flap was deemed most appropriate for complete closure of the defect.  Using a sterile surgical marker, an appropriate advancement flap was drawn incorporating the defect and placing the expected incisions within the relaxed skin tension lines where possible.    The area thus outlined was incised deep to adipose tissue with a #15 scalpel blade.  The skin margins were undermined to an appropriate distance in all directions utilizing iris scissors.
Complex Repair And V-Y Plasty Text: The defect edges were debeveled with a #15 scalpel blade.  The primary defect was closed partially with a complex linear closure.  Given the location of the remaining defect, shape of the defect and the proximity to free margins a V-Y plasty was deemed most appropriate for complete closure of the defect.  Using a sterile surgical marker, an appropriate advancement flap was drawn incorporating the defect and placing the expected incisions within the relaxed skin tension lines where possible.    The area thus outlined was incised deep to adipose tissue with a #15 scalpel blade.  The skin margins were undermined to an appropriate distance in all directions utilizing iris scissors.
Complex Repair And M Plasty Text: The defect edges were debeveled with a #15 scalpel blade.  The primary defect was closed partially with a complex linear closure.  Given the location of the remaining defect, shape of the defect and the proximity to free margins an M plasty was deemed most appropriate for complete closure of the defect.  Using a sterile surgical marker, an appropriate advancement flap was drawn incorporating the defect and placing the expected incisions within the relaxed skin tension lines where possible.    The area thus outlined was incised deep to adipose tissue with a #15 scalpel blade.  The skin margins were undermined to an appropriate distance in all directions utilizing iris scissors.
Complex Repair And Double M Plasty Text: The defect edges were debeveled with a #15 scalpel blade.  The primary defect was closed partially with a complex linear closure.  Given the location of the remaining defect, shape of the defect and the proximity to free margins a double M plasty was deemed most appropriate for complete closure of the defect.  Using a sterile surgical marker, an appropriate advancement flap was drawn incorporating the defect and placing the expected incisions within the relaxed skin tension lines where possible.    The area thus outlined was incised deep to adipose tissue with a #15 scalpel blade.  The skin margins were undermined to an appropriate distance in all directions utilizing iris scissors.
Complex Repair And W Plasty Text: The defect edges were debeveled with a #15 scalpel blade.  The primary defect was closed partially with a complex linear closure.  Given the location of the remaining defect, shape of the defect and the proximity to free margins a W plasty was deemed most appropriate for complete closure of the defect.  Using a sterile surgical marker, an appropriate advancement flap was drawn incorporating the defect and placing the expected incisions within the relaxed skin tension lines where possible.    The area thus outlined was incised deep to adipose tissue with a #15 scalpel blade.  The skin margins were undermined to an appropriate distance in all directions utilizing iris scissors.
Complex Repair And Z Plasty Text: The defect edges were debeveled with a #15 scalpel blade.  The primary defect was closed partially with a complex linear closure.  Given the location of the remaining defect, shape of the defect and the proximity to free margins a Z plasty was deemed most appropriate for complete closure of the defect.  Using a sterile surgical marker, an appropriate advancement flap was drawn incorporating the defect and placing the expected incisions within the relaxed skin tension lines where possible.    The area thus outlined was incised deep to adipose tissue with a #15 scalpel blade.  The skin margins were undermined to an appropriate distance in all directions utilizing iris scissors.
Complex Repair And Dorsal Nasal Flap Text: The defect edges were debeveled with a #15 scalpel blade.  The primary defect was closed partially with a complex linear closure.  Given the location of the remaining defect, shape of the defect and the proximity to free margins a dorsal nasal flap was deemed most appropriate for complete closure of the defect.  Using a sterile surgical marker, an appropriate flap was drawn incorporating the defect and placing the expected incisions within the relaxed skin tension lines where possible.    The area thus outlined was incised deep to adipose tissue with a #15 scalpel blade.  The skin margins were undermined to an appropriate distance in all directions utilizing iris scissors.
Complex Repair And Ftsg Text: The defect edges were debeveled with a #15 scalpel blade.  The primary defect was closed partially with a complex linear closure.  Given the location of the defect, shape of the defect and the proximity to free margins a full thickness skin graft was deemed most appropriate to repair the remaining defect.  The graft was trimmed to fit the size of the remaining defect.  The graft was then placed in the primary defect, oriented appropriately, and sutured into place.
Complex Repair And Burow's Graft Text: The defect edges were debeveled with a #15 scalpel blade.  The primary defect was closed partially with a complex linear closure.  Given the location of the defect, shape of the defect, the proximity to free margins and the presence of a standing cone deformity a Burow's graft was deemed most appropriate to repair the remaining defect.  The graft was trimmed to fit the size of the remaining defect.  The graft was then placed in the primary defect, oriented appropriately, and sutured into place.
Complex Repair And Split-Thickness Skin Graft Text: The defect edges were debeveled with a #15 scalpel blade.  The primary defect was closed partially with a complex linear closure.  Given the location of the defect, shape of the defect and the proximity to free margins a split thickness skin graft was deemed most appropriate to repair the remaining defect.  The graft was trimmed to fit the size of the remaining defect.  The graft was then placed in the primary defect, oriented appropriately, and sutured into place.
Complex Repair And Epidermal Autograft Text: The defect edges were debeveled with a #15 scalpel blade.  The primary defect was closed partially with a complex linear closure.  Given the location of the defect, shape of the defect and the proximity to free margins an epidermal autograft was deemed most appropriate to repair the remaining defect.  The graft was trimmed to fit the size of the remaining defect.  The graft was then placed in the primary defect, oriented appropriately, and sutured into place.
Complex Repair And Dermal Autograft Text: The defect edges were debeveled with a #15 scalpel blade.  The primary defect was closed partially with a complex linear closure.  Given the location of the defect, shape of the defect and the proximity to free margins an dermal autograft was deemed most appropriate to repair the remaining defect.  The graft was trimmed to fit the size of the remaining defect.  The graft was then placed in the primary defect, oriented appropriately, and sutured into place.
Complex Repair And Tissue Cultured Epidermal Autograft Text: The defect edges were debeveled with a #15 scalpel blade.  The primary defect was closed partially with a complex linear closure.  Given the location of the defect, shape of the defect and the proximity to free margins an tissue cultured epidermal autograft was deemed most appropriate to repair the remaining defect.  The graft was trimmed to fit the size of the remaining defect.  The graft was then placed in the primary defect, oriented appropriately, and sutured into place.
Complex Repair And Xenograft Text: The defect edges were debeveled with a #15 scalpel blade.  The primary defect was closed partially with a complex linear closure.  Given the location of the defect, shape of the defect and the proximity to free margins a xenograft was deemed most appropriate to repair the remaining defect.  The graft was trimmed to fit the size of the remaining defect.  The graft was then placed in the primary defect, oriented appropriately, and sutured into place.
Complex Repair And Skin Substitute Graft Text: The defect edges were debeveled with a #15 scalpel blade.  The primary defect was closed partially with a complex linear closure.  Given the location of the remaining defect, shape of the defect and the proximity to free margins a skin substitute graft was deemed most appropriate to repair the remaining defect.  The graft was trimmed to fit the size of the remaining defect.  The graft was then placed in the primary defect, oriented appropriately, and sutured into place.
Path Notes (To The Dermatopathologist): Please check margins.
Consent was obtained from the patient. The risks and benefits to therapy were discussed in detail. Specifically, the risks of infection, scarring, bleeding, prolonged wound healing, incomplete removal, allergy to anesthesia, nerve injury and recurrence were addressed. Prior to the procedure, the treatment site was clearly identified and confirmed by the patient. All components of Universal Protocol/PAUSE Rule completed.
Post-Care Instructions: I reviewed with the patient in detail post-care instructions. Patient is not to engage in any heavy lifting, exercise, or swimming for the next 14 days. Should the patient develop any fevers, chills, bleeding, severe pain patient will contact the office immediately.
Home Suture Removal Text: Patient was provided a home suture removal kit and will remove their sutures at home.  If they have any questions or difficulties they will call the office.
Where Do You Want The Question To Include Opioid Counseling Located?: Case Summary Tab
Information: Selecting Yes will display possible errors in your note based on the variables you have selected. This validation is only offered as a suggestion for you. PLEASE NOTE THAT THE VALIDATION TEXT WILL BE REMOVED WHEN YOU FINALIZE YOUR NOTE. IF YOU WANT TO FAX A PRELIMINARY NOTE YOU WILL NEED TO TOGGLE THIS TO 'NO' IF YOU DO NOT WANT IT IN YOUR FAXED NOTE.

## 2024-04-18 ENCOUNTER — APPOINTMENT (RX ONLY)
Dept: URBAN - METROPOLITAN AREA CLINIC 56 | Facility: CLINIC | Age: 74
Setting detail: DERMATOLOGY
End: 2024-04-18

## 2024-04-18 DIAGNOSIS — L82.0 INFLAMED SEBORRHEIC KERATOSIS: ICD-10-CM

## 2024-04-18 DIAGNOSIS — L57.0 ACTINIC KERATOSIS: ICD-10-CM

## 2024-04-18 DIAGNOSIS — D22 MELANOCYTIC NEVI: ICD-10-CM | Status: STABLE

## 2024-04-18 DIAGNOSIS — L81.4 OTHER MELANIN HYPERPIGMENTATION: ICD-10-CM

## 2024-04-18 DIAGNOSIS — L82.1 OTHER SEBORRHEIC KERATOSIS: ICD-10-CM | Status: STABLE

## 2024-04-18 DIAGNOSIS — B35.3 TINEA PEDIS: ICD-10-CM

## 2024-04-18 DIAGNOSIS — D18.0 HEMANGIOMA: ICD-10-CM

## 2024-04-18 DIAGNOSIS — L57.8 OTHER SKIN CHANGES DUE TO CHRONIC EXPOSURE TO NONIONIZING RADIATION: ICD-10-CM | Status: STABLE

## 2024-04-18 PROBLEM — D22.5 MELANOCYTIC NEVI OF TRUNK: Status: ACTIVE | Noted: 2024-04-18

## 2024-04-18 PROBLEM — D18.01 HEMANGIOMA OF SKIN AND SUBCUTANEOUS TISSUE: Status: ACTIVE | Noted: 2024-04-18

## 2024-04-18 PROCEDURE — ? ADDITIONAL NOTES

## 2024-04-18 PROCEDURE — 99213 OFFICE O/P EST LOW 20 MIN: CPT | Mod: 25

## 2024-04-18 PROCEDURE — ? LIQUID NITROGEN

## 2024-04-18 PROCEDURE — 17003 DESTRUCT PREMALG LES 2-14: CPT | Mod: 59

## 2024-04-18 PROCEDURE — ? COUNSELING

## 2024-04-18 PROCEDURE — 17110 DESTRUCTION B9 LES UP TO 14: CPT

## 2024-04-18 PROCEDURE — 17000 DESTRUCT PREMALG LESION: CPT | Mod: 59

## 2024-04-18 PROCEDURE — ? TREATMENT REGIMEN

## 2024-04-18 PROCEDURE — ? FULL BODY SKIN EXAM

## 2024-04-18 PROCEDURE — ? PRESCRIPTION

## 2024-04-18 RX ORDER — KETOCONAZOLE 20 MG/G
CREAM TOPICAL BID
Qty: 60 | Refills: 3 | Status: ERX | COMMUNITY
Start: 2024-04-18

## 2024-04-18 RX ADMIN — KETOCONAZOLE: 20 CREAM TOPICAL at 00:00

## 2024-04-18 ASSESSMENT — LOCATION SIMPLE DESCRIPTION DERM
LOCATION SIMPLE: LEFT FOREARM
LOCATION SIMPLE: RIGHT HAND
LOCATION SIMPLE: LEFT SCALP
LOCATION SIMPLE: LEFT FOOT
LOCATION SIMPLE: RIGHT UPPER ARM
LOCATION SIMPLE: LEFT NOSE
LOCATION SIMPLE: LEFT UPPER ARM
LOCATION SIMPLE: RIGHT UPPER BACK
LOCATION SIMPLE: LEFT ANTERIOR NECK
LOCATION SIMPLE: UPPER BACK
LOCATION SIMPLE: RIGHT FOOT
LOCATION SIMPLE: INFERIOR FOREHEAD
LOCATION SIMPLE: CHEST
LOCATION SIMPLE: LEFT CHEEK
LOCATION SIMPLE: RIGHT FOREARM

## 2024-04-18 ASSESSMENT — LOCATION DETAILED DESCRIPTION DERM
LOCATION DETAILED: LEFT ANTERIOR PROXIMAL UPPER ARM
LOCATION DETAILED: RIGHT ANTERIOR PROXIMAL UPPER ARM
LOCATION DETAILED: RIGHT SUPERIOR MEDIAL UPPER BACK
LOCATION DETAILED: LEFT PROXIMAL RADIAL DORSAL FOREARM
LOCATION DETAILED: LEFT DORSAL FOOT
LOCATION DETAILED: SUPERIOR THORACIC SPINE
LOCATION DETAILED: LEFT SUPERIOR ANTERIOR NECK
LOCATION DETAILED: INFERIOR MID FOREHEAD
LOCATION DETAILED: LEFT NASAL SIDEWALL
LOCATION DETAILED: LEFT INFERIOR CENTRAL MALAR CHEEK
LOCATION DETAILED: RIGHT RADIAL DORSAL HAND
LOCATION DETAILED: RIGHT DORSAL FOOT
LOCATION DETAILED: LOWER STERNUM
LOCATION DETAILED: LEFT INFERIOR LATERAL NECK
LOCATION DETAILED: LEFT CENTRAL MALAR CHEEK
LOCATION DETAILED: INFERIOR THORACIC SPINE
LOCATION DETAILED: LEFT MEDIAL SUPERIOR CHEST
LOCATION DETAILED: RIGHT VENTRAL PROXIMAL FOREARM
LOCATION DETAILED: LEFT CENTRAL FRONTAL SCALP
LOCATION DETAILED: LEFT VENTRAL PROXIMAL FOREARM
LOCATION DETAILED: RIGHT MEDIAL SUPERIOR CHEST

## 2024-04-18 ASSESSMENT — LOCATION ZONE DERM
LOCATION ZONE: NECK
LOCATION ZONE: TRUNK
LOCATION ZONE: ARM
LOCATION ZONE: NOSE
LOCATION ZONE: FACE
LOCATION ZONE: HAND
LOCATION ZONE: SCALP
LOCATION ZONE: FEET

## 2024-04-18 NOTE — PROCEDURE: LIQUID NITROGEN
Post-Care Instructions: I reviewed with the patient in detail post-care instructions. Patient is to wear sunprotection, and avoid picking at any of the treated lesions. Pt may apply Vaseline to crusted or scabbing areas.
Consent: The patient's consent was obtained including but not limited to risks of crusting, scabbing, blistering, scarring, darker or lighter pigmentary change, recurrence, incomplete removal and infection.
Duration Of Freeze Thaw-Cycle (Seconds): 3
Number Of Freeze-Thaw Cycles: 1 freeze-thaw cycle
Render Note In Bullet Format When Appropriate: No
Detail Level: Detailed
Show Applicator Variable?: Yes
Medical Necessity Information: It is in your best interest to select a reason for this procedure from the list below. All of these items fulfill various CMS LCD requirements except the new and changing color options.
Medical Necessity Clause: This procedure was medically necessary because the lesions that were treated were:
Spray Paint Text: The liquid nitrogen was applied to the skin utilizing a spray paint frosting technique.

## 2024-12-18 ENCOUNTER — APPOINTMENT (OUTPATIENT)
Dept: URBAN - METROPOLITAN AREA CLINIC 56 | Facility: CLINIC | Age: 74
Setting detail: DERMATOLOGY
End: 2024-12-18

## 2024-12-18 DIAGNOSIS — D18.0 HEMANGIOMA: ICD-10-CM

## 2024-12-18 DIAGNOSIS — L81.4 OTHER MELANIN HYPERPIGMENTATION: ICD-10-CM

## 2024-12-18 DIAGNOSIS — D22 MELANOCYTIC NEVI: ICD-10-CM

## 2024-12-18 DIAGNOSIS — D69.2 OTHER NONTHROMBOCYTOPENIC PURPURA: ICD-10-CM

## 2024-12-18 DIAGNOSIS — L82.0 INFLAMED SEBORRHEIC KERATOSIS: ICD-10-CM

## 2024-12-18 DIAGNOSIS — L28.1 PRURIGO NODULARIS: ICD-10-CM | Status: INADEQUATELY CONTROLLED

## 2024-12-18 DIAGNOSIS — L57.0 ACTINIC KERATOSIS: ICD-10-CM

## 2024-12-18 DIAGNOSIS — L57.8 OTHER SKIN CHANGES DUE TO CHRONIC EXPOSURE TO NONIONIZING RADIATION: ICD-10-CM

## 2024-12-18 DIAGNOSIS — L82.1 OTHER SEBORRHEIC KERATOSIS: ICD-10-CM

## 2024-12-18 PROBLEM — D22.5 MELANOCYTIC NEVI OF TRUNK: Status: ACTIVE | Noted: 2024-12-18

## 2024-12-18 PROBLEM — D18.01 HEMANGIOMA OF SKIN AND SUBCUTANEOUS TISSUE: Status: ACTIVE | Noted: 2024-12-18

## 2024-12-18 PROBLEM — D48.5 NEOPLASM OF UNCERTAIN BEHAVIOR OF SKIN: Status: ACTIVE | Noted: 2024-12-18

## 2024-12-18 PROCEDURE — 99214 OFFICE O/P EST MOD 30 MIN: CPT | Mod: 25

## 2024-12-18 PROCEDURE — 17110 DESTRUCTION B9 LES UP TO 14: CPT | Mod: 59

## 2024-12-18 PROCEDURE — ? PRESCRIPTION

## 2024-12-18 PROCEDURE — 17004 DESTROY PREMAL LESIONS 15/>: CPT

## 2024-12-18 PROCEDURE — ? BIOPSY BY SHAVE METHOD

## 2024-12-18 PROCEDURE — ? ADDITIONAL NOTES

## 2024-12-18 PROCEDURE — ? LIQUID NITROGEN

## 2024-12-18 PROCEDURE — ? FULL BODY SKIN EXAM

## 2024-12-18 PROCEDURE — ? PRESCRIPTION MEDICATION MANAGEMENT

## 2024-12-18 PROCEDURE — ? COUNSELING

## 2024-12-18 PROCEDURE — 11102 TANGNTL BX SKIN SINGLE LES: CPT | Mod: 59

## 2024-12-18 PROCEDURE — ? RECOMMENDATIONS

## 2024-12-18 RX ORDER — CLOBETASOL PROPIONATE 0.5 MG/G
CREAM TOPICAL
Qty: 30 | Refills: 3 | Status: ERX | COMMUNITY
Start: 2024-12-18

## 2024-12-18 RX ADMIN — CLOBETASOL PROPIONATE: 0.5 CREAM TOPICAL at 00:00

## 2024-12-18 ASSESSMENT — LOCATION DETAILED DESCRIPTION DERM
LOCATION DETAILED: RIGHT CENTRAL PARIETAL SCALP
LOCATION DETAILED: PERIUMBILICAL SKIN
LOCATION DETAILED: LEFT PROXIMAL DORSAL FOREARM
LOCATION DETAILED: RIGHT SUPERIOR PARIETAL SCALP
LOCATION DETAILED: LEFT SUPERIOR PARIETAL SCALP
LOCATION DETAILED: RIGHT PROXIMAL DORSAL FOREARM
LOCATION DETAILED: RIGHT MEDIAL FRONTAL SCALP
LOCATION DETAILED: SUPERIOR MID FOREHEAD
LOCATION DETAILED: RIGHT LATERAL FRONTAL SCALP
LOCATION DETAILED: RIGHT DISTAL DORSAL FOREARM
LOCATION DETAILED: RIGHT SUPERIOR UPPER BACK
LOCATION DETAILED: LEFT DISTAL DORSAL FOREARM
LOCATION DETAILED: MID-FRONTAL SCALP
LOCATION DETAILED: LEFT MEDIAL FRONTAL SCALP
LOCATION DETAILED: SUPERIOR THORACIC SPINE
LOCATION DETAILED: RIGHT SUPERIOR FOREHEAD
LOCATION DETAILED: LEFT SUPERIOR FOREHEAD
LOCATION DETAILED: RIGHT CENTRAL FRONTAL SCALP

## 2024-12-18 ASSESSMENT — LOCATION SIMPLE DESCRIPTION DERM
LOCATION SIMPLE: RIGHT FOREHEAD
LOCATION SIMPLE: SUPERIOR FOREHEAD
LOCATION SIMPLE: RIGHT FOREARM
LOCATION SIMPLE: RIGHT SCALP
LOCATION SIMPLE: LEFT FOREHEAD
LOCATION SIMPLE: ABDOMEN
LOCATION SIMPLE: LEFT SCALP
LOCATION SIMPLE: UPPER BACK
LOCATION SIMPLE: ANTERIOR SCALP
LOCATION SIMPLE: LEFT FOREARM
LOCATION SIMPLE: RIGHT UPPER BACK
LOCATION SIMPLE: SCALP

## 2024-12-18 ASSESSMENT — LOCATION ZONE DERM
LOCATION ZONE: FACE
LOCATION ZONE: SCALP
LOCATION ZONE: TRUNK
LOCATION ZONE: ARM

## 2024-12-18 NOTE — PROCEDURE: BIOPSY BY SHAVE METHOD
Detail Level: Detailed
Depth Of Biopsy: dermis
Was A Bandage Applied: Yes
Size Of Lesion In Cm: 0
Biopsy Type: H and E
Biopsy Method: Dermablade
Anesthesia Type: 1% lidocaine with epinephrine
Anesthesia Volume In Cc: 0.5
Hemostasis: Aluminum Chloride
Wound Care: Petrolatum
Dressing: Band-Aid
Destruction After The Procedure: No
Type Of Destruction Used: Electrodesiccation
Curettage Text: The wound bed was treated with curettage after the biopsy was performed.
Cryotherapy Text: The wound bed was treated with cryotherapy after the biopsy was performed.
Electrodesiccation Text: The wound bed was treated with electrodesiccation after the biopsy was performed.
Electrodesiccation And Curettage Text: The wound bed was treated with electrodesiccation and curettage after the biopsy was performed.
Silver Nitrate Text: The wound bed was treated with silver nitrate after the biopsy was performed.
Lab: [id number]
Lab Facility: [id number]
Medical Necessity Information: It is in your best interest to select a reason for this procedure from the list below. All of these items fulfill various CMS LCD requirements except the new and changing color options.
Consent was obtained and risks were reviewed including but not limited to scarring, infection, bleeding, scabbing, incomplete removal, nerve damage and allergy to anesthesia.
Post-Care Instructions: I reviewed with the patient in detail post-care instructions. Patient is to keep the biopsy site dry overnight, and then apply Vaseline twice daily until healed.
Notification Instructions: Patient will be notified of biopsy results. However, patient instructed to call the office if not contacted within 2 weeks.
Billing Type: Third-Party Bill
Information: Selecting Yes will display possible errors in your note based on the variables you have selected. This validation is only offered as a suggestion for you. PLEASE NOTE THAT THE VALIDATION TEXT WILL BE REMOVED WHEN YOU FINALIZE YOUR NOTE. IF YOU WANT TO FAX A PRELIMINARY NOTE YOU WILL NEED TO TOGGLE THIS TO 'NO' IF YOU DO NOT WANT IT IN YOUR FAXED NOTE.

## 2024-12-18 NOTE — PROCEDURE: LIQUID NITROGEN
Show Spray Paint Technique Variable?: Yes
Post-Care Instructions: I reviewed with the patient in detail post-care instructions. Patient is to wear sunprotection, and avoid picking at any of the treated lesions. Pt may apply Vaseline to crusted or scabbing areas.
Spray Paint Technique: No
Spray Paint Text: The liquid nitrogen was applied to the skin utilizing a spray paint frosting technique.
Number Of Freeze-Thaw Cycles: 1 freeze-thaw cycle
Medical Necessity Clause: This procedure was medically necessary because the lesions that were treated were:
Duration Of Freeze Thaw-Cycle (Seconds): 3
Consent: The patient's consent was obtained including but not limited to risks of crusting, scabbing, blistering, scarring, darker or lighter pigmentary change, recurrence, incomplete removal and infection.
Detail Level: Detailed
Medical Necessity Information: It is in your best interest to select a reason for this procedure from the list below. All of these items fulfill various CMS LCD requirements except the new and changing color options.

## 2025-01-04 ENCOUNTER — APPOINTMENT (OUTPATIENT)
Dept: URBAN - METROPOLITAN AREA CLINIC 56 | Facility: CLINIC | Age: 75
Setting detail: DERMATOLOGY
End: 2025-01-04

## 2025-01-04 PROBLEM — C44.42 SQUAMOUS CELL CARCINOMA OF SKIN OF SCALP AND NECK: Status: ACTIVE | Noted: 2025-01-04

## 2025-01-04 PROCEDURE — ? EXCISION

## 2025-01-04 PROCEDURE — 13132 CMPLX RPR F/C/C/M/N/AX/G/H/F: CPT

## 2025-01-04 PROCEDURE — 11623 EXC S/N/H/F/G MAL+MRG 2.1-3: CPT

## 2025-01-04 NOTE — PROCEDURE: EXCISION

## 2025-01-17 ENCOUNTER — APPOINTMENT (OUTPATIENT)
Dept: URBAN - METROPOLITAN AREA CLINIC 56 | Facility: CLINIC | Age: 75
Setting detail: DERMATOLOGY
End: 2025-01-17

## 2025-01-17 DIAGNOSIS — L57.0 ACTINIC KERATOSIS: ICD-10-CM

## 2025-01-17 PROCEDURE — ? PDT: BLUE

## 2025-01-17 PROCEDURE — 96574 DBRDMT PRMLG LES W/PDT: CPT

## 2025-01-17 ASSESSMENT — LOCATION DETAILED DESCRIPTION DERM: LOCATION DETAILED: POSTERIOR MID-PARIETAL SCALP

## 2025-01-17 ASSESSMENT — LOCATION SIMPLE DESCRIPTION DERM: LOCATION SIMPLE: POSTERIOR SCALP

## 2025-01-17 ASSESSMENT — LOCATION ZONE DERM: LOCATION ZONE: SCALP

## 2025-01-17 NOTE — PROCEDURE: PDT: BLUE
Treatment Number: 1
Consent: Written consent obtained.  The risks were reviewed with the patient including but not limited to: pigmentary changes, pain, blistering, scabbing, redness, and the remote possibility of scarring.
Detail Level: Zone
Illumination Time: 00:16:40
Show Anesthesia In Plan?: Yes
Total Number Of Aks Treated (Optional To Report): 0
Post-Care Instructions: I reviewed with the patient in detail post-care instructions. Patient is to avoid sunlight for the next 2 days, and wear sun protection. Patients may expect sunburn like redness, discomfort and scabbing.
Was Levulan/Ameluz Applied On A Previous Day?: No
Light Source: [id number]-[id number]
Incubation Time: 02:00:00
Who Performed The Pdt?: Performed by MD SELENA, SUSANNE or ANDREI with Pre-Procedure Debridement of Hyperkeratotic Lesions (52062)
Debridement Text (Will Only Render In Visit Note If You Select Debridement Option Under Who Performed The Pdt Field): Prior to application of the photodynamic medication the hyperkeratotic lesions were curetted to make them more amenable to therapy.
Who Performed The Pdt (Provider): Jo
Incubation Start Time: 8:15
Which Photosensitizer Was Used: Levulan
Anesthesia Type: 1% lidocaine with epinephrine
Medical Necessity: Precancerous Lesions
Pre-Procedure Text: The treatment areas were cleaned and prepped in the usual fashion.

## 2025-04-09 ENCOUNTER — APPOINTMENT (OUTPATIENT)
Dept: URBAN - METROPOLITAN AREA CLINIC 56 | Facility: CLINIC | Age: 75
Setting detail: DERMATOLOGY
End: 2025-04-09

## 2025-04-09 DIAGNOSIS — L57.0 ACTINIC KERATOSIS: ICD-10-CM | Status: INADEQUATELY CONTROLLED

## 2025-04-09 DIAGNOSIS — D18.0 HEMANGIOMA: ICD-10-CM

## 2025-04-09 DIAGNOSIS — L82.0 INFLAMED SEBORRHEIC KERATOSIS: ICD-10-CM

## 2025-04-09 DIAGNOSIS — L28.0 LICHEN SIMPLEX CHRONICUS: ICD-10-CM | Status: INADEQUATELY CONTROLLED

## 2025-04-09 DIAGNOSIS — L82.1 OTHER SEBORRHEIC KERATOSIS: ICD-10-CM | Status: STABLE

## 2025-04-09 DIAGNOSIS — L81.4 OTHER MELANIN HYPERPIGMENTATION: ICD-10-CM

## 2025-04-09 DIAGNOSIS — L57.8 OTHER SKIN CHANGES DUE TO CHRONIC EXPOSURE TO NONIONIZING RADIATION: ICD-10-CM | Status: STABLE

## 2025-04-09 DIAGNOSIS — D22 MELANOCYTIC NEVI: ICD-10-CM | Status: STABLE

## 2025-04-09 PROBLEM — D48.5 NEOPLASM OF UNCERTAIN BEHAVIOR OF SKIN: Status: ACTIVE | Noted: 2025-04-09

## 2025-04-09 PROBLEM — D18.01 HEMANGIOMA OF SKIN AND SUBCUTANEOUS TISSUE: Status: ACTIVE | Noted: 2025-04-09

## 2025-04-09 PROBLEM — D22.5 MELANOCYTIC NEVI OF TRUNK: Status: ACTIVE | Noted: 2025-04-09

## 2025-04-09 PROCEDURE — 17003 DESTRUCT PREMALG LES 2-14: CPT | Mod: 59

## 2025-04-09 PROCEDURE — 99214 OFFICE O/P EST MOD 30 MIN: CPT | Mod: 25

## 2025-04-09 PROCEDURE — ? COUNSELING

## 2025-04-09 PROCEDURE — ? BIOPSY BY SHAVE METHOD

## 2025-04-09 PROCEDURE — 17110 DESTRUCTION B9 LES UP TO 14: CPT

## 2025-04-09 PROCEDURE — ? PRESCRIPTION MEDICATION MANAGEMENT

## 2025-04-09 PROCEDURE — 11102 TANGNTL BX SKIN SINGLE LES: CPT | Mod: 59

## 2025-04-09 PROCEDURE — ? TREATMENT REGIMEN

## 2025-04-09 PROCEDURE — 17000 DESTRUCT PREMALG LESION: CPT | Mod: 59

## 2025-04-09 PROCEDURE — ? LIQUID NITROGEN

## 2025-04-09 PROCEDURE — ? FULL BODY SKIN EXAM

## 2025-04-09 PROCEDURE — ? PRESCRIPTION

## 2025-04-09 RX ORDER — CLOBETASOL PROPIONATE 0.5 MG/G
OINTMENT TOPICAL BID
Qty: 60 | Refills: 4 | Status: ERX

## 2025-04-09 ASSESSMENT — LOCATION DETAILED DESCRIPTION DERM
LOCATION DETAILED: RIGHT CENTRAL MALAR CHEEK
LOCATION DETAILED: RIGHT VENTRAL PROXIMAL FOREARM
LOCATION DETAILED: RIGHT LATERAL ZYGOMA
LOCATION DETAILED: RIGHT SUPERIOR PREAURICULAR CHEEK
LOCATION DETAILED: INFERIOR THORACIC SPINE
LOCATION DETAILED: LEFT CENTRAL FRONTAL SCALP
LOCATION DETAILED: RIGHT INFERIOR LATERAL NECK
LOCATION DETAILED: LEFT VENTRAL PROXIMAL FOREARM
LOCATION DETAILED: RIGHT CENTRAL FRONTAL SCALP
LOCATION DETAILED: LEFT INFERIOR CENTRAL MALAR CHEEK
LOCATION DETAILED: LOWER STERNUM
LOCATION DETAILED: RIGHT DISTAL DORSAL FOREARM
LOCATION DETAILED: RIGHT SUPERIOR MEDIAL UPPER BACK
LOCATION DETAILED: INFERIOR MID FOREHEAD
LOCATION DETAILED: LEFT MEDIAL SUPERIOR CHEST
LOCATION DETAILED: LEFT ANTERIOR PROXIMAL UPPER ARM
LOCATION DETAILED: RIGHT SUPERIOR CENTRAL BUCCAL CHEEK
LOCATION DETAILED: RIGHT ULNAR DORSAL HAND
LOCATION DETAILED: LEFT INFERIOR FOREHEAD
LOCATION DETAILED: LEFT DISTAL DORSAL FOREARM
LOCATION DETAILED: SUPERIOR THORACIC SPINE
LOCATION DETAILED: RIGHT MEDIAL SUPERIOR CHEST
LOCATION DETAILED: LEFT RADIAL DORSAL HAND
LOCATION DETAILED: RIGHT ANTERIOR PROXIMAL UPPER ARM

## 2025-04-09 ASSESSMENT — LOCATION SIMPLE DESCRIPTION DERM
LOCATION SIMPLE: LEFT FOREARM
LOCATION SIMPLE: RIGHT UPPER ARM
LOCATION SIMPLE: LEFT CHEEK
LOCATION SIMPLE: LEFT SCALP
LOCATION SIMPLE: UPPER BACK
LOCATION SIMPLE: LEFT HAND
LOCATION SIMPLE: LEFT UPPER ARM
LOCATION SIMPLE: INFERIOR FOREHEAD
LOCATION SIMPLE: LEFT FOREHEAD
LOCATION SIMPLE: RIGHT UPPER BACK
LOCATION SIMPLE: RIGHT SCALP
LOCATION SIMPLE: RIGHT FOREARM
LOCATION SIMPLE: CHEST
LOCATION SIMPLE: RIGHT CHEEK
LOCATION SIMPLE: RIGHT HAND
LOCATION SIMPLE: RIGHT ANTERIOR NECK
LOCATION SIMPLE: RIGHT ZYGOMA

## 2025-04-09 ASSESSMENT — LOCATION ZONE DERM
LOCATION ZONE: ARM
LOCATION ZONE: TRUNK
LOCATION ZONE: NECK
LOCATION ZONE: FACE
LOCATION ZONE: SCALP
LOCATION ZONE: HAND

## 2025-04-09 NOTE — PROCEDURE: LIQUID NITROGEN
Application Tool (Optional): Liquid Nitrogen Sprayer
Number Of Freeze-Thaw Cycles: 1 freeze-thaw cycle
Show Aperture Variable?: Yes
Duration Of Freeze Thaw-Cycle (Seconds): 3
Detail Level: Detailed
Render Note In Bullet Format When Appropriate: No
Post-Care Instructions: I reviewed with the patient in detail post-care instructions. Patient is to wear sunprotection, and avoid picking at any of the treated lesions. Pt may apply Vaseline to crusted or scabbing areas.
Consent: The patient's consent was obtained including but not limited to risks of crusting, scabbing, blistering, scarring, darker or lighter pigmentary change, recurrence, incomplete removal and infection.
Spray Paint Text: The liquid nitrogen was applied to the skin utilizing a spray paint frosting technique.
Medical Necessity Information: It is in your best interest to select a reason for this procedure from the list below. All of these items fulfill various CMS LCD requirements except the new and changing color options.
Medical Necessity Clause: This procedure was medically necessary because the lesions that were treated were:

## 2025-04-25 ENCOUNTER — APPOINTMENT (OUTPATIENT)
Dept: URBAN - METROPOLITAN AREA CLINIC 56 | Facility: CLINIC | Age: 75
Setting detail: DERMATOLOGY
End: 2025-04-25

## 2025-04-25 PROBLEM — C44.519 BASAL CELL CARCINOMA OF SKIN OF OTHER PART OF TRUNK: Status: ACTIVE | Noted: 2025-04-25

## 2025-04-25 PROCEDURE — ? CRYOSURGICAL DESTRUCTION

## 2025-04-25 PROCEDURE — 17262 DSTRJ MAL LES T/A/L 1.1-2.0: CPT

## 2025-07-18 ENCOUNTER — PATIENT MESSAGE (OUTPATIENT)
Dept: RADIOLOGY | Facility: MEDICAL CENTER | Age: 75
End: 2025-07-18
Payer: MEDICARE

## (undated) DEVICE — ARMREST CRADLE FOAM - (2PR/PK 12PR/CA)

## (undated) DEVICE — REMOTE CONTROL

## (undated) DEVICE — SPONGE GAUZESTER 4 X 4 4PLY - (128PK/CA)

## (undated) DEVICE — SUCTION INSTRUMENT YANKAUER BULBOUS TIP W/O VENT (50EA/CA)

## (undated) DEVICE — SUTURE 2-0 VICRYL PLUS CT-1 - 8 X 18 INCH(12/BX)

## (undated) DEVICE — SUTURE 2-0 VICRYL PLUS SH - 8 X 18 INCH (12/BX)

## (undated) DEVICE — DRAPE IOBAN II INCISE 23X17 - (10EA/BX 4BX/CA)

## (undated) DEVICE — GOWN SURGEONS LARGE - (32/CA)

## (undated) DEVICE — HEADREST PRONEVIEW LARGE - (10/CA)

## (undated) DEVICE — LACTATED RINGERS INJ 1000 ML - (14EA/CA 60CA/PF)

## (undated) DEVICE — BAG, SPONGE COUNT 50600

## (undated) DEVICE — ELECTRODE DUAL RETURN W/ CORD - (50/PK)

## (undated) DEVICE — ELECTRODE 850 FOAM ADHESIVE - HYDROGEL RADIOTRNSPRNT (50/PK)

## (undated) DEVICE — CANISTER SUCTION RIGID RED 1500CC (40EA/CA)

## (undated) DEVICE — HUMID-VENT HEAT AND MOISTURE EXCHANGE- (50/BX)

## (undated) DEVICE — GOWN WARMING STANDARD FLEX - (30/CA)

## (undated) DEVICE — KIT ROOM DECONTAMINATION

## (undated) DEVICE — TUNNELING TOOL

## (undated) DEVICE — NEEDLE NON SAFETY 25 GA X 1 1/2 IN HYPO (100EA/BX)

## (undated) DEVICE — SENSOR SPO2 NEO LNCS ADHESIVE (20/BX) SEE USER NOTES

## (undated) DEVICE — SUTURE 0 ETHIBOND CT-1 - (12/BX) 18 INCH

## (undated) DEVICE — SYRINGE 10 ML CONTROL LL (25EA/BX 4BX/CA)

## (undated) DEVICE — PACK MINOR BASIN - (2EA/CA)

## (undated) DEVICE — SYRINGE DISP. 60 CC LL - (30/BX, 12BX/CA)**WHEN THESE ARE GONE ORDER #500206**

## (undated) DEVICE — SYRINGE LOSS OF RESIST. 50/BX - (50/CA)

## (undated) DEVICE — TUBE CONNECTING SUCTION - CLEAR PLASTIC STERILE 72 IN (50EA/CA)

## (undated) DEVICE — GLOVE SZ 7.5 LF PROTEXIS (50PR/BX)

## (undated) DEVICE — CHLORAPREP 26 ML APPLICATOR - ORANGE TINT(25/CA)

## (undated) DEVICE — DRAPE C-ARM LARGE 41IN X 74 IN - (10/BX 2BX/CA)

## (undated) DEVICE — CONTAINER, SPECIMEN, STERILE

## (undated) DEVICE — SODIUM CHL IRRIGATION 0.9% 1000ML (12EA/CA)

## (undated) DEVICE — SUTURE GENERAL

## (undated) DEVICE — NEPTUNE 4 PORT MANIFOLD - (20/PK)

## (undated) DEVICE — JELLY SURGILUBE STERILE TUBE 4.25 OZ (1/EA)

## (undated) DEVICE — SPONGE GAUZE NON-STERILE 4X4 - (2000/CA 10PK/CA)

## (undated) DEVICE — SUTURE 0 ETHIBOND OS-4 - (36/BX) 30 INCH

## (undated) DEVICE — DRESSING TRANSPARENT FILM TEGADERM 4 X 4.75" (50EA/BX)"

## (undated) DEVICE — DRAPE LAPAROTOMY T SHEET - (12EA/CA)

## (undated) DEVICE — HEAD HOLDER JUNIOR/ADULT

## (undated) DEVICE — DRAPE LARGE 3 QUARTER - (20/CA)

## (undated) DEVICE — BITE BLOCK ADULT 60FR (100EA/CA)

## (undated) DEVICE — KIT ANESTHESIA W/CIRCUIT & 3/LT BAG W/FILTER (20EA/CA)

## (undated) DEVICE — MASK ANESTHESIA ADULT  - (100/CA)

## (undated) DEVICE — GLOVE, LITE (PAIR)